# Patient Record
Sex: MALE | Race: WHITE | NOT HISPANIC OR LATINO | Employment: OTHER | URBAN - METROPOLITAN AREA
[De-identification: names, ages, dates, MRNs, and addresses within clinical notes are randomized per-mention and may not be internally consistent; named-entity substitution may affect disease eponyms.]

---

## 2023-07-09 ENCOUNTER — NURSING HOME VISIT (OUTPATIENT)
Dept: POST ACUTE CARE | Facility: EXTERNAL LOCATION | Age: 78
End: 2023-07-09
Payer: MEDICARE

## 2023-07-09 DIAGNOSIS — Z91.81 AT RISK FOR FALLS: ICD-10-CM

## 2023-07-09 DIAGNOSIS — L03.818 CELLULITIS OF OTHER SPECIFIED SITE: Primary | ICD-10-CM

## 2023-07-09 DIAGNOSIS — M62.82 NON-TRAUMATIC RHABDOMYOLYSIS: ICD-10-CM

## 2023-07-09 DIAGNOSIS — R53.1 ASTHENIA: ICD-10-CM

## 2023-07-09 DIAGNOSIS — I25.10 CORONARY ARTERY DISEASE, UNSPECIFIED VESSEL OR LESION TYPE, UNSPECIFIED WHETHER ANGINA PRESENT, UNSPECIFIED WHETHER NATIVE OR TRANSPLANTED HEART: ICD-10-CM

## 2023-07-09 DIAGNOSIS — S70.10XD: ICD-10-CM

## 2023-07-09 DIAGNOSIS — E13.9 DIABETES MELLITUS OF OTHER TYPE WITHOUT COMPLICATION, UNSPECIFIED WHETHER LONG TERM INSULIN USE (MULTI): ICD-10-CM

## 2023-07-09 DIAGNOSIS — I48.91 ATRIAL FIBRILLATION, UNSPECIFIED TYPE (MULTI): ICD-10-CM

## 2023-07-09 DIAGNOSIS — I10 BENIGN ESSENTIAL HYPERTENSION: ICD-10-CM

## 2023-07-09 PROCEDURE — 99306 1ST NF CARE HIGH MDM 50: CPT | Performed by: INTERNAL MEDICINE

## 2023-07-09 NOTE — LETTER
Patient: Darrell Arreola  : 1945    Encounter Date: 2023    NAME OF THE PATIENT: Darrell Arreola    YOB: 1945    PLACE OF SERVICE:   [facility]    This is new/initial history and physical.    HPI:  Mr. Darrell Arreola is a 77-year-old male with history of diabetes, who developed cellulitis to the left lower extremity.  He did have a fall.  He was noted to have contusion to his hip, as well as hematoma to his left thigh.  He was found to be in rhabdomyolysis.  He will continue oral fluid replacement.    PAST MEDICAL HISTORY:  As per nursing home list.  Cellulitis, hematoma, contusion, rhabdomyolysis, atrial fibrillation, coronary artery disease, diabetes, chronic ulcer left lower extremity, CVA, coronary artery disease, PAD, obesity, hypertension and atrial fibrillation.    CURRENT MEDICATIONS:  As per nursing home list.  Reviewed.    ALLERGIES:  As per nursing home list.  No known allergies.    SOCIAL HISTORY:  As per nursing home list.  The patient has a history of cigarette abuse.  He denies use of alcohol.    FAMILY HISTORY:  As per nursing home list.  History of hypertension in this patient's family.    REVIEW OF SYSTEMS:  All 12 systems reviewed and pertaining covered in history and physical, the rest are negative.  The patient denies any fever, chills, or chest pain at this time.    PHYSICAL EXAMINATION  GENERAL: This is a well-developed, well-nourished male, lying in bed, appearing weak and lethargic.  VITAL SIGNS:  As recorded and reviewed from EMR.  Blood pressure 130/84.  Pulse 86.  Respirations 18.  Temperature 98.3.  EYES:  The patient's sclerae white.  The pupils were equal and round.  ENT:  The patient's external ears were normal and the otoscopic examination was negative.  NECK:  Supple.  There were no palpable masses.  Thyroid was not enlarged and there were no carotid bruits.  RESPIRATORY:  The patient had normal inspirations and expirations.  The breath sounds were  equal bilaterally and clear to auscultation.  CARDIOVASCULAR:  The patient had S1 normal, split S2 without obvious rubs, clicks, or murmurs.  GASTROINTESTINAL:  There was no hepatosplenomegaly.  There were no palpable masses and no inguinal nodes.  LYMPHATIC:  The patient had no axillary, groin, or lymphadenopathy.  MUSCULOSKELETAL:  The patient had normal gait.  The joints appeared to be normal without evidence of deformity.  Grossly the muscles had good range of motion and were without effusion.  EXTREMITIES:  There is tenderness over the patient's left hip.  There are cellulitic changes to the patient's left lower extremity.  There is a chronic ulcer to the left lower extremity with dressing employed.  NEUROLOGIC:  The patient had normal cranial nerves.  The reflexes, sensory, and motor examination were grossly within normal limits.  PSYCHIATRIC: The patient had normal judgment and insight.  The patient was oriented to person, place, and time, and had no obvious mood defect including depression, anxiety, and/or agitation.    LAB WORK: Laboratory studies were reviewed.    ASSESSMENT AND PLAN:  Cellulitis, on antibotic.  Diabetes, on insulin.  Hematoma, left thigh, continue to monitor.  Contusion of the hip, on pain control.  Rhabdomyolysis, on oral replacement.  Atrial fibrillation, rate controlled, on Eliquis.  Coronary artery disease, on aspirin.  Weakness, on PT/OT.  Fall risk, on fall precaution.  Hypertension, med controlled.  Medication list reviewed and discussed with the family.  Hospital chart reviewed.      Electronically Signed By: Susan Delgado MD   8/24/23  6:10 PM

## 2023-07-15 ENCOUNTER — NURSING HOME VISIT (OUTPATIENT)
Dept: POST ACUTE CARE | Facility: EXTERNAL LOCATION | Age: 78
End: 2023-07-15
Payer: MEDICARE

## 2023-07-15 DIAGNOSIS — I10 BENIGN ESSENTIAL HYPERTENSION: ICD-10-CM

## 2023-07-15 DIAGNOSIS — I73.9 PVD (PERIPHERAL VASCULAR DISEASE) (CMS-HCC): ICD-10-CM

## 2023-07-15 DIAGNOSIS — M62.82 NON-TRAUMATIC RHABDOMYOLYSIS: ICD-10-CM

## 2023-07-15 DIAGNOSIS — L03.818 CELLULITIS OF OTHER SPECIFIED SITE: Primary | ICD-10-CM

## 2023-07-15 DIAGNOSIS — S70.10XD: ICD-10-CM

## 2023-07-15 DIAGNOSIS — I48.91 ATRIAL FIBRILLATION, UNSPECIFIED TYPE (MULTI): ICD-10-CM

## 2023-07-15 DIAGNOSIS — F32.A DEPRESSION, UNSPECIFIED DEPRESSION TYPE: ICD-10-CM

## 2023-07-15 DIAGNOSIS — R53.1 ASTHENIA: ICD-10-CM

## 2023-07-15 DIAGNOSIS — Z91.81 AT RISK FOR FALLS: ICD-10-CM

## 2023-07-15 DIAGNOSIS — E13.9 DIABETES MELLITUS OF OTHER TYPE WITHOUT COMPLICATION, UNSPECIFIED WHETHER LONG TERM INSULIN USE (MULTI): ICD-10-CM

## 2023-07-15 PROCEDURE — 99308 SBSQ NF CARE LOW MDM 20: CPT | Performed by: INTERNAL MEDICINE

## 2023-07-15 NOTE — LETTER
Patient: Darrell Arreola  : 1945    Encounter Date: 07/15/2023    NAME OF THE PATIENT: Darrell Arreola     YOB: 1945    PLACE OF SERVICE:  [facility]    This is a subsequent visit.    HPI:  Mr. Darrell Arreola is a 77-year-old male with history of diabetes with cellulitis to his lower extremity.  He has had recent rhabdomyolysis.  He suffers from weakness and deconditioning and unable to care for himself.  He requires supportive care.    PAST MEDICAL HISTORY:  As per nursing home list.  Cellulitis, hematoma, contusion, rhabdomyolysis, atrial fibrillation, coronary artery disease, diabetes, chronic ulcer left lower extremity, CVA, coronary artery disease, PAD, obesity, hypertension and atrial fibrillation.    CURRENT MEDICATIONS:  As per nursing home list.  Reviewed.    ALLERGIES:  As per nursing home list.  No known allergies.    SOCIAL HISTORY:  As per nursing home list.  The patient has a history of cigarette abuse.  He denies use of alcohol.    FAMILY HISTORY:  As per nursing home list.  History of hypertension in this patient's family.    REVIEW OF SYSTEMS:  All 12 systems reviewed and pertaining covered in history and physical, the rest are negative.  The patient denies any fever, chills, or chest pain at this time.    PHYSICAL EXAMINATION  GENERAL:  This is a well-developed, well-nourished male, sitting in a chair, in no acute distress.  VITAL SIGNS:  As recorded and reviewed from EMR.  Blood pressure 130/80.  Pulse 74.  Respirations 16.  Temperature 97.5.  EYES:  The patient's sclerae white.  The pupils were equal and round.  ENT:  The patient's external ears were normal and the otoscopic examination was negative.  NECK:  Supple.  There were no palpable masses.  Thyroid was not enlarged and there were no carotid bruits.  RESPIRATORY:  The patient had normal inspirations and expirations.  The breath sounds were equal bilaterally and clear to auscultation.  CARDIOVASCULAR:  The patient  had S1 normal, split S2 without obvious rubs, clicks, or murmurs.  GASTROINTESTINAL:  There was no hepatosplenomegaly.  There were no palpable masses and no inguinal nodes.  LYMPHATIC:  The patient had no axillary, groin, or lymphadenopathy.  MUSCULOSKELETAL:  The patient had normal gait.  The joints appeared to be normal without evidence of deformity.  Grossly the muscles had good range of motion and were without effusion.  EXTREMITIES:  The patient does have cellulitis to his left lower extremity.  There is tenderness over the patient's left thigh.  NEUROLOGIC:  The patient had normal cranial nerves.  The reflexes, sensory, and motor examination were grossly within normal limits.  PSYCHIATRIC: The patient had normal judgment and insight.  The patient was oriented to person, place, and time, and had no obvious mood defect including depression, anxiety, and/or agitation.    LAB WORK:  Laboratory studies reviewed from prior visit.    ASSESSMENT AND PLAN:  Cellulitis, on antibiotic.  Thigh contusion, on pain control.  Rhabdomyolysis, oral hydration.  Atrial fibrillation, rate controlled.  Diabetes, on insulin.  PVD, on medication.  Hypertension, med controlled.  Depression, on medication.  Weakness, on PT and OT.  Fall risk, fall precaution.  Medication list reviewed and discussed with the family.  Hospital chart reviewed.      Electronically Signed By: Susan Delgado MD   8/24/23  6:10 PM

## 2023-08-23 PROBLEM — I10 BENIGN ESSENTIAL HYPERTENSION: Status: ACTIVE | Noted: 2023-08-23

## 2023-08-23 PROBLEM — E11.9 DIABETES MELLITUS (MULTI): Status: ACTIVE | Noted: 2023-08-23

## 2023-08-23 PROBLEM — I73.9 PVD (PERIPHERAL VASCULAR DISEASE) (CMS-HCC): Status: ACTIVE | Noted: 2023-08-23

## 2023-08-23 PROBLEM — L03.90 CELLULITIS: Status: ACTIVE | Noted: 2023-08-23

## 2023-08-23 PROBLEM — Z91.81 AT RISK FOR FALLS: Status: ACTIVE | Noted: 2023-08-23

## 2023-08-23 PROBLEM — R53.1 ASTHENIA: Status: ACTIVE | Noted: 2023-08-23

## 2023-08-23 PROBLEM — I48.91 ATRIAL FIBRILLATION (MULTI): Status: ACTIVE | Noted: 2023-08-23

## 2023-08-23 PROBLEM — M62.82 NON-TRAUMATIC RHABDOMYOLYSIS: Status: ACTIVE | Noted: 2023-08-23

## 2023-08-23 PROBLEM — S70.10XA CONTUSION OF THIGH: Status: ACTIVE | Noted: 2023-08-23

## 2023-08-23 PROBLEM — I25.10 CORONARY ARTERY DISEASE: Status: ACTIVE | Noted: 2023-08-23

## 2023-08-23 PROBLEM — F32.A DEPRESSION: Status: ACTIVE | Noted: 2023-08-23

## 2023-08-24 NOTE — PROGRESS NOTES
NAME OF THE PATIENT: Darrell Arreola     YOB: 1945    PLACE OF SERVICE:  [facility]    This is a subsequent visit.    HPI:  Mr. Darrell Arreola is a 77-year-old male with history of diabetes with cellulitis to his lower extremity.  He has had recent rhabdomyolysis.  He suffers from weakness and deconditioning and unable to care for himself.  He requires supportive care.    PAST MEDICAL HISTORY:  As per nursing home list.  Cellulitis, hematoma, contusion, rhabdomyolysis, atrial fibrillation, coronary artery disease, diabetes, chronic ulcer left lower extremity, CVA, coronary artery disease, PAD, obesity, hypertension and atrial fibrillation.    CURRENT MEDICATIONS:  As per nursing home list.  Reviewed.    ALLERGIES:  As per nursing home list.  No known allergies.    SOCIAL HISTORY:  As per nursing home list.  The patient has a history of cigarette abuse.  He denies use of alcohol.    FAMILY HISTORY:  As per nursing home list.  History of hypertension in this patient's family.    REVIEW OF SYSTEMS:  All 12 systems reviewed and pertaining covered in history and physical, the rest are negative.  The patient denies any fever, chills, or chest pain at this time.    PHYSICAL EXAMINATION  GENERAL:  This is a well-developed, well-nourished male, sitting in a chair, in no acute distress.  VITAL SIGNS:  As recorded and reviewed from EMR.  Blood pressure 130/80.  Pulse 74.  Respirations 16.  Temperature 97.5.  EYES:  The patient's sclerae white.  The pupils were equal and round.  ENT:  The patient's external ears were normal and the otoscopic examination was negative.  NECK:  Supple.  There were no palpable masses.  Thyroid was not enlarged and there were no carotid bruits.  RESPIRATORY:  The patient had normal inspirations and expirations.  The breath sounds were equal bilaterally and clear to auscultation.  CARDIOVASCULAR:  The patient had S1 normal, split S2 without obvious rubs, clicks, or  murmurs.  GASTROINTESTINAL:  There was no hepatosplenomegaly.  There were no palpable masses and no inguinal nodes.  LYMPHATIC:  The patient had no axillary, groin, or lymphadenopathy.  MUSCULOSKELETAL:  The patient had normal gait.  The joints appeared to be normal without evidence of deformity.  Grossly the muscles had good range of motion and were without effusion.  EXTREMITIES:  The patient does have cellulitis to his left lower extremity.  There is tenderness over the patient's left thigh.  NEUROLOGIC:  The patient had normal cranial nerves.  The reflexes, sensory, and motor examination were grossly within normal limits.  PSYCHIATRIC: The patient had normal judgment and insight.  The patient was oriented to person, place, and time, and had no obvious mood defect including depression, anxiety, and/or agitation.    LAB WORK:  Laboratory studies reviewed from prior visit.    ASSESSMENT AND PLAN:  Cellulitis, on antibiotic.  Thigh contusion, on pain control.  Rhabdomyolysis, oral hydration.  Atrial fibrillation, rate controlled.  Diabetes, on insulin.  PVD, on medication.  Hypertension, med controlled.  Depression, on medication.  Weakness, on PT and OT.  Fall risk, fall precaution.  Medication list reviewed and discussed with the family.  Hospital chart reviewed.

## 2023-08-24 NOTE — PROGRESS NOTES
NAME OF THE PATIENT: Darrell Arreola    YOB: 1945    PLACE OF SERVICE:   [facility]    This is new/initial history and physical.    HPI:  Mr. Darrell Arreola is a 77-year-old male with history of diabetes, who developed cellulitis to the left lower extremity.  He did have a fall.  He was noted to have contusion to his hip, as well as hematoma to his left thigh.  He was found to be in rhabdomyolysis.  He will continue oral fluid replacement.    PAST MEDICAL HISTORY:  As per nursing home list.  Cellulitis, hematoma, contusion, rhabdomyolysis, atrial fibrillation, coronary artery disease, diabetes, chronic ulcer left lower extremity, CVA, coronary artery disease, PAD, obesity, hypertension and atrial fibrillation.    CURRENT MEDICATIONS:  As per nursing home list.  Reviewed.    ALLERGIES:  As per nursing home list.  No known allergies.    SOCIAL HISTORY:  As per nursing home list.  The patient has a history of cigarette abuse.  He denies use of alcohol.    FAMILY HISTORY:  As per nursing home list.  History of hypertension in this patient's family.    REVIEW OF SYSTEMS:  All 12 systems reviewed and pertaining covered in history and physical, the rest are negative.  The patient denies any fever, chills, or chest pain at this time.    PHYSICAL EXAMINATION  GENERAL: This is a well-developed, well-nourished male, lying in bed, appearing weak and lethargic.  VITAL SIGNS:  As recorded and reviewed from EMR.  Blood pressure 130/84.  Pulse 86.  Respirations 18.  Temperature 98.3.  EYES:  The patient's sclerae white.  The pupils were equal and round.  ENT:  The patient's external ears were normal and the otoscopic examination was negative.  NECK:  Supple.  There were no palpable masses.  Thyroid was not enlarged and there were no carotid bruits.  RESPIRATORY:  The patient had normal inspirations and expirations.  The breath sounds were equal bilaterally and clear to auscultation.  CARDIOVASCULAR:  The  patient had S1 normal, split S2 without obvious rubs, clicks, or murmurs.  GASTROINTESTINAL:  There was no hepatosplenomegaly.  There were no palpable masses and no inguinal nodes.  LYMPHATIC:  The patient had no axillary, groin, or lymphadenopathy.  MUSCULOSKELETAL:  The patient had normal gait.  The joints appeared to be normal without evidence of deformity.  Grossly the muscles had good range of motion and were without effusion.  EXTREMITIES:  There is tenderness over the patient's left hip.  There are cellulitic changes to the patient's left lower extremity.  There is a chronic ulcer to the left lower extremity with dressing employed.  NEUROLOGIC:  The patient had normal cranial nerves.  The reflexes, sensory, and motor examination were grossly within normal limits.  PSYCHIATRIC: The patient had normal judgment and insight.  The patient was oriented to person, place, and time, and had no obvious mood defect including depression, anxiety, and/or agitation.    LAB WORK: Laboratory studies were reviewed.    ASSESSMENT AND PLAN:  Cellulitis, on antibotic.  Diabetes, on insulin.  Hematoma, left thigh, continue to monitor.  Contusion of the hip, on pain control.  Rhabdomyolysis, on oral replacement.  Atrial fibrillation, rate controlled, on Eliquis.  Coronary artery disease, on aspirin.  Weakness, on PT/OT.  Fall risk, on fall precaution.  Hypertension, med controlled.  Medication list reviewed and discussed with the family.  Hospital chart reviewed.

## 2023-09-02 ENCOUNTER — NURSING HOME VISIT (OUTPATIENT)
Dept: POST ACUTE CARE | Facility: EXTERNAL LOCATION | Age: 78
End: 2023-09-02
Payer: MEDICARE

## 2023-09-02 DIAGNOSIS — E13.9 DIABETES MELLITUS OF OTHER TYPE WITHOUT COMPLICATION, UNSPECIFIED WHETHER LONG TERM INSULIN USE (MULTI): ICD-10-CM

## 2023-09-02 DIAGNOSIS — I10 BENIGN ESSENTIAL HYPERTENSION: ICD-10-CM

## 2023-09-02 DIAGNOSIS — Z91.81 AT RISK FOR FALLS: ICD-10-CM

## 2023-09-02 DIAGNOSIS — F32.89 OTHER DEPRESSION: ICD-10-CM

## 2023-09-02 DIAGNOSIS — I73.9 PVD (PERIPHERAL VASCULAR DISEASE) (CMS-HCC): ICD-10-CM

## 2023-09-02 DIAGNOSIS — L03.818 CELLULITIS OF OTHER SPECIFIED SITE: Primary | ICD-10-CM

## 2023-09-02 DIAGNOSIS — R53.1 ASTHENIA: ICD-10-CM

## 2023-09-02 DIAGNOSIS — E66.9 OBESITY, UNSPECIFIED CLASSIFICATION, UNSPECIFIED OBESITY TYPE, UNSPECIFIED WHETHER SERIOUS COMORBIDITY PRESENT: ICD-10-CM

## 2023-09-02 DIAGNOSIS — I48.91 ATRIAL FIBRILLATION, UNSPECIFIED TYPE (MULTI): ICD-10-CM

## 2023-09-02 PROCEDURE — 99307 SBSQ NF CARE SF MDM 10: CPT | Performed by: INTERNAL MEDICINE

## 2023-09-02 NOTE — LETTER
Patient: Darrell Arreola  : 1945    Encounter Date: 2023    NAME OF THE PATIENT: Darrell Arreola     YOB: 1945    PLACE OF SERVICE:  [facility]    This is a subsequent visit.    HPI:  Mr. Darrell Arreola is a 78-year-old male with history of diabetes and DVT with cellulitis to his lower extremity.  He is currently unable to care for himself and manage his affairs.  He requires supportive care.    PAST MEDICAL HISTORY:  As per nursing home list.  Cellulitis, hematoma, contusion, rhabdomyolysis, atrial fibrillation, coronary artery disease, diabetes, chronic ulcer left lower extremity, CVA, coronary artery disease, PAD, obesity, hypertension and atrial fibrillation.    CURRENT MEDICATIONS:  As per nursing home list.  Reviewed.    ALLERGIES:  As per nursing home list.  No known allergies.    SOCIAL HISTORY:  As per nursing home list.  The patient has a history of cigarette abuse.  He denies use of alcohol.    FAMILY HISTORY:  As per nursing home list.  History of hypertension in this patient's family.    REVIEW OF SYSTEMS:  All 12 systems reviewed and pertaining covered in history and physical, the rest are negative.  The patient denies any fever, chills, or chest pain at this time.    PHYSICAL EXAMINATION  GENERAL:  This is a well-developed, well-nourished male, sitting in a chair, in no acute distress.  VITAL SIGNS:  As recorded and reviewed from EMR.  Blood pressure 128/76.  Pulse 74.  Respirations 18.  Temperature 98.3.  EYES:  The patient's sclerae white.  The pupils were equal and round.  ENT:  The patient's external ears were normal and the otoscopic examination was negative.  NECK:  Supple.  There were no palpable masses.  Thyroid was not enlarged and there were no carotid bruits.  RESPIRATORY:  The patient had normal inspirations and expirations.  The breath sounds were equal bilaterally and clear to auscultation.  CARDIOVASCULAR:  The patient had S1 normal, split S2 without  obvious rubs, clicks, or murmurs.  GASTROINTESTINAL:  There was no hepatosplenomegaly.  There were no palpable masses and no inguinal nodes.  LYMPHATIC:  The patient had no axillary, groin, or lymphadenopathy.  MUSCULOSKELETAL:  The patient had normal gait.  The joints appeared to be normal without evidence of deformity.  Grossly the muscles had good range of motion and were without effusion.  EXTREMITIES:  The patient does show some residual erythema to his left lower extremity.  NEUROLOGIC:  The patient had normal cranial nerves.  The reflexes, sensory, and motor examination were grossly within normal limits.  PSYCHIATRIC: The patient had normal judgment and insight.  The patient was oriented to person, place, and time, and had no obvious mood defect including anxiety, and/or agitation.    LAB WORK:  Laboratory studies were reviewed from prior visit.    ASSESSMENT AND PLAN:  Cellulitis, continue to monitor.  Atrial fibrillation, rate controlled.  Diabetes, on insulin.  PVD, on medication.  Morbid obesity, encouraged to exercise.  Hypertension, med controlled.  Depression, on medication.  Weakness, on PT and OT.  Fall risk, fall precaution.  Medication list reviewed and discussed with the family.  Hospital chart reviewed.      Electronically Signed By: Susan Delgado MD   9/26/23  1:39 PM

## 2023-09-25 PROBLEM — E66.9 OBESITY: Status: ACTIVE | Noted: 2023-09-25

## 2023-09-25 NOTE — PROGRESS NOTES
NAME OF THE PATIENT: Darrell Arreola     YOB: 1945    PLACE OF SERVICE:  [facility]    This is a subsequent visit.    HPI:  Mr. Darrell Arreola is a 78-year-old male with history of diabetes and DVT with cellulitis to his lower extremity.  He is currently unable to care for himself and manage his affairs.  He requires supportive care.    PAST MEDICAL HISTORY:  As per nursing home list.  Cellulitis, hematoma, contusion, rhabdomyolysis, atrial fibrillation, coronary artery disease, diabetes, chronic ulcer left lower extremity, CVA, coronary artery disease, PAD, obesity, hypertension and atrial fibrillation.    CURRENT MEDICATIONS:  As per nursing home list.  Reviewed.    ALLERGIES:  As per nursing home list.  No known allergies.    SOCIAL HISTORY:  As per nursing home list.  The patient has a history of cigarette abuse.  He denies use of alcohol.    FAMILY HISTORY:  As per nursing home list.  History of hypertension in this patient's family.    REVIEW OF SYSTEMS:  All 12 systems reviewed and pertaining covered in history and physical, the rest are negative.  The patient denies any fever, chills, or chest pain at this time.    PHYSICAL EXAMINATION  GENERAL:  This is a well-developed, well-nourished male, sitting in a chair, in no acute distress.  VITAL SIGNS:  As recorded and reviewed from EMR.  Blood pressure 128/76.  Pulse 74.  Respirations 18.  Temperature 98.3.  EYES:  The patient's sclerae white.  The pupils were equal and round.  ENT:  The patient's external ears were normal and the otoscopic examination was negative.  NECK:  Supple.  There were no palpable masses.  Thyroid was not enlarged and there were no carotid bruits.  RESPIRATORY:  The patient had normal inspirations and expirations.  The breath sounds were equal bilaterally and clear to auscultation.  CARDIOVASCULAR:  The patient had S1 normal, split S2 without obvious rubs, clicks, or murmurs.  GASTROINTESTINAL:  There was no  hepatosplenomegaly.  There were no palpable masses and no inguinal nodes.  LYMPHATIC:  The patient had no axillary, groin, or lymphadenopathy.  MUSCULOSKELETAL:  The patient had normal gait.  The joints appeared to be normal without evidence of deformity.  Grossly the muscles had good range of motion and were without effusion.  EXTREMITIES:  The patient does show some residual erythema to his left lower extremity.  NEUROLOGIC:  The patient had normal cranial nerves.  The reflexes, sensory, and motor examination were grossly within normal limits.  PSYCHIATRIC: The patient had normal judgment and insight.  The patient was oriented to person, place, and time, and had no obvious mood defect including anxiety, and/or agitation.    LAB WORK:  Laboratory studies were reviewed from prior visit.    ASSESSMENT AND PLAN:  Cellulitis, continue to monitor.  Atrial fibrillation, rate controlled.  Diabetes, on insulin.  PVD, on medication.  Morbid obesity, encouraged to exercise.  Hypertension, med controlled.  Depression, on medication.  Weakness, on PT and OT.  Fall risk, fall precaution.  Medication list reviewed and discussed with the family.  Hospital chart reviewed.

## 2023-10-08 ENCOUNTER — HOSPITAL ENCOUNTER (INPATIENT)
Facility: HOSPITAL | Age: 78
LOS: 12 days | Discharge: NURSING, REHAB OR LONG-TERM CARE | DRG: 091 | End: 2023-10-20
Attending: EMERGENCY MEDICINE | Admitting: INTERNAL MEDICINE
Payer: MEDICARE

## 2023-10-08 ENCOUNTER — APPOINTMENT (OUTPATIENT)
Dept: RADIOLOGY | Facility: HOSPITAL | Age: 78
DRG: 091 | End: 2023-10-08
Payer: MEDICARE

## 2023-10-08 DIAGNOSIS — N17.9 ACUTE RENAL FAILURE, UNSPECIFIED ACUTE RENAL FAILURE TYPE (CMS-HCC): ICD-10-CM

## 2023-10-08 DIAGNOSIS — I73.9 PVD (PERIPHERAL VASCULAR DISEASE) (CMS-HCC): ICD-10-CM

## 2023-10-08 DIAGNOSIS — R47.01 APHASIA: ICD-10-CM

## 2023-10-08 DIAGNOSIS — E66.9 CLASS 1 OBESITY WITHOUT SERIOUS COMORBIDITY WITH BODY MASS INDEX (BMI) OF 31.0 TO 31.9 IN ADULT, UNSPECIFIED OBESITY TYPE: ICD-10-CM

## 2023-10-08 DIAGNOSIS — I48.91 ATRIAL FIBRILLATION WITH RVR (MULTI): ICD-10-CM

## 2023-10-08 DIAGNOSIS — Z86.73 HISTORY OF CEREBROVASCULAR ACCIDENT: ICD-10-CM

## 2023-10-08 DIAGNOSIS — I25.118 CORONARY ARTERY DISEASE OF NATIVE HEART WITH STABLE ANGINA PECTORIS, UNSPECIFIED VESSEL OR LESION TYPE (CMS-HCC): ICD-10-CM

## 2023-10-08 DIAGNOSIS — I48.0 PAROXYSMAL ATRIAL FIBRILLATION (MULTI): ICD-10-CM

## 2023-10-08 DIAGNOSIS — R29.90 STROKE-LIKE SYMPTOMS: Primary | ICD-10-CM

## 2023-10-08 DIAGNOSIS — I48.20 CHRONIC ATRIAL FIBRILLATION (MULTI): ICD-10-CM

## 2023-10-08 DIAGNOSIS — E11.69 DIABETES MELLITUS TYPE 2 IN OBESE: ICD-10-CM

## 2023-10-08 DIAGNOSIS — I48.91 ATRIAL FIBRILLATION, UNSPECIFIED TYPE (MULTI): ICD-10-CM

## 2023-10-08 DIAGNOSIS — E66.9 DIABETES MELLITUS TYPE 2 IN OBESE: ICD-10-CM

## 2023-10-08 DIAGNOSIS — Z91.81 AT RISK FOR FALLS: ICD-10-CM

## 2023-10-08 DIAGNOSIS — R53.1 ASTHENIA: ICD-10-CM

## 2023-10-08 DIAGNOSIS — I63.9 CEREBRAL INFARCTION, UNSPECIFIED (MULTI): ICD-10-CM

## 2023-10-08 DIAGNOSIS — M62.82 NON-TRAUMATIC RHABDOMYOLYSIS: ICD-10-CM

## 2023-10-08 DIAGNOSIS — I10 BENIGN ESSENTIAL HYPERTENSION: ICD-10-CM

## 2023-10-08 DIAGNOSIS — I42.0 DILATED CARDIOMYOPATHY (MULTI): ICD-10-CM

## 2023-10-08 DIAGNOSIS — E13.9 DIABETES MELLITUS OF OTHER TYPE WITHOUT COMPLICATION, UNSPECIFIED WHETHER LONG TERM INSULIN USE (MULTI): ICD-10-CM

## 2023-10-08 LAB
ALBUMIN SERPL-MCNC: 4 G/DL (ref 3.5–5)
ALP BLD-CCNC: 159 U/L (ref 35–125)
ALT SERPL-CCNC: 13 U/L (ref 5–40)
ANION GAP SERPL CALC-SCNC: 15 MMOL/L
APPEARANCE UR: CLEAR
AST SERPL-CCNC: 24 U/L (ref 5–40)
BACTERIA #/AREA URNS AUTO: ABNORMAL /HPF
BASOPHILS # BLD AUTO: 0.01 X10*3/UL (ref 0–0.1)
BASOPHILS NFR BLD AUTO: 0.2 %
BILIRUB SERPL-MCNC: 2.1 MG/DL (ref 0.1–1.2)
BILIRUB UR STRIP.AUTO-MCNC: ABNORMAL MG/DL
BUN SERPL-MCNC: 23 MG/DL (ref 8–25)
CALCIUM SERPL-MCNC: 9.4 MG/DL (ref 8.5–10.4)
CHLORIDE SERPL-SCNC: 101 MMOL/L (ref 97–107)
CO2 SERPL-SCNC: 23 MMOL/L (ref 24–31)
COLOR UR: YELLOW
CREAT SERPL-MCNC: 1.4 MG/DL (ref 0.4–1.6)
EOSINOPHIL # BLD AUTO: 0.02 X10*3/UL (ref 0–0.4)
EOSINOPHIL NFR BLD AUTO: 0.5 %
ERYTHROCYTE [DISTWIDTH] IN BLOOD BY AUTOMATED COUNT: 15.5 % (ref 11.5–14.5)
GFR SERPL CREATININE-BSD FRML MDRD: 51 ML/MIN/1.73M*2
GLUCOSE BLD MANUAL STRIP-MCNC: 74 MG/DL (ref 74–99)
GLUCOSE BLD MANUAL STRIP-MCNC: 88 MG/DL (ref 74–99)
GLUCOSE BLD MANUAL STRIP-MCNC: 92 MG/DL (ref 74–99)
GLUCOSE SERPL-MCNC: 97 MG/DL (ref 65–99)
GLUCOSE UR STRIP.AUTO-MCNC: NORMAL MG/DL
HCT VFR BLD AUTO: 41.1 % (ref 41–52)
HGB BLD-MCNC: 13.2 G/DL (ref 13.5–17.5)
HYALINE CASTS #/AREA URNS AUTO: ABNORMAL /LPF
IMM GRANULOCYTES # BLD AUTO: 0.01 X10*3/UL (ref 0–0.5)
IMM GRANULOCYTES NFR BLD AUTO: 0.2 % (ref 0–0.9)
INR PPP: 1.3 (ref 0.9–1.2)
KETONES UR STRIP.AUTO-MCNC: ABNORMAL MG/DL
LEUKOCYTE ESTERASE UR QL STRIP.AUTO: NEGATIVE
LYMPHOCYTES # BLD AUTO: 1 X10*3/UL (ref 0.8–3)
LYMPHOCYTES NFR BLD AUTO: 24.1 %
MCH RBC QN AUTO: 33.8 PG (ref 26–34)
MCHC RBC AUTO-ENTMCNC: 32.1 G/DL (ref 32–36)
MCV RBC AUTO: 105 FL (ref 80–100)
MONOCYTES # BLD AUTO: 0.34 X10*3/UL (ref 0.05–0.8)
MONOCYTES NFR BLD AUTO: 8.2 %
NEUTROPHILS # BLD AUTO: 2.77 X10*3/UL (ref 1.6–5.5)
NEUTROPHILS NFR BLD AUTO: 66.8 %
NITRITE UR QL STRIP.AUTO: NEGATIVE
NRBC BLD-RTO: 0 /100 WBCS (ref 0–0)
PH UR STRIP.AUTO: 5 [PH]
PLATELET # BLD AUTO: 147 X10*3/UL (ref 150–450)
PMV BLD AUTO: 9.4 FL (ref 7.5–11.5)
POTASSIUM SERPL-SCNC: 4.5 MMOL/L (ref 3.4–5.1)
PROT SERPL-MCNC: 7.5 G/DL (ref 5.9–7.9)
PROT UR STRIP.AUTO-MCNC: ABNORMAL MG/DL
PROTHROMBIN TIME: 13 SECONDS (ref 9.3–12.7)
RBC # BLD AUTO: 3.91 X10*6/UL (ref 4.5–5.9)
RBC # UR STRIP.AUTO: ABNORMAL /UL
RBC #/AREA URNS AUTO: >20 /HPF
SODIUM SERPL-SCNC: 139 MMOL/L (ref 133–145)
SP GR UR STRIP.AUTO: 1.02
UROBILINOGEN UR STRIP.AUTO-MCNC: ABNORMAL MG/DL
WBC # BLD AUTO: 4.2 X10*3/UL (ref 4.4–11.3)
WBC #/AREA URNS AUTO: ABNORMAL /HPF

## 2023-10-08 PROCEDURE — 93010 ELECTROCARDIOGRAM REPORT: CPT | Performed by: INTERNAL MEDICINE

## 2023-10-08 PROCEDURE — 2060000001 HC INTERMEDIATE ICU ROOM DAILY

## 2023-10-08 PROCEDURE — 2550000001 HC RX 255 CONTRASTS: Performed by: INTERNAL MEDICINE

## 2023-10-08 PROCEDURE — 70498 CT ANGIOGRAPHY NECK: CPT | Mod: MG

## 2023-10-08 PROCEDURE — 85610 PROTHROMBIN TIME: CPT | Performed by: EMERGENCY MEDICINE

## 2023-10-08 PROCEDURE — 85025 COMPLETE CBC W/AUTO DIFF WBC: CPT | Performed by: EMERGENCY MEDICINE

## 2023-10-08 PROCEDURE — 99291 CRITICAL CARE FIRST HOUR: CPT

## 2023-10-08 PROCEDURE — 82947 ASSAY GLUCOSE BLOOD QUANT: CPT

## 2023-10-08 PROCEDURE — 70450 CT HEAD/BRAIN W/O DYE: CPT

## 2023-10-08 PROCEDURE — 96375 TX/PRO/DX INJ NEW DRUG ADDON: CPT

## 2023-10-08 PROCEDURE — 96365 THER/PROPH/DIAG IV INF INIT: CPT

## 2023-10-08 PROCEDURE — 2500000005 HC RX 250 GENERAL PHARMACY W/O HCPCS

## 2023-10-08 PROCEDURE — 80053 COMPREHEN METABOLIC PANEL: CPT | Performed by: EMERGENCY MEDICINE

## 2023-10-08 PROCEDURE — 36415 COLL VENOUS BLD VENIPUNCTURE: CPT | Performed by: EMERGENCY MEDICINE

## 2023-10-08 PROCEDURE — 2500000001 HC RX 250 WO HCPCS SELF ADMINISTERED DRUGS (ALT 637 FOR MEDICARE OP): Performed by: NURSE PRACTITIONER

## 2023-10-08 PROCEDURE — 2500000004 HC RX 250 GENERAL PHARMACY W/ HCPCS (ALT 636 FOR OP/ED)

## 2023-10-08 PROCEDURE — 81001 URINALYSIS AUTO W/SCOPE: CPT | Performed by: EMERGENCY MEDICINE

## 2023-10-08 PROCEDURE — 2500000001 HC RX 250 WO HCPCS SELF ADMINISTERED DRUGS (ALT 637 FOR MEDICARE OP): Performed by: EMERGENCY MEDICINE

## 2023-10-08 RX ORDER — METOPROLOL SUCCINATE 25 MG/1
12.5 TABLET, EXTENDED RELEASE ORAL 2 TIMES DAILY
COMMUNITY
End: 2023-10-20 | Stop reason: HOSPADM

## 2023-10-08 RX ORDER — DILTIAZEM HYDROCHLORIDE 5 MG/ML
10 INJECTION INTRAVENOUS ONCE
Status: COMPLETED | OUTPATIENT
Start: 2023-10-08 | End: 2023-10-08

## 2023-10-08 RX ORDER — VIT C/E/ZN/COPPR/LUTEIN/ZEAXAN 250MG-90MG
50 CAPSULE ORAL DAILY
COMMUNITY
End: 2023-10-08 | Stop reason: ALTCHOICE

## 2023-10-08 RX ORDER — SIMVASTATIN 20 MG/1
20 TABLET, FILM COATED ORAL NIGHTLY
COMMUNITY
End: 2023-10-08 | Stop reason: ALTCHOICE

## 2023-10-08 RX ORDER — CEFTRIAXONE 1 G/50ML
1 INJECTION, SOLUTION INTRAVENOUS ONCE
Status: COMPLETED | OUTPATIENT
Start: 2023-10-08 | End: 2023-10-08

## 2023-10-08 RX ORDER — NAPROXEN SODIUM 220 MG/1
324 TABLET, FILM COATED ORAL ONCE
Status: COMPLETED | OUTPATIENT
Start: 2023-10-08 | End: 2023-10-08

## 2023-10-08 RX ORDER — INSULIN LISPRO 100 [IU]/ML
0-5 INJECTION, SOLUTION INTRAVENOUS; SUBCUTANEOUS EVERY 4 HOURS
Status: DISCONTINUED | OUTPATIENT
Start: 2023-10-08 | End: 2023-10-12

## 2023-10-08 RX ORDER — ENOXAPARIN SODIUM 100 MG/ML
40 INJECTION SUBCUTANEOUS EVERY 24 HOURS
Status: DISCONTINUED | OUTPATIENT
Start: 2023-10-08 | End: 2023-10-08

## 2023-10-08 RX ORDER — ESCITALOPRAM OXALATE 10 MG/1
10 TABLET ORAL DAILY
COMMUNITY
End: 2023-10-08 | Stop reason: ALTCHOICE

## 2023-10-08 RX ORDER — CEFTRIAXONE 1 G/50ML
1 INJECTION, SOLUTION INTRAVENOUS EVERY 24 HOURS
Status: DISCONTINUED | OUTPATIENT
Start: 2023-10-09 | End: 2023-10-14

## 2023-10-08 RX ORDER — HYDRALAZINE HYDROCHLORIDE 20 MG/ML
10 INJECTION INTRAMUSCULAR; INTRAVENOUS
Status: ACTIVE | OUTPATIENT
Start: 2023-10-08 | End: 2023-10-10

## 2023-10-08 RX ORDER — LABETALOL HYDROCHLORIDE 5 MG/ML
10 INJECTION, SOLUTION INTRAVENOUS EVERY 10 MIN PRN
Status: ACTIVE | OUTPATIENT
Start: 2023-10-08 | End: 2023-10-10

## 2023-10-08 RX ORDER — METOPROLOL TARTRATE 1 MG/ML
5 INJECTION, SOLUTION INTRAVENOUS ONCE
Status: COMPLETED | OUTPATIENT
Start: 2023-10-08 | End: 2023-10-08

## 2023-10-08 RX ORDER — LIDOCAINE 50 MG/G
1 PATCH TOPICAL DAILY
COMMUNITY
End: 2023-10-08 | Stop reason: ALTCHOICE

## 2023-10-08 RX ORDER — VENLAFAXINE HYDROCHLORIDE 150 MG/1
150 CAPSULE, EXTENDED RELEASE ORAL DAILY
COMMUNITY
End: 2023-10-20 | Stop reason: HOSPADM

## 2023-10-08 RX ORDER — ASPIRIN 81 MG/1
81 TABLET ORAL DAILY
Status: DISCONTINUED | OUTPATIENT
Start: 2023-10-09 | End: 2023-10-16

## 2023-10-08 RX ORDER — DILTIAZEM HCL/D5W 125 MG/125
5-15 PLASTIC BAG, INJECTION (ML) INTRAVENOUS CONTINUOUS
Status: DISCONTINUED | OUTPATIENT
Start: 2023-10-08 | End: 2023-10-10

## 2023-10-08 RX ORDER — FAMOTIDINE 20 MG/1
20 TABLET, FILM COATED ORAL DAILY
COMMUNITY
End: 2024-04-28 | Stop reason: HOSPADM

## 2023-10-08 RX ORDER — ROPINIROLE 1 MG/1
1 TABLET, FILM COATED ORAL 3 TIMES DAILY
COMMUNITY
End: 2023-10-08 | Stop reason: ALTCHOICE

## 2023-10-08 RX ORDER — HYDRALAZINE HYDROCHLORIDE 25 MG/1
25 TABLET, FILM COATED ORAL EVERY 6 HOURS PRN
Status: DISCONTINUED | OUTPATIENT
Start: 2023-10-10 | End: 2023-10-20 | Stop reason: HOSPADM

## 2023-10-08 RX ORDER — TORSEMIDE 20 MG/1
20 TABLET ORAL DAILY
COMMUNITY
End: 2023-10-20 | Stop reason: HOSPADM

## 2023-10-08 RX ORDER — DEXTROSE 50 % IN WATER (D50W) INTRAVENOUS SYRINGE
25
Status: DISCONTINUED | OUTPATIENT
Start: 2023-10-08 | End: 2023-10-20 | Stop reason: HOSPADM

## 2023-10-08 RX ORDER — FUROSEMIDE 20 MG/1
20 TABLET ORAL DAILY
COMMUNITY
End: 2023-10-08 | Stop reason: ALTCHOICE

## 2023-10-08 RX ORDER — ROSUVASTATIN CALCIUM 10 MG/1
10 TABLET, COATED ORAL DAILY
COMMUNITY

## 2023-10-08 RX ORDER — ASPIRIN 300 MG/1
300 SUPPOSITORY RECTAL DAILY
Status: DISCONTINUED | OUTPATIENT
Start: 2023-10-09 | End: 2023-10-16

## 2023-10-08 RX ORDER — AMOXICILLIN 250 MG
2 CAPSULE ORAL
COMMUNITY
End: 2023-10-08 | Stop reason: ALTCHOICE

## 2023-10-08 RX ORDER — NAPROXEN SODIUM 220 MG/1
81 TABLET, FILM COATED ORAL DAILY
Status: DISCONTINUED | OUTPATIENT
Start: 2023-10-09 | End: 2023-10-16

## 2023-10-08 RX ORDER — DEXTROSE MONOHYDRATE 100 MG/ML
0.3 INJECTION, SOLUTION INTRAVENOUS ONCE AS NEEDED
Status: DISCONTINUED | OUTPATIENT
Start: 2023-10-08 | End: 2023-10-20 | Stop reason: HOSPADM

## 2023-10-08 RX ORDER — TRAZODONE HYDROCHLORIDE 50 MG/1
50 TABLET ORAL NIGHTLY
COMMUNITY
End: 2023-10-20 | Stop reason: HOSPADM

## 2023-10-08 RX ORDER — MIDODRINE HYDROCHLORIDE 10 MG/1
15 TABLET ORAL 3 TIMES DAILY
COMMUNITY
End: 2023-10-20 | Stop reason: HOSPADM

## 2023-10-08 RX ORDER — METOPROLOL TARTRATE 100 MG/1
100 TABLET ORAL 2 TIMES DAILY
COMMUNITY
End: 2023-10-08 | Stop reason: ALTCHOICE

## 2023-10-08 RX ORDER — UBIDECARENONE 75 MG
500 CAPSULE ORAL
COMMUNITY
End: 2023-10-08 | Stop reason: ALTCHOICE

## 2023-10-08 RX ORDER — TAMSULOSIN HYDROCHLORIDE 0.4 MG/1
0.4 CAPSULE ORAL
COMMUNITY
End: 2024-04-28 | Stop reason: HOSPADM

## 2023-10-08 RX ORDER — FOLIC ACID 1 MG/1
TABLET ORAL DAILY
COMMUNITY
End: 2024-04-28 | Stop reason: HOSPADM

## 2023-10-08 RX ORDER — FERROUS SULFATE 325(65) MG
65 TABLET ORAL
COMMUNITY
End: 2023-10-08 | Stop reason: ALTCHOICE

## 2023-10-08 RX ORDER — GABAPENTIN 100 MG/1
100 CAPSULE ORAL 2 TIMES DAILY
COMMUNITY
End: 2023-10-20 | Stop reason: HOSPADM

## 2023-10-08 RX ORDER — COLCHICINE 0.6 MG/1
0.6 TABLET ORAL DAILY
COMMUNITY
End: 2023-10-08 | Stop reason: ALTCHOICE

## 2023-10-08 RX ADMIN — CEFTRIAXONE SODIUM 1 G: 1 INJECTION, SOLUTION INTRAVENOUS at 14:55

## 2023-10-08 RX ADMIN — APIXABAN 5 MG: 5 TABLET, FILM COATED ORAL at 18:15

## 2023-10-08 RX ADMIN — Medication 10 MG/HR: at 15:40

## 2023-10-08 RX ADMIN — ASPIRIN 324 MG: 81 TABLET, CHEWABLE ORAL at 17:13

## 2023-10-08 RX ADMIN — METOPROLOL TARTRATE 5 MG: 5 INJECTION INTRAVENOUS at 14:51

## 2023-10-08 RX ADMIN — IOHEXOL 100 ML: 350 INJECTION, SOLUTION INTRAVENOUS at 16:04

## 2023-10-08 RX ADMIN — DILTIAZEM HYDROCHLORIDE 10 MG: 5 INJECTION INTRAVENOUS at 15:09

## 2023-10-08 ASSESSMENT — COGNITIVE AND FUNCTIONAL STATUS - GENERAL
DRESSING REGULAR UPPER BODY CLOTHING: A LOT
WALKING IN HOSPITAL ROOM: A LOT
MOBILITY SCORE: 14
DRESSING REGULAR LOWER BODY CLOTHING: A LOT
PERSONAL GROOMING: A LOT
MOVING FROM LYING ON BACK TO SITTING ON SIDE OF FLAT BED WITH BEDRAILS: A LITTLE
DAILY ACTIVITIY SCORE: 13
EATING MEALS: A LITTLE
CLIMB 3 TO 5 STEPS WITH RAILING: TOTAL
STANDING UP FROM CHAIR USING ARMS: A LOT
HELP NEEDED FOR BATHING: A LOT
TURNING FROM BACK TO SIDE WHILE IN FLAT BAD: A LITTLE
TOILETING: A LOT
MOVING TO AND FROM BED TO CHAIR: A LITTLE

## 2023-10-08 ASSESSMENT — LIFESTYLE VARIABLES
EVER FELT BAD OR GUILTY ABOUT YOUR DRINKING: NO
EVER HAD A DRINK FIRST THING IN THE MORNING TO STEADY YOUR NERVES TO GET RID OF A HANGOVER: NO
HAVE YOU EVER FELT YOU SHOULD CUT DOWN ON YOUR DRINKING: NO
HAVE PEOPLE ANNOYED YOU BY CRITICIZING YOUR DRINKING: NO

## 2023-10-08 ASSESSMENT — COLUMBIA-SUICIDE SEVERITY RATING SCALE - C-SSRS
1. IN THE PAST MONTH, HAVE YOU WISHED YOU WERE DEAD OR WISHED YOU COULD GO TO SLEEP AND NOT WAKE UP?: NO
6. HAVE YOU EVER DONE ANYTHING, STARTED TO DO ANYTHING, OR PREPARED TO DO ANYTHING TO END YOUR LIFE?: NO
2. HAVE YOU ACTUALLY HAD ANY THOUGHTS OF KILLING YOURSELF?: NO

## 2023-10-08 ASSESSMENT — PAIN SCALES - GENERAL: PAINLEVEL_OUTOF10: 0 - NO PAIN

## 2023-10-08 ASSESSMENT — PAIN - FUNCTIONAL ASSESSMENT
PAIN_FUNCTIONAL_ASSESSMENT: 0-10
PAIN_FUNCTIONAL_ASSESSMENT: 0-10

## 2023-10-08 NOTE — ED PROVIDER NOTES
EMERGENCY DEPARTMENT ENCOUNTER      Pt Name: Darrell Arreola  MRN: [id number]  Birthdate 1945  Date of evaluation: 10/8/2023  ED Provider: Zara Horne DO     CHIEF COMPLAINT       Chief Complaint   Patient presents with    Altered Mental Status     Patient arrives via ems with complaints of confusion, aphasia and possible stroke. Patient alert and awake but not responding to questions asked.       HISTORY OF PRESENT ILLNESS    Darrell Arreola is a 78 y.o. who presents to the emergency department via EMS with concern for possible stroke.  Last known well is similar as prior to arrival.  He lives at home independent and is normally alert and oriented x4.  Today family found him confused and unable to follow commands.  When EMS arrived his sugar was appropriate he was tachycardic in atrial fibrillation.  He is on anticoagulant, unsure Eliquis or Coumadin.  He was recently discharged from a skilled nursing facility approximately 1 month ago when he was admitted for weakness.  The patient himself is unable to verbalize complaint.  He follows some commands but not more complex ones.  History is limited secondary to lack of family and patient's ability to speak for himself.    Nursing Notes were reviewed.    Outside historians: EMS  REVIEW OF SYSTEMS     Focused ROS performed and negative other than as listed in HPI    PAST MEDICAL HISTORY   No past medical history on file.    SURGICAL HISTORY       Past Surgical History:   Procedure Laterality Date    CT GUIDED PERCUTANEOUS BIOPSY BONE DEEP  2/21/2020    CT GUIDED PERCUTANEOUS BIOPSY BONE DEEP 2/21/2020 [facility] [place]    CT GUIDED PERCUTANEOUS BIOPSY BONE DEEP  3/24/2020    CT GUIDED PERCUTANEOUS BIOPSY BONE DEEP 3/24/2020 [facility] [place]    MR HEAD ANGIO WO IV CONTRAST  5/17/2019    MR HEAD ANGIO WO IV CONTRAST [facility] EMERGENCY LEGACY       CURRENT MEDICATIONS       Previous Medications    APIXABAN (ELIQUIS) 5 MG TABLET    Take 1 tablet (5 mg) by mouth 2  times a day.    CHOLECALCIFEROL (VITAMIN D-3) 25 MCG (1000 UT) CAPSULE    Take 2 capsules (50 mcg) by mouth once daily.    COLCHICINE, GOUT, 0.6 MG TABLET    Take 1 tablet (0.6 mg) by mouth once daily.    CYANOCOBALAMIN (VITAMIN B-12) 500 MCG TABLET    Take 1 tablet (500 mcg) by mouth once a day on Tuesday and Thursday. Tues,Thur and saturday    DEXTROMETHORPHAN 15 MG/5 ML SYRUP    Take 15 mL (45 mg) by mouth every 6 hours if needed for cough.    ESCITALOPRAM (LEXAPRO) 10 MG TABLET    Take 1 tablet (10 mg) by mouth once daily.    FERROUS SULFATE 325 (65 FE) MG TABLET    Take 1 tablet (65 mg of iron) by mouth 3 times a day with meals.    FUROSEMIDE (LASIX) 20 MG TABLET    Take 1 tablet (20 mg) by mouth once daily.    LIDOCAINE (LIDODERM) 5 % PATCH    Place 1 patch on the skin once daily. Remove & discard patch within 12 hours or as directed by MD.    METOPROLOL TARTRATE (LOPRESSOR) 100 MG TABLET    Take 1 tablet (100 mg) by mouth 2 times a day. Hold for SBP <100 or HR <60    ROPINIROLE (REQUIP) 1 MG TABLET    Take 1 tablet (1 mg) by mouth 3 times a day.    SENNOSIDES-DOCUSATE SODIUM (BENITO-COLACE) 8.6-50 MG TABLET    Take 2 tablets by mouth once a day on Monday, Wednesday, and Friday.    SIMVASTATIN (ZOCOR) 20 MG TABLET    Take 1 tablet (20 mg) by mouth once daily at bedtime.    TRAZODONE (DESYREL) 50 MG TABLET    Take 1 tablet (50 mg) by mouth once daily at bedtime.       ALLERGIES     Patient has no known allergies.    FAMILY HISTORY     No family history on file.     SOCIAL HISTORY       Social History     Socioeconomic History    Marital status:      Spouse name: Not on file    Number of children: Not on file    Years of education: Not on file    Highest education level: Not on file   Occupational History    Not on file   Tobacco Use    Smoking status: Not on file    Smokeless tobacco: Not on file   Substance and Sexual Activity    Alcohol use: Not on file    Drug use: Not on file    Sexual activity: Not on  file   Other Topics Concern    Not on file   Social History Narrative    Not on file     Social Determinants of Health     Financial Resource Strain: Not on file   Food Insecurity: Not on file   Transportation Needs: Not on file   Physical Activity: Not on file   Stress: Not on file   Social Connections: Not on file   Intimate Partner Violence: Not on file   Housing Stability: Not on file       PHYSICAL EXAM       ED Triage Vitals [10/08/23 1309]   Temp Heart Rate Resp BP   36.9 °C (98.4 °F) (!) 130 18 (!) 130/92      SpO2 Temp src Heart Rate Source Patient Position   95 % -- -- --      BP Location FiO2 (%)     Right arm --        General: Appears as stated age, in no acute distress, alert  Head: Head atraumatic; normocephalic  Eyes: normal inspection; no icterus  ENT: mucosa moist without lesion  Neck: Normal inspection, no meningeal signs  Resp: Normal breath sounds, no wheeze or crackles; No respiratory distress  Chest Wall: no tenderness or deformity  Heart: Heart rate and rhythm regular; No Murmurs  Abdomen: Soft, Non-tender; No distention, guarding, rigidity, or rebound  MSK: Normal appearance; Moves all extremities; No Pedal edema  Neuro: Alert; NIH 8 (deficits in L leg (2), severe aphasia (2), severe dysarthria (2), and incorrect answers to questions (2)  Psych: Mood and Affect normal  Skin: Color appropriate; warm; Dry    DIAGNOSTIC RESULTS   Lab and radiology results are independently interpreted unless noted below.  RADIOLOGY (Per Emergency Physician):     Interpretation per the Radiologist below, if available at the time of this note:  CT brain attack head wo IV contrast   Final Result   1. No acute intracranial findings.   2. Symmetric volume loss of the cerebral hemispheres.   3. Periventricular Microangiopathy.             This report has been produced using speech recognition.   This exam is available in DICOM format to non-affiliated healthcare   facilities on a secure media free searchable basis  with prior patient   authorization. The patient exposure is reported to a radiation dose   index registry. All CT examinations are performed with one or more of   the following dose reduction techniques: Automated Exposure Control,   Adjustment of mA and/or KV according to patient size, or use of   iterative reconstruction techniques.        MACRO:   None        Signed by: Earl Sanchez 10/8/2023 1:12 PM   Dictation workstation:   [id number]      CT angio head neck w and wo IV contrast    (Results Pending)       LABS:  Abnormal Labs Reviewed   COMPREHENSIVE METABOLIC PANEL - Abnormal; Notable for the following components:       Result Value    Bicarbonate 23 (*)     eGFR 51 (*)     Alkaline Phosphatase 159 (*)     Bilirubin, Total 2.1 (*)     All other components within normal limits   CBC WITH AUTO DIFFERENTIAL - Abnormal; Notable for the following components:    WBC 4.2 (*)     RBC 3.91 (*)     Hemoglobin 13.2 (*)      (*)     RDW 15.5 (*)     Platelets 147 (*)     All other components within normal limits   PROTIME-INR - Abnormal; Notable for the following components:    Protime 13.0 (*)     INR 1.3 (*)     All other components within normal limits    Narrative:     INR Therapeutic Range: 2.0-3.5   URINALYSIS WITH REFLEX MICROSCOPIC - Abnormal; Notable for the following components:    Protein, Urine 30 (1+) (*)     Blood, Urine OVER (3+) (*)     Ketones, Urine 20 (1+) (*)     Bilirubin, Urine 0.5 (1+) (*)     Urobilinogen, Urine 8 (3+) (*)     All other components within normal limits   URINALYSIS MICROSCOPIC ONLY - Abnormal; Notable for the following components:    WBC, Urine 21-50 (*)     RBC, Urine >20 (*)     Bacteria, Urine 1+ (*)     Hyaline Casts, Urine 1+ (*)     All other components within normal limits       All other labs were within normal range or not returned as of this dictation.    EKG:  Personally interpreted by Zara Horne DO      EMERGENCY DEPARTMENT COURSE/MDM:   Patient presents  with concern for CVA.  Brain attack was called prior to patient's arrival.  Patient is escorted personally to CT scanner and NIH was performed on the way.  There is no family to collaborate information.  Stroke work-up is initiated.      ED Course as of 10/08/23 1530   Sun Oct 08, 2023   1329 CT scan personally reviewed no evidence of intracranial hemorrhage.  This is confirmed on radiology read. [EF]   1335 Spoke with neurology, Dr. Singh,  main [facility].  She recommended CTA of the head neck be obtained to ensure that there is no large vessel occlusion.  This order was placed [EF]   1509 CTA head and neck ordered and partially completed, due to issues with CT scan scanner, unable to complete full study.  Spoke with stroke neurologist once again Dr. Singh was able to review partial images and could determine that patient had no obvious large vessel occlusion and she recommended admitting to the hospitalist for further MRI imaging.  No indication for acute transfer at this time. [AF]      ED Course User Index  [AF] Daily Benitez PA-C  [EF] Zara Horne, DO         Diagnoses as of 10/08/23 1530   Stroke-like symptoms   Aphasia   Atrial fibrillation with RVR (CMS/HCC)     Meds Administered:  Medications   dilTIAZem (Cardizem) 125 mg in dextrose 5% 125 mL (1 mg/mL) infusion (premix) (has no administration in time range)   metoprolol tartrate (Lopressor) injection 5 mg (5 mg intravenous Given 10/8/23 1451)   cefTRIAXone (Rocephin) IVPB 1 g (0 g intravenous Stopped 10/8/23 1525)   dilTIAZem (Cardizem) injection 10 mg (10 mg intravenous New Bag 10/8/23 1509)     PROCEDURES:  Unless otherwise noted below, none  Procedures      FINAL IMPRESSION      1. Stroke-like symptoms    2. Aphasia    3. Atrial fibrillation with RVR (CMS/HCC)          DISPOSITION    Admit 10/08/2023 03:09:00 PM    Critical Care Time   TOTAL CRITICAL CARE TIME (EXCLUDING SEPARATE BILLABLE PROCEDURES): 32 min  CC time assessed for complex  medical decision making, coordination of care between providers and specialists, discussion with family, documentation of findings, and interpretation of labwork and imaging.      (Comment: Please note this report has been produced using speech recognition software and may contain errors related to that system including errors in grammar, punctuation, and spelling, as well as words and phrases that may be inappropriate.  If there are any questions or concerns please feel free to contact the dictating provider for clarification.)    Zara Horne DO (electronically signed)  Emergency Medicine Physician    Medical Decision Making             Zara Horne DO  10/08/23 1531

## 2023-10-08 NOTE — ED PROVIDER NOTES
HPI   Chief Complaint   Patient presents with    Altered Mental Status     Patient arrives via ems with complaints of confusion, aphasia and possible stroke. Patient alert and awake but not responding to questions asked.       Patient is a 78-year-old male presents emergency department after code brain attack called from the field last known well of 2 hours prior to presentation so approximately 11:00 am.  Reportedly patient was with family earlier this morning when they left and came back. when they came back patient was aphasic and unable to respond to them or talk.  He was alert, but seems significantly confused.  He typically is alert and oriented x3 at baseline.  They note that he was just recently discharged from rehab facility 4 days ago and has been doing well.  He is currently on Eliquis atrial fibrillation and has history of strokes in the past.      History provided by:  EMS personnel and relative   used: No                        Joseph Coma Scale Score: 12         NIH Stroke Scale: 8          Patient History   No past medical history on file.  Past Surgical History:   Procedure Laterality Date    CT GUIDED PERCUTANEOUS BIOPSY BONE DEEP  2/21/2020    CT GUIDED PERCUTANEOUS BIOPSY BONE DEEP 2/21/2020 [facility] [place] [facility]    CT GUIDED PERCUTANEOUS BIOPSY BONE DEEP  3/24/2020    CT GUIDED PERCUTANEOUS BIOPSY BONE DEEP 3/24/2020 [facility] [place] [facility]    MR HEAD ANGIO WO IV CONTRAST  5/17/2019    MR HEAD ANGIO WO IV CONTRAST [facility]GENCY LEGAC[facility]     No family history on file.  Social History     Tobacco Use    Smoking status: Not on file    Smokeless tobacco: Not on file   Substance Use Topics    Alcohol use: Not on file    Drug use: Not on file       Physical Exam   ED Triage Vitals [10/08/23 1309]   Temp Heart Rate Resp BP   36.9 °C (98.4 °F) (!) 130 18 (!) 130/92      SpO2 Temp src Heart Rate Source Patient Position   95 % -- -- --      BP Location FiO2 (%)     Right arm --        Physical Exam  Constitutional:       Appearance: He is well-developed.   HENT:      Head: Normocephalic and atraumatic.   Eyes:      Extraocular Movements: Extraocular movements intact.      Pupils: Pupils are equal, round, and reactive to light.   Cardiovascular:      Rate and Rhythm: Tachycardia present. Rhythm irregular.   Pulmonary:      Effort: Pulmonary effort is normal.      Breath sounds: Normal breath sounds.   Abdominal:      General: Bowel sounds are normal.      Palpations: Abdomen is soft.   Musculoskeletal:         General: Normal range of motion.      Cervical back: Normal range of motion and neck supple.   Skin:     General: Skin is warm and dry.   Neurological:      Mental Status: He is alert. He is disoriented and confused.      GCS: GCS eye subscore is 4. GCS verbal subscore is 2. GCS motor subscore is 6.      Comments: NIHSS 8 for severe aphasia, severe dysarthria, left lower extremity drift, inability to answer level of consciousness questions.  Detailed NIH score by me in scoring tools section of note.         ED Course & [facility]   ED Course as of 10/08/23 1511   Sun Oct 08, 2023   1329 CT scan personally reviewed no evidence of intracranial hemorrhage.  This is confirmed on radiology read. [EF]   1335 Spoke with neurology, Dr. Singh,  main [facility].  She recommended CTA of the head neck be obtained to ensure that there is no large vessel occlusion.  This order was placed [EF]   1509 CTA head and neck ordered and partially completed, due to issues with CT scan scanner, unable to complete full study.  Spoke with stroke neurologist once again Dr. Singh was able to review partial images and could determine that patient had no obvious large vessel occlusion and she recommended admitting to the hospitalist for further MRI imaging.  No indication for acute transfer at this time. [AF]      ED Course User Index  [AF] Daily Benitez PA-C  [EF] Zara Horne DO         Diagnoses as of  10/08/23 1511   Stroke-like symptoms   Aphasia   Atrial fibrillation with RVR (CMS/[facility])       Medical Decision Making  Is a 78-year-old male presents emergency department under code brain attack for aphasia and altered mental status.    EKG was interpreted by attending physician.    Lab work done today included CMP, CBC, PT/INR, urinalysis.  Lab work with elevations of alkaline phosphatase, bilirubinemia, urine with blood, white blood cells, red blood cells, bacteria, INR 1.3, leukopenia, mild anemia.    Scans done today were interpreted/confirmed by radiologist and also interpreted by me which included CT brain without contrast and CT angio head and neck.  CT brain showed no acute intracranial findings with symmetric volume loss of cerebral hemispheres and periventricular microangiopathy.  CTA head and neck partially done and able to be reviewed by telestroke neurologist Dr. Ch who felt there was no obvious large vessel occlusion.    Medications given at today's visit include metoprolol, IV Cardizem, IV ceftriaxone    I saw this patient in conjunction with Dr. Horne.  Given patient's abrupt onset of aphasia brain attack was called from the field.  Patient's last known well was just before 11 AM per family.  Patient is on Eliquis he is not candidate for TNK.  We initially spoke with telestroke neurology Dr. Singh commended in addition to CT scan doing a CTA head and neck and that I NIH score was 8.  His initial difficulty with CTA scanner and only partial study was able to be completed, but telestroke neurologist Dr. Singh was able to review scans and determined that there was no obvious large vessel occlusion and patient was not thrombectomy candidate given this and she recommended admission for further work-up including MRI and MRA.  Also during stay patient incidentally was found to be in A-fib RVR.  Initial dose of IV metoprolol was given which did not lower rate.  Given this bolus dose of  diltiazem including Cardizem drip was started to help control rate better.  Patient did have improvement of rate with Cardizem drip going from 130s down to the low 100s.  Urine did show some white blood cells and leukocyte esterase and given this was also given dose of IV ceftriaxone for urinary tract infection coverage.  I spoke with hospitalist on-call who is agreeable to mission patient for further management for further evaluation and stroke work-up and atrial fibrillation RVR control.    The patient/family was counseled on clinical impression, expectations, and plan along with recommendations to admission.  All questions were answered and involved parties were understanding and agreeable to course of treatment.  Case was discussed with admitting physician and any consultants. Bed type, ED treatment and further ED workup decided by joint decision making with admitting team and any consultants. Patient stable for admission per my assessment and further management of patient will be deferred to the inpatient setting.    Critical care time was billed at 40 minutes by me and is non concurrent with other provider involved.  Time excludes other separately billable procedures.    ** Disclaimer:  Parts of this document were written utilizing a voice to text dictation software.  Note may contain minor transcription or typographical errors that were inadvertently transcribed by the computer software.        Procedure  Critical Care    Performed by: Daily Benitez PA-C  Authorized by: Zara Horne DO    Critical care provider statement:     Critical care time (minutes):  40    Critical care start time:  10/8/2023 3:45 PM    Critical care end time:  10/8/2023 4:31 PM    Critical care time was exclusive of:  Separately billable procedures and treating other patients    Critical care was necessary to treat or prevent imminent or life-threatening deterioration of the following conditions:  CNS failure or compromise,  circulatory failure and cardiac failure    Critical care was time spent personally by me on the following activities:  Development of treatment plan with patient or surrogate, discussions with consultants, evaluation of patient's response to treatment, obtaining history from patient or surrogate, ordering and performing treatments and interventions, ordering and review of laboratory studies, ordering and review of radiographic studies, re-evaluation of patient's condition and review of old charts    Care discussed with: admitting provider         Daily Benitez PA-C  10/08/23 3496

## 2023-10-08 NOTE — TREATMENT PLAN
Patient was  seen and examined by me personally.  Please see detailed history and physical in epic by Mirian Paul CNP.  I talked with CNP regarding assessment and plan.  I agree with assessment and plan.  Patient presented to [facility] with aphasia.  Patient has history of atrial fibrillation on Eliquis, history of stroke in the past.   CT  brain done in ER did not reveal acute intracranial finding.    Neurology stroke team was called ER physician.  He was not found to be candidate for thrombolytics or endovascular procedure.  Patient was not able to to  to provide some information, he answer with yes or no, nodding his head.    General: Negative for fever,  chills or fatigue.    HEENT: Negative for headache, blurring of vision or double vision.    Cardiovascular: Negative for chest pain, positive for  palpitations , negative for orthopnea.    Respiratory: Negative for cough, shortness of breath or wheezing.    Gastrointestinal: Negative for nausea, vomiting, hematemesis, abdominal pain or diarrhea.   Genitourinary: Negative for dysuria, hematuria, frequency or nocturia.   Musculoskeletal: Negative for joint pain, joint swelling or deformity.   Skin: Negative for rash, itching, or jaundice.   Hematologic: Negative for bleeding or bruising.   Neurologic: Negative for headache, loss of consciousness. syncope or seizures   Psychological: Negative for anxiety, hallucinations or depression.     General: In non acute distress, cooperating during physical exam, answer to some of my questions  HEENT: Pupils are equal and reactive to light and commendation , oral mucosa moist, no JVD   Cardiovascular: Normal sinus rhythm, no MRG.  Lungs: Clear to auscultation bilaterally, no wheezing, no crackles, no dullness to percussion.  Abdomen: No hepatosplenomegaly appreciated, soft , not tender, positive bowel sounds, positive bowel movement.  Neuro: Alert and oriented to himself, aphasia, dysarthria, sensation intact.  Psych:  Patient had great insight was going on  Musculoskeletal: No swelling in lower extremities, no limitation in range of motion.  Vascular: Pulses are intact in upper and lower extremities  Skin: No petechiae, ecchymosis or other stigmata for dermatology disease.     Aphasia, CVA  Patient has history of stroke  History of atrial fibrillation, being on Eliquis  CT scan brain did not reveal acute intracranial findings.  Stroke neurologist was contacted at  main  Patient was not candidate for thrombolytics  No large vessel occlusion  Will have full work-up including MRI, MRA, ultrasound carotid 2D echo  Consult Dr. Trujillo from neurology services.  Monitor close    Atrial fibrillation  Rate not controlled  Patient is on Cardizem drip  Consult Dr. Lees from cardiology services  2D echo  PT/ OT to see him    Diabetes mellitus type 2  Cover with insulin sliding scale    Coronary artery disease  Peripheral artery disease  Resume his home medication.    Diabetic neuropathy.    Deconditioning  PT /OT to see him  Ambulate with assistant  Fall precaution  Hypertension    Urinary tract infection  Start ceftriaxone    Patient to get discharge in 24 to 48 hours.      Patient will be monitored in stepdown unit.  Time spent with the patient 62 minutes.    Discussed with neurology on the case  Okay to resume [facility]  Neurology to evaluate the patient soon.

## 2023-10-08 NOTE — H&P
History Of Present Illness  Darrell Arreola is a 78 y.o. male presenting with past medical history of CVA, atrial fibrillation, on Eliquis, heart failure, diabetes mellitus, hypertension, CAD, PAD, and hyperlipidemia who presented to the emergency department with complaints of aphasia.  Patient is a limited historian and unable to provide a history.  Attempted to call his nephew however the line was busy.  History obtained from the chart.  Patient lives at home independently.  He was recently discharged in the hospital to a skilled nursing facility about a month ago.  He was discharged home from the nursing home and had been doing well.  Last known well was about 2 hours prior to presentation, around 11:00 AM.  Per ED note, family had been with the patient earlier and left.  When they came back they found the patient was aphasic and unable to respond to them or talk.  He was alert but confused.  His baseline is alert and oriented x3.  Neurology stroke team was called in the ED.  Patient was not a candidate for thrombolytics or endovascular procedure.    In the ED: Glucose 97.  Sodium 139, potassium 4.5.  BUN/creatinine 23/1.40, ALT/AST normal.  INR 1.3.  WBC was low at 4.2.  H&H 13.2/41.1, platelets 147.  UA was positive for WBCs and bacteria, culture pending.  CT of the head without contrast showed no acute intracranial findings.  CT angio of the head and neck were ordered however are currently pending.  Patient was admitted to [facility] for further evaluation and treatment.     Past Medical History  Past Medical History:   Diagnosis Date    CHF (congestive heart failure) (CMS/HCC)     Diabetes mellitus (CMS/HCC)     Heart disease     Hypertension     Stroke (CMS/HCC)    A fib, paroxysmal (2 unsuccessful DC cardioversion)  HTN, essential  IDDM  Remote stroke  Coronary artery disease  Peripheral artery disease  Morbid obesity  CHF, per [facility] chart review, echo with a EF of 27±5%, severe MR, moderately  severe TR, AS; [facility] 7/27 concern for anaphylaxis?    Surgical History  Past Surgical History:   Procedure Laterality Date    CT GUIDED PERCUTANEOUS BIOPSY BONE DEEP  02/21/2020    CT GUIDED PERCUTANEOUS BIOPSY BONE DEEP [place] [facility] [place] [facility]    CT GUIDED PERCUTANEOUS BIOPSY BONE DEEP  03/24/2020    CT GUIDED PERCUTANEOUS BIOPSY BONE DEEP [place] [facility] [place] [facility]    MR HEAD ANGIO WO IV CONTRAST  05/17/2019    MR HEAD ANGIO WO IV CONTRAST [facility]    TOE AMPUTATION      TONSILLECTOMY          Social History  He reports that he has quit smoking. His smoking use included cigars and cigarettes. He has never used smokeless tobacco. He reports that he does not drink alcohol and does not use drugs.    Family History  Family History   Family history unknown: Yes        Allergies  Patient has no known allergies.    Review of Systems   Reason unable to perform ROS: due to patient altered mental status.        Physical Exam  Vitals reviewed.   Constitutional:       Appearance: He is ill-appearing.   HENT:      Head: Normocephalic and atraumatic.      Nose: Nose normal.      Mouth/Throat:      Mouth: Mucous membranes are moist.   Eyes:      Extraocular Movements: Extraocular movements intact.      Conjunctiva/sclera: Conjunctivae normal.   Cardiovascular:      Rate and Rhythm: Tachycardia present. Rhythm irregular.   Pulmonary:      Effort: Pulmonary effort is normal.      Breath sounds: Normal breath sounds. No wheezing, rhonchi or rales.   Abdominal:      General: Bowel sounds are normal.      Palpations: Abdomen is soft.      Tenderness: There is no abdominal tenderness.   Musculoskeletal:         General: Normal range of motion.      Cervical back: Normal range of motion and neck supple.   Skin:     General: Skin is warm and dry.      Capillary Refill: Capillary refill takes less than 2 seconds.   Neurological:      Mental Status: He is alert. He is disoriented.      Motor: Weakness present.      " Comments: aphasia   Psychiatric:         Mood and Affect: Mood normal.         Behavior: Behavior normal.          Last Recorded Vitals  Blood pressure (!) 154/97, pulse (!) 120, temperature 36.2 °C (97.2 °F), resp. rate 18, height 1.854 m (6' 1\"), weight 104 kg (229 lb 4.5 oz), SpO2 98 %.    Relevant Results    Scheduled medications  aspirin, 324 mg, oral, Once  [START ON 10/9/2023] aspirin, 81 mg, oral, Daily   Or  [START ON 10/9/2023] aspirin, 81 mg, oral, Daily   Or  [START ON 10/9/2023] aspirin, 81 mg, nasogastric tube, Daily   Or  [START ON 10/9/2023] aspirin, 300 mg, rectal, Daily  enoxaparin, 40 mg, subcutaneous, q24h      Continuous medications  dilTIAZem, 5-15 mg/hr, Last Rate: 10 mg/hr (10/08/23 1540)      PRN medications  PRN medications: hydrALAZINE **FOLLOWED BY** [START ON 10/10/2023] hydrALAZINE, labetaloL, oxygen    Lab Results   Component Value Date    WBC 4.2 (L) 10/08/2023    HGB 13.2 (L) 10/08/2023    HCT 41.1 10/08/2023     (H) 10/08/2023     (L) 10/08/2023     Lab Results   Component Value Date    GLUCOSE 97 10/08/2023    CALCIUM 9.4 10/08/2023     10/08/2023    K 4.5 10/08/2023    CO2 23 (L) 10/08/2023     10/08/2023    BUN 23 10/08/2023    CREATININE 1.40 10/08/2023     CT brain attack head wo IV contrast    Result Date: 10/8/2023  Interpreted By:  Earl Sanchez, STUDY: CT BRAIN ATTACK HEAD WO IV CONTRAST; 10/8/2023 1:04 pm   INDICATION: Signs/Symptoms:aphasia.   COMPARISON: 1 July 2023.   ACCESSION NUMBER(S): [id number]   ORDERING CLINICIAN: NAMAN SCHAEFER   TECHNIQUE: CT was performed with one or more of the following dose reduction techniques: automated exposure control, adjustment of the mA and/or kV according to patient size, or use of iterative reconstruction technique.       PROCEDURE: 3.0 mm axial images were obtained through the brain, to include the posterior fossa without intravenous contrast enhancement.   FINDINGS: There is symmetric volume loss of the " cerebral hemispheres. Mild-to-moderate decreased attenuation in the bilateral periventricular white matter, consistent with microangiopathy is noted.  There is no encephalomalacia change. Brainstem is unremarkable. Cerebellar hemispheres are symmetric. No intra- or extra-axial mass or abnormal blood products are demonstrated.   Mild nodular ethmoid mucosal thickening left maxillary mucosal thickening seen. Globes and orbits as well as calvarium and scalp are unremarkable.       1. No acute intracranial findings. 2. Symmetric volume loss of the cerebral hemispheres. 3. Periventricular Microangiopathy.     This report has been produced using speech recognition. This exam is available in DICOM format to non-affiliated healthcare facilities on a secure media free searchable basis with prior patient authorization. The patient exposure is reported to a radiation dose index registry. All CT examinations are performed with one or more of the following dose reduction techniques: Automated Exposure Control, Adjustment of mA and/or KV according to patient size, or use of iterative reconstruction techniques.   MACRO: None   Signed by: Earl Sanchez 10/8/2023 1:12 PM Dictation workstation:   [id number]        Assessment/Plan   Principal Problem:    Stroke-like symptoms    CVA  Suspected, aphasia  admit to SDU  monitor on tele  ASA/statin when able to take p.o.  NPO pending swallow eval  echocardiogram recently at [place], echo with a EF of 27±5%, severe MR, moderately severe TR, AS; [facility] 7/27   MRI/MRA  US carotids  lipid panel recently  neurostroke assessment  consult neurology, appreciate recommendations  PT/OT eval    A-fib RVR  A fib RVR on the monitor  Cardizem drip running  We will need to monitor blood pressure closely    Urinary tract infection  UA positive for WBCs and bacteria, culture pending  IV ceftriaxone  Follow cultures    Diabetes mellitus  Sliding scale insulin  Monitor blood glucose  Hypoglycemia  protocol  Hemoglobin A1c    Hypertension  Hold antihypertensives for now  Permissive hypertension  As needed labetalol and Apresoline for systolic greater than 220    CAD  Aspirin/statin    PAD  As above    Plan  Admit to stepdown unit for observation  Monitor on telemetry  Stroke work-up  Cardizem drip  Monitor blood pressure  Consult neurology and cardiology, appreciate recommendations  DVT prophylaxis-Lovenox (resume Eliquis pending MRI and ability to take p.o.)  CBC and BMP in the morning    Addendum: Dr. Kingsley spoke with neurology, hema Torres to resume eliquis as patient is in A fib RVR. Stop Lovenox       I spent 55 minutes in the professional and overall care of this patient.      Bianka Olivia, ELENITA-CNP

## 2023-10-09 ENCOUNTER — APPOINTMENT (OUTPATIENT)
Dept: RADIOLOGY | Facility: HOSPITAL | Age: 78
DRG: 091 | End: 2023-10-09
Payer: MEDICARE

## 2023-10-09 LAB
ANION GAP SERPL CALC-SCNC: 11 MMOL/L
BASOPHILS # BLD AUTO: 0.01 X10*3/UL (ref 0–0.1)
BASOPHILS NFR BLD AUTO: 0.2 %
BUN SERPL-MCNC: 22 MG/DL (ref 8–25)
CALCIUM SERPL-MCNC: 8.7 MG/DL (ref 8.5–10.4)
CHLORIDE SERPL-SCNC: 107 MMOL/L (ref 97–107)
CO2 SERPL-SCNC: 23 MMOL/L (ref 24–31)
CREAT SERPL-MCNC: 1.3 MG/DL (ref 0.4–1.6)
EOSINOPHIL # BLD AUTO: 0.05 X10*3/UL (ref 0–0.4)
EOSINOPHIL NFR BLD AUTO: 1 %
ERYTHROCYTE [DISTWIDTH] IN BLOOD BY AUTOMATED COUNT: 15.5 % (ref 11.5–14.5)
ERYTHROCYTE [DISTWIDTH] IN BLOOD BY AUTOMATED COUNT: 15.9 % (ref 11.5–14.5)
GFR SERPL CREATININE-BSD FRML MDRD: 56 ML/MIN/1.73M*2
GLUCOSE BLD MANUAL STRIP-MCNC: 104 MG/DL (ref 74–99)
GLUCOSE BLD MANUAL STRIP-MCNC: 129 MG/DL (ref 74–99)
GLUCOSE BLD MANUAL STRIP-MCNC: 174 MG/DL (ref 74–99)
GLUCOSE BLD MANUAL STRIP-MCNC: 179 MG/DL (ref 74–99)
GLUCOSE BLD MANUAL STRIP-MCNC: 204 MG/DL (ref 74–99)
GLUCOSE BLD MANUAL STRIP-MCNC: 216 MG/DL (ref 74–99)
GLUCOSE SERPL-MCNC: 121 MG/DL (ref 65–99)
HCT VFR BLD AUTO: 34.9 % (ref 41–52)
HCT VFR BLD AUTO: 37.7 % (ref 41–52)
HGB BLD-MCNC: 11.3 G/DL (ref 13.5–17.5)
HGB BLD-MCNC: 12 G/DL (ref 13.5–17.5)
IMM GRANULOCYTES # BLD AUTO: 0.02 X10*3/UL (ref 0–0.5)
IMM GRANULOCYTES NFR BLD AUTO: 0.4 % (ref 0–0.9)
LYMPHOCYTES # BLD AUTO: 1.41 X10*3/UL (ref 0.8–3)
LYMPHOCYTES NFR BLD AUTO: 28.3 %
MCH RBC QN AUTO: 34 PG (ref 26–34)
MCH RBC QN AUTO: 34.2 PG (ref 26–34)
MCHC RBC AUTO-ENTMCNC: 31.8 G/DL (ref 32–36)
MCHC RBC AUTO-ENTMCNC: 32.4 G/DL (ref 32–36)
MCV RBC AUTO: 105 FL (ref 80–100)
MCV RBC AUTO: 107 FL (ref 80–100)
MONOCYTES # BLD AUTO: 0.57 X10*3/UL (ref 0.05–0.8)
MONOCYTES NFR BLD AUTO: 11.4 %
NEUTROPHILS # BLD AUTO: 2.93 X10*3/UL (ref 1.6–5.5)
NEUTROPHILS NFR BLD AUTO: 58.7 %
NRBC BLD-RTO: 0 /100 WBCS (ref 0–0)
NRBC BLD-RTO: 0 /100 WBCS (ref 0–0)
PLATELET # BLD AUTO: 131 X10*3/UL (ref 150–450)
PLATELET # BLD AUTO: 142 X10*3/UL (ref 150–450)
PMV BLD AUTO: 9.3 FL (ref 7.5–11.5)
PMV BLD AUTO: 9.7 FL (ref 7.5–11.5)
POTASSIUM SERPL-SCNC: 3.8 MMOL/L (ref 3.4–5.1)
RBC # BLD AUTO: 3.32 X10*6/UL (ref 4.5–5.9)
RBC # BLD AUTO: 3.51 X10*6/UL (ref 4.5–5.9)
SODIUM SERPL-SCNC: 141 MMOL/L (ref 133–145)
WBC # BLD AUTO: 5 X10*3/UL (ref 4.4–11.3)
WBC # BLD AUTO: 5.6 X10*3/UL (ref 4.4–11.3)

## 2023-10-09 PROCEDURE — 82374 ASSAY BLOOD CARBON DIOXIDE: CPT | Performed by: NURSE PRACTITIONER

## 2023-10-09 PROCEDURE — 92610 EVALUATE SWALLOWING FUNCTION: CPT | Mod: GN | Performed by: SPEECH-LANGUAGE PATHOLOGIST

## 2023-10-09 PROCEDURE — 97530 THERAPEUTIC ACTIVITIES: CPT | Mod: GO

## 2023-10-09 PROCEDURE — 76857 US EXAM PELVIC LIMITED: CPT

## 2023-10-09 PROCEDURE — 97116 GAIT TRAINING THERAPY: CPT | Mod: GP

## 2023-10-09 PROCEDURE — 70544 MR ANGIOGRAPHY HEAD W/O DYE: CPT | Mod: MG

## 2023-10-09 PROCEDURE — 76857 US EXAM PELVIC LIMITED: CPT | Mod: XU | Performed by: RADIOLOGY

## 2023-10-09 PROCEDURE — 2500000001 HC RX 250 WO HCPCS SELF ADMINISTERED DRUGS (ALT 637 FOR MEDICARE OP): Performed by: NURSE PRACTITIONER

## 2023-10-09 PROCEDURE — 2500000004 HC RX 250 GENERAL PHARMACY W/ HCPCS (ALT 636 FOR OP/ED): Performed by: NURSE PRACTITIONER

## 2023-10-09 PROCEDURE — 82947 ASSAY GLUCOSE BLOOD QUANT: CPT

## 2023-10-09 PROCEDURE — 70551 MRI BRAIN STEM W/O DYE: CPT | Mod: MG

## 2023-10-09 PROCEDURE — 85025 COMPLETE CBC W/AUTO DIFF WBC: CPT | Performed by: NURSE PRACTITIONER

## 2023-10-09 PROCEDURE — 36415 COLL VENOUS BLD VENIPUNCTURE: CPT

## 2023-10-09 PROCEDURE — 2500000002 HC RX 250 W HCPCS SELF ADMINISTERED DRUGS (ALT 637 FOR MEDICARE OP, ALT 636 FOR OP/ED): Performed by: NURSE PRACTITIONER

## 2023-10-09 PROCEDURE — 92523 SPEECH SOUND LANG COMPREHEN: CPT | Mod: GN | Performed by: SPEECH-LANGUAGE PATHOLOGIST

## 2023-10-09 PROCEDURE — 2060000001 HC INTERMEDIATE ICU ROOM DAILY

## 2023-10-09 PROCEDURE — 97162 PT EVAL MOD COMPLEX 30 MIN: CPT | Mod: GP

## 2023-10-09 PROCEDURE — 97166 OT EVAL MOD COMPLEX 45 MIN: CPT | Mod: GO

## 2023-10-09 PROCEDURE — 36415 COLL VENOUS BLD VENIPUNCTURE: CPT | Performed by: NURSE PRACTITIONER

## 2023-10-09 PROCEDURE — 85027 COMPLETE CBC AUTOMATED: CPT

## 2023-10-09 PROCEDURE — 76770 US EXAM ABDO BACK WALL COMP: CPT

## 2023-10-09 PROCEDURE — 96372 THER/PROPH/DIAG INJ SC/IM: CPT | Performed by: NURSE PRACTITIONER

## 2023-10-09 RX ORDER — TAMSULOSIN HYDROCHLORIDE 0.4 MG/1
0.4 CAPSULE ORAL
Status: DISCONTINUED | OUTPATIENT
Start: 2023-10-09 | End: 2023-10-20 | Stop reason: HOSPADM

## 2023-10-09 RX ORDER — FAMOTIDINE 20 MG/1
20 TABLET, FILM COATED ORAL DAILY
Status: DISCONTINUED | OUTPATIENT
Start: 2023-10-09 | End: 2023-10-20 | Stop reason: HOSPADM

## 2023-10-09 RX ORDER — METOPROLOL SUCCINATE 25 MG/1
12.5 TABLET, EXTENDED RELEASE ORAL 2 TIMES DAILY
Status: DISCONTINUED | OUTPATIENT
Start: 2023-10-09 | End: 2023-10-10

## 2023-10-09 RX ORDER — ROSUVASTATIN CALCIUM 10 MG/1
10 TABLET, COATED ORAL DAILY
Status: DISCONTINUED | OUTPATIENT
Start: 2023-10-09 | End: 2023-10-20 | Stop reason: HOSPADM

## 2023-10-09 RX ORDER — FOLIC ACID 1 MG/1
1 TABLET ORAL DAILY
Status: DISCONTINUED | OUTPATIENT
Start: 2023-10-09 | End: 2023-10-20 | Stop reason: HOSPADM

## 2023-10-09 RX ADMIN — METOPROLOL SUCCINATE 12.5 MG: 25 TABLET, FILM COATED, EXTENDED RELEASE ORAL at 13:02

## 2023-10-09 RX ADMIN — ASPIRIN 81 MG CHEWABLE TABLET 81 MG: 81 TABLET CHEWABLE at 10:45

## 2023-10-09 RX ADMIN — INSULIN LISPRO 1 UNITS: 100 INJECTION, SOLUTION INTRAVENOUS; SUBCUTANEOUS at 18:12

## 2023-10-09 RX ADMIN — TAMSULOSIN HYDROCHLORIDE 0.4 MG: 0.4 CAPSULE ORAL at 13:04

## 2023-10-09 RX ADMIN — ROSUVASTATIN CALCIUM 10 MG: 10 TABLET, COATED ORAL at 13:03

## 2023-10-09 RX ADMIN — APIXABAN 5 MG: 5 TABLET, FILM COATED ORAL at 05:18

## 2023-10-09 RX ADMIN — Medication 5 MG/HR: at 04:16

## 2023-10-09 RX ADMIN — CEFTRIAXONE SODIUM 1 G: 1 INJECTION, SOLUTION INTRAVENOUS at 18:13

## 2023-10-09 RX ADMIN — METOPROLOL SUCCINATE 12.5 MG: 25 TABLET, FILM COATED, EXTENDED RELEASE ORAL at 21:15

## 2023-10-09 SDOH — SOCIAL STABILITY: SOCIAL INSECURITY: DO YOU FEEL UNSAFE GOING BACK TO THE PLACE WHERE YOU ARE LIVING?: UNABLE TO ASSESS

## 2023-10-09 SDOH — SOCIAL STABILITY: SOCIAL INSECURITY: ARE THERE ANY APPARENT SIGNS OF INJURIES/BEHAVIORS THAT COULD BE RELATED TO ABUSE/NEGLECT?: NO

## 2023-10-09 SDOH — SOCIAL STABILITY: SOCIAL INSECURITY: ARE YOU OR HAVE YOU BEEN THREATENED OR ABUSED PHYSICALLY, EMOTIONALLY, OR SEXUALLY BY ANYONE?: UNABLE TO ASSESS

## 2023-10-09 SDOH — SOCIAL STABILITY: SOCIAL INSECURITY: ABUSE: ADULT

## 2023-10-09 SDOH — SOCIAL STABILITY: SOCIAL INSECURITY: HAS ANYONE EVER THREATENED TO HURT YOUR FAMILY OR YOUR PETS?: UNABLE TO ASSESS

## 2023-10-09 SDOH — SOCIAL STABILITY: SOCIAL INSECURITY: DOES ANYONE TRY TO KEEP YOU FROM HAVING/CONTACTING OTHER FRIENDS OR DOING THINGS OUTSIDE YOUR HOME?: UNABLE TO ASSESS

## 2023-10-09 SDOH — SOCIAL STABILITY: SOCIAL INSECURITY: DO YOU FEEL ANYONE HAS EXPLOITED OR TAKEN ADVANTAGE OF YOU FINANCIALLY OR OF YOUR PERSONAL PROPERTY?: UNABLE TO ASSESS

## 2023-10-09 SDOH — SOCIAL STABILITY: SOCIAL INSECURITY: WERE YOU ABLE TO COMPLETE ALL THE BEHAVIORAL HEALTH SCREENINGS?: NO

## 2023-10-09 ASSESSMENT — COGNITIVE AND FUNCTIONAL STATUS - GENERAL
MOVING FROM LYING ON BACK TO SITTING ON SIDE OF FLAT BED WITH BEDRAILS: A LOT
WALKING IN HOSPITAL ROOM: A LITTLE
TURNING FROM BACK TO SIDE WHILE IN FLAT BAD: A LOT
MOVING FROM LYING ON BACK TO SITTING ON SIDE OF FLAT BED WITH BEDRAILS: A LITTLE
HELP NEEDED FOR BATHING: A LITTLE
DRESSING REGULAR LOWER BODY CLOTHING: A LOT
TOILETING: A LOT
TURNING FROM BACK TO SIDE WHILE IN FLAT BAD: A LITTLE
MOVING TO AND FROM BED TO CHAIR: A LITTLE
PERSONAL GROOMING: A LITTLE
STANDING UP FROM CHAIR USING ARMS: A LITTLE
DRESSING REGULAR UPPER BODY CLOTHING: A LITTLE
WALKING IN HOSPITAL ROOM: A LITTLE
CLIMB 3 TO 5 STEPS WITH RAILING: A LITTLE
PATIENT BASELINE BEDBOUND: UNABLE TO ASSESS AT THIS TIME
MOBILITY SCORE: 16
DRESSING REGULAR LOWER BODY CLOTHING: A LITTLE
PERSONAL GROOMING: A LITTLE
WALKING IN HOSPITAL ROOM: A LOT
CLIMB 3 TO 5 STEPS WITH RAILING: A LITTLE
DAILY ACTIVITIY SCORE: 14
HELP NEEDED FOR BATHING: A LITTLE
PERSONAL GROOMING: A LITTLE
TOILETING: A LITTLE
MOVING FROM LYING ON BACK TO SITTING ON SIDE OF FLAT BED WITH BEDRAILS: A LITTLE
DAILY ACTIVITIY SCORE: 19
WALKING IN HOSPITAL ROOM: A LOT
MOBILITY SCORE: 18
MOBILITY SCORE: 11
CLIMB 3 TO 5 STEPS WITH RAILING: TOTAL
STANDING UP FROM CHAIR USING ARMS: A LITTLE
DAILY ACTIVITIY SCORE: 15
DRESSING REGULAR UPPER BODY CLOTHING: A LOT
HELP NEEDED FOR BATHING: A LOT
TOILETING: A LITTLE
EATING MEALS: A LITTLE
MOVING TO AND FROM BED TO CHAIR: A LITTLE
MOVING TO AND FROM BED TO CHAIR: A LITTLE
MOVING TO AND FROM BED TO CHAIR: A LOT
STANDING UP FROM CHAIR USING ARMS: A LOT
DRESSING REGULAR LOWER BODY CLOTHING: A LOT
DAILY ACTIVITIY SCORE: 19
TOILETING: A LOT
CLIMB 3 TO 5 STEPS WITH RAILING: A LOT
DRESSING REGULAR UPPER BODY CLOTHING: A LITTLE
HELP NEEDED FOR BATHING: A LOT
PERSONAL GROOMING: A LITTLE
DRESSING REGULAR LOWER BODY CLOTHING: A LITTLE
STANDING UP FROM CHAIR USING ARMS: A LITTLE
MOBILITY SCORE: 18
MOVING FROM LYING ON BACK TO SITTING ON SIDE OF FLAT BED WITH BEDRAILS: A LITTLE
TURNING FROM BACK TO SIDE WHILE IN FLAT BAD: A LITTLE
DRESSING REGULAR UPPER BODY CLOTHING: A LITTLE
TURNING FROM BACK TO SIDE WHILE IN FLAT BAD: A LITTLE
EATING MEALS: A LITTLE

## 2023-10-09 ASSESSMENT — PAIN SCALES - GENERAL
PAINLEVEL_OUTOF10: 0 - NO PAIN

## 2023-10-09 ASSESSMENT — ACTIVITIES OF DAILY LIVING (ADL)
DRESSING YOURSELF: NEEDS ASSISTANCE
FEEDING YOURSELF: NEEDS ASSISTANCE
JUDGMENT_ADEQUATE_SAFELY_COMPLETE_DAILY_ACTIVITIES: UNABLE TO ASSESS
HEARING - RIGHT EAR: UNABLE TO ASSESS
GROOMING: NEEDS ASSISTANCE
TOILETING: NEEDS ASSISTANCE
BATHING: NEEDS ASSISTANCE
HEARING - LEFT EAR: UNABLE TO ASSESS
BATHING_ASSISTANCE: MODERATE
ADEQUATE_TO_COMPLETE_ADL: YES
WALKS IN HOME: NEEDS ASSISTANCE
PATIENT'S MEMORY ADEQUATE TO SAFELY COMPLETE DAILY ACTIVITIES?: UNABLE TO ASSESS
ASSISTIVE_DEVICE: OTHER (COMMENT)
ADL_ASSISTANCE: NEEDS ASSISTANCE

## 2023-10-09 ASSESSMENT — PAIN - FUNCTIONAL ASSESSMENT
PAIN_FUNCTIONAL_ASSESSMENT: UNABLE TO SELF-REPORT
PAIN_FUNCTIONAL_ASSESSMENT: 0-10
PAIN_FUNCTIONAL_ASSESSMENT: 0-10
PAIN_FUNCTIONAL_ASSESSMENT: UNABLE TO SELF-REPORT
PAIN_FUNCTIONAL_ASSESSMENT: 0-10

## 2023-10-09 ASSESSMENT — LIFESTYLE VARIABLES
HOW OFTEN DO YOU HAVE 6 OR MORE DRINKS ON ONE OCCASION: NEVER
HOW OFTEN DO YOU HAVE A DRINK CONTAINING ALCOHOL: NEVER
SUBSTANCE_ABUSE_PAST_12_MONTHS: NO
PRESCIPTION_ABUSE_PAST_12_MONTHS: NO

## 2023-10-09 NOTE — CONSULTS
History Of Present Illness  Darrell Arreola is a 78 y.o. male presented with aphasia, but pt c/o dizziness.  He does not exhibit aphasia nor other focal neurologic deficits at this time.  He c/o fever and dizziness and states he wants to go home.  He does report numbness and tingling in B feet though cannot state how high on his BLE is ascends, nor for how long it has been there.  He also c/o numbness in his chin but not elsewhere on his face.       Past Medical History  Past Medical History:   Diagnosis Date    CHF (congestive heart failure) (CMS/[facility])     Diabetes mellitus (CMS/HCC)     Heart disease     Hypertension     Stroke (CMS/[facility])      Surgical History  Past Surgical History:   Procedure Laterality Date    CT GUIDED PERCUTANEOUS BIOPSY BONE DEEP  02/21/2020    CT GUIDED PERCUTANEOUS BIOPSY BONE DEEP 2/21/2020 [facility] INPATIENT LEGACY    CT GUIDED PERCUTANEOUS BIOPSY BONE DEEP  03/24/2020    CT GUIDED PERCUTANEOUS BIOPSY BONE DEEP 3/24/2020 [facility] INPATIENT LEGACY    MR HEAD ANGIO WO IV CONTRAST  05/17/2019    MR HEAD ANGIO WO IV CONTRAST [facility] EMERGENCY LEGACY    MR HEAD ANGIO WO IV CONTRAST  10/9/2023    MR HEAD ANGIO WO IV CONTRAST 10/9/2023 [facility]    TOE AMPUTATION      TONSILLECTOMY       Social History  Social History     Tobacco Use    Smoking status: Former     Types: Cigars, Cigarettes    Smokeless tobacco: Never   Substance Use Topics    Alcohol use: He denies but I was informed he drinks daily    Drug use: Never     Allergies  Patient has no known allergies.  Medications Prior to Admission   Medication Sig Dispense Refill Last Dose    apixaban (Eliquis) 5 mg tablet Take 1 tablet (5 mg) by mouth 2 times a day.       famotidine (Pepcid) 20 mg tablet Take 1 tablet (20 mg) by mouth once daily.       folic acid (Folvite) 1 mg tablet Take by mouth once daily.       gabapentin (Neurontin) 100 mg capsule Take 1 capsule (100 mg) by mouth 2 times a day.       metoprolol succinate XL (Toprol-XL) 25 mg 24 hr tablet  "Take 0.5 tablets (12.5 mg) by mouth 2 times a day. Do not crush or chew.       midodrine (Proamatine) 10 mg tablet Take 1.5 tablets (15 mg) by mouth 3 times a day.       rosuvastatin (Crestor) 10 mg tablet Take 1 tablet (10 mg) by mouth once daily.       SITagliptin phosphate (Januvia) 50 mg tablet Take 1 tablet (50 mg) by mouth once daily.       tamsulosin (Flomax) 0.4 mg 24 hr capsule Take 1 capsule (0.4 mg) by mouth once daily in the morning. Take before meals.       torsemide (Demadex) 20 mg tablet Take 1 tablet (20 mg) by mouth once daily.       traZODone (Desyrel) 50 mg tablet Take 1 tablet (50 mg) by mouth once daily at bedtime.       venlafaxine XR (Effexor-XR) 150 mg 24 hr capsule Take 1 capsule (150 mg) by mouth once daily. Do not crush or chew.          Review of Systems  Neurological Exam  Physical Exam  Last Recorded Vitals  Blood pressure 124/68, pulse (!) 155, temperature 36.2 °C (97.2 °F), temperature source Temporal, resp. rate 18, height 1.854 m (6' 1\"), weight 104 kg (229 lb 8 oz), SpO2 92 %.    Relevant Results  Scheduled medications  apixaban, 5 mg, oral, q12h  aspirin, 81 mg, oral, Daily  cefTRIAXone, 1 g, intravenous, q24h  famotidine, 20 mg, oral, Daily  folic acid, 1 mg, oral, Daily  insulin lispro, 0-5 Units, subcutaneous, q4h  metoprolol succinate XL, 12.5 mg, oral, BID  rosuvastatin, 10 mg, oral, Daily  tamsulosin, 0.4 mg, oral, Daily before breakfast      Continuous medications  dilTIAZem, 5-15 mg/hr, Last Rate: 5 mg/hr (10/09/23 0416)      PRN medications  PRN medications: dextrose 10 % in water (D10W), dextrose, glucagon, hydrALAZINE **FOLLOWED BY** [START ON 10/10/2023] hydrALAZINE, labetaloL, oxygen    Results for orders placed or performed during the hospital encounter of 10/08/23 (from the past 96 hour(s))   Comprehensive metabolic panel   Result Value Ref Range    Glucose 97 65 - 99 mg/dL    Sodium 139 133 - 145 mmol/L    Potassium 4.5 3.4 - 5.1 mmol/L    Chloride 101 97 - 107 " mmol/L    Bicarbonate 23 (L) 24 - 31 mmol/L    Urea Nitrogen 23 8 - 25 mg/dL    Creatinine 1.40 0.40 - 1.60 mg/dL    eGFR 51 (L) >60 mL/min/1.73m*2    Calcium 9.4 8.5 - 10.4 mg/dL    Albumin 4.0 3.5 - 5.0 g/dL    Alkaline Phosphatase 159 (H) 35 - 125 U/L    Total Protein 7.5 5.9 - 7.9 g/dL    AST 24 5 - 40 U/L    Bilirubin, Total 2.1 (H) 0.1 - 1.2 mg/dL    ALT 13 5 - 40 U/L    Anion Gap 15 <=19 mmol/L   CBC and Auto Differential   Result Value Ref Range    WBC 4.2 (L) 4.4 - 11.3 x10*3/uL    nRBC 0.0 0.0 - 0.0 /100 WBCs    RBC 3.91 (L) 4.50 - 5.90 x10*6/uL    Hemoglobin 13.2 (L) 13.5 - 17.5 g/dL    Hematocrit 41.1 41.0 - 52.0 %     (H) 80 - 100 fL    MCH 33.8 26.0 - 34.0 pg    MCHC 32.1 32.0 - 36.0 g/dL    RDW 15.5 (H) 11.5 - 14.5 %    Platelets 147 (L) 150 - 450 x10*3/uL    MPV 9.4 7.5 - 11.5 fL    Neutrophils % 66.8 40.0 - 80.0 %    Immature Granulocytes %, Automated 0.2 0.0 - 0.9 %    Lymphocytes % 24.1 13.0 - 44.0 %    Monocytes % 8.2 2.0 - 10.0 %    Eosinophils % 0.5 0.0 - 6.0 %    Basophils % 0.2 0.0 - 2.0 %    Neutrophils Absolute 2.77 1.60 - 5.50 x10*3/uL    Immature Granulocytes Absolute, Automated 0.01 0.00 - 0.50 x10*3/uL    Lymphocytes Absolute 1.00 0.80 - 3.00 x10*3/uL    Monocytes Absolute 0.34 0.05 - 0.80 x10*3/uL    Eosinophils Absolute 0.02 0.00 - 0.40 x10*3/uL    Basophils Absolute 0.01 0.00 - 0.10 x10*3/uL   Protime-INR   Result Value Ref Range    Protime 13.0 (H) 9.3 - 12.7 seconds    INR 1.3 (H) 0.9 - 1.2   Urinalysis with Reflex Microscopic   Result Value Ref Range    Color, Urine Yellow Light-Yellow, Yellow, Dark-Yellow    Appearance, Urine Clear Clear    Specific Gravity, Urine 1.022 1.005 - 1.035    pH, Urine 5.0 5.0, 5.5, 6.0, 6.5, 7.0, 7.5, 8.0    Protein, Urine 30 (1+) (A) NEGATIVE, 10 (TRACE), 20 (TRACE) mg/dL    Glucose, Urine Normal Normal mg/dL    Blood, Urine OVER (3+) (A) NEGATIVE    Ketones, Urine 20 (1+) (A) NEGATIVE mg/dL    Bilirubin, Urine 0.5 (1+) (A) NEGATIVE     Urobilinogen, Urine 8 (3+) (A) Normal mg/dL    Nitrite, Urine NEGATIVE NEGATIVE    Leukocyte Esterase, Urine NEGATIVE NEGATIVE   Urinalysis Microscopic Only   Result Value Ref Range    WBC, Urine 21-50 (A) 1-5, NONE /HPF    RBC, Urine >20 (A) NONE, 1-2, 3-5 /HPF    Bacteria, Urine 1+ (A) NONE SEEN /HPF    Hyaline Casts, Urine 1+ (A) NONE /LPF   POCT GLUCOSE   Result Value Ref Range    POCT Glucose 88 74 - 99 mg/dL   POCT GLUCOSE   Result Value Ref Range    POCT Glucose 92 74 - 99 mg/dL   POCT GLUCOSE   Result Value Ref Range    POCT Glucose 74 74 - 99 mg/dL   POCT GLUCOSE   Result Value Ref Range    POCT Glucose 204 (H) 74 - 99 mg/dL   Basic Metabolic Panel   Result Value Ref Range    Glucose 121 (H) 65 - 99 mg/dL    Sodium 141 133 - 145 mmol/L    Potassium 3.8 3.4 - 5.1 mmol/L    Chloride 107 97 - 107 mmol/L    Bicarbonate 23 (L) 24 - 31 mmol/L    Urea Nitrogen 22 8 - 25 mg/dL    Creatinine 1.30 0.40 - 1.60 mg/dL    eGFR 56 (L) >60 mL/min/1.73m*2    Calcium 8.7 8.5 - 10.4 mg/dL    Anion Gap 11 <=19 mmol/L   CBC and Auto Differential   Result Value Ref Range    WBC 5.0 4.4 - 11.3 x10*3/uL    nRBC 0.0 0.0 - 0.0 /100 WBCs    RBC 3.32 (L) 4.50 - 5.90 x10*6/uL    Hemoglobin 11.3 (L) 13.5 - 17.5 g/dL    Hematocrit 34.9 (L) 41.0 - 52.0 %     (H) 80 - 100 fL    MCH 34.0 26.0 - 34.0 pg    MCHC 32.4 32.0 - 36.0 g/dL    RDW 15.5 (H) 11.5 - 14.5 %    Platelets 131 (L) 150 - 450 x10*3/uL    MPV 9.7 7.5 - 11.5 fL    Neutrophils % 58.7 40.0 - 80.0 %    Immature Granulocytes %, Automated 0.4 0.0 - 0.9 %    Lymphocytes % 28.3 13.0 - 44.0 %    Monocytes % 11.4 2.0 - 10.0 %    Eosinophils % 1.0 0.0 - 6.0 %    Basophils % 0.2 0.0 - 2.0 %    Neutrophils Absolute 2.93 1.60 - 5.50 x10*3/uL    Immature Granulocytes Absolute, Automated 0.02 0.00 - 0.50 x10*3/uL    Lymphocytes Absolute 1.41 0.80 - 3.00 x10*3/uL    Monocytes Absolute 0.57 0.05 - 0.80 x10*3/uL    Eosinophils Absolute 0.05 0.00 - 0.40 x10*3/uL    Basophils Absolute 0.01  0.00 - 0.10 x10*3/uL   POCT GLUCOSE   Result Value Ref Range    POCT Glucose 129 (H) 74 - 99 mg/dL   POCT GLUCOSE   Result Value Ref Range    POCT Glucose 104 (H) 74 - 99 mg/dL   POCT GLUCOSE   Result Value Ref Range    POCT Glucose 174 (H) 74 - 99 mg/dL      MR angio head wo IV contrast    Result Date: 10/9/2023  Interpreted By:  Earl Barnhart, STUDY: MR ANGIO HEAD WO IV CONTRAST; 10/9/2023 12:11 pm   INDICATION: Signs/Symptoms:CVA. Aphasia. Unable to respond. Confusion.   COMPARISON: None   ACCESSION NUMBER(S): [id number]   ORDERING CLINICIAN: COREY MENA   TECHNIQUE: 3-D time of flight images of the [place] were obtained.   FINDINGS: The carotid siphons and basilar artery are patent. The ARIEL, MCA, and posterior cerebral arteries are patent bilaterally. Superior cerebellar arteries as well as the AICA vessels are also patent bilaterally. There is no evidence for intracranial aneurysm. PICA vessels are also identified bilaterally.       No evidence for hemodynamically significant stenosis at the level of the [place].   Signed by: Earl Barnhart 10/9/2023 12:42 PM Dictation workstation:   [id number]    MR brain wo IV contrast    Result Date: 10/9/2023  Interpreted By:  Earl Barnhart, STUDY: MR BRAIN WO IV CONTRAST; 10/9/2023 12:09 pm   INDICATION: Signs/Symptoms:CVA. Aphasia. Unable to respond. Confusion.   COMPARISON: None. CT head 10/08/2023   ACCESSION NUMBER(S): [id number]   ORDERING CLINICIAN: COREY MENA   TECHNIQUE: Multiplanar T1, T2, FLAIR and diffusion-weighted images were obtained.  Gradient axial images were also obtained.   FINDINGS: There is no evidence for acute infarction.  There is no evidence for intracranial hemorrhage or mass effect.  There are patchy white matter hyperintensities bilaterally probably secondary to age related changes and/or white matter ischemic disease.  Age-related atrophy is present. Paranasal sinuses are clear. Normal flow-voids indicating  vessel patency are identified within the carotid siphons and basilar artery.       No acute intracranial pathologic findings are identified. Age-related findings are present.   MACRO: none   Signed by: Earl Barnhart 10/9/2023 12:41 PM Dictation workstation:   [id number]    CT angio head neck w and wo IV contrast    Result Date: 10/8/2023  Interpreted By:  Isaac Tejeda, STUDY: CT ANGIO HEAD NECK W AND WO IV CONTRAST; 10/8/2023 6:17 pm   INDICATION: Signs/Symptoms:aphasia, dysarthria; /patient on Eliquis/history of CVA/heart failure/diabetes   COMPARISON: CT head same day /   ACCESSION NUMBER(S): [id number]   ORDERING CLINICIAN: NAMAN SCHAEFER   TECHNIQUE: CT arteriogram of the head and neck was attempted. I IV infiltration 3 times. No significant intravenous contrast can be seen within the arterial system.   FINDINGS:   AORTIC ARCH AND BRANCHES: Calcific atherosclerosis of the carotid bulbs and aortic arch can be seen bilaterally.   Degenerative disc disease spondylosis of the cervical spine is seen.   This study is nondiagnostic for the evaluation of large vessel occlusion of the head and neck.       Nondiagnostic study.   Signed by: Isaac Tejeda 10/8/2023 6:35 PM Dictation workstation:   [id number]    CT brain attack head wo IV contrast    Result Date: 10/8/2023  Interpreted By:  Earl Sanchez, STUDY: CT BRAIN ATTACK HEAD WO IV CONTRAST; 10/8/2023 1:04 pm   INDICATION: Signs/Symptoms:aphasia.   COMPARISON: 1 July 2023.   ACCESSION NUMBER(S): [id number]   ORDERING CLINICIAN: NAMAN SCHAEFER   TECHNIQUE: CT was performed with one or more of the following dose reduction techniques: automated exposure control, adjustment of the mA and/or kV according to patient size, or use of iterative reconstruction technique.       PROCEDURE: 3.0 mm axial images were obtained through the brain, to include the posterior fossa without intravenous contrast enhancement.   FINDINGS: There is symmetric volume loss of the cerebral  hemispheres. Mild-to-moderate decreased attenuation in the bilateral periventricular white matter, consistent with microangiopathy is noted.  There is no encephalomalacia change. Brainstem is unremarkable. Cerebellar hemispheres are symmetric. No intra- or extra-axial mass or abnormal blood products are demonstrated.   Mild nodular ethmoid mucosal thickening left maxillary mucosal thickening seen. Globes and orbits as well as calvarium and scalp are unremarkable.       1. No acute intracranial findings. 2. Symmetric volume loss of the cerebral hemispheres. 3. Periventricular Microangiopathy.     This report has been produced using speech recognition. This exam is available in DICOM format to non-affiliated healthcare facilities on a secure media free searchable basis with prior patient authorization. The patient exposure is reported to a radiation dose index registry. All CT examinations are performed with one or more of the following dose reduction techniques: Automated Exposure Control, Adjustment of mA and/or KV according to patient size, or use of iterative reconstruction techniques.   MACRO: None   Signed by: Earl Sanchez 10/8/2023 1:12 PM Dictation workstation:   [id number]        NIH Stroke Scale  1A. Level of Consciousness: Alert, Keenly Responsive  1B. Ask Month and Age: No Questions Right  1C. Blink Eyes & Squeeze Hands: Performs Both Tasks  2. Best Gaze: Normal  3. Visual: No Visual Loss  4. Facial Palsy: Normal Symmetrical Movements  5A. Motor - Left Arm: No Drift  5B. Motor - Right Arm: No Drift  6A. Motor - Left Leg: Some Effort Against Gravity  6B. Motor - Right Leg: No Drift  7. Limb Ataxia: Absent  8. Sensory Loss: Normal  9. Best Language: none  10. Dysarthria: none  11. Extinction and Inattention: No Abnormality  NIH Stroke Scale: 0           I have personally reviewed the following imaging results MR angio head wo IV contrast    Result Date: 10/9/2023  Interpreted By:  Earl Barnhart, STUDY: MR  ANGIO HEAD WO IV CONTRAST; 10/9/2023 12:11 pm   INDICATION: Signs/Symptoms:CVA. Aphasia. Unable to respond. Confusion.   COMPARISON: None   ACCESSION NUMBER(S): [id number]   ORDERING CLINICIAN: COREY MENA   TECHNIQUE: 3-D time of flight images of the [place] were obtained.   FINDINGS: The carotid siphons and basilar artery are patent. The ARIEL, MCA, and posterior cerebral arteries are patent bilaterally. Superior cerebellar arteries as well as the AICA vessels are also patent bilaterally. There is no evidence for intracranial aneurysm. PICA vessels are also identified bilaterally.       No evidence for hemodynamically significant stenosis at the level of the [place].   Signed by: Earl Banrhart 10/9/2023 12:42 PM Dictation workstation:   [id number]    MR brain wo IV contrast    Result Date: 10/9/2023  Interpreted By:  Earl Barnhart, STUDY: MR BRAIN WO IV CONTRAST; 10/9/2023 12:09 pm   INDICATION: Signs/Symptoms:CVA. Aphasia. Unable to respond. Confusion.   COMPARISON: None. CT head 10/08/2023   ACCESSION NUMBER(S): [id number]   ORDERING CLINICIAN: COREY MENA   TECHNIQUE: Multiplanar T1, T2, FLAIR and diffusion-weighted images were obtained.  Gradient axial images were also obtained.   FINDINGS: There is no evidence for acute infarction.  There is no evidence for intracranial hemorrhage or mass effect.  There are patchy white matter hyperintensities bilaterally probably secondary to age related changes and/or white matter ischemic disease.  Age-related atrophy is present. Paranasal sinuses are clear. Normal flow-voids indicating vessel patency are identified within the carotid siphons and basilar artery.       No acute intracranial pathologic findings are identified. Age-related findings are present.   MACRO: none   Signed by: Earl Barnhart 10/9/2023 12:41 PM Dictation workstation:   [id number]    CT angio head neck w and wo IV contrast    Result Date: 10/8/2023  Interpreted By:   Isaac Tejeda, STUDY: CT ANGIO HEAD NECK W AND WO IV CONTRAST; 10/8/2023 6:17 pm   INDICATION: Signs/Symptoms:aphasia, dysarthria; /patient on Eliquis/history of CVA/heart failure/diabetes   COMPARISON: CT head same day /   ACCESSION NUMBER(S): [id number]   ORDERING CLINICIAN: NAMAN SCHAEFER   TECHNIQUE: CT arteriogram of the head and neck was attempted. I IV infiltration 3 times. No significant intravenous contrast can be seen within the arterial system.   FINDINGS:   AORTIC ARCH AND BRANCHES: Calcific atherosclerosis of the carotid bulbs and aortic arch can be seen bilaterally.   Degenerative disc disease spondylosis of the cervical spine is seen.   This study is nondiagnostic for the evaluation of large vessel occlusion of the head and neck.       Nondiagnostic study.   Signed by: Isaac Tejeda 10/8/2023 6:35 PM Dictation workstation:   [id number]    CT brain attack head wo IV contrast    Result Date: 10/8/2023  Interpreted By:  Earl Sanchez, STUDY: CT BRAIN ATTACK HEAD WO IV CONTRAST; 10/8/2023 1:04 pm   INDICATION: Signs/Symptoms:aphasia.   COMPARISON: 1 July 2023.   ACCESSION NUMBER(S): [id number]   ORDERING CLINICIAN: NAMAN SCHAEFER   TECHNIQUE: CT was performed with one or more of the following dose reduction techniques: automated exposure control, adjustment of the mA and/or kV according to patient size, or use of iterative reconstruction technique.       PROCEDURE: 3.0 mm axial images were obtained through the brain, to include the posterior fossa without intravenous contrast enhancement.   FINDINGS: There is symmetric volume loss of the cerebral hemispheres. Mild-to-moderate decreased attenuation in the bilateral periventricular white matter, consistent with microangiopathy is noted.  There is no encephalomalacia change. Brainstem is unremarkable. Cerebellar hemispheres are symmetric. No intra- or extra-axial mass or abnormal blood products are demonstrated.   Mild nodular ethmoid mucosal thickening  left maxillary mucosal thickening seen. Globes and orbits as well as calvarium and scalp are unremarkable.       1. No acute intracranial findings. 2. Symmetric volume loss of the cerebral hemispheres. 3. Periventricular Microangiopathy.     This report has been produced using speech recognition. This exam is available in DICOM format to non-affiliated healthcare facilities on a secure media free searchable basis with prior patient authorization. The patient exposure is reported to a radiation dose index registry. All CT examinations are performed with one or more of the following dose reduction techniques: Automated Exposure Control, Adjustment of mA and/or KV according to patient size, or use of iterative reconstruction techniques.   MACRO: None   Signed by: Earl Sanchez 10/8/2023 1:12 PM Dictation workstation:   [id number]  .      Assessment/Plan   Principal Problem:    Stroke-like symptoms resolved    77 yo M with Afib presented with c/o aphasia in the setting of acute medical issues.  I suspect this event was a toxic metabolic encephalopathy in the setting of UTI.  His cognitive issues have since resolved.  Re: Afib, I agree with current use of Eliquis, though if the ASA is only there for stroke prevention, then recommend its discontinuation.     He does also c/o dizziness, which I believe is a manifestation of his severe alcoholic polyneuropathy, the clinical impact of which was magnified by the TME.  He is currently at his baseline and will likely benefit from rehab, but no further neurologic evaluation is recommended at this time. I will sign off.  Please erma with questions.         I spent 45 minutes in the professional and overall care of this patient.      Ira Rios MD

## 2023-10-09 NOTE — NURSING NOTE
Made hospitalist aware that patient can take PO meds and all PO meds are on hold at this time.    Patient sitting upright in bed, eating cereal.  Taking PO fluids with no difficulties.  Ordered tray.    MRI to be done shortly.    Afib on tele with rates in 110's.

## 2023-10-09 NOTE — NURSING NOTE
End of shift orders reviewed, pt resting in bed watching FOOTBALL  Ed loves foot ball, he is watching the college station per request,   VSS and pt denies any needs, the pt seems more alert and oriented than the beginning of the shift, pt will respond but it takes him a minute to think of what he wants to say.  He knows his name and  he is not sure of the month or year, he does know he is at [facility].   He is excited to eat breakfast, but he is sad because he does not have his teeth. I suggested that we can possibly order him a soft diet.

## 2023-10-09 NOTE — NURSING NOTE
Patient ambulated to bathroom, washed up.  When transferring patient off toilet noted blood in toilet.  Assessed rectum and penis, blood is coming from penis.  Patient denies pain/discomfort.  Hospitalist notified of findings.

## 2023-10-09 NOTE — NURSING NOTE
Amy ROBLES hospitalist rounding on patient.  Therapy worked with patient and ambulated in room.  HR up to 150's.  Recovered quickly.  Placed back in bed.  MRI scheduled for 1045.  Incontinence care provided.

## 2023-10-09 NOTE — PROGRESS NOTES
[facility] attempted to discuss dc planning with the pt x1, he appeared to be eating and this worker asked him if she would like him to return when he was done with his food, he stated yes but then put his food on the tray as if he was done, this worker repeated what she states and he again stated yes. [facility] called pts contact Abdoulaye Arreola [phone] and left message to further discuss dc planning.     Katrina Harris [facility]

## 2023-10-09 NOTE — PROGRESS NOTES
Speech-Language Pathology    SLP ADULT Inpatient Speech-Language Cognition    Patient Name: Darrell Arreola  MRN: [id number]  Today's Date: 10/9/2023   Time Calculation  Start Time: 0909  Stop Time: 0925  Time Calculation (min): 16 min         Current Problem:   Patient Active Problem List   Diagnosis    Cellulitis    Contusion of thigh    Non-traumatic rhabdomyolysis    Atrial fibrillation (CMS/HCC)    Diabetes mellitus (CMS/HCC)    PVD (peripheral vascular disease) (CMS/HCC)    Benign essential hypertension    Depression    Asthenia    At risk for falls    Coronary artery disease    Obesity    Stroke-like symptoms    Diabetes mellitus type 2 in obese (CMS/HCC)    History of cerebrovascular accident     SLP Assessment:  SLP Assessment  SLP Assessment Results: Cognitive Deficits, Expression deficits  Prognosis: Good  Treatment Provided: No  Medical Staff Made Aware: Yes (jocelyn soler RN)  Barriers: Cognition  Education Provided: Yes      SLP Plan:  Plan  Inpatient/Swing Bed or Outpatient: Inpatient  Treatment/Interventions: Cognitive communication functioning, Expressive Language (further assess cognition)  SLP TX Plan: Continue Plan of Care  SLP Plan: Skilled SLP  SLP Frequency: 2x per week  Duration: Current admission  SLP Discharge Recommendations: Continue skilled SLP services at the next level of care  Next Treatment Priority: aphasia tx, cognitve assessment  Discussed POC: Patient, Nursing (jocelyn soler RN)  Discussed Risks/Benefits: Yes, Patient, Nursing (jocelyn soler RN)  Patient/Caregiver Agreeable: Yes (needs reinforcemnt)    Goals: Pt will:  Complete confrontational naming tasks with 90% acc given cues  Complete responsive naming tasks with 90% acc given cues  Complete orientation tasls with 90% acc  Participate in futher cognitive testing      Subjective   Current Problem:  Darrell Arreola is a 78 y.o. male presenting with past medical history of CVA, atrial fibrillation, on Eliquis, heart  failure, diabetes mellitus, hypertension, CAD, PAD, and hyperlipidemia who presented to the emergency department with complaints of aphasia.       General Visit Information:  General Information  Reason for Referral: aphasia  Past Medical History Relevant to Rehab: h/o CVA  Prior to Session Communication: Bedside nurse (jocelyn soler RN)    Objective   Moderate to severe expressive aphasia, cognitive impairment    Pain:  Pain Assessment  Pain Assessment: Unable to self-report  Pain Score:  (pt is aphasic, says foot, unable to rate)      Cognition:  Cognition  Overall Cognitive Status: Impaired  Orientation Level: Other (Comment) (oriented to self and place only)  Problem Solving: Unable to assess  Numeric Reasoning: Unable to assess  Abstract Reasoning: Unable to assess  Processing Speed: Delayed               Auditory Comprehension:   Auditory Comprehension  Yes/No Questions: Within Functional Limits  Commands: Within Functional Limits (pt followed 1 and 2 level commands)      Verbal:  Verbal Expression  Primary Mode of Expression: Verbal  Primary Language: English  Confrontation Naming: Impaired (Automatic speech: impaired: Counted 1-10, unable to recite days of week, cues not effective)  Confrontation Objects: Impaired (named 4/7 objects)  Confrontation Naming Comments: cues not effective  Responsive Naming: Impaired  Responsive Naming Comments: responded to 2/5 basic questions re self  Open Ended Questions: Impaired  Open Ended Questions Comments: responded to 2/5 open ended questions  Conversation: Impaired  Impaired Conversation: pt demon some automatic speech, word finding difficulty, paraphasias, speech is telegraphic, aware of deficits  Eye Contact: Within Functional Limits      Inpatient:  Education Documentation  Pt educated on results, rec and POC, verbalizes understanding, and agreement, needs reinforcement from staff and caregivers

## 2023-10-09 NOTE — PROGRESS NOTES
Occupational Therapy    Evaluation/Treatment    Patient Name: Darrell Arreola  MRN: [id number]  : 1945  Today's Date: 10/09/23  Time Calculation  Start Time: 930  Stop Time: 954  Time Calculation (min): 24 min       Assessment:  OT Assessment: Pt would benefit from acute OT services  Prognosis: Good  Evaluation/Treatment Tolerance: Patient limited by fatigue  Medical Staff Made Aware: Yes  OT Assessment Results: Decreased ADL status, Decreased upper extremity strength, Decreased cognition, Decreased fine motor control, Decreased functional mobility  Prognosis: Good  Evaluation/Treatment Tolerance: Patient limited by fatigue  Medical Staff Made Aware: Yes  Plan:  Treatment Interventions: ADL retraining, Functional transfer training, UE strengthening/ROM, Patient/family training  OT Frequency: 4 times per week  OT Discharge Recommendations: Moderate intensity level of continued care  Treatment Interventions: ADL retraining, Functional transfer training, UE strengthening/ROM, Patient/family training    Subjective   Current Problem:  1. Stroke-like symptoms        2. Aphasia        3. Atrial fibrillation with RVR (CMS/HCC)        4. Atrial fibrillation, unspecified type (CMS/HCC)          General:   OT Received On: 10/09/23  General  Reason for Referral: Stroke like symptoms  Past Medical History Relevant to Rehab: CVA, AFIB, heart failure, DM, HTN, CAD, PAD, toe amputation  Family/Caregiver Present: No  Prior to Session Communication: Bedside nurse  Patient Position Received: Bed, 2 rail up  General Comment: Cleared by nursing. Pt supine in bed upon arrival. Pt pleasant and agreeable to therapy  Precautions:  Medical Precautions: Fall precautions  Vital Signs:  Heart Rate:  (105-155)  Heart Rate Source: Monitor  Pain:  Pain Assessment  Pain Assessment: 0-10  Pain Score: 0 - No pain    Objective   Cognition:  Overall Cognitive Status: Impaired  Orientation Level: Disoriented to time (aphasia)  Processing Speed:  Delayed           Home Living:  Type of Home: House  Lives With: Significant other  Home Adaptive Equipment: Cane, Walker rolling or standard  Home Layout: One level  Home Access: Ramped entrance  Bathroom Shower/Tub: Tub/shower unit  Bathroom Equipment: Tub transfer bench  Home Living Comments: Unable to accurately assess intake infromation due to patient being poor historian;  intake information abstracted from previous medical records  Prior Function:  Level of [place]: Needs assistance with ADLs, Needs assistance with homemaking, Independent with homemaking with ambulation (use of RW)  Receives Help From: Home health  ADL Assistance: Needs assistance (?)  Homemaking Assistance: Needs assistance (?)  Ambulatory Assistance: Independent (use of RW)  Prior Function Comments: Unable to accurately assess PLOF due to patient being poor historian.       ADL:  Eating Assistance: Minimal  Grooming Assistance: Minimal  Bathing Assistance: Moderate  UE Dressing Assistance: Minimal  UE Dressing Deficit:  (to don/doff hospitla gown)  LE Dressing Assistance: Moderate  LE Dressing Deficit: Don/doff R sock, Don/doff L sock  Toileting Assistance with Device: Moderate    Activity Tolerance:  Endurance: Tolerates less than 10 min exercise with changes in vital signs  Functional Standing Tolerance:     Bed Mobility/Transfers: Bed Mobility  Bed Mobility: Yes  Bed Mobility 1  Bed Mobility 1: Supine to sitting  Level of Assistance 1: Moderate assistance  Bed Mobility Comments 1: trunk upright and assist with BLEs supine>seated EOB  Bed Mobility 2  Bed Mobility  2: Sitting to supine  Level of Assistance 2: Moderate assistance  Bed Mobility Comments 2: controlled descent of trunk and assist with BLEs seated EOB>supine    Transfers  Transfer: Yes  Transfer 1  Transfer From 1: Bed to, Stand to  Transfer to 1: Sit, Stand  Technique 1: Sit to stand, Stand to sit  Transfer Device 1: Walker  Transfer Level of Assistance 1: Minimum  assistance  Trials/Comments 1: for balance and controlled descent sit<>stand, VCs for safe hand and leg placement  Pt mod  A for functional mobility short household distance with use of RW, slightly unsteady this date. HR elevated to 150s during mobility, pt returned to supine. Pt supine in bed at end of session, bed alarm on, all needs in reach, RN aware.            Vision:Vision - Basic Assessment  Current Vision: Wears glasses only for reading  Sensation:  Sensation Comment: pt denied numbness/paresthesia in BUEs  Strength:  Strength Comments: BUEs grossly 3+/5    Coordination:  Movements are Fluid and Coordinated: No  Coordination Comment: digit opposition impaired right hand   Hand Function:  Hand Function  Gross Grasp: Functional  Coordination: Functional  Extremities: RUE   RUE : Within Functional Limits and LUE   LUE: Within Functional Limits    Outcome Measures: [facility] Daily Activity  Putting on and taking off regular lower body clothing: A lot  Bathing (including washing, rinsing, drying): A lot  Putting on and taking off regular upper body clothing: A little  Toileting, which includes using toilet, bedpan or urinal: A lot  Taking care of personal grooming such as brushing teeth: A little  Eating Meals: A little  Daily Activity - Total Score: 15    Education Documentation  ADL Training, taught by Danielle Choe OT at 10/9/2023 11:41 AM.  Learner: Patient  Readiness: Acceptance  Method: Explanation, Demonstration  Response: Needs Reinforcement    Education Comments  No comments found.      Goals:    Problem: Bathing  Goal: STG - Patient will perform UB bathing at mod I; LB bathing at distant supervision with use of AE prn  Outcome: Progressing     Problem: Dressings Lower Extremities  Goal: STG - Patient to complete lower body dressing at distant supervision with use of AE prn  Outcome: Progressing     Problem: Dressing Upper Extremities  Goal: STG - Patient will dress upper body at mod  I  Outcome: Progressing     Problem: Grooming  Goal: STG - Patient completes grooming at mod I  Outcome: Progressing     Problem: Mobility  Goal: Pt will perform functional mobility household distances at distant supervision with use of RW  Outcome: Progressing     Problem: Toileting  Goal: STG - Patient will complete toileting tasks at distant supervision  Outcome: Progressing     Problem: Transfers  Goal: pt will perform functional transfers at distant supervision with use of DME prn  Outcome: Progressing  Goal: Pt will perform BUE exercises at mod I to improve strength and independence with functional tranfers and ADL tasks   Outcome: Progressing

## 2023-10-09 NOTE — NURSING NOTE
Assumed care of patient.  Awake and alert to name and place. Difficulties with speech observed.  Night nurse reports patient did pass swallow screen although patient does not have dentures.  Will make hospitalist aware.   Afib on monitoring with HR in 100-110's. Cardizem  gtt infusing at 5.  Call  light and commonly used items in reach.  Bed locked and in low position.

## 2023-10-09 NOTE — PROGRESS NOTES
Darrell Arreola is a 78 y.o. male on day 1 of admission presenting with Stroke-like symptoms.      Subjective   Patient seen and examined.  Is alert and oriented x1-2.  With expressive aphasia.  Pending MRI.  Pending consult with neurology.       Objective     Last Recorded Vitals  /68 (BP Location: Left arm, Patient Position: Lying)   Pulse (!) 155 Comment: heart rate range 1-5-155 bpm;  RN notified  Temp 36.2 °C (97.2 °F) (Temporal)   Resp 18   Wt 104 kg (229 lb 8 oz)   SpO2 92%   Intake/Output last 3 Shifts:    Intake/Output Summary (Last 24 hours) at 10/9/2023 1105  Last data filed at 10/9/2023 1000  Gross per 24 hour   Intake 664.5 ml   Output --   Net 664.5 ml       Admission Weight  Weight: 104 kg (229 lb 4.5 oz) (10/08/23 1309)    Daily Weight  10/09/23 : 104 kg (229 lb 8 oz)    Image Results  CT angio head neck w and wo IV contrast  Narrative: Interpreted By:  Isaac Tejeda,   STUDY:  CT ANGIO HEAD NECK W AND WO IV CONTRAST; 10/8/2023 6:17 pm      INDICATION:  Signs/Symptoms:aphasia, dysarthria; /patient on Eliquis/history of  CVA/heart failure/diabetes      COMPARISON:  CT head same day /      ACCESSION NUMBER(S):  [id number]      ORDERING CLINICIAN:  NAMAN SCHAEFER      TECHNIQUE:  CT arteriogram of the head and neck was attempted. I IV infiltration  3 times. No significant intravenous contrast can be seen within the  arterial system.      FINDINGS:      AORTIC ARCH AND BRANCHES: Calcific atherosclerosis of the carotid  bulbs and aortic arch can be seen bilaterally.      Degenerative disc disease spondylosis of the cervical spine is seen.      This study is nondiagnostic for the evaluation of large vessel  occlusion of the head and neck.      Impression: Nondiagnostic study.      Signed by: Isaac Tejeda 10/8/2023 6:35 PM  Dictation workstation:   [id number]  CT brain attack head wo IV contrast  Narrative: Interpreted By:  Earl Sanchez,   STUDY:  CT BRAIN ATTACK HEAD WO IV CONTRAST;  10/8/2023 1:04 pm      INDICATION:  Signs/Symptoms:aphasia.      COMPARISON:  1 July 2023.      ACCESSION NUMBER(S):  [id number]      ORDERING CLINICIAN:  NAMAN SCHAEFER      TECHNIQUE:  CT was performed with one or more of the following dose reduction  techniques: automated exposure control, adjustment of the mA and/or  kV according to patient size, or use of iterative reconstruction  technique.              PROCEDURE: 3.0 mm axial images were obtained through the brain, to  include the posterior fossa without intravenous contrast enhancement.      FINDINGS:  There is symmetric volume loss of the cerebral hemispheres.  Mild-to-moderate decreased attenuation in the bilateral  periventricular white matter, consistent with microangiopathy is  noted.  There is no encephalomalacia change. Brainstem is  unremarkable. Cerebellar hemispheres are symmetric. No intra- or  extra-axial mass or abnormal blood products are demonstrated.      Mild nodular ethmoid mucosal thickening left maxillary mucosal  thickening seen. Globes and orbits as well as calvarium and scalp are  unremarkable.      Impression: 1. No acute intracranial findings.  2. Symmetric volume loss of the cerebral hemispheres.  3. Periventricular Microangiopathy.          This report has been produced using speech recognition.  This exam is available in DICOM format to non-affiliated healthcare  facilities on a secure media free searchable basis with prior patient  authorization. The patient exposure is reported to a radiation dose  index registry. All CT examinations are performed with one or more of  the following dose reduction techniques: Automated Exposure Control,  Adjustment of mA and/or KV according to patient size, or use of  iterative reconstruction techniques.      MACRO:  None      Signed by: Earl Sanchez 10/8/2023 1:12 PM  Dictation workstation:   [id number]      Physical Exam  Vitals reviewed.   Constitutional:       Appearance: He is obese.   HENT:       Head: Normocephalic and atraumatic.   Eyes:      Extraocular Movements: Extraocular movements intact.   Cardiovascular:      Rate and Rhythm: Tachycardia present. Rhythm irregular.      Pulses: Normal pulses.   Pulmonary:      Effort: Pulmonary effort is normal.   Abdominal:      General: Abdomen is flat.      Palpations: Abdomen is soft.   Musculoskeletal:      Cervical back: Normal range of motion.      Comments: Generalized weakness   Skin:     General: Skin is warm and dry.      Capillary Refill: Capillary refill takes less than 2 seconds.   Neurological:      Mental Status: He is alert.      Motor: Weakness present.      Comments: Oriented x1-2         Relevant Results       Results for orders placed or performed during the hospital encounter of 10/08/23 (from the past 24 hour(s))   Comprehensive metabolic panel   Result Value Ref Range    Glucose 97 65 - 99 mg/dL    Sodium 139 133 - 145 mmol/L    Potassium 4.5 3.4 - 5.1 mmol/L    Chloride 101 97 - 107 mmol/L    Bicarbonate 23 (L) 24 - 31 mmol/L    Urea Nitrogen 23 8 - 25 mg/dL    Creatinine 1.40 0.40 - 1.60 mg/dL    eGFR 51 (L) >60 mL/min/1.73m*2    Calcium 9.4 8.5 - 10.4 mg/dL    Albumin 4.0 3.5 - 5.0 g/dL    Alkaline Phosphatase 159 (H) 35 - 125 U/L    Total Protein 7.5 5.9 - 7.9 g/dL    AST 24 5 - 40 U/L    Bilirubin, Total 2.1 (H) 0.1 - 1.2 mg/dL    ALT 13 5 - 40 U/L    Anion Gap 15 <=19 mmol/L   CBC and Auto Differential   Result Value Ref Range    WBC 4.2 (L) 4.4 - 11.3 x10*3/uL    nRBC 0.0 0.0 - 0.0 /100 WBCs    RBC 3.91 (L) 4.50 - 5.90 x10*6/uL    Hemoglobin 13.2 (L) 13.5 - 17.5 g/dL    Hematocrit 41.1 41.0 - 52.0 %     (H) 80 - 100 fL    MCH 33.8 26.0 - 34.0 pg    MCHC 32.1 32.0 - 36.0 g/dL    RDW 15.5 (H) 11.5 - 14.5 %    Platelets 147 (L) 150 - 450 x10*3/uL    MPV 9.4 7.5 - 11.5 fL    Neutrophils % 66.8 40.0 - 80.0 %    Immature Granulocytes %, Automated 0.2 0.0 - 0.9 %    Lymphocytes % 24.1 13.0 - 44.0 %    Monocytes % 8.2 2.0 - 10.0 %     Eosinophils % 0.5 0.0 - 6.0 %    Basophils % 0.2 0.0 - 2.0 %    Neutrophils Absolute 2.77 1.60 - 5.50 x10*3/uL    Immature Granulocytes Absolute, Automated 0.01 0.00 - 0.50 x10*3/uL    Lymphocytes Absolute 1.00 0.80 - 3.00 x10*3/uL    Monocytes Absolute 0.34 0.05 - 0.80 x10*3/uL    Eosinophils Absolute 0.02 0.00 - 0.40 x10*3/uL    Basophils Absolute 0.01 0.00 - 0.10 x10*3/uL   Protime-INR   Result Value Ref Range    Protime 13.0 (H) 9.3 - 12.7 seconds    INR 1.3 (H) 0.9 - 1.2   Urinalysis with Reflex Microscopic   Result Value Ref Range    Color, Urine Yellow Light-Yellow, Yellow, Dark-Yellow    Appearance, Urine Clear Clear    Specific Gravity, Urine 1.022 1.005 - 1.035    pH, Urine 5.0 5.0, 5.5, 6.0, 6.5, 7.0, 7.5, 8.0    Protein, Urine 30 (1+) (A) NEGATIVE, 10 (TRACE), 20 (TRACE) mg/dL    Glucose, Urine Normal Normal mg/dL    Blood, Urine OVER (3+) (A) NEGATIVE    Ketones, Urine 20 (1+) (A) NEGATIVE mg/dL    Bilirubin, Urine 0.5 (1+) (A) NEGATIVE    Urobilinogen, Urine 8 (3+) (A) Normal mg/dL    Nitrite, Urine NEGATIVE NEGATIVE    Leukocyte Esterase, Urine NEGATIVE NEGATIVE   Urinalysis Microscopic Only   Result Value Ref Range    WBC, Urine 21-50 (A) 1-5, NONE /HPF    RBC, Urine >20 (A) NONE, 1-2, 3-5 /HPF    Bacteria, Urine 1+ (A) NONE SEEN /HPF    Hyaline Casts, Urine 1+ (A) NONE /LPF   POCT GLUCOSE   Result Value Ref Range    POCT Glucose 88 74 - 99 mg/dL   POCT GLUCOSE   Result Value Ref Range    POCT Glucose 92 74 - 99 mg/dL   POCT GLUCOSE   Result Value Ref Range    POCT Glucose 74 74 - 99 mg/dL   POCT GLUCOSE   Result Value Ref Range    POCT Glucose 204 (H) 74 - 99 mg/dL   Basic Metabolic Panel   Result Value Ref Range    Glucose 121 (H) 65 - 99 mg/dL    Sodium 141 133 - 145 mmol/L    Potassium 3.8 3.4 - 5.1 mmol/L    Chloride 107 97 - 107 mmol/L    Bicarbonate 23 (L) 24 - 31 mmol/L    Urea Nitrogen 22 8 - 25 mg/dL    Creatinine 1.30 0.40 - 1.60 mg/dL    eGFR 56 (L) >60 mL/min/1.73m*2    Calcium 8.7  8.5 - 10.4 mg/dL    Anion Gap 11 <=19 mmol/L   CBC and Auto Differential   Result Value Ref Range    WBC 5.0 4.4 - 11.3 x10*3/uL    nRBC 0.0 0.0 - 0.0 /100 WBCs    RBC 3.32 (L) 4.50 - 5.90 x10*6/uL    Hemoglobin 11.3 (L) 13.5 - 17.5 g/dL    Hematocrit 34.9 (L) 41.0 - 52.0 %     (H) 80 - 100 fL    MCH 34.0 26.0 - 34.0 pg    MCHC 32.4 32.0 - 36.0 g/dL    RDW 15.5 (H) 11.5 - 14.5 %    Platelets 131 (L) 150 - 450 x10*3/uL    MPV 9.7 7.5 - 11.5 fL    Neutrophils % 58.7 40.0 - 80.0 %    Immature Granulocytes %, Automated 0.4 0.0 - 0.9 %    Lymphocytes % 28.3 13.0 - 44.0 %    Monocytes % 11.4 2.0 - 10.0 %    Eosinophils % 1.0 0.0 - 6.0 %    Basophils % 0.2 0.0 - 2.0 %    Neutrophils Absolute 2.93 1.60 - 5.50 x10*3/uL    Immature Granulocytes Absolute, Automated 0.02 0.00 - 0.50 x10*3/uL    Lymphocytes Absolute 1.41 0.80 - 3.00 x10*3/uL    Monocytes Absolute 0.57 0.05 - 0.80 x10*3/uL    Eosinophils Absolute 0.05 0.00 - 0.40 x10*3/uL    Basophils Absolute 0.01 0.00 - 0.10 x10*3/uL   POCT GLUCOSE   Result Value Ref Range    POCT Glucose 129 (H) 74 - 99 mg/dL               Assessment/Plan   This patient currently has cardiac telemetry ordered; if you would like to modify or discontinue the telemetry order, click here to go to the orders activity to modify/discontinue the order.    CVA  Suspected, aphasia  admit to SDU  monitor on tele  ASA/statin when able to take p.o.  Swallow eval  echocardiogram recently at [place], echo with a EF of 27±5%, severe MR, moderately severe TR, AS; [facility] 7/27   MRI/MRA  US carotids  lipid panel recently  neurostroke assessment  consult neurology, appreciate recommendations  PT/OT eval     A-fib RVR  A fib RVR on the monitor  Cardizem drip running  We will need to monitor blood pressure closely     Urinary tract infection  UA positive for WBCs and bacteria, culture pending  IV ceftriaxone  Follow cultures     Diabetes mellitus  Sliding scale insulin  Monitor blood glucose  Hypoglycemia  protocol  Hemoglobin A1c     Hypertension  Hold antihypertensives for now  Permissive hypertension  As needed labetalol and Apresoline for systolic greater than 220     CAD  Aspirin/statin     PAD  As above     Plan  Admit to stepdown unit for observation  Monitor on telemetry  Stroke work-up  Cardizem drip  Monitor blood pressure  Consult neurology and cardiology, appreciate recommendations  Okay to resume Eliquis per neurology  CBC and BMP in the morning            Principal Problem:    Stroke-like symptoms        Amy Moss, APRN-CNP

## 2023-10-09 NOTE — PROGRESS NOTES
Speech-Language Pathology    Inpatient Speech-Language Pathology Clinical Swallow Evaluation    Patient Name: Darrell Arreola  MRN: [id number]  Today's Date: 10/9/2023   Time Calculation  Start Time: 0909  Stop Time: 0925  Time Calculation (min): 16 min         Current Problem:   Patient Active Problem List   Diagnosis    Cellulitis    Contusion of thigh    Non-traumatic rhabdomyolysis    Atrial fibrillation (CMS/HCC)    Diabetes mellitus (CMS/HCC)    PVD (peripheral vascular disease) (CMS/HCC)    Benign essential hypertension    Depression    Asthenia    At risk for falls    Coronary artery disease    Obesity    Stroke-like symptoms    Diabetes mellitus type 2 in obese (CMS/HCC)    History of cerebrovascular accident         Recommendations:  Risk for Aspiration: No  Additional Recommendations: Dysphagia treatment  Solid Diet Recommendations : Soft & bite sized/chopped (IDDSI Level 6) (rec soft and bite sized/chopped d/t edentulous status)  Liquid Diet Recommendations: Thin (IDDSI Level 0)  Compensatory Swallowing Strategies: Upright 90 degrees as possible for all oral intake, Single sips  Medication Administration Recommendations: With Liquid  Follow up treatments: Diet tolerance monitoring  Dysphagia Goals: Patient will tolerate recommended diet without observed clinical signs of aspiration, Patient will demonstrate appropriate strategies for swallowing safety      Assessment:  Assessment Results: orophayrngeal swallow WFL, no s/s aspiration, pt edentulous, no dentures available, rec IDDS level 6 solids @ this time, ok to upgrade to regular solids when pt has dentures   Prognosis: Good  Treatment Provided: No  Medical Staff Made Aware: Yes (jocelyn osler RN)  Barriers: Cognition      Plan:  Inpatient/Swing Bed or Outpatient: Inpatient  SLP Plan: Skilled SLP  SLP Frequency: 1x per week  Duration: Current admission  Diet Recommendations: Solid  Solid Consistency: Soft & bite sized/chopped (IDDSI Level 6)  Liquid  Consistency: Thin (IDDSI Level 0)  Discussed POC: Patient, Nursing (jocelyn soler RN)  Discussed Risks/Benefits: Yes, Patient, Nursing (jocelyn soler RN)  Patient/Caregiver Agreeable: Yes (needs reinforcemnt)      Subjective   Current Problem:  Darrell Arreola is a 78 y.o. male presenting with past medical history of CVA, atrial fibrillation, on Eliquis, heart failure, diabetes mellitus, hypertension, CAD, PAD, and hyperlipidemia who presented to the emergency department with complaints of aphasia.    Pt reports no difficulty with swallowing, but difficulty with speech.      General Visit Information:  Patient Class: Inpatient  Reason for Referral: assess swallow per CVA protocol, pt passed NSS per RN, edentulous, unsure of diet order, so pt is currently NPO  Past Medical History Relevant to Rehab: h/o CVA, aphasia, dysphagia   Date of Onset: 10/08/23  Date of Order: 10/08/23  [employer] Diet: regular and thin liquids  Current Diet : NPO  Dysphagia Diagnosis: Within functional limits     Objective   Pt tolerated 4 oz thin liquids via cup and straw (single and consecutive sips), 2 oz applesauce, and 1 shelia cracker without s/s aspiration, mastication slow d/t edentulous status, swallow onset appeared mildly delayed, laryngeal elevation adequate upon palpation.   Baseline Assessment:  Respiratory Status: Room air  Behavior/Cognition: Alert, Cooperative, Confused (aphasia)  Patient Positioning: Upright in Bed  Baseline Vocal Quality: Normal  Volitional Cough: Strong      Pain:  Pain Assessment: Unable to self-report  Pain Score:  (pt is aphasic, says foot, unable to rate)       Oral/Motor Assessment:  Oral Hygiene: oral mucosa moist  Dentition: Edentulous (does not have dentures)  Oral Motor: Within Functional Limits  Vocal Quality: Within Functional Limits      Consistencies Trialed:  Consistencies Trialed: Yes  Consistencies Trialed: Thin (IDDSI Level 0) - Straw, Thin (IDDSI Level 0) - Cup, Pureed/extremely  thick (IDDSI Level 4), Regular (IDDSI Level 7)      Clinical Observations:  Patient Positioning: Upright in Bed  Impaired Mastication: Regular (IDDSI Level 7) (slow mastication d/t edentulous status)     Inpatient:  Education Documentation  Pt educated on results and diet recommendation, POC, pt verbalized understanding and agreement, needs reinforcement from staff and caregivers

## 2023-10-09 NOTE — CARE PLAN
Problem: PT Problem  Goal: Patient will ambulate 50 distance using walker with supervision  Outcome: Progressing  Note: Patient will ambulate 50 distance using walker with supervision  Goal: Patient will perform chair to and from bed transfer with supervision  Outcome: Progressing  Note: Patient will perform chair to and from bed transfer with supervision

## 2023-10-09 NOTE — PROGRESS NOTES
Physical Therapy    Physical Therapy Evaluation    Patient Name: Darrell Arreola  MRN: [id number]  Today's Date: 10/9/2023   Time Calculation  Start Time: 0925  Stop Time: 0955  Time Calculation (min): 30 min    Assessment/Plan   PT Assessment  PT Assessment Results: Decreased strength, Decreased endurance, Impaired balance, Decreased mobility, Decreased coordination, Decreased safety awareness  Rehab Prognosis: Good  Evaluation/Treatment Tolerance: Other (Comment) (Elevated heart rate during mobility (155 bpm);  RN nontified)  Medical Staff Made Aware: Yes  End of Session Communication: Bedside nurse  End of Session Patient Position: Bed, 1 rail up, Alarm on  IP OR SWING BED PT PLAN  Inpatient or Swing Bed: Swing Bed  PT Plan  Treatment/Interventions: Bed mobility, Transfer training, Gait training, Balance training, Strengthening, Endurance training, Therapeutic exercise, Therapeutic activity  PT Plan: Skilled PT  PT Frequency: 4 times per week  PT Discharge Recommendations: Moderate intensity level of continued care  PT Recommended Transfer Status: Assist x1      Subjective   General Visit Information:  General  Reason for Referral: Stroke like symptoms;  impaired mobility  Referred By: Dr. Ramirez  Past Medical History Relevant to Rehab: CVA, AFIB, heart failure, DM, HTN, CAD, PAD, toe amputation  Prior to Session Communication: Bedside nurse  Patient Position Received: Bed, 1 rail up  Home Living:  Home Living  Type of Home: House  Lives With: Significant other  Home Adaptive Equipment: Cane, Walker rolling or standard  Home Layout: One level  Home Access: Ramped entrance  Bathroom Shower/Tub: Tub/shower unit  Bathroom Equipment: Tub transfer bench  Home Living Comments: Unable to accurately assess intake infromation due to patient being poor historian;  intake information abstracted from previous medical records.  Prior Level of Function:  Prior Function Per Pt/Caregiver Report  Level of [place]: Other  "(Comment) (Mod independent with rolling walker)  Receives Help From: Home health  Ambulatory Assistance: Independent (Mod independent with rolling walker)  Prior Function Comments: Unable to accurately assess PLOF due to patient being poor historian.  Precautions:  Precautions  Hearing/Visual Limitations: glasses  Medical Precautions: Fall precautions  Vital Signs:  Vital Signs  Heart Rate: (!) 155 (heart rate range 1-5-155 bpm;  RN notified)  Heart Rate Source: Monitor    Objective   Pain:  Pain Assessment  Pain Assessment: Unable to self-report  Unable to Self-Report Pain Reason: Other (Comment)  Activities/Procedures Causing Pain:  (aphasia)  Cognition:  Cognition  Overall Cognitive Status: Impaired (appropriately verbalizes \"yes/no\" responses;  verbalizes 2word phrases intermittently)  Orientation Level: Disoriented to place, Disoriented to time (expressive apahasia;  mild receptive aphasia?)    General Assessments:  General Observation  General Observation: Areas of skin discoloration smita LE tibial region;  closed areas of previous skin ulcers             Activity Tolerance  Endurance: Decreased tolerance for upright activites  Activity Tolerance Comments: heart rate 155 bpm during mobility:  RN notified    Sensation  Light Touch: Partial deficits in the RLE, Partial deficits in the LLE  Sensation Comment: reports numbness in toes smita LE feet                Coordination  Movements are Fluid and Coordinated: No  Lower Body Coordination: slower rate of movement smita LE         Static Sitting Balance  Static Sitting-Balance Support: No upper extremity supported  Static Sitting-Level of Assistance: Close supervision    Static Standing Balance  Static Standing-Balance Support: Bilateral upper extremity supported  Static Standing-Level of Assistance: Minimum assistance  Functional Assessments:  Bed Mobility  Bed Mobility: Yes  Bed Mobility 1  Bed Mobility 1: Supine to sitting, Sitting to supine  Level of Assistance 1: " Moderate assistance  Bed Mobility Comments 1: Assist with trunk-up during supine to sit;  assist with smita LE during sit to supine    Transfers  Transfer: Yes  Transfer 1  Transfer From 1: Bed to  Transfer to 1: Stand  Technique 1: Sit to stand, Stand to sit  Transfer Device 1: Walker  Transfer Level of Assistance 1: Minimum assistance  Trials/Comments 1: Tactile cues for hand placement    Ambulation/Gait Training  Ambulation/Gait Training Performed: Yes  Ambulation/Gait Training 1  Surface 1: Level tile  Device 1: Rolling walker  Assistance 1: Moderate assistance  Comments/Distance (ft) 1: 5 feet x 1.  Treatment including gait training (9 minutes) including ambulation 10 feet x 1, 15 feet x 1 with rolling walker and assist for balance and trunk support;  patient ambulates with slow mari, non-reciprocating gait pattern, decreased step length smita LE, and flexion at trunk-hips-knees.  Extremity/Trunk Assessments:  RLE   RLE : Exceptions to WFL  Strength RLE  R Hip Flexion: 2/5  R Knee Extension: 3/5  R Ankle Dorsiflexion: 3/5  LLE   LLE : Exceptions to WFL  Strength LLE  L Hip Flexion: 2/5  L Knee Extension: 3/5  L Ankle Dorsiflexion: 3/5  Outcome Measures:  [facility] Basic Mobility  Turning from your back to your side while in a flat bed without using bedrails: A lot  Moving from lying on your back to sitting on the side of a flat bed without using bedrails: A lot  Moving to and from bed to chair (including a wheelchair): A lot  Standing up from a chair using your arms (e.g. wheelchair or bedside chair): A lot  To walk in hospital room: A lot  Climbing 3-5 steps with railing: Total  Basic Mobility - Total Score: 11    Encounter Problems       Encounter Problems (Active)       PT Problem       Patient will ambulate 50 distance using walker with supervision (Progressing)       Start:  10/09/23    Expected End:  10/23/23         Goal Note       Patient will ambulate 50 distance using walker with supervision               Patient will perform chair to and from bed transfer with supervision (Progressing)       Start:  10/09/23    Expected End:  10/23/23         Goal Note       Patient will perform chair to and from bed transfer with supervision              Patient will perform supine to sit on bed with supervision demonstrating control       Start:  10/09/23    Expected End:  10/23/23         Goal note on 10/09/23 1028 by Earl Barba, PT       Patient will perform supine to sit on bed with supervision demonstrating control              Patient will perform sit to supine on bed with supervision demonstrating control       Start:  10/09/23    Expected End:  10/23/23         Goal note on 10/09/23 1028 by Earl Barba, PT       Patient will perform sit to supine on bed with supervision demonstrating control                     Education Documentation  Mobility Training, taught by Earl Barba PT at 10/9/2023 10:30 AM.  Learner: Patient  Readiness: Acceptance  Method: Explanation, Demonstration  Response: Needs Reinforcement    Education Comments  No comments found.

## 2023-10-09 NOTE — CARE PLAN
Problem: Bathing  Goal: STG - Patient will perform UB bathing at mod I; LB bathing at distant supervision with use of AE prn  Outcome: Progressing     Problem: Dressings Lower Extremities  Goal: STG - Patient to complete lower body dressing at distant supervision with use of AE prn  Outcome: Progressing     Problem: Dressing Upper Extremities  Goal: STG - Patient will dress upper body at mod I  Outcome: Progressing     Problem: Grooming  Goal: STG - Patient completes grooming at mod I  Outcome: Progressing     Problem: Mobility  Goal: Pt will perform functional mobility household distances at distant supervision with use of RW  Outcome: Progressing     Problem: Toileting  Goal: STG - Patient will complete toileting tasks at distant supervision  Outcome: Progressing     Problem: Transfers  Goal: pt will perform functional transfers at distant supervision with use of DME prn  Outcome: Progressing  Goal: Pt will perform BUE exercises at mod I to improve strength and independence with functional tranfers and ADL tasks   Outcome: Progressing

## 2023-10-09 NOTE — CARE PLAN
The patient's goals for the shift include      The clinical goals for the shift include stable heart rate and BP    Over the shift, the patient did not make progress toward the following goals. Barriers to progression include N/A pt is progressing. Recommendations to address these barriers include rest.

## 2023-10-10 ENCOUNTER — APPOINTMENT (OUTPATIENT)
Dept: CARDIOLOGY | Facility: HOSPITAL | Age: 78
DRG: 091 | End: 2023-10-10
Payer: MEDICARE

## 2023-10-10 LAB
25(OH)D3 SERPL-MCNC: 44 NG/ML (ref 31–100)
ANION GAP SERPL CALC-SCNC: 6 MMOL/L
BUN SERPL-MCNC: 17 MG/DL (ref 8–25)
CALCIUM SERPL-MCNC: 8.8 MG/DL (ref 8.5–10.4)
CHLORIDE SERPL-SCNC: 105 MMOL/L (ref 97–107)
CO2 SERPL-SCNC: 27 MMOL/L (ref 24–31)
CREAT SERPL-MCNC: 1.2 MG/DL (ref 0.4–1.6)
EJECTION FRACTION APICAL 4 CHAMBER: 25
ERYTHROCYTE [DISTWIDTH] IN BLOOD BY AUTOMATED COUNT: 15.7 % (ref 11.5–14.5)
EST. AVERAGE GLUCOSE BLD GHB EST-MCNC: 105 MG/DL
GFR SERPL CREATININE-BSD FRML MDRD: 62 ML/MIN/1.73M*2
GLUCOSE BLD MANUAL STRIP-MCNC: 136 MG/DL (ref 74–99)
GLUCOSE BLD MANUAL STRIP-MCNC: 152 MG/DL (ref 74–99)
GLUCOSE BLD MANUAL STRIP-MCNC: 174 MG/DL (ref 74–99)
GLUCOSE BLD MANUAL STRIP-MCNC: 251 MG/DL (ref 74–99)
GLUCOSE BLD MANUAL STRIP-MCNC: 97 MG/DL (ref 74–99)
GLUCOSE SERPL-MCNC: 122 MG/DL (ref 65–99)
HBA1C MFR BLD: 5.3 %
HCT VFR BLD AUTO: 35 % (ref 41–52)
HGB BLD-MCNC: 11.1 G/DL (ref 13.5–17.5)
MCH RBC QN AUTO: 33.6 PG (ref 26–34)
MCHC RBC AUTO-ENTMCNC: 31.7 G/DL (ref 32–36)
MCV RBC AUTO: 106 FL (ref 80–100)
NRBC BLD-RTO: 0 /100 WBCS (ref 0–0)
PLATELET # BLD AUTO: 127 X10*3/UL (ref 150–450)
PMV BLD AUTO: 9.6 FL (ref 7.5–11.5)
POTASSIUM SERPL-SCNC: 3.8 MMOL/L (ref 3.4–5.1)
Q ONSET: 212 MS
QRS COUNT: 21 BEATS
QRS DURATION: 110 MS
QT INTERVAL: 314 MS
QTC CALCULATION(BAZETT): 460 MS
QTC FREDERICIA: 405 MS
R AXIS: 45 DEGREES
RBC # BLD AUTO: 3.3 X10*6/UL (ref 4.5–5.9)
SODIUM SERPL-SCNC: 138 MMOL/L (ref 133–145)
T AXIS: 213 DEGREES
T OFFSET: 369 MS
TSH SERPL DL<=0.05 MIU/L-ACNC: 3.37 MIU/L (ref 0.27–4.2)
VENTRICULAR RATE: 129 BPM
VIT B12 SERPL-MCNC: 1864 PG/ML (ref 211–946)
WBC # BLD AUTO: 4.5 X10*3/UL (ref 4.4–11.3)

## 2023-10-10 PROCEDURE — 82306 VITAMIN D 25 HYDROXY: CPT | Performed by: PSYCHIATRY & NEUROLOGY

## 2023-10-10 PROCEDURE — 80051 ELECTROLYTE PANEL: CPT

## 2023-10-10 PROCEDURE — 97116 GAIT TRAINING THERAPY: CPT | Mod: GP

## 2023-10-10 PROCEDURE — 82947 ASSAY GLUCOSE BLOOD QUANT: CPT

## 2023-10-10 PROCEDURE — 36415 COLL VENOUS BLD VENIPUNCTURE: CPT

## 2023-10-10 PROCEDURE — 87086 URINE CULTURE/COLONY COUNT: CPT | Mod: CMCLAB,WESLAB

## 2023-10-10 PROCEDURE — 2060000001 HC INTERMEDIATE ICU ROOM DAILY

## 2023-10-10 PROCEDURE — 83721 ASSAY OF BLOOD LIPOPROTEIN: CPT | Mod: CMCLAB,WESLAB

## 2023-10-10 PROCEDURE — 93306 TTE W/DOPPLER COMPLETE: CPT

## 2023-10-10 PROCEDURE — 83036 HEMOGLOBIN GLYCOSYLATED A1C: CPT

## 2023-10-10 PROCEDURE — 2500000001 HC RX 250 WO HCPCS SELF ADMINISTERED DRUGS (ALT 637 FOR MEDICARE OP): Performed by: NURSE PRACTITIONER

## 2023-10-10 PROCEDURE — 2500000004 HC RX 250 GENERAL PHARMACY W/ HCPCS (ALT 636 FOR OP/ED): Performed by: NURSE PRACTITIONER

## 2023-10-10 PROCEDURE — 97535 SELF CARE MNGMENT TRAINING: CPT | Mod: GO,CO

## 2023-10-10 PROCEDURE — 82607 VITAMIN B-12: CPT | Performed by: PSYCHIATRY & NEUROLOGY

## 2023-10-10 PROCEDURE — 85027 COMPLETE CBC AUTOMATED: CPT

## 2023-10-10 PROCEDURE — 97110 THERAPEUTIC EXERCISES: CPT | Mod: GP

## 2023-10-10 PROCEDURE — 93005 ELECTROCARDIOGRAM TRACING: CPT

## 2023-10-10 PROCEDURE — 84443 ASSAY THYROID STIM HORMONE: CPT | Performed by: PSYCHIATRY & NEUROLOGY

## 2023-10-10 PROCEDURE — 2500000001 HC RX 250 WO HCPCS SELF ADMINISTERED DRUGS (ALT 637 FOR MEDICARE OP): Performed by: INTERNAL MEDICINE

## 2023-10-10 PROCEDURE — 2500000002 HC RX 250 W HCPCS SELF ADMINISTERED DRUGS (ALT 637 FOR MEDICARE OP, ALT 636 FOR OP/ED): Performed by: NURSE PRACTITIONER

## 2023-10-10 PROCEDURE — 96372 THER/PROPH/DIAG INJ SC/IM: CPT | Performed by: NURSE PRACTITIONER

## 2023-10-10 RX ORDER — METOPROLOL SUCCINATE 25 MG/1
25 TABLET, EXTENDED RELEASE ORAL 2 TIMES DAILY
Status: DISCONTINUED | OUTPATIENT
Start: 2023-10-10 | End: 2023-10-11

## 2023-10-10 RX ADMIN — FOLIC ACID 1 MG: 1 TABLET ORAL at 10:05

## 2023-10-10 RX ADMIN — FAMOTIDINE 20 MG: 20 TABLET ORAL at 10:05

## 2023-10-10 RX ADMIN — ROSUVASTATIN CALCIUM 10 MG: 10 TABLET, COATED ORAL at 10:05

## 2023-10-10 RX ADMIN — APIXABAN 5 MG: 5 TABLET, FILM COATED ORAL at 17:18

## 2023-10-10 RX ADMIN — METOPROLOL SUCCINATE 25 MG: 25 TABLET, FILM COATED, EXTENDED RELEASE ORAL at 17:18

## 2023-10-10 RX ADMIN — CEFTRIAXONE SODIUM 1 G: 1 INJECTION, SOLUTION INTRAVENOUS at 17:17

## 2023-10-10 RX ADMIN — TAMSULOSIN HYDROCHLORIDE 0.4 MG: 0.4 CAPSULE ORAL at 06:06

## 2023-10-10 RX ADMIN — INSULIN LISPRO 3 UNITS: 100 INJECTION, SOLUTION INTRAVENOUS; SUBCUTANEOUS at 22:08

## 2023-10-10 RX ADMIN — METOPROLOL SUCCINATE 12.5 MG: 25 TABLET, FILM COATED, EXTENDED RELEASE ORAL at 10:05

## 2023-10-10 RX ADMIN — ASPIRIN 81 MG: 81 TABLET, COATED ORAL at 10:04

## 2023-10-10 SDOH — ECONOMIC STABILITY: FOOD INSECURITY: WITHIN THE PAST 12 MONTHS, THE FOOD YOU BOUGHT JUST DIDN'T LAST AND YOU DIDN'T HAVE MONEY TO GET MORE.: NEVER TRUE

## 2023-10-10 SDOH — ECONOMIC STABILITY: INCOME INSECURITY: HOW HARD IS IT FOR YOU TO PAY FOR THE VERY BASICS LIKE FOOD, HOUSING, MEDICAL CARE, AND HEATING?: NOT HARD AT ALL

## 2023-10-10 SDOH — SOCIAL STABILITY: SOCIAL NETWORK: ARE YOU MARRIED, WIDOWED, DIVORCED, SEPARATED, NEVER MARRIED, OR LIVING WITH A PARTNER?: WIDOWED

## 2023-10-10 SDOH — ECONOMIC STABILITY: INCOME INSECURITY: IN THE LAST 12 MONTHS, WAS THERE A TIME WHEN YOU WERE NOT ABLE TO PAY THE MORTGAGE OR RENT ON TIME?: NO

## 2023-10-10 SDOH — ECONOMIC STABILITY: TRANSPORTATION INSECURITY
IN THE PAST 12 MONTHS, HAS LACK OF TRANSPORTATION KEPT YOU FROM MEETINGS, WORK, OR FROM GETTING THINGS NEEDED FOR DAILY LIVING?: NO

## 2023-10-10 SDOH — SOCIAL STABILITY: SOCIAL NETWORK: HOW OFTEN DO YOU GET TOGETHER WITH FRIENDS OR RELATIVES?: MORE THAN THREE TIMES A WEEK

## 2023-10-10 SDOH — SOCIAL STABILITY: SOCIAL NETWORK
IN A TYPICAL WEEK, HOW MANY TIMES DO YOU TALK ON THE PHONE WITH FAMILY, FRIENDS, OR NEIGHBORS?: MORE THAN THREE TIMES A WEEK

## 2023-10-10 SDOH — ECONOMIC STABILITY: INCOME INSECURITY: IN THE PAST 12 MONTHS, HAS THE ELECTRIC, GAS, OIL, OR WATER COMPANY THREATENED TO SHUT OFF SERVICE IN YOUR HOME?: NO

## 2023-10-10 SDOH — ECONOMIC STABILITY: FOOD INSECURITY: WITHIN THE PAST 12 MONTHS, YOU WORRIED THAT YOUR FOOD WOULD RUN OUT BEFORE YOU GOT MONEY TO BUY MORE.: NEVER TRUE

## 2023-10-10 SDOH — ECONOMIC STABILITY: HOUSING INSECURITY
IN THE LAST 12 MONTHS, WAS THERE A TIME WHEN YOU DID NOT HAVE A STEADY PLACE TO SLEEP OR SLEPT IN A SHELTER (INCLUDING NOW)?: NO

## 2023-10-10 ASSESSMENT — COGNITIVE AND FUNCTIONAL STATUS - GENERAL
WALKING IN HOSPITAL ROOM: A LITTLE
MOVING FROM LYING ON BACK TO SITTING ON SIDE OF FLAT BED WITH BEDRAILS: A LITTLE
MOBILITY SCORE: 16
DRESSING REGULAR UPPER BODY CLOTHING: A LITTLE
MOVING TO AND FROM BED TO CHAIR: A LITTLE
DAILY ACTIVITIY SCORE: 19
PERSONAL GROOMING: A LITTLE
TOILETING: A LITTLE
TURNING FROM BACK TO SIDE WHILE IN FLAT BAD: A LITTLE
STANDING UP FROM CHAIR USING ARMS: A LITTLE
DRESSING REGULAR LOWER BODY CLOTHING: A LITTLE
HELP NEEDED FOR BATHING: A LITTLE
CLIMB 3 TO 5 STEPS WITH RAILING: TOTAL

## 2023-10-10 ASSESSMENT — PAIN SCALES - GENERAL
PAINLEVEL_OUTOF10: 0 - NO PAIN

## 2023-10-10 ASSESSMENT — PAIN - FUNCTIONAL ASSESSMENT
PAIN_FUNCTIONAL_ASSESSMENT: 0-10

## 2023-10-10 NOTE — NURSING NOTE
Patient up to chair.  Nephew brought in glasses for patient.  No complaints of pain/discomfort.  HR remains in low 100's.  Afib rhythm.  Denies SOB/chest pain.  Up with 1 person assist with toileting.  Call light in reach.

## 2023-10-10 NOTE — NURSING NOTE
Patient worked with therapy, up to chair.  Complaints of missing glasses, called nephew who states DID not bring glasses to hospital.  Nephew Wilfredo will bring in glasses today.    No bleeding episodes noted with urination.,  Amy ROBLES at bedside.  Has been made aware.  Eliquis to restart and NP to place order.

## 2023-10-10 NOTE — NURSING NOTE
Pt resting in bed watching football, he denies any needs, end of shift orders reviewed,  only positive changes noted since beginning of shift, improvement of pt mental status and communication  is much better  Pt is still on Cardizem drip at 5mg and tolerating

## 2023-10-10 NOTE — PROGRESS NOTES
"Subjective   Patient is awake sitting in the chair mildly irritable but appropriately interactive.  No events overnight.  The nurse reports sundowning behaviors with agitation, but redirectable.  No hallucinations.     Objective   Neurological Exam  Physical Exam    Last Recorded Vitals  Blood pressure 100/72, pulse 103, temperature 36.6 °C (97.9 °F), temperature source Temporal, resp. rate 14, height 1.854 m (6' 1\"), weight 102 kg (225 lb 12 oz), SpO2 98 %.      Neurologically, pt is awake and oriented x2.  Cognition is notable for 4 poor processing but adequate attention at this time.  no aphasia  Cranial nerves: VFF, EOMI, No nystagmus, Face is symmetric to sensory and motor, no dysarthria, Tongue protrudes midline, Shrug symmetric  Motor: 4/5 strength B throughout, no tremor or asterixis  Sensation is intact bilaterally throughout  Coordination: no ataxia, no dysmetria/dysdiadochokinesia         Scheduled medications  apixaban, 5 mg, oral, q12h  aspirin, 81 mg, oral, Daily   Or  aspirin, 81 mg, oral, Daily   Or  aspirin, 81 mg, nasogastric tube, Daily   Or  aspirin, 300 mg, rectal, Daily  cefTRIAXone, 1 g, intravenous, q24h  famotidine, 20 mg, oral, Daily  folic acid, 1 mg, oral, Daily  insulin lispro, 0-5 Units, subcutaneous, q4h  metoprolol succinate XL, 12.5 mg, oral, BID  rosuvastatin, 10 mg, oral, Daily  tamsulosin, 0.4 mg, oral, Daily before breakfast      Continuous medications  dilTIAZem, 5-15 mg/hr, Last Rate: 5 mg/hr (10/10/23 0023)      PRN medications  PRN medications: dextrose 10 % in water (D10W), dextrose, glucagon, hydrALAZINE **FOLLOWED BY** hydrALAZINE, labetaloL, oxygen     Results for orders placed or performed during the hospital encounter of 10/08/23 (from the past 96 hour(s))   Comprehensive metabolic panel   Result Value Ref Range    Glucose 97 65 - 99 mg/dL    Sodium 139 133 - 145 mmol/L    Potassium 4.5 3.4 - 5.1 mmol/L    Chloride 101 97 - 107 mmol/L    Bicarbonate 23 (L) 24 - 31 " mmol/L    Urea Nitrogen 23 8 - 25 mg/dL    Creatinine 1.40 0.40 - 1.60 mg/dL    eGFR 51 (L) >60 mL/min/1.73m*2    Calcium 9.4 8.5 - 10.4 mg/dL    Albumin 4.0 3.5 - 5.0 g/dL    Alkaline Phosphatase 159 (H) 35 - 125 U/L    Total Protein 7.5 5.9 - 7.9 g/dL    AST 24 5 - 40 U/L    Bilirubin, Total 2.1 (H) 0.1 - 1.2 mg/dL    ALT 13 5 - 40 U/L    Anion Gap 15 <=19 mmol/L   CBC and Auto Differential   Result Value Ref Range    WBC 4.2 (L) 4.4 - 11.3 x10*3/uL    nRBC 0.0 0.0 - 0.0 /100 WBCs    RBC 3.91 (L) 4.50 - 5.90 x10*6/uL    Hemoglobin 13.2 (L) 13.5 - 17.5 g/dL    Hematocrit 41.1 41.0 - 52.0 %     (H) 80 - 100 fL    MCH 33.8 26.0 - 34.0 pg    MCHC 32.1 32.0 - 36.0 g/dL    RDW 15.5 (H) 11.5 - 14.5 %    Platelets 147 (L) 150 - 450 x10*3/uL    MPV 9.4 7.5 - 11.5 fL    Neutrophils % 66.8 40.0 - 80.0 %    Immature Granulocytes %, Automated 0.2 0.0 - 0.9 %    Lymphocytes % 24.1 13.0 - 44.0 %    Monocytes % 8.2 2.0 - 10.0 %    Eosinophils % 0.5 0.0 - 6.0 %    Basophils % 0.2 0.0 - 2.0 %    Neutrophils Absolute 2.77 1.60 - 5.50 x10*3/uL    Immature Granulocytes Absolute, Automated 0.01 0.00 - 0.50 x10*3/uL    Lymphocytes Absolute 1.00 0.80 - 3.00 x10*3/uL    Monocytes Absolute 0.34 0.05 - 0.80 x10*3/uL    Eosinophils Absolute 0.02 0.00 - 0.40 x10*3/uL    Basophils Absolute 0.01 0.00 - 0.10 x10*3/uL   Protime-INR   Result Value Ref Range    Protime 13.0 (H) 9.3 - 12.7 seconds    INR 1.3 (H) 0.9 - 1.2   Urinalysis with Reflex Microscopic   Result Value Ref Range    Color, Urine Yellow Light-Yellow, Yellow, Dark-Yellow    Appearance, Urine Clear Clear    Specific Gravity, Urine 1.022 1.005 - 1.035    pH, Urine 5.0 5.0, 5.5, 6.0, 6.5, 7.0, 7.5, 8.0    Protein, Urine 30 (1+) (A) NEGATIVE, 10 (TRACE), 20 (TRACE) mg/dL    Glucose, Urine Normal Normal mg/dL    Blood, Urine OVER (3+) (A) NEGATIVE    Ketones, Urine 20 (1+) (A) NEGATIVE mg/dL    Bilirubin, Urine 0.5 (1+) (A) NEGATIVE    Urobilinogen, Urine 8 (3+) (A) Normal mg/dL     Nitrite, Urine NEGATIVE NEGATIVE    Leukocyte Esterase, Urine NEGATIVE NEGATIVE   Urinalysis Microscopic Only   Result Value Ref Range    WBC, Urine 21-50 (A) 1-5, NONE /HPF    RBC, Urine >20 (A) NONE, 1-2, 3-5 /HPF    Bacteria, Urine 1+ (A) NONE SEEN /HPF    Hyaline Casts, Urine 1+ (A) NONE /LPF   POCT GLUCOSE   Result Value Ref Range    POCT Glucose 88 74 - 99 mg/dL   POCT GLUCOSE   Result Value Ref Range    POCT Glucose 92 74 - 99 mg/dL   POCT GLUCOSE   Result Value Ref Range    POCT Glucose 74 74 - 99 mg/dL   POCT GLUCOSE   Result Value Ref Range    POCT Glucose 204 (H) 74 - 99 mg/dL   Basic Metabolic Panel   Result Value Ref Range    Glucose 121 (H) 65 - 99 mg/dL    Sodium 141 133 - 145 mmol/L    Potassium 3.8 3.4 - 5.1 mmol/L    Chloride 107 97 - 107 mmol/L    Bicarbonate 23 (L) 24 - 31 mmol/L    Urea Nitrogen 22 8 - 25 mg/dL    Creatinine 1.30 0.40 - 1.60 mg/dL    eGFR 56 (L) >60 mL/min/1.73m*2    Calcium 8.7 8.5 - 10.4 mg/dL    Anion Gap 11 <=19 mmol/L   CBC and Auto Differential   Result Value Ref Range    WBC 5.0 4.4 - 11.3 x10*3/uL    nRBC 0.0 0.0 - 0.0 /100 WBCs    RBC 3.32 (L) 4.50 - 5.90 x10*6/uL    Hemoglobin 11.3 (L) 13.5 - 17.5 g/dL    Hematocrit 34.9 (L) 41.0 - 52.0 %     (H) 80 - 100 fL    MCH 34.0 26.0 - 34.0 pg    MCHC 32.4 32.0 - 36.0 g/dL    RDW 15.5 (H) 11.5 - 14.5 %    Platelets 131 (L) 150 - 450 x10*3/uL    MPV 9.7 7.5 - 11.5 fL    Neutrophils % 58.7 40.0 - 80.0 %    Immature Granulocytes %, Automated 0.4 0.0 - 0.9 %    Lymphocytes % 28.3 13.0 - 44.0 %    Monocytes % 11.4 2.0 - 10.0 %    Eosinophils % 1.0 0.0 - 6.0 %    Basophils % 0.2 0.0 - 2.0 %    Neutrophils Absolute 2.93 1.60 - 5.50 x10*3/uL    Immature Granulocytes Absolute, Automated 0.02 0.00 - 0.50 x10*3/uL    Lymphocytes Absolute 1.41 0.80 - 3.00 x10*3/uL    Monocytes Absolute 0.57 0.05 - 0.80 x10*3/uL    Eosinophils Absolute 0.05 0.00 - 0.40 x10*3/uL    Basophils Absolute 0.01 0.00 - 0.10 x10*3/uL   POCT GLUCOSE    Result Value Ref Range    POCT Glucose 129 (H) 74 - 99 mg/dL   POCT GLUCOSE   Result Value Ref Range    POCT Glucose 104 (H) 74 - 99 mg/dL   POCT GLUCOSE   Result Value Ref Range    POCT Glucose 174 (H) 74 - 99 mg/dL   CBC   Result Value Ref Range    WBC 5.6 4.4 - 11.3 x10*3/uL    nRBC 0.0 0.0 - 0.0 /100 WBCs    RBC 3.51 (L) 4.50 - 5.90 x10*6/uL    Hemoglobin 12.0 (L) 13.5 - 17.5 g/dL    Hematocrit 37.7 (L) 41.0 - 52.0 %     (H) 80 - 100 fL    MCH 34.2 (H) 26.0 - 34.0 pg    MCHC 31.8 (L) 32.0 - 36.0 g/dL    RDW 15.9 (H) 11.5 - 14.5 %    Platelets 142 (L) 150 - 450 x10*3/uL    MPV 9.3 7.5 - 11.5 fL   POCT GLUCOSE   Result Value Ref Range    POCT Glucose 179 (H) 74 - 99 mg/dL   POCT GLUCOSE   Result Value Ref Range    POCT Glucose 216 (H) 74 - 99 mg/dL   POCT GLUCOSE   Result Value Ref Range    POCT Glucose 152 (H) 74 - 99 mg/dL   CBC   Result Value Ref Range    WBC 4.5 4.4 - 11.3 x10*3/uL    nRBC 0.0 0.0 - 0.0 /100 WBCs    RBC 3.30 (L) 4.50 - 5.90 x10*6/uL    Hemoglobin 11.1 (L) 13.5 - 17.5 g/dL    Hematocrit 35.0 (L) 41.0 - 52.0 %     (H) 80 - 100 fL    MCH 33.6 26.0 - 34.0 pg    MCHC 31.7 (L) 32.0 - 36.0 g/dL    RDW 15.7 (H) 11.5 - 14.5 %    Platelets 127 (L) 150 - 450 x10*3/uL    MPV 9.6 7.5 - 11.5 fL   POCT GLUCOSE   Result Value Ref Range    POCT Glucose 97 74 - 99 mg/dL   Basic metabolic panel   Result Value Ref Range    Glucose 122 (H) 65 - 99 mg/dL    Sodium 138 133 - 145 mmol/L    Potassium 3.8 3.4 - 5.1 mmol/L    Chloride 105 97 - 107 mmol/L    Bicarbonate 27 24 - 31 mmol/L    Urea Nitrogen 17 8 - 25 mg/dL    Creatinine 1.20 0.40 - 1.60 mg/dL    eGFR 62 >60 mL/min/1.73m*2    Calcium 8.8 8.5 - 10.4 mg/dL    Anion Gap 6 <=19 mmol/L   POCT GLUCOSE   Result Value Ref Range    POCT Glucose 136 (H) 74 - 99 mg/dL   Hemoglobin A1c   Result Value Ref Range    Hemoglobin A1C 5.3 See below %    Estimated Average Glucose 105 Not Established mg/dL          US bladder    Result Date:  10/9/2023  Interpreted By:  Jamarcus Stroud, STUDY: US BLADDER; 10/9/2023 7:27 pm   INDICATION: Signs/Symptoms:hematuria.   COMPARISON: None.   ACCESSION NUMBER(S): [id number]   ORDERING CLINICIAN: GONZALEZ GARCIA   TECHNIQUE: The urinary bladder is morphologically normal. No free fluid is seen in the pelvis. Before voiding, the bladder corresponds to an estimated volume of 452 ml. After voiding, the bladder corresponds to an estimated volume of 5 ml.   FINDINGS: No echogenic masses or calculi are identified within the bladder.       1. Post void residual volume 5 ml.   MACRO: None.   Signed by: Jamarcus Stroud 10/9/2023 8:38 PM Dictation workstation:   [id number]    US renal complete    Result Date: 10/9/2023  Interpreted By:  Jamarcus Stroud, STUDY: US RENAL COMPLETE; 10/9/2023 7:15 pm   INDICATION: Signs/Symptoms:hematuria.   COMPARISON: None.   ACCESSION NUMBER(S): [id number]   ORDERING CLINICIAN: GONZALEZ GARCIA   TECHNIQUE: Grayscale and color Doppler imaging of the kidneys.   FINDINGS: The examination is markedly limited due to poor penetration due to the patient's body habitus.   The right kidney measures 10.2 x 4.1 x 5.1 cm. The left kidney measures 10.2 x 5.7 x 5.9 cm. There is no definite shadowing calculus, hydronephrosis, or solid mass identified..       Limited examination due to poor penetration due to the patient's body habitus. Within the limits of the examination, unremarkable exam.   MACRO: None.   Signed by: Jamarcus Stroud 10/9/2023 8:37 PM Dictation workstation:   [id number]    MR angio head wo IV contrast    Result Date: 10/9/2023  Interpreted By:  Earl Barnhart, STUDY: MR ANGIO HEAD WO IV CONTRAST; 10/9/2023 12:11 pm   INDICATION: Signs/Symptoms:CVA. Aphasia. Unable to respond. Confusion.   COMPARISON: None   ACCESSION NUMBER(S): [id number]   ORDERING CLINICIAN: COREY MENA   TECHNIQUE: 3-D time of flight images of the [place] were obtained.   FINDINGS: The carotid  siphons and basilar artery are patent. The ARIEL, MCA, and posterior cerebral arteries are patent bilaterally. Superior cerebellar arteries as well as the AICA vessels are also patent bilaterally. There is no evidence for intracranial aneurysm. PICA vessels are also identified bilaterally.       No evidence for hemodynamically significant stenosis at the level of the [place].   Signed by: Earl Barnhart 10/9/2023 12:42 PM Dictation workstation:   [id number]    MR brain wo IV contrast    Result Date: 10/9/2023  Interpreted By:  Earl Barnhart, STUDY: MR BRAIN WO IV CONTRAST; 10/9/2023 12:09 pm   INDICATION: Signs/Symptoms:CVA. Aphasia. Unable to respond. Confusion.   COMPARISON: None. CT head 10/08/2023   ACCESSION NUMBER(S): [id number]   ORDERING CLINICIAN: COREY MENA   TECHNIQUE: Multiplanar T1, T2, FLAIR and diffusion-weighted images were obtained.  Gradient axial images were also obtained.   FINDINGS: There is no evidence for acute infarction.  There is no evidence for intracranial hemorrhage or mass effect.  There are patchy white matter hyperintensities bilaterally probably secondary to age related changes and/or white matter ischemic disease.  Age-related atrophy is present. Paranasal sinuses are clear. Normal flow-voids indicating vessel patency are identified within the carotid siphons and basilar artery.       No acute intracranial pathologic findings are identified. Age-related findings are present.   MACRO: none   Signed by: Earl Barnhart 10/9/2023 12:41 PM Dictation workstation:   [id number]    CT angio head neck w and wo IV contrast    Result Date: 10/8/2023  Interpreted By:  Isaac Tejeda, STUDY: CT ANGIO HEAD NECK W AND WO IV CONTRAST; 10/8/2023 6:17 pm   INDICATION: Signs/Symptoms:aphasia, dysarthria; /patient on Eliquis/history of CVA/heart failure/diabetes   COMPARISON: CT head same day /   ACCESSION NUMBER(S): [id number]   ORDERING CLINICIAN: NAMAN SCHAEFER   TECHNIQUE: CT  arteriogram of the head and neck was attempted. I IV infiltration 3 times. No significant intravenous contrast can be seen within the arterial system.   FINDINGS:   AORTIC ARCH AND BRANCHES: Calcific atherosclerosis of the carotid bulbs and aortic arch can be seen bilaterally.   Degenerative disc disease spondylosis of the cervical spine is seen.   This study is nondiagnostic for the evaluation of large vessel occlusion of the head and neck.       Nondiagnostic study.   Signed by: Isaac Tejeda 10/8/2023 6:35 PM Dictation workstation:   [id number]    CT brain attack head wo IV contrast    Result Date: 10/8/2023  Interpreted By:  Earl Sanchez, STUDY: CT BRAIN ATTACK HEAD WO IV CONTRAST; 10/8/2023 1:04 pm   INDICATION: Signs/Symptoms:aphasia.   COMPARISON: 1 July 2023.   ACCESSION NUMBER(S): [id number]   ORDERING CLINICIAN: NAMAN SCHAEFER   TECHNIQUE: CT was performed with one or more of the following dose reduction techniques: automated exposure control, adjustment of the mA and/or kV according to patient size, or use of iterative reconstruction technique.       PROCEDURE: 3.0 mm axial images were obtained through the brain, to include the posterior fossa without intravenous contrast enhancement.   FINDINGS: There is symmetric volume loss of the cerebral hemispheres. Mild-to-moderate decreased attenuation in the bilateral periventricular white matter, consistent with microangiopathy is noted.  There is no encephalomalacia change. Brainstem is unremarkable. Cerebellar hemispheres are symmetric. No intra- or extra-axial mass or abnormal blood products are demonstrated.   Mild nodular ethmoid mucosal thickening left maxillary mucosal thickening seen. Globes and orbits as well as calvarium and scalp are unremarkable.       1. No acute intracranial findings. 2. Symmetric volume loss of the cerebral hemispheres. 3. Periventricular Microangiopathy.     This report has been produced using speech recognition. This exam is  available in DICOM format to non-affiliated healthcare facilities on a secure media free searchable basis with prior patient authorization. The patient exposure is reported to a radiation dose index registry. All CT examinations are performed with one or more of the following dose reduction techniques: Automated Exposure Control, Adjustment of mA and/or KV according to patient size, or use of iterative reconstruction techniques.   MACRO: None   Signed by: Earl Sanchez 10/8/2023 1:12 PM Dictation workstation:   [id number]       Assessment/Plan   Principal Problem:    Stroke-like symptoms    77 yo M with Afib presented with c/o aphasia in the setting of acute medical issues.  I suspect this event was a toxic metabolic encephalopathy in the setting of UTI.  He continues to have a fluctuating mental status suggestive of sundowning, indicative of an underlying dementia.  An outpatient evaluation for his cognition would be appropriate.  I will order nutritional labs.  He has had an MRI.  Outpatient cognitive eval is appropriate.  Re: Afib, I agree with current use of Eliquis, though if the ASA is only there for stroke prevention, then recommend its discontinuation.      He does also c/o dizziness, which I believe is a manifestation of his severe alcoholic polyneuropathy, the clinical impact of which was magnified by the TME.       I personally spent 25 minutes today, exclusive of procedures, providing care for this patient, including preparation, face to face time, documentation and other services such as review of medical records, diagnostic result, patient education, counseling, coordination of care as specified in the encounter.

## 2023-10-10 NOTE — PROGRESS NOTES
Pts harmanew/YUE Stanford supportive of pt going to SNF at [place], referral sent to [facility] will be needed.

## 2023-10-10 NOTE — PROGRESS NOTES
Darrell Arreola is a 78 y.o. male on day 2 of admission presenting with Stroke-like symptoms.      Subjective   Patient seen and examined.  Is alert and oriented x3.  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  Negative stroke work-up.  Has been seen by neurology.       Objective     Last Recorded Vitals  /72 (BP Location: Right arm, Patient Position: Sitting)   Pulse 103   Temp 36.6 °C (97.9 °F) (Temporal)   Resp 14   Wt 102 kg (225 lb 12 oz)   SpO2 98%   Intake/Output last 3 Shifts:    Intake/Output Summary (Last 24 hours) at 10/10/2023 1303  Last data filed at 10/10/2023 0600  Gross per 24 hour   Intake 1302.08 ml   Output 500 ml   Net 802.08 ml         Admission Weight  Weight: 104 kg (229 lb 4.5 oz) (10/08/23 1309)    Daily Weight  10/10/23 : 102 kg (225 lb 12 oz)    Image Results  US bladder  Narrative: Interpreted By:  Jamarcus Stroud,   STUDY:  US BLADDER; 10/9/2023 7:27 pm      INDICATION:  Signs/Symptoms:hematuria.      COMPARISON:  None.      ACCESSION NUMBER(S):  [id number]      ORDERING CLINICIAN:  GONZALEZ GARCIA      TECHNIQUE:  The urinary bladder is morphologically normal. No free fluid is seen  in the pelvis. Before voiding, the bladder corresponds to an  estimated volume of 452 ml. After voiding, the bladder corresponds to  an estimated volume of 5 ml.      FINDINGS:  No echogenic masses or calculi are identified within the bladder.      Impression: 1. Post void residual volume 5 ml.      MACRO:  None.      Signed by: Jamarcus Stroud 10/9/2023 8:38 PM  Dictation workstation:   [id number]  US renal complete  Narrative: Interpreted By:  Jamarcus Stroud,   STUDY:  US RENAL COMPLETE; 10/9/2023 7:15 pm      INDICATION:  Signs/Symptoms:hematuria.      COMPARISON:  None.      ACCESSION NUMBER(S):  [id number]      ORDERING CLINICIAN:  GONZALEZ GARCIA      TECHNIQUE:  Grayscale and color Doppler imaging of the kidneys.      FINDINGS:  The examination is markedly limited due to poor  penetration due to  the patient's body habitus.      The right kidney measures 10.2 x 4.1 x 5.1 cm.  The left kidney measures 10.2 x 5.7 x 5.9 cm.  There is no definite shadowing calculus, hydronephrosis, or solid  mass identified..      Impression: Limited examination due to poor penetration due to the patient's body  habitus. Within the limits of the examination, unremarkable exam.      MACRO:  None.      Signed by: Jamarcus Stroud 10/9/2023 8:37 PM  Dictation workstation:   [id number]  MR angio head wo IV contrast  Narrative: Interpreted By:  Earl Barnhart,   STUDY:  MR ANGIO HEAD WO IV CONTRAST; 10/9/2023 12:11 pm      INDICATION:  Signs/Symptoms:CVA. Aphasia. Unable to respond. Confusion.      COMPARISON:  None      ACCESSION NUMBER(S):  [id number]      ORDERING CLINICIAN:  COREY MENA      TECHNIQUE:  3-D time of flight images of the [place] were obtained.      FINDINGS:  The carotid siphons and basilar artery are patent. The ARIEL, MCA, and  posterior cerebral arteries are patent bilaterally. Superior  cerebellar arteries as well as the AICA vessels are also patent  bilaterally. There is no evidence for intracranial aneurysm. PICA  vessels are also identified bilaterally.      Impression: No evidence for hemodynamically significant stenosis at the level of  the [place].      Signed by: Earl Barnhart 10/9/2023 12:42 PM  Dictation workstation:   [id number]  MR brain wo IV contrast  Narrative: Interpreted By:  Earl Barnhart,   STUDY:  MR BRAIN WO IV CONTRAST; 10/9/2023 12:09 pm      INDICATION:  Signs/Symptoms:CVA. Aphasia. Unable to respond. Confusion.      COMPARISON:  None.  CT head 10/08/2023      ACCESSION NUMBER(S):  [id number]      ORDERING CLINICIAN:  COREY MENA      TECHNIQUE:  Multiplanar T1, T2, FLAIR and diffusion-weighted images were  obtained.  Gradient axial images were also obtained.      FINDINGS:  There is no evidence for acute infarction.  There is no evidence  for  intracranial hemorrhage or mass effect.  There are patchy white  matter hyperintensities bilaterally probably secondary to age related  changes and/or white matter ischemic disease.  Age-related atrophy is  present. Paranasal sinuses are clear. Normal flow-voids indicating  vessel patency are identified within the carotid siphons and basilar  artery.      Impression: No acute intracranial pathologic findings are identified.  Age-related findings are present.      MACRO:  none      Signed by: Earl Barnhart 10/9/2023 12:41 PM  Dictation workstation:   [id number]      Physical Exam  Vitals reviewed.   Constitutional:       Appearance: He is obese.   HENT:      Head: Normocephalic and atraumatic.   Eyes:      Extraocular Movements: Extraocular movements intact.   Cardiovascular:      Rate and Rhythm: Tachycardia present. Rhythm irregular.      Pulses: Normal pulses.   Pulmonary:      Effort: Pulmonary effort is normal.   Abdominal:      General: Abdomen is flat.      Palpations: Abdomen is soft.   Musculoskeletal:      Cervical back: Normal range of motion.      Comments: Generalized weakness   Skin:     General: Skin is warm and dry.      Capillary Refill: Capillary refill takes less than 2 seconds.   Neurological:      Mental Status: He is alert.      Motor: Weakness present.      Comments: Oriented x1-2         Relevant Results       Results for orders placed or performed during the hospital encounter of 10/08/23 (from the past 24 hour(s))   CBC   Result Value Ref Range    WBC 5.6 4.4 - 11.3 x10*3/uL    nRBC 0.0 0.0 - 0.0 /100 WBCs    RBC 3.51 (L) 4.50 - 5.90 x10*6/uL    Hemoglobin 12.0 (L) 13.5 - 17.5 g/dL    Hematocrit 37.7 (L) 41.0 - 52.0 %     (H) 80 - 100 fL    MCH 34.2 (H) 26.0 - 34.0 pg    MCHC 31.8 (L) 32.0 - 36.0 g/dL    RDW 15.9 (H) 11.5 - 14.5 %    Platelets 142 (L) 150 - 450 x10*3/uL    MPV 9.3 7.5 - 11.5 fL   POCT GLUCOSE   Result Value Ref Range    POCT Glucose 179 (H) 74 - 99 mg/dL   POCT  GLUCOSE   Result Value Ref Range    POCT Glucose 216 (H) 74 - 99 mg/dL   POCT GLUCOSE   Result Value Ref Range    POCT Glucose 152 (H) 74 - 99 mg/dL   CBC   Result Value Ref Range    WBC 4.5 4.4 - 11.3 x10*3/uL    nRBC 0.0 0.0 - 0.0 /100 WBCs    RBC 3.30 (L) 4.50 - 5.90 x10*6/uL    Hemoglobin 11.1 (L) 13.5 - 17.5 g/dL    Hematocrit 35.0 (L) 41.0 - 52.0 %     (H) 80 - 100 fL    MCH 33.6 26.0 - 34.0 pg    MCHC 31.7 (L) 32.0 - 36.0 g/dL    RDW 15.7 (H) 11.5 - 14.5 %    Platelets 127 (L) 150 - 450 x10*3/uL    MPV 9.6 7.5 - 11.5 fL   POCT GLUCOSE   Result Value Ref Range    POCT Glucose 97 74 - 99 mg/dL   Basic metabolic panel   Result Value Ref Range    Glucose 122 (H) 65 - 99 mg/dL    Sodium 138 133 - 145 mmol/L    Potassium 3.8 3.4 - 5.1 mmol/L    Chloride 105 97 - 107 mmol/L    Bicarbonate 27 24 - 31 mmol/L    Urea Nitrogen 17 8 - 25 mg/dL    Creatinine 1.20 0.40 - 1.60 mg/dL    eGFR 62 >60 mL/min/1.73m*2    Calcium 8.8 8.5 - 10.4 mg/dL    Anion Gap 6 <=19 mmol/L   POCT GLUCOSE   Result Value Ref Range    POCT Glucose 136 (H) 74 - 99 mg/dL   Hemoglobin A1c   Result Value Ref Range    Hemoglobin A1C 5.3 See below %    Estimated Average Glucose 105 Not Established mg/dL               Assessment/Plan   This patient currently has cardiac telemetry ordered; if you would like to modify or discontinue the telemetry order, click here to go to the orders activity to modify/discontinue the order.    CVA  Suspected, aphasia  admit to SDU  monitor on tele  ASA/statin when able to take p.o.  Swallow eval  echocardiogram recently at [place], echo with a EF of 27±5%, severe MR, moderately severe TR, AS; [facility] 7/27   MRI/MRA  US carotids  lipid panel ordered  neurostroke assessment  consult neurology, appreciate recommendations  PT/OT eval     A-fib RVR  A fib rate controlled  Cardizem drip running  We will need to monitor blood pressure closely  Awaiting consult with cardiology     Urinary tract infection  UA positive  for WBCs and bacteria, ordered culture  IV ceftriaxone  Follow cultures     Diabetes mellitus  Sliding scale insulin  Monitor blood glucose  Hypoglycemia protocol  Hemoglobin A1c 5.3     Hypertension  Hold antihypertensives for now  Permissive hypertension  As needed labetalol and Apresoline for systolic greater than 220     CAD  Aspirin/statin     PAD  As above     Plan  Admit to stepdown unit for observation  Monitor on telemetry  Stroke work-up  Cardizem drip  Monitor blood pressure  Consult neurology and cardiology, appreciate recommendations  Okay to resume Eliquis per neurology  CBC and BMP in the morning    Awaiting discharge planning with care coordination and family.  I spent over 35 minutes in the care and management of this patient.          Principal Problem:    Stroke-like symptoms        Amy Moss, APRN-CNP

## 2023-10-10 NOTE — PROGRESS NOTES
"Occupational Therapy    OT Treatment    Patient Name: Darrell Arreola  MRN: [id number]  Today's Date: 10/10/2023  Time Calculation  Start Time: 1130 (previously entered incorrect time)  Stop Time: 1148 (previously entered incorrect time)  Time Calculation (min): 18 min         Assessment:        Plan:          Subjective   General:  OT Received On: 10/10/23  Prior to Session Communication: Bedside nurse  Patient Position Received: Up in chair  General Comment: . Pt cleared by nursing to be seen for therapy. Pt sitting up in chair upon arrival and initially declined therapy after stating \"I already did my therapy today.\" Educated pt on the difference of PT/OT and the goals/benefits of OT. Pt agreeable to treatment.  Vital Signs:     Pain:  Pain Assessment  Pain Assessment: 0-10  Pain Score: 0 - No pain    Objective    Activities of Daily Living: Grooming  Grooming Level of Assistance: Setup  Grooming Where Assessed: Chair  Grooming Comments: to wash face    LE Dressing  LE Dressing: Yes  Sock Level of Assistance: Minimum assistance  LE Dressing Where Assessed: Chair  LE Dressing Comments: to doff/don socks with Min A for alignment after donning sock  Functional Standing Tolerance:     Bed Mobility/Transfers: Transfers  Transfer: No (Pt declined to complete functional transfers)      Outcome Measures:[facility] Daily Activity  Putting on and taking off regular lower body clothing: A little  Bathing (including washing, rinsing, drying): A little  Putting on and taking off regular upper body clothing: A little  Toileting, which includes using toilet, bedpan or urinal: A little  Taking care of personal grooming such as brushing teeth: A little  Eating Meals: None  Daily Activity - Total Score: 19        Education Documentation  No documentation found.  Education Comments  No comments found.        OP EDUCATION:       Goals:  Encounter Problems       Encounter Problems (Active)       Bathing       STG - Patient will perform UB " bathing at mod I; LB bathing at distant supervision with use of AE prn (Progressing)       Start:  10/09/23    Expected End:  10/23/23               Dressing Upper Extremities       STG - Patient will dress upper body at mod I (Progressing)       Start:  10/09/23    Expected End:  10/23/23               Dressings Lower Extremities       STG - Patient to complete lower body dressing at distant supervision with use of AE prn (Progressing)       Start:  10/09/23    Expected End:  10/23/23               Grooming       STG - Patient completes grooming at mod I (Progressing)       Start:  10/09/23    Expected End:  10/23/23               Mobility       Pt will perform functional mobility household distances at distant supervision with use of RW (Progressing)       Start:  10/09/23    Expected End:  10/23/23               Toileting       STG - Patient will complete toileting tasks at distant supervision (Progressing)       Start:  10/09/23    Expected End:  10/23/23               Transfers       pt will perform functional transfers at distant supervision with use of DME prn (Progressing)       Start:  10/09/23    Expected End:  10/23/23            Pt will perform BUE exercises at mod I to improve strength and independence with functional tranfers and ADL tasks  (Progressing)       Start:  10/09/23    Expected End:  10/23/23

## 2023-10-10 NOTE — CONSULTS
History Of Present Illness:    Darrell Arreola is a 78 y.o. male presenting with history significant for history of chronic atrial fibrillation on systemic oral anticoagulation was recently admitted to rehab facility and was discharged 4 days ago to his home to the care of his nephew.  Patient was brought in for altered mental status changes and slurring of speech.  Initial diagnosis was acute CVA but work-up so far has been unremarkable with no evidence for acute CVA on the MRI of the brain that was performed during this hospitalization.  Patient does have slurring of speech but denies any symptoms of chest pain or shortness of breath.  Cardiology was consulted for evaluation of chronic atrial fibrillation and with possible CVA while patient was taking Eliquis 5 mg twice a day.  Patient is currently cared by his nephew and patient was noted to be having episodes of hypoglycemia and suspicion was raised at possibly the hypoglycemic episodes could explain his symptoms of altered mental status changes and slurring of speech.  He has no new physical deficit     Last Recorded Vitals:  Vitals:    10/10/23 0354 10/10/23 0405 10/10/23 0725 10/10/23 1126   BP:  116/73 123/77 100/72   BP Location:  Right arm Right arm Right arm   Patient Position:  Lying Lying Sitting   Pulse:  90 100 103   Resp:  19 15 14   Temp:  36 °C (96.8 °F) 36.1 °C (97 °F) 36.6 °C (97.9 °F)   TempSrc:  Temporal Temporal Temporal   SpO2: 94% 93% 97% 98%   Weight:  102 kg (225 lb 12 oz)     Height:           Last Labs:  CBC - 10/10/2023:  5:31 AM  4.5 11.1 127    35.0      CMP - 10/10/2023:  8:27 AM  8.8 7.5 24 --- 2.1   _ 4.0 13 159      PTT - 7/1/2023:  6:04 AM  1.3   13.0 34.7     Hemoglobin A1C   Date/Time Value Ref Range Status   10/10/2023 11:42 AM 5.3 See below % Final   07/02/2023 04:38 AM 5.6 4.0 - 6.0 % Final     Comment:     Hemoglobin A1C levels are related to mean blood glucose during the   preceding 2-3 months. The relationship table below  may be used as a   general guide. Each 1% increase in HGB A1C is a reflection of an   increase in mean glucose of approximately 30 mg/dl.   Reference: Diabetes Care, volume 29, supplement 1 Jan. 2006                        HGB A1C ................. Approx. Mean Glucose   _______________________________________________   6%   ...............................  120 mg/dl   7%   ...............................  150 mg/dl   8%   ...............................  180 mg/dl   9%   ...............................  210 mg/dl   10%  ...............................  240 mg/dl  Performed at [address] [address]     05/16/2019 01:52 PM 7.8 (H) 4.0 - 6.0 % Final     Comment:     Hemoglobin A1C levels are related to mean blood glucose during the   preceding 2-3 months. The relationship table below may be used as a   general guide. Each 1% increase in HGB A1C is a reflection of an   increase in mean glucose of approximately 30 mg/dl.   Reference: Diabetes Care, volume 29, supplement 1 Jan. 2006                        HGB A1C ................. Approx. Mean Glucose   _______________________________________________   6%   ...............................  120 mg/dl   7%   ...............................  150 mg/dl   8%   ...............................  180 mg/dl   9%   ...............................  210 mg/dl   10%  ...............................  240 mg/dl  Performed at [address] [address]       LDL Calculated   Date/Time Value Ref Range Status   07/02/2023 04:38 AM 23 (L) 65 - 130 MG/DL Final   05/17/2019 06:01 AM 42 (L) 65 - 130 MG/DL Final      Last I/O:  I/O last 3 completed shifts:  In: 2326.6 (22.7 mL/kg) [P.O.:2100; I.V.:226.6 (2.2 mL/kg)]  Out: 500 (4.9 mL/kg) [Urine:500 (0.1 mL/kg/hr)]  Weight: 102.4 kg     Past Cardiology Tests (Last 3 Years):  EKG:  No results found for this or any previous visit from the past 1095 days.    Echo:  No results found for this or any  "previous visit from the past 1095 days.    Ejection Fractions:  No results found for: \"EF\"  Cath:  No results found for this or any previous visit from the past 1095 days.    Stress Test:  No results found for this or any previous visit from the past 1095 days.    Cardiac Imaging:  No results found for this or any previous visit from the past 1095 days.      Past Medical History:  He has a past medical history of CHF (congestive heart failure) (CMS/Formerly [facility]), Diabetes mellitus (CMS/Formerly [facility]), Heart disease, Hypertension, and Stroke (CMS/Formerly [facility]).    Past Surgical History:  He has a past surgical history that includes CT guided percutaneous biopsy bone deep (02/21/2020); CT guided percutaneous biopsy bone deep (03/24/2020); MR angio head wo IV contrast (05/17/2019); Toe amputation; Tonsillectomy; and MR angio head wo IV contrast (10/9/2023).      Social History:  He reports that he has quit smoking. His smoking use included cigars and cigarettes. He has never used smokeless tobacco. He reports that he does not drink alcohol and does not use drugs.    Family History:  Family History   Family history unknown: Yes        Allergies:  Patient has no known allergies.    Inpatient Medications:  Scheduled medications   Medication Dose Route Frequency    apixaban  5 mg oral q12h    aspirin  81 mg oral Daily    Or    aspirin  81 mg oral Daily    Or    aspirin  81 mg nasogastric tube Daily    Or    aspirin  300 mg rectal Daily    cefTRIAXone  1 g intravenous q24h    famotidine  20 mg oral Daily    folic acid  1 mg oral Daily    insulin lispro  0-5 Units subcutaneous q4h    metoprolol succinate XL  12.5 mg oral BID    rosuvastatin  10 mg oral Daily    tamsulosin  0.4 mg oral Daily before breakfast     PRN medications   Medication    dextrose 10 % in water (D10W)    dextrose    glucagon    hydrALAZINE    Followed by    hydrALAZINE    labetaloL    oxygen     Continuous Medications   Medication Dose Last Rate    dilTIAZem  5-15 mg/hr 5 mg/hr " (10/10/23 0023)     Outpatient Medications:  Current Outpatient Medications   Medication Instructions    apixaban (ELIQUIS) 5 mg, oral, 2 times daily    famotidine (PEPCID) 20 mg, oral, Daily    folic acid (Folvite) 1 mg tablet oral, Daily    gabapentin (NEURONTIN) 100 mg, oral, 2 times daily    metoprolol succinate XL (TOPROL-XL) 12.5 mg, oral, 2 times daily, Do not crush or chew.    midodrine (PROAMATINE) 15 mg, oral, 3 times daily    rosuvastatin (CRESTOR) 10 mg, oral, Daily    SITagliptin phosphate (JANUVIA) 50 mg, oral, Daily    tamsulosin (FLOMAX) 0.4 mg, oral, Daily before breakfast    torsemide (DEMADEX) 20 mg, oral, Daily    traZODone (DESYREL) 50 mg, oral, Nightly    venlafaxine XR (EFFEXOR-XR) 150 mg, oral, Daily, Do not crush or chew.       Physical Exam:  HEENT PRL neck is supple lungs clear to auscultation bilaterally with good air entry heart S1-S2 present with no significant murmurs abdomen soft and nontender EXTR shows no evidence of pedal edema he does have diapers in place     Assessment/Plan   #1 chronic atrial fibrillation patient remains on IV Cardizem drip.  We will start patient on the long-acting beta-blockers and will optimize the dose and gradually wean and discontinue IV Cardizem drip.  We will continue patient on Eliquis 5 mg twice a day.  So far there is no evidence of acute CVA.  For now I would not pursue any work-up for ischemic heart disease.  2 hypertension currently well controlled   3.  Diabetes mellitus for which she remains on Januvia  4.  History of CVA and patient remains on rosuvastatin 10 mg nightly which be continued.  Site Assessment Clean;Dry;Intact 10/10/23 0000   Dressing Status Clean;Dry;Occlusive 10/10/23 0000   Number of days: 2       Code Status:  Full Code    I spent 35 minutes in the professional and overall care of this patient.        Simeon Lucas MD

## 2023-10-10 NOTE — PROGRESS NOTES
Physical Therapy    Physical Therapy Treatment    Patient Name: Darrell Arreola  MRN: [id number]  Today's Date: 10/10/2023  Time Calculation  Start Time: 0800  Stop Time: 0823  Time Calculation (min): 23 min       Assessment/Plan   PT Assessment  PT Assessment Results: Decreased strength, Decreased endurance, Impaired balance, Decreased mobility, Decreased coordination, Decreased safety awareness  Rehab Prognosis: Good  Evaluation/Treatment Tolerance: Other (Comment) (Elevated heart rate during mobility (155 bpm);  RN nontified)  Medical Staff Made Aware: Yes  End of Session Communication: Bedside nurse  Assessment Comment: Pt continues to present with decreased endurance and strength, pt remains a fall risk at this time.  End of Session Patient Position: Up in chair  PT Plan  Inpatient/Swing Bed or Outpatient: Inpatient  PT Plan  Treatment/Interventions: Bed mobility, Transfer training, Gait training, Balance training, Strengthening, Endurance training, Therapeutic exercise, Therapeutic activity  PT Plan: Skilled PT  PT Frequency: 4 times per week  PT Discharge Recommendations: Moderate intensity level of continued care  PT Recommended Transfer Status: Assist x1      General Visit Information:   PT  Visit  PT Received On: 10/10/23  General  Prior to Session Communication: Bedside nurse  Patient Position Received: Bed, 3 rail up  General Comment: Cleared by nursing to be seen for therapy, pt agreeable with tx, supine in bed upon arrival.    Subjective   Precautions:  Precautions  Hearing/Visual Limitations: glasses  Medical Precautions: Fall precautions  Vital Signs:       Objective   Pain:     Cognition:     Postural Control:     Activity Tolerance:  Activity Tolerance  Endurance: Tolerates 10 - 20 min exercise with multiple rests  Treatments:  Therapeutic Exercise  Therapeutic Exercise Performed: Yes  Therapeutic Exercise Activity 1: Bilateral ankle pumps x15  Therapeutic Exercise Activity 2: Seated bilateral hip  flexion x15  Therapeutic Exercise Activity 3: Seated bilateral knee extension x15  Therapeutic Exercise Activity 4: Hip abd/add x15         Balance/Neuromuscular Re-Education  Balance/Neuromuscular Re-Education Activity Performed: Yes  Balance/Neuromuscular Re-Education Activity 1: Fair seated static balance. Poor+ static standing balance with bilateral UE support on walker.    Bed Mobility  Bed Mobility: Yes  Bed Mobility 1  Bed Mobility 1: Supine to sitting  Level of Assistance 1: Minimum assistance  Bed Mobility Comments 1: Min assist for trunk up during sit to stand, pt able to bring LE's off EOB without assist from therapist.    Ambulation/Gait Training  Ambulation/Gait Training Performed: Yes  Ambulation/Gait Training 1  Surface 1: Level tile  Device 1: Rolling walker  Assistance 1: Minimum assistance  Comments/Distance (ft) 1: 5' with wheeled walker, presents with slow mari, decreased bilateral step length, mild flexed posture, bilateral flexed knees, min assist for balance.  Transfers  Transfer: Yes  Transfer 1  Transfer From 1: Sit to  Transfer to 1: Stand  Transfer Level of Assistance 1: Minimum assistance  Trials/Comments 1: Min assist for trunk up during sit to stand, cues for proper hand placement.    Stairs  Stairs: No    Outcome Measures:  [facility] Basic Mobility  Turning from your back to your side while in a flat bed without using bedrails: A little  Moving from lying on your back to sitting on the side of a flat bed without using bedrails: A little  Moving to and from bed to chair (including a wheelchair): A little  Standing up from a chair using your arms (e.g. wheelchair or bedside chair): A little  To walk in hospital room: A little  Climbing 3-5 steps with railing: Total  Basic Mobility - Total Score: 16    Education Documentation  No documentation found.  Education Comments  No comments found.        OP EDUCATION:  Education  Individual(s) Educated: Patient  Education Provided: Fall  Risk    Encounter Problems       Encounter Problems (Active)       Mobility       Pt will perform functional mobility household distances at distant supervision with use of RW (Progressing)       Start:  10/09/23    Expected End:  10/23/23               PT Problem       Patient will ambulate 50 distance using walker with supervision (Progressing)       Start:  10/09/23    Expected End:  10/23/23         Goal Note       Patient will ambulate 50 distance using walker with supervision              Patient will perform chair to and from bed transfer with supervision (Progressing)       Start:  10/09/23    Expected End:  10/23/23         Goal Note       Patient will perform chair to and from bed transfer with supervision              Patient will perform supine to sit on bed with supervision demonstrating control (Progressing)       Start:  10/09/23    Expected End:  10/23/23         Goal Note       Patient will perform supine to sit on bed with supervision demonstrating control              Patient will perform sit to supine on bed with supervision demonstrating control (Progressing)       Start:  10/09/23    Expected End:  10/23/23         Goal Note       Patient will perform sit to supine on bed with supervision demonstrating control                 Transfers       pt will perform functional transfers at distant supervision with use of DME prn (Progressing)       Start:  10/09/23    Expected End:  10/23/23            Pt will perform BUE exercises at mod I to improve strength and independence with functional tranfers and ADL tasks  (Progressing)       Start:  10/09/23    Expected End:  10/23/23

## 2023-10-10 NOTE — NURSING NOTE
Assumed care of patient.  Resting quietly in bed, awakens easily to nurse voice.  Afib on tele with rates in 100's-110's.  Cardizem gtt infusing at 5.  Denies chest pain/discomfort.  Speech improving.  Call light and commonly used items in reach.  Bed locked and in low position.

## 2023-10-10 NOTE — PROGRESS NOTES
[facility] met with pts nephrae Stanford who states he is pts medical POA though paperwork not on file and confirmed pt is normally very alert and oriented and this current confusion is not normal, pt does not have hx of dementia. Discussion around dc planning, Abdoulaye agrees pt would benefit from snf stay and gave [facility] as choice as pts niece and 4 young children live in that area and the pt likes to get visits from the young kids. Referral being sent via careport, precert is needed.

## 2023-10-10 NOTE — CARE PLAN
The patient's goals for the shift include      The clinical goals for the shift include rest    Over the shift, the patient did not make progress toward the following goals. Barriers to progression include N/A pt making progress. Recommendations to address these barriers include rest and continue to improve.

## 2023-10-10 NOTE — CARE PLAN
Problem: Fall/Injury  Goal: Not fall by end of shift  Outcome: Progressing  Goal: Be free from injury by end of the shift  Outcome: Progressing  Goal: Verbalize understanding of personal risk factors for fall in the hospital  Outcome: Progressing  Goal: Verbalize understanding of risk factor reduction measures to prevent injury from fall in the home  Outcome: Progressing  Goal: Use assistive devices by end of the shift  Outcome: Progressing  Goal: Pace activities to prevent fatigue by end of the shift  Outcome: Progressing   The patient's goals for the shift include      The clinical goals for the shift include maintain HR less than 100    Over the shift, the patient did not make progress toward the following goals. Barriers to progression include n/a. Recommendations to address these barriers include new medications ordered by cardio.

## 2023-10-10 NOTE — PROGRESS NOTES
"Occupational Therapy    OT Treatment    Patient Name: Darrell Arreola  MRN: [id number]  Today's Date: 10/10/2023  Time Calculation  Start Time: 1030  Stop Time: 1048  Time Calculation (min): 18 min         Assessment:        Plan:          Subjective   General:  OT Received On: 10/10/23  Prior to Session Communication: Bedside nurse  Patient Position Received: Up in chair  General Comment: . Pt cleared by nursing to be seen for therapy. Pt sitting up in chair upon arrival and initially declined therapy after stating \"I already did my therapy today.\" Educated pt on the difference of PT/OT and the goals/benefits of OT. Pt agreeable to treatment with increased time required.     Vital Signs:     Pain:  Pain Assessment  Pain Assessment: 0-10  Pain Score: 0 - No pain    Objective    Activities of Daily Living: Grooming  Grooming Level of Assistance: Setup  Grooming Where Assessed: Chair  Grooming Comments: to wash face    LE Dressing  LE Dressing: Yes  Sock Level of Assistance: Minimum assistance  LE Dressing Where Assessed: Chair  LE Dressing Comments: to doff/don socks with Min A for alignment after donning sock  Functional Standing Tolerance:     Bed Mobility/Transfers: Transfers  Transfer: No (Pt declined to complete functional transfers)      Outcome Measures:[facility] Daily Activity  Putting on and taking off regular lower body clothing: A little  Bathing (including washing, rinsing, drying): A little  Putting on and taking off regular upper body clothing: A little  Toileting, which includes using toilet, bedpan or urinal: A little  Taking care of personal grooming such as brushing teeth: A little  Eating Meals: None  Daily Activity - Total Score: 19        Education Documentation  No documentation found.  Education Comments  No comments found.        OP EDUCATION:       Goals:  Problem: Bathing  Goal: STG - Patient will perform UB bathing at mod I; LB bathing at distant supervision with use of AE prn  Outcome: " Progressing     Problem: Dressings Lower Extremities  Goal: STG - Patient to complete lower body dressing at distant supervision with use of AE prn  Outcome: Progressing     Problem: Dressing Upper Extremities  Goal: STG - Patient will dress upper body at mod I  Outcome: Progressing     Problem: Grooming  Goal: STG - Patient completes grooming at mod I  Outcome: Progressing     Problem: Mobility  Goal: Pt will perform functional mobility household distances at distant supervision with use of RW  Outcome: Progressing     Problem: Toileting  Goal: STG - Patient will complete toileting tasks at distant supervision  Outcome: Progressing     Problem: Transfers  Goal: pt will perform functional transfers at distant supervision with use of DME prn  Outcome: Progressing  Goal: Pt will perform BUE exercises at mod I to improve strength and independence with functional tranfers and ADL tasks   Outcome: Progressing

## 2023-10-10 NOTE — NURSING NOTE
Dr Lucas into see patient.  HR has been in 80's.  Per parameters cardizem gtt d/c'd.  Patient to be given another dose of metoprolol for HR control.

## 2023-10-10 NOTE — PROGRESS NOTES
All questions answered with help from pts nephew/medical YUE Stanford. Pt lives alone, is normally A&Ox3. Pt lives alone, uses walker and electric scooter. Pt with some falls this year. [facility] provided nephew with information on [facility] and encouraged him to discuss with the pt (once the pt is more clear headed) transitioning to assisted living vs snf and that this agency can assist with this.

## 2023-10-11 LAB
ERYTHROCYTE [DISTWIDTH] IN BLOOD BY AUTOMATED COUNT: 15.7 % (ref 11.5–14.5)
GLUCOSE BLD MANUAL STRIP-MCNC: 122 MG/DL (ref 74–99)
GLUCOSE BLD MANUAL STRIP-MCNC: 132 MG/DL (ref 74–99)
GLUCOSE BLD MANUAL STRIP-MCNC: 179 MG/DL (ref 74–99)
GLUCOSE BLD MANUAL STRIP-MCNC: 252 MG/DL (ref 74–99)
HCT VFR BLD AUTO: 37.5 % (ref 41–52)
HGB BLD-MCNC: 11.8 G/DL (ref 13.5–17.5)
LDLC SERPL DIRECT ASSAY-MCNC: 71 MG/DL (ref 0–129)
MCH RBC QN AUTO: 33.6 PG (ref 26–34)
MCHC RBC AUTO-ENTMCNC: 31.5 G/DL (ref 32–36)
MCV RBC AUTO: 107 FL (ref 80–100)
NRBC BLD-RTO: 0 /100 WBCS (ref 0–0)
PLATELET # BLD AUTO: 129 X10*3/UL (ref 150–450)
PMV BLD AUTO: 9.7 FL (ref 7.5–11.5)
RBC # BLD AUTO: 3.51 X10*6/UL (ref 4.5–5.9)
WBC # BLD AUTO: 4.8 X10*3/UL (ref 4.4–11.3)

## 2023-10-11 PROCEDURE — 97116 GAIT TRAINING THERAPY: CPT | Mod: GP

## 2023-10-11 PROCEDURE — 36415 COLL VENOUS BLD VENIPUNCTURE: CPT

## 2023-10-11 PROCEDURE — 82947 ASSAY GLUCOSE BLOOD QUANT: CPT

## 2023-10-11 PROCEDURE — 92526 ORAL FUNCTION THERAPY: CPT | Mod: GN | Performed by: SPEECH-LANGUAGE PATHOLOGIST

## 2023-10-11 PROCEDURE — 92507 TX SP LANG VOICE COMM INDIV: CPT | Mod: GN | Performed by: SPEECH-LANGUAGE PATHOLOGIST

## 2023-10-11 PROCEDURE — 97110 THERAPEUTIC EXERCISES: CPT | Mod: GP

## 2023-10-11 PROCEDURE — 2060000001 HC INTERMEDIATE ICU ROOM DAILY

## 2023-10-11 PROCEDURE — 2500000004 HC RX 250 GENERAL PHARMACY W/ HCPCS (ALT 636 FOR OP/ED): Performed by: NURSE PRACTITIONER

## 2023-10-11 PROCEDURE — 2500000001 HC RX 250 WO HCPCS SELF ADMINISTERED DRUGS (ALT 637 FOR MEDICARE OP): Performed by: NURSE PRACTITIONER

## 2023-10-11 PROCEDURE — 2500000001 HC RX 250 WO HCPCS SELF ADMINISTERED DRUGS (ALT 637 FOR MEDICARE OP): Performed by: INTERNAL MEDICINE

## 2023-10-11 PROCEDURE — 85027 COMPLETE CBC AUTOMATED: CPT

## 2023-10-11 PROCEDURE — 97535 SELF CARE MNGMENT TRAINING: CPT | Mod: GO,CO

## 2023-10-11 RX ORDER — METOPROLOL SUCCINATE 50 MG/1
50 TABLET, EXTENDED RELEASE ORAL 2 TIMES DAILY
Status: DISCONTINUED | OUTPATIENT
Start: 2023-10-11 | End: 2023-10-17

## 2023-10-11 RX ORDER — INSULIN LISPRO 100 [IU]/ML
0-10 INJECTION, SOLUTION INTRAVENOUS; SUBCUTANEOUS
Status: DISCONTINUED | OUTPATIENT
Start: 2023-10-11 | End: 2023-10-20 | Stop reason: HOSPADM

## 2023-10-11 RX ADMIN — APIXABAN 5 MG: 5 TABLET, FILM COATED ORAL at 05:51

## 2023-10-11 RX ADMIN — METOPROLOL SUCCINATE 25 MG: 25 TABLET, FILM COATED, EXTENDED RELEASE ORAL at 08:51

## 2023-10-11 RX ADMIN — ROSUVASTATIN CALCIUM 10 MG: 10 TABLET, COATED ORAL at 08:51

## 2023-10-11 RX ADMIN — TAMSULOSIN HYDROCHLORIDE 0.4 MG: 0.4 CAPSULE ORAL at 06:30

## 2023-10-11 RX ADMIN — METOPROLOL SUCCINATE 50 MG: 50 TABLET, EXTENDED RELEASE ORAL at 21:19

## 2023-10-11 RX ADMIN — FAMOTIDINE 20 MG: 20 TABLET ORAL at 08:52

## 2023-10-11 RX ADMIN — APIXABAN 5 MG: 5 TABLET, FILM COATED ORAL at 17:15

## 2023-10-11 RX ADMIN — FOLIC ACID 1 MG: 1 TABLET ORAL at 08:51

## 2023-10-11 RX ADMIN — ASPIRIN 81 MG CHEWABLE TABLET 81 MG: 81 TABLET CHEWABLE at 08:52

## 2023-10-11 RX ADMIN — CEFTRIAXONE SODIUM 1 G: 1 INJECTION, SOLUTION INTRAVENOUS at 17:15

## 2023-10-11 ASSESSMENT — PAIN SCALES - GENERAL
PAINLEVEL_OUTOF10: 0 - NO PAIN

## 2023-10-11 ASSESSMENT — COGNITIVE AND FUNCTIONAL STATUS - GENERAL
CLIMB 3 TO 5 STEPS WITH RAILING: TOTAL
PERSONAL GROOMING: A LITTLE
STANDING UP FROM CHAIR USING ARMS: A LITTLE
WALKING IN HOSPITAL ROOM: A LITTLE
MOVING TO AND FROM BED TO CHAIR: A LITTLE
HELP NEEDED FOR BATHING: A LITTLE
DRESSING REGULAR LOWER BODY CLOTHING: A LITTLE
DRESSING REGULAR UPPER BODY CLOTHING: A LITTLE
MOBILITY SCORE: 16
TOILETING: A LITTLE
TURNING FROM BACK TO SIDE WHILE IN FLAT BAD: A LITTLE
MOVING FROM LYING ON BACK TO SITTING ON SIDE OF FLAT BED WITH BEDRAILS: A LITTLE
DAILY ACTIVITIY SCORE: 19

## 2023-10-11 ASSESSMENT — PAIN - FUNCTIONAL ASSESSMENT
PAIN_FUNCTIONAL_ASSESSMENT: 0-10

## 2023-10-11 NOTE — NURSING NOTE
A&Ox3. Patient sitting up in a chair eating breakfast. No complaints. RA. Patient is able to make needs known. Call light in reach.

## 2023-10-11 NOTE — PROGRESS NOTES
Darrell Tucker is a 78 y.o. male on day 3 of admission presenting with Stroke-like symptoms.      Subjective   Patient seen and examined.  Is alert and oriented x3.  Expressive aphasia appears to be resolving.  Negative stroke work-up.  Has been seen by neurology.       Objective     Last Recorded Vitals  BP 98/81 (BP Location: Left arm, Patient Position: Sitting)   Pulse (!) 111   Temp 36.2 °C (97.2 °F) (Temporal)   Resp 18   Wt 102 kg (225 lb 8.5 oz)   SpO2 98%   Intake/Output last 3 Shifts:    Intake/Output Summary (Last 24 hours) at 10/11/2023 1225  Last data filed at 10/11/2023 0554  Gross per 24 hour   Intake 743.42 ml   Output 250 ml   Net 493.42 ml         Admission Weight  Weight: 104 kg (229 lb 4.5 oz) (10/08/23 1309)    Daily Weight  10/11/23 : 102 kg (225 lb 8.5 oz)    Image Results  Electrocardiogram, 12-lead PRN ACS symptoms  Atrial fibrillation with rapid ventricular response with premature ventricular or aberrantly conducted complexes  Nondiagnostic inferior Qwaves , age undetermined  ST & T wave abnormality, consider lateral ischemia  Abnormal ECG    Confirmed by Harpreet Freeman ([id number]) on 10/10/2023 10:35:40 PM  Transthoracic Echo (TTE) [place] [address]             Phone [phone]    TRANSTHORACIC ECHOCARDIOGRAM REPORT       Patient Name:      DARRELL TUCKER     Reading Physician:    [id number] Simeon Lucas MD  Study Date:        10/10/2023          Ordering Provider:    [id number] SE COLEMAN  MRN/PID:           [id number]            Fellow:  Accession#:        [id number]        Nurse:  Date of Birth/Age: 1945 / 78 years Sonographer:          Dorene RIVERA  Gender:            M                   Additional Staff:  Height:            185.00 cm           Admit Date:            10/8/2023  Weight:            102.00 kg           Admission Status:     Inpatient - Routine  BSA:               2.26 m2             Department Location:  Blood Pressure: 100 /72 mmHg    Study Type:    TRANSTHORACIC ECHO (TTE) COMPLETE  Diagnosis/ICD: Cerebral Infarction, unspecified-I63.9; Paroxysmal atrial                 fibrillation-I48.0  Indication:    Stroke like symptoms history of cerebrovascular accident                 paroxysmal atrial fibrillation  CPT Codes:     Echo Complete w Full [id number]    Patient History:  Pertinent History: CHF CAD Depression DMII HTN PVD.    Study Detail: The following Echo studies were performed: 2D, M-Mode, Doppler and                color flow. Technically challenging study due to body habitus,                patient lying in supine position, poor acoustic windows, the                patient's lack of cooperation and prominent lung artifact. Unable                to obtain subcostal view.       PHYSICIAN INTERPRETATION:  Left Ventricle: Left ventricular systolic function is severely decreased, with an estimated ejection fraction of 20-25%. There is global hypokinesis of the left ventricle with minor regional variations. The left ventricular cavity size is mild to moderately dilated. Spectral Doppler shows a pseudonormal pattern of left ventricular diastolic filling.  Left Atrium: The left atrium is moderate to severely dilated.  Right Ventricle: The right ventricle is normal in size. There is normal right ventricular global systolic function.  Right Atrium: The right atrium is mildly dilated.  Aortic Valve: The aortic valve is trileaflet. There is no evidence of aortic valve regurgitation. The peak instantaneous gradient of the aortic valve is 5.4 mmHg.  Mitral Valve: The mitral valve is mildly thickened. There is moderate mitral valve regurgitation which is posteriorly directed.  Tricuspid Valve: The tricuspid valve is structurally normal. There is mild tricuspid  regurgitation. The Doppler estimated RVSP is within normal limits at 27.0 mmHg.  Pulmonic Valve: The pulmonic valve is not well visualized. There is no indication of pulmonic valve regurgitation.  Pericardium: There is no pericardial effusion noted.  Aorta: The aortic root is normal. There is mild dilatation of the aortic root.       CONCLUSIONS:   1. Left ventricular systolic function is severely decreased with a 20-25% estimated ejection fraction.   2. Spectral Doppler shows a pseudonormal pattern of left ventricular diastolic filling.   3. The left atrium is moderate to severely dilated.   4. Moderate mitral valve regurgitation.   5. RVSP within normal limits.   6. There is global hypokinesis of the left ventricle with minor regional variations.    QUANTITATIVE DATA SUMMARY:  2D MEASUREMENTS:               Normal Ranges:  LAs: 5.90 cm (2.7-4.0cm)    LA VOLUME:                                Normal Ranges:  LA Vol A2C:        158.6 ml  LA Vol Index A2C:  70.2 ml/m2  LA Area A2C:       38.0 cm2  LA Major Axis A2C: 7.7 cm  LA Volume Index:   69.9 ml/m2  LA Vol A2C:        144.0 ml    RA VOLUME BY A/L METHOD:                                Normal Ranges:  RA Vol A4C:        128.0 ml   (8.3-19.5ml)  RA Vol Index A4C:  56.7 ml/m2  RA Area A4C:       33.5 cm2  RA Major Axis A4C: 7.4 cm    M-MODE MEASUREMENTS:                   Normal Ranges:  Ao Root: 3.60 cm (2.0-3.7cm)  AoV Exc: 2.50 cm (1.5-2.5cm)  LAs:     5.50 cm (2.7-4.0cm)    AORTA MEASUREMENTS:                     Normal Ranges:  AoV Exc:   2.50 cm (1.5-2.5cm)  Asc Ao, d: 3.70 cm (2.1-3.4cm)    LV SYSTOLIC FUNCTION BY 2D PLANIMETRY (MOD):                      Normal Ranges:  EF-A4C View: 25.0 % (>=55%)  EF-A2C View: 24.2 %  EF-Biplane:  22.5 %    LV DIASTOLIC FUNCTION:                         Normal Ranges:  MV Peak E:    1.12 m/s (0.7-1.2 m/s)  MV e'         0.05 m/s (>8.0)  MV lateral e' 0.06 m/s  MV medial e'  0.05 m/s  E/e' Ratio:   20.59    (<8.0)    MITRAL  VALVE:                       Normal Ranges:  MV Vmax:    1.18 m/s (<=1.3m/s)  MV peak P.6 mmHg (<5mmHg)  MV mean P.5 mmHg (<48mmHg)  MV DT:      138 msec (150-240msec)    MITRAL INSUFFICIENCY:                       Normal Ranges:  MR VTI:  127.00 cm  MR Vmax: 396.00 cm/s    AORTIC VALVE:                          Normal Ranges:  AoV Vmax:      1.16 m/s (<=1.7m/s)  AoV Peak P.4 mmHg (<20mmHg)  LVOT Max Gt:  0.98 m/s (<=1.1m/s)  LVOT VTI:      15.20 cm  LVOT Diameter: 2.60 cm  (1.8-2.4cm)  AoV Area,Vmax: 4.48 cm2 (2.5-4.5cm2)       RIGHT VENTRICLE:  RV s' 0.09 m/s    TRICUSPID VALVE/RVSP:                              Normal Ranges:  Peak TR Velocity: 2.45 m/s  RV Syst Pressure: 27.0 mmHg (< 30mmHg)    PULMONIC VALVE:                          Normal Ranges:  PV Accel Time: 77 msec  (>120ms)  PV Max Gt:    0.9 m/s  (0.6-0.9m/s)  PV Max PG:     3.6 mmHg       [id number] Simeon Luacs MD  Electronically signed on 10/10/2023 at 7:39:33 PM       ** Final **      Physical Exam  Vitals reviewed.   Constitutional:       Appearance: He is obese.   HENT:      Head: Normocephalic and atraumatic.   Eyes:      Extraocular Movements: Extraocular movements intact.   Cardiovascular:      Rate and Rhythm: Tachycardia present. Rhythm irregular.      Pulses: Normal pulses.   Pulmonary:      Effort: Pulmonary effort is normal.   Abdominal:      General: Abdomen is flat.      Palpations: Abdomen is soft.   Musculoskeletal:      Cervical back: Normal range of motion.      Comments: Generalized weakness   Skin:     General: Skin is warm and dry.      Capillary Refill: Capillary refill takes less than 2 seconds.   Neurological:      Mental Status: He is alert and oriented to person, place, and time. Mental status is at baseline.      Motor: Weakness present.      Comments: Oriented x1-2         Relevant Results       Results for orders placed or performed during the hospital encounter of 10/08/23 (from the past 24 hour(s))   TSH    Result Value Ref Range    Thyroid Stimulating Hormone 3.37 0.27 - 4.20 mIU/L   Vitamin D 25-Hydroxy,Total (for eval of Vitamin D levels)   Result Value Ref Range    Vitamin D, 25-Hydroxy, Total 44 31 - 100 ng/mL   Vitamin B12   Result Value Ref Range    Vitamin B12 1,864 (H) 211 - 946 pg/mL   Transthoracic Echo (TTE) Complete   Result Value Ref Range    LV A4C EF 25.0    POCT GLUCOSE   Result Value Ref Range    POCT Glucose 174 (H) 74 - 99 mg/dL   POCT GLUCOSE   Result Value Ref Range    POCT Glucose 251 (H) 74 - 99 mg/dL   Electrocardiogram, 12-lead PRN ACS symptoms   Result Value Ref Range    Ventricular Rate 129 BPM    QRS Duration 110 ms    QT Interval 314 ms    QTC Calculation(Bazett) 460 ms    R Axis 45 degrees    T Axis 213 degrees    QRS Count 21 beats    Q Onset 212 ms    T Offset 369 ms    QTC Fredericia 405 ms   CBC   Result Value Ref Range    WBC 4.8 4.4 - 11.3 x10*3/uL    nRBC 0.0 0.0 - 0.0 /100 WBCs    RBC 3.51 (L) 4.50 - 5.90 x10*6/uL    Hemoglobin 11.8 (L) 13.5 - 17.5 g/dL    Hematocrit 37.5 (L) 41.0 - 52.0 %     (H) 80 - 100 fL    MCH 33.6 26.0 - 34.0 pg    MCHC 31.5 (L) 32.0 - 36.0 g/dL    RDW 15.7 (H) 11.5 - 14.5 %    Platelets 129 (L) 150 - 450 x10*3/uL    MPV 9.7 7.5 - 11.5 fL   POCT GLUCOSE   Result Value Ref Range    POCT Glucose 132 (H) 74 - 99 mg/dL   POCT GLUCOSE   Result Value Ref Range    POCT Glucose 252 (H) 74 - 99 mg/dL               Assessment/Plan   This patient currently has cardiac telemetry ordered; if you would like to modify or discontinue the telemetry order, click here to go to the orders activity to modify/discontinue the order.    CVA  Suspected, aphasia  admit to SDU  monitor on tele  ASA/statin   Swallow eval  echocardiogram recently at [place], [place] with a EF of 27±5%, severe MR, moderately severe TR, AS; [facility] 7/27   MRI/MRA negative  US carotids  lipid panel ordered  neurostroke assessment  consult neurology, appreciate recommendations  PT/OT kasie     A-fib  RVR  A fib rate controlled  Beta-blocker  We will need to monitor blood pressure closely       Urinary tract infection  UA positive for WBCs and bacteria, ordered culture  IV ceftriaxone  Follow cultures     Diabetes mellitus  Sliding scale insulin  Monitor blood glucose  Hypoglycemia protocol  Hemoglobin A1c 5.3     Hypertension  Hold antihypertensives for now  Permissive hypertension  As needed labetalol and Apresoline for systolic greater than 220     CAD  Aspirin/statin     PAD  As above     Plan  Admit to stepdown unit for observation  Monitor on telemetry  Stroke work-up  Cardizem drip has been discontinued.  Heart rate controlled  Monitor blood pressure  Consult neurology and cardiology, appreciate recommendations  Okay to resume Eliquis per neurology  CBC and BMP in the morning    Awaiting discharge planning with care coordination and family.  I spent over 35 minutes in the care and management of this patient.    10/11/2023: Anticipate discharge to SNF 24 to 48 hours        Principal Problem:    Stroke-like symptoms        Amy Moss, APRN-CNP

## 2023-10-11 NOTE — PROGRESS NOTES
[facility] stating they can accept utilizing pts [facility] coverage, [facility] filled out appropriate document for referral to [facility] and appropriate clinicals faxed to them at [phone], await response.

## 2023-10-11 NOTE — PROGRESS NOTES
Speech-Language Pathology    Inpatient  Speech-Language Pathology Treatment     Patient Name: Darrell Arreola  MRN: [id number]  Today's Date: 10/11/2023  Time Calculation  Start Time: 1102  Stop Time: 1138  Time Calculation (min): 36 min         Current Problem:   Patient Active Problem List   Diagnosis    Cellulitis    Contusion of thigh    Non-traumatic rhabdomyolysis    Atrial fibrillation (CMS/HCC)    Diabetes mellitus (CMS/HCC)    PVD (peripheral vascular disease) (CMS/HCC)    Benign essential hypertension    Depression    Asthenia    At risk for falls    Coronary artery disease    Obesity    Stroke-like symptoms    Diabetes mellitus type 2 in obese (CMS/HCC)    History of cerebrovascular accident         SLP Assessment:  SLP TX Intervention Outcome: Making Progress Towards Goals  SLP Assessment Results: Expression deficits, Memory deficits, Problem solving deficits  Prognosis: Good  Treatment Provided: Yes   Treatment Tolerance: Patient tolerated treatment well  Medical Staff Made Aware: Yes (cornelius giron RN)  Barriers: Cognition, Comorbidities  Education Provided: Yes       Plan:  Inpatient/Swing Bed or Outpatient: Inpatient  Treatment/Interventions: Expressive Language, Cognitive communication functioning  SLP TX Plan: Continue Plan of Care for cognition, discharge dysphagia tx  SLP Plan: Skilled SLP  SLP Frequency: 2x per week  Duration: Current admission  SLP Discharge Recommendations: Continue skilled SLP services at the next level of care  Next Treatment Priority: cogntive tx  Discussed POC: Patient, Nursing  Discussed Risks/Benefits: Patient, Nursing  Patient/Caregiver Agreeable: Yes    GOALS:  Complete orientation tasls with 90% acc  2.   Complete STM tasks with 90% acc  3. . Complete problem solving tasks with 90% acc       Subjective   Current Problem:  Pt seated in chair, has dentures, which pt placed independently prior to PO trials. Pt reports improved speech, requested to have regular solids      General Visit Information:  Prior to Session Communication: Bedside nurse (cornelius giron)      Pain Assessment:   Pain Assessment: 0-10  Pain Score: 0 - No pain      Objective   Pt observed with the followin oz thin liquids via straw, 1 shelia cracker, mastication WFL, adequate oral clearance, no overt s/s aspiration. Pt demonstrated independent use of safe swallow strategies: seated upright, single bites and sips. RECOMMEND;upgrade to regular solids, no further dysphagia tx indicated.        Cognitive Linguistics:  Cognitive Linguistics Interventions: Orientation, Short term memory, Safety awareness  Cognitive Linguistics Comments: oriented to self, place, month date and year, able to recall morning events with 80% acc, able to respond to problem solving quesitons with 70%acc      Language Expression:  Language Expression Interventions: Automatic Language Tasks, Confrontation Naming, Conversational Language Tasks  Language Expression Comments: completed automatic tasks: 100% acc, Naming basic objects 100% acc, generative naming 8 items/category, responsive speech 100%acc, open ended quesitons:mild word findng difficulty, overall improved from evaluation                       Inpatient:  Education Documentation  Pt educated on recommendation for diet upgrade to regular solids, continue with cogntive tx. Pt verbalizes agreement

## 2023-10-11 NOTE — PROGRESS NOTES
Physical Therapy    Physical Therapy Treatment    Patient Name: Darrell Arreola  MRN: [id number]  Today's Date: 10/11/2023  Time Calculation  Start Time: 0807  Stop Time: 0831  Time Calculation (min): 24 min       Assessment/Plan   PT Assessment  PT Assessment Results: Decreased strength, Decreased endurance, Impaired balance, Decreased mobility, Decreased coordination, Decreased safety awareness  Rehab Prognosis: Good  Evaluation/Treatment Tolerance: Other (Comment) (Elevated heart rate during mobility (155 bpm);  RN nontified)  Medical Staff Made Aware: Yes  End of Session Communication: Bedside nurse  Assessment Comment: Improved ambulation distances, pt continues to present with decreased strength and endurance. Pt remains a fall risk at this time.  End of Session Patient Position: Up in chair  PT Plan  Inpatient/Swing Bed or Outpatient: Inpatient  PT Plan  Treatment/Interventions: Bed mobility, Transfer training, Gait training, Balance training, Strengthening, Endurance training, Therapeutic exercise, Therapeutic activity  PT Plan: Skilled PT  PT Frequency: 4 times per week  PT Discharge Recommendations: Moderate intensity level of continued care  PT Recommended Transfer Status: Assist x1      General Visit Information:   PT  Visit  PT Received On: 10/11/23  General  Prior to Session Communication: Bedside nurse  Patient Position Received: Bed, 3 rail up  General Comment: Cleared by nursing to be seen for therapy, pt agreeable with tx, supine in bed upon arrival.    Subjective   Precautions:  Precautions  Hearing/Visual Limitations: glasses  Medical Precautions: Fall precautions  Vital Signs:       Objective   Pain:  Pain Assessment  Pain Assessment: 0-10  Pain Score: 0 - No pain  Cognition:     Postural Control:     Activity Tolerance:  Activity Tolerance  Endurance: Tolerates 10 - 20 min exercise with multiple rests  Treatments:  Therapeutic Exercise  Therapeutic Exercise Performed: Yes  Therapeutic Exercise  Activity 1: Bilateral ankle pumps x15  Therapeutic Exercise Activity 2: Bilateral hip flexion x15  Therapeutic Exercise Activity 3: Bilateral knee extension x15  Therapeutic Exercise Activity 4: Hip abd/add x15         Balance/Neuromuscular Re-Education  Balance/Neuromuscular Re-Education Activity Performed: Yes  Balance/Neuromuscular Re-Education Activity 1: Fair seated static balance, poor+ static standing balance with UE support on walker.    Bed Mobility  Bed Mobility: Yes  Bed Mobility 1  Bed Mobility 1: Supine to sitting  Level of Assistance 1: Minimum assistance  Bed Mobility Comments 1: Min assist for trunk during supine to sit, pt able to bring LE's off EOB without assist from therapist.    Ambulation/Gait Training  Ambulation/Gait Training Performed: Yes  Ambulation/Gait Training 1  Surface 1: Level tile  Device 1: Rolling walker  Assistance 1: Minimum assistance  Comments/Distance (ft) 1: 20' with wheeled walker, presents with slow mari, decreased bilateral step length, bilateral flexed knees, requires cues for upright posture/cues to remain in ALDAIR of walker, min assist for balance.  Transfers  Transfer: Yes  Transfer 1  Transfer From 1: Sit to  Transfer to 1: Stand  Transfer Level of Assistance 1: Minimum assistance  Trials/Comments 1: Min assist for trunk up during sit to stand, cues for proper hand placement, decreased eccentric control in sitting.    Stairs  Stairs: No    Outcome Measures:  [facility] Basic Mobility  Turning from your back to your side while in a flat bed without using bedrails: A little  Moving from lying on your back to sitting on the side of a flat bed without using bedrails: A little  Moving to and from bed to chair (including a wheelchair): A little  Standing up from a chair using your arms (e.g. wheelchair or bedside chair): A little  To walk in hospital room: A little  Climbing 3-5 steps with railing: Total  Basic Mobility - Total Score: 16    Education Documentation  No  documentation found.  Education Comments  No comments found.        OP EDUCATION:  Education  Individual(s) Educated: Patient  Education Provided: Fall Risk    Encounter Problems       Encounter Problems (Active)       Mobility       Pt will perform functional mobility household distances at distant supervision with use of RW (Progressing)       Start:  10/09/23    Expected End:  10/23/23               PT Problem       Patient will ambulate 50 distance using walker with supervision (Progressing)       Start:  10/09/23    Expected End:  10/23/23         Goal Note       Patient will ambulate 50 distance using walker with supervision              Patient will perform chair to and from bed transfer with supervision (Progressing)       Start:  10/09/23    Expected End:  10/23/23         Goal Note       Patient will perform chair to and from bed transfer with supervision              Patient will perform supine to sit on bed with supervision demonstrating control (Progressing)       Start:  10/09/23    Expected End:  10/23/23         Goal Note       Patient will perform supine to sit on bed with supervision demonstrating control              Patient will perform sit to supine on bed with supervision demonstrating control (Progressing)       Start:  10/09/23    Expected End:  10/23/23         Goal Note       Patient will perform sit to supine on bed with supervision demonstrating control                 Transfers       pt will perform functional transfers at distant supervision with use of DME prn (Progressing)       Start:  10/09/23    Expected End:  10/23/23            Pt will perform BUE exercises at mod I to improve strength and independence with functional tranfers and ADL tasks  (Progressing)       Start:  10/09/23    Expected End:  10/23/23

## 2023-10-11 NOTE — PROGRESS NOTES
Subjective Data:  Patient denies any symptoms of chest pain shortness of breath is feeling much better.  He was sitting in the bed and admits that he did had dinner with no limitations     Overnight Events:    none     Objective Data:  Last Recorded Vitals:  Vitals:    10/11/23 0742 10/11/23 1145 10/11/23 1700 10/11/23 1849   BP: (!) 122/92 98/81 114/80 120/74   BP Location: Left arm Left arm Left arm Right arm   Patient Position: Lying Sitting Sitting Sitting   Pulse: 103 (!) 111 106 (!) 121   Resp: 16 18 18 18   Temp: 36 °C (96.8 °F) 36.2 °C (97.2 °F) 35.9 °C (96.6 °F) 36.3 °C (97.3 °F)   TempSrc: Temporal Temporal Temporal Temporal   SpO2: 94% 98% 98% 98%   Weight:       Height:           Last Labs:  CBC - 10/11/2023:  5:15 AM  4.8 11.8 129    37.5      CMP - 10/10/2023:  8:27 AM  8.8 7.5 24 --- 2.1   _ 4.0 13 159      PTT - 7/1/2023:  6:04 AM  1.3   13.0 34.7     HGBA1C   Date/Time Value Ref Range Status   10/10/2023 11:42 AM 5.3 See below % Final   07/02/2023 04:38 AM 5.6 4.0 - 6.0 % Final     Comment:     Hemoglobin A1C levels are related to mean blood glucose during the   preceding 2-3 months. The relationship table below may be used as a   general guide. Each 1% increase in HGB A1C is a reflection of an   increase in mean glucose of approximately 30 mg/dl.   Reference: Diabetes Care, volume 29, supplement 1 Jan. 2006                        HGB A1C ................. Approx. Mean Glucose   _______________________________________________   6%   ...............................  120 mg/dl   7%   ...............................  150 mg/dl   8%   ...............................  180 mg/dl   9%   ...............................  210 mg/dl   10%  ...............................  240 mg/dl  Performed at [address] [address]     05/16/2019 01:52 PM 7.8 4.0 - 6.0 % Final     Comment:     Hemoglobin A1C levels are related to mean blood glucose during the   preceding 2-3 months. The relationship  table below may be used as a   general guide. Each 1% increase in HGB A1C is a reflection of an   increase in mean glucose of approximately 30 mg/dl.   Reference: Diabetes Care, volume 29, supplement 1 Jan. 2006                        HGB A1C ................. Approx. Mean Glucose   _______________________________________________   6%   ...............................  120 mg/dl   7%   ...............................  150 mg/dl   8%   ...............................  180 mg/dl   9%   ...............................  210 mg/dl   10%  ...............................  240 mg/dl  Performed at [address] [address]       LDLCALC   Date/Time Value Ref Range Status   07/02/2023 04:38 AM 23 65 - 130 MG/DL Final   05/17/2019 06:01 AM 42 65 - 130 MG/DL Final      Last I/O:  I/O last 3 completed shifts:  In: 1343.4 (13.1 mL/kg) [P.O.:1320; I.V.:23.4 (0.2 mL/kg)]  Out: 250 (2.4 mL/kg) [Urine:250 (0.1 mL/kg/hr)]  Weight: 102.3 kg     Past Cardiology Tests (Last 3 Years):  EKG:  Electrocardiogram, 12-lead PRN ACS symptoms 10/10/2023    Echo:  Transthoracic Echo (TTE) Complete 10/10/2023  Inpatient Medications:  Scheduled medications   Medication Dose Route Frequency    apixaban  5 mg oral q12h    aspirin  81 mg oral Daily    Or    aspirin  81 mg oral Daily    Or    aspirin  81 mg nasogastric tube Daily    Or    aspirin  300 mg rectal Daily    cefTRIAXone  1 g intravenous q24h    famotidine  20 mg oral Daily    folic acid  1 mg oral Daily    insulin lispro  0-10 Units subcutaneous TID with meals    insulin lispro  0-5 Units subcutaneous q4h    metoprolol succinate XL  50 mg oral BID    rosuvastatin  10 mg oral Daily    tamsulosin  0.4 mg oral Daily before breakfast     PRN medications   Medication    dextrose 10 % in water (D10W)    dextrose    glucagon    hydrALAZINE    oxygen     Continuous Medications   Medication Dose Last Rate       Physical Exam:  Physical Exam  Constitutional:       Appearance:  Normal appearance.   HENT:      Head: Normocephalic.      Nose: Nose normal.      Mouth/Throat:      Mouth: Mucous membranes are moist.   Eyes:      Pupils: Pupils are equal, round, and reactive to light.   Cardiovascular:      Rate and Rhythm: Tachycardia present.      Pulses: Normal pulses.   Abdominal:      General: Abdomen is flat.      Palpations: Abdomen is soft.   Musculoskeletal:      Cervical back: Normal range of motion.      Comments: Both lower extremities has chronic venous stasis changes but no significant pedal edema   Skin:     General: Skin is warm.   Neurological:      General: No focal deficit present.      Mental Status: He is alert and oriented to person, place, and time.   Psychiatric:         Mood and Affect: Mood normal.         Behavior: Behavior normal.           Assessment/Plan   #1 chronic atrial fibrillation patient remains on IV Cardizem drip.  We will start patient on the long-acting beta-blockers and will optimize the dose and gradually wean and discontinue IV Cardizem drip.  We will continue patient on Eliquis 5 mg twice a day.  So far there is no evidence of acute CVA.  For now I would not pursue any work-up for ischemic heart disease.  He is currently remains on long-acting metoprolol succinate 25 mg twice a day.  In spite of this patient still remains in atrial fibrillation without ventricular response.  I would increase the dose of metoprolol succinate to 50 mg twice a day starting this evening and reevaluate his blood pressure response tomorrow.  Patient was taking only Metropol succinate 12.5 twice a day prior to his hospitalization.  2 hypertension currently well controlled   3.  Diabetes mellitus for which she remains on Januvia  4.  History of CVA and patient remains on rosuvastatin 10 mg nightly which be continued  Peripheral IV 10/08/23 22 G Left;Ventral Hand (Active)   Site Assessment Clean;Dry;Intact 10/11/23 1200   Dressing Status Clean;Dry;Occlusive 10/11/23 1200    Number of days: 3       Code Status:  Full Code    I spent 30 minutes in the professional and overall care of this patient.        Simeon Lucas MD

## 2023-10-11 NOTE — CARE PLAN
Problem: Bathing  Goal: STG - Patient will perform UB bathing at mod I; LB bathing at distant supervision with use of AE prn  Outcome: Not Progressing     Problem: Dressings Lower Extremities  Goal: STG - Patient to complete lower body dressing at distant supervision with use of AE prn  Outcome: Not Progressing     Problem: Dressing Upper Extremities  Goal: STG - Patient will dress upper body at mod I  Outcome: Not Progressing     Problem: Mobility  Goal: Pt will perform functional mobility household distances at distant supervision with use of RW  Outcome: Not Progressing     Problem: Toileting  Goal: STG - Patient will complete toileting tasks at distant supervision  Outcome: Not Progressing     Problem: Transfers  Goal: pt will perform functional transfers at distant supervision with use of DME prn  Outcome: Not Progressing  Goal: Pt will perform BUE exercises at mod I to improve strength and independence with functional tranfers and ADL tasks   Outcome: Not Progressing

## 2023-10-12 LAB
BACTERIA UR CULT: NO GROWTH
ERYTHROCYTE [DISTWIDTH] IN BLOOD BY AUTOMATED COUNT: 15.4 % (ref 11.5–14.5)
GLUCOSE BLD MANUAL STRIP-MCNC: 141 MG/DL (ref 74–99)
GLUCOSE BLD MANUAL STRIP-MCNC: 149 MG/DL (ref 74–99)
GLUCOSE BLD MANUAL STRIP-MCNC: 150 MG/DL (ref 74–99)
HCT VFR BLD AUTO: 39.1 % (ref 41–52)
HGB BLD-MCNC: 12.3 G/DL (ref 13.5–17.5)
HOLD SPECIMEN: NORMAL
MCH RBC QN AUTO: 34.1 PG (ref 26–34)
MCHC RBC AUTO-ENTMCNC: 31.5 G/DL (ref 32–36)
MCV RBC AUTO: 108 FL (ref 80–100)
NRBC BLD-RTO: 0 /100 WBCS (ref 0–0)
PLATELET # BLD AUTO: 128 X10*3/UL (ref 150–450)
PMV BLD AUTO: 9.6 FL (ref 7.5–11.5)
RBC # BLD AUTO: 3.61 X10*6/UL (ref 4.5–5.9)
WBC # BLD AUTO: 4.5 X10*3/UL (ref 4.4–11.3)

## 2023-10-12 PROCEDURE — 97116 GAIT TRAINING THERAPY: CPT | Mod: GP

## 2023-10-12 PROCEDURE — 97110 THERAPEUTIC EXERCISES: CPT | Mod: GO,CO

## 2023-10-12 PROCEDURE — 2500000001 HC RX 250 WO HCPCS SELF ADMINISTERED DRUGS (ALT 637 FOR MEDICARE OP): Performed by: INTERNAL MEDICINE

## 2023-10-12 PROCEDURE — 85027 COMPLETE CBC AUTOMATED: CPT

## 2023-10-12 PROCEDURE — 36415 COLL VENOUS BLD VENIPUNCTURE: CPT

## 2023-10-12 PROCEDURE — 92507 TX SP LANG VOICE COMM INDIV: CPT | Mod: GN | Performed by: SPEECH-LANGUAGE PATHOLOGIST

## 2023-10-12 PROCEDURE — 2060000001 HC INTERMEDIATE ICU ROOM DAILY

## 2023-10-12 PROCEDURE — 2500000004 HC RX 250 GENERAL PHARMACY W/ HCPCS (ALT 636 FOR OP/ED): Performed by: NURSE PRACTITIONER

## 2023-10-12 PROCEDURE — 82947 ASSAY GLUCOSE BLOOD QUANT: CPT

## 2023-10-12 PROCEDURE — 97110 THERAPEUTIC EXERCISES: CPT | Mod: GP

## 2023-10-12 PROCEDURE — 2500000001 HC RX 250 WO HCPCS SELF ADMINISTERED DRUGS (ALT 637 FOR MEDICARE OP): Performed by: NURSE PRACTITIONER

## 2023-10-12 PROCEDURE — 97535 SELF CARE MNGMENT TRAINING: CPT | Mod: GO,CO

## 2023-10-12 RX ORDER — TORSEMIDE 20 MG/1
20 TABLET ORAL DAILY
Status: DISCONTINUED | OUTPATIENT
Start: 2023-10-12 | End: 2023-10-13

## 2023-10-12 RX ADMIN — FAMOTIDINE 20 MG: 20 TABLET ORAL at 08:55

## 2023-10-12 RX ADMIN — FOLIC ACID 1 MG: 1 TABLET ORAL at 08:55

## 2023-10-12 RX ADMIN — APIXABAN 5 MG: 5 TABLET, FILM COATED ORAL at 17:49

## 2023-10-12 RX ADMIN — TORSEMIDE 20 MG: 20 TABLET ORAL at 20:51

## 2023-10-12 RX ADMIN — APIXABAN 5 MG: 5 TABLET, FILM COATED ORAL at 05:34

## 2023-10-12 RX ADMIN — ROSUVASTATIN CALCIUM 10 MG: 10 TABLET, COATED ORAL at 08:55

## 2023-10-12 RX ADMIN — CEFTRIAXONE SODIUM 1 G: 1 INJECTION, SOLUTION INTRAVENOUS at 17:49

## 2023-10-12 RX ADMIN — METOPROLOL SUCCINATE 50 MG: 50 TABLET, EXTENDED RELEASE ORAL at 08:55

## 2023-10-12 RX ADMIN — METOPROLOL SUCCINATE 50 MG: 50 TABLET, EXTENDED RELEASE ORAL at 20:51

## 2023-10-12 RX ADMIN — ASPIRIN 81 MG: 81 TABLET, COATED ORAL at 08:55

## 2023-10-12 RX ADMIN — TAMSULOSIN HYDROCHLORIDE 0.4 MG: 0.4 CAPSULE ORAL at 06:47

## 2023-10-12 ASSESSMENT — PAIN SCALES - GENERAL
PAINLEVEL_OUTOF10: 0 - NO PAIN

## 2023-10-12 ASSESSMENT — COGNITIVE AND FUNCTIONAL STATUS - GENERAL
TOILETING: A LITTLE
CLIMB 3 TO 5 STEPS WITH RAILING: A LOT
MOBILITY SCORE: 17
CLIMB 3 TO 5 STEPS WITH RAILING: A LITTLE
WALKING IN HOSPITAL ROOM: A LITTLE
DRESSING REGULAR UPPER BODY CLOTHING: A LITTLE
HELP NEEDED FOR BATHING: A LITTLE
TOILETING: A LITTLE
TOILETING: A LITTLE
MOVING TO AND FROM BED TO CHAIR: A LITTLE
TURNING FROM BACK TO SIDE WHILE IN FLAT BAD: A LITTLE
STANDING UP FROM CHAIR USING ARMS: A LITTLE
WALKING IN HOSPITAL ROOM: A LITTLE
DRESSING REGULAR UPPER BODY CLOTHING: A LITTLE
DAILY ACTIVITIY SCORE: 19
MOVING TO AND FROM BED TO CHAIR: A LITTLE
STANDING UP FROM CHAIR USING ARMS: A LITTLE
MOBILITY SCORE: 18
MOVING FROM LYING ON BACK TO SITTING ON SIDE OF FLAT BED WITH BEDRAILS: A LITTLE
DAILY ACTIVITIY SCORE: 20
HELP NEEDED FOR BATHING: A LITTLE
PERSONAL GROOMING: A LITTLE
DRESSING REGULAR UPPER BODY CLOTHING: A LITTLE
DRESSING REGULAR LOWER BODY CLOTHING: A LITTLE
TURNING FROM BACK TO SIDE WHILE IN FLAT BAD: A LITTLE
PERSONAL GROOMING: A LITTLE
MOVING FROM LYING ON BACK TO SITTING ON SIDE OF FLAT BED WITH BEDRAILS: A LITTLE
DAILY ACTIVITIY SCORE: 20
HELP NEEDED FOR BATHING: A LITTLE
MOBILITY SCORE: 23
DRESSING REGULAR LOWER BODY CLOTHING: A LITTLE
CLIMB 3 TO 5 STEPS WITH RAILING: A LITTLE

## 2023-10-12 ASSESSMENT — PAIN - FUNCTIONAL ASSESSMENT
PAIN_FUNCTIONAL_ASSESSMENT: 0-10

## 2023-10-12 NOTE — CARE PLAN
The patient's goals for the shift include      The clinical goals for the shift include no falls this shift    Over the shift, the patient did not make progress toward the following goals. Barriers to progression include unsteady gait/weak. Recommendations to address these barriers include ambulate with assistance.

## 2023-10-12 NOTE — NURSING NOTE
Assumed care of patient.  Assisted up to chair.  Patient denies pain/discomfort.  No SOB/chest pain.   Afib on tele with controlled rate in 90's.  Breakfast tray ordered.  Call light and commonly used items in reach.  Bed locked and in low position.

## 2023-10-12 NOTE — NURSING NOTE
Patient ambulating to bathroom with assistance.  No complaints of pain/discomfort.  Afib on monitor with controlled rate.  No SOB/chest pain.  Call light and commonly used items in reach.

## 2023-10-12 NOTE — PROGRESS NOTES
Speech-Language Pathology    Inpatient  Speech-Language Pathology Treatment     Patient Name: Darrell Arreola  MRN: [id number]  Today's Date: 10/12/2023  Time Calculation  Start Time: 1138  Stop Time: 1152  Time Calculation (min): 14 min         Current Problem:   Patient Active Problem List   Diagnosis    Cellulitis    Contusion of thigh    Non-traumatic rhabdomyolysis    Atrial fibrillation (CMS/HCC)    Diabetes mellitus (CMS/HCC)    PVD (peripheral vascular disease) (CMS/HCC)    Benign essential hypertension    Depression    Asthenia    At risk for falls    Coronary artery disease    Obesity    Stroke-like symptoms    Diabetes mellitus type 2 in obese (CMS/HCC)    History of cerebrovascular accident         SLP Assessment:  SLP TX Intervention Outcome: Making Progress Towards Goals  SLP Assessment Results: Expression deficits, Memory deficits, Problem solving deficits  Prognosis: Good  Treatment Provided: Yes   Treatment Tolerance: Patient tolerated treatment well  Medical Staff Made Aware: Yes  Barriers: Cognition, Comorbidities  Education Provided: Yes       Plan:  Inpatient/Swing Bed or Outpatient: Inpatient  Treatment/Interventions: Cognitive communication functioning  SLP TX Plan: Continue Plan of Care  SLP Plan: Skilled SLP  SLP Frequency: 2x per week  Duration: Current admission  SLP Discharge Recommendations: Continue skilled SLP services at the next level of care  Next Treatment Priority: cognitive tx  Discussed POC: Patient  Discussed Risks/Benefits: Patient, Nursing  Patient/Caregiver Agreeable: Yes      Subjective   Current Problem:  Pt lethargic, more confused than last session, but cooperative, required intermittent cues to  attend to task    General Visit Information:   Past Medical History Relevant to Rehab: prior CVA  Prior to Session Communication: Bedside nurse (jocelyn soler)      Pain Assessment:   Pain Assessment: 0-10  Pain Score: 0 - No pain      Objective   Seee cognitive/linguistic  section below          Cognitive Linguistics:  Cognitive Linguistics Interventions: Orientation, Short term memory  Cognitive Linguistics Comments: oriented to self,  and month , more confused, able to recall morning events via yes/no questions with 80% acc, unable to provide details unless cues given (decreased from yesterday). overall pt more lethargic today                      Inpatient:  Education Documentation  Pt educated on  continuing with cogntive tx. Pt verbalizes agreement

## 2023-10-12 NOTE — PROGRESS NOTES
Darrell Tucker is a 78 y.o. male on day 4 of admission presenting with Stroke-like symptoms.      Subjective   Patient seen and examined.  Is alert and oriented x3.  Expressive aphasia appears to be resolving.  Negative stroke work-up.  Has been seen by neurology.       Objective     Last Recorded Vitals  /77   Pulse 92   Temp 36.4 °C (97.5 °F) (Temporal)   Resp 15   Wt 102 kg (225 lb 8.5 oz)   SpO2 92%   Intake/Output last 3 Shifts:    Intake/Output Summary (Last 24 hours) at 10/12/2023 1341  Last data filed at 10/12/2023 0800  Gross per 24 hour   Intake 530 ml   Output --   Net 530 ml         Admission Weight  Weight: 104 kg (229 lb 4.5 oz) (10/08/23 1309)    Daily Weight  10/11/23 : 102 kg (225 lb 8.5 oz)    Image Results  Electrocardiogram, 12-lead PRN ACS symptoms  Atrial fibrillation with rapid ventricular response with premature ventricular or aberrantly conducted complexes  Nondiagnostic inferior Qwaves , age undetermined  ST & T wave abnormality, consider lateral ischemia  Abnormal ECG    Confirmed by Harpreet Freeman ([id number]) on 10/10/2023 10:35:40 PM  Transthoracic Echo (TTE) [place] [address]             Phone [phone]    TRANSTHORACIC ECHOCARDIOGRAM REPORT       Patient Name:      DARRELL TUCKER     Reading Physician:    [id number] Simeon Lucas MD  Study Date:        10/10/2023          Ordering Provider:    [id number] COLEMAN  MRN/PID:           [id number]            Fellow:  Accession#:        [id number]        Nurse:  Date of Birth/Age: 1945 / 78 years Sonographer:          Dorene RIVERA  Gender:            M                   Additional Staff:  Height:            185.00 cm           Admit Date:           10/8/2023  Weight:            102.00 kg           Admission Status:      Inpatient - Routine  BSA:               2.26 m2             Department Location:  Blood Pressure: 100 /72 mmHg    Study Type:    TRANSTHORACIC ECHO (TTE) COMPLETE  Diagnosis/ICD: Cerebral Infarction, unspecified-I63.9; Paroxysmal atrial                 fibrillation-I48.0  Indication:    Stroke like symptoms history of cerebrovascular accident                 paroxysmal atrial fibrillation  CPT Codes:     Echo Complete w Full [id number]    Patient History:  Pertinent History: CHF CAD Depression DMII HTN PVD.    Study Detail: The following Echo studies were performed: 2D, M-Mode, Doppler and                color flow. Technically challenging study due to body habitus,                patient lying in supine position, poor acoustic windows, the                patient's lack of cooperation and prominent lung artifact. Unable                to obtain subcostal view.       PHYSICIAN INTERPRETATION:  Left Ventricle: Left ventricular systolic function is severely decreased, with an estimated ejection fraction of 20-25%. There is global hypokinesis of the left ventricle with minor regional variations. The left ventricular cavity size is mild to moderately dilated. Spectral Doppler shows a pseudonormal pattern of left ventricular diastolic filling.  Left Atrium: The left atrium is moderate to severely dilated.  Right Ventricle: The right ventricle is normal in size. There is normal right ventricular global systolic function.  Right Atrium: The right atrium is mildly dilated.  Aortic Valve: The aortic valve is trileaflet. There is no evidence of aortic valve regurgitation. The peak instantaneous gradient of the aortic valve is 5.4 mmHg.  Mitral Valve: The mitral valve is mildly thickened. There is moderate mitral valve regurgitation which is posteriorly directed.  Tricuspid Valve: The tricuspid valve is structurally normal. There is mild tricuspid regurgitation. The Doppler estimated RVSP is within normal limits at 27.0  mmHg.  Pulmonic Valve: The pulmonic valve is not well visualized. There is no indication of pulmonic valve regurgitation.  Pericardium: There is no pericardial effusion noted.  Aorta: The aortic root is normal. There is mild dilatation of the aortic root.       CONCLUSIONS:   1. Left ventricular systolic function is severely decreased with a 20-25% estimated ejection fraction.   2. Spectral Doppler shows a pseudonormal pattern of left ventricular diastolic filling.   3. The left atrium is moderate to severely dilated.   4. Moderate mitral valve regurgitation.   5. RVSP within normal limits.   6. There is global hypokinesis of the left ventricle with minor regional variations.    QUANTITATIVE DATA SUMMARY:  2D MEASUREMENTS:               Normal Ranges:  LAs: 5.90 cm (2.7-4.0cm)    LA VOLUME:                                Normal Ranges:  LA Vol A2C:        158.6 ml  LA Vol Index A2C:  70.2 ml/m2  LA Area A2C:       38.0 cm2  LA Major Axis A2C: 7.7 cm  LA Volume Index:   69.9 ml/m2  LA Vol A2C:        144.0 ml    RA VOLUME BY A/L METHOD:                                Normal Ranges:  RA Vol A4C:        128.0 ml   (8.3-19.5ml)  RA Vol Index A4C:  56.7 ml/m2  RA Area A4C:       33.5 cm2  RA Major Axis A4C: 7.4 cm    M-MODE MEASUREMENTS:                   Normal Ranges:  Ao Root: 3.60 cm (2.0-3.7cm)  AoV Exc: 2.50 cm (1.5-2.5cm)  LAs:     5.50 cm (2.7-4.0cm)    AORTA MEASUREMENTS:                     Normal Ranges:  AoV Exc:   2.50 cm (1.5-2.5cm)  Asc Ao, d: 3.70 cm (2.1-3.4cm)    LV SYSTOLIC FUNCTION BY 2D PLANIMETRY (MOD):                      Normal Ranges:  EF-A4C View: 25.0 % (>=55%)  EF-A2C View: 24.2 %  EF-Biplane:  22.5 %    LV DIASTOLIC FUNCTION:                         Normal Ranges:  MV Peak E:    1.12 m/s (0.7-1.2 m/s)  MV e'         0.05 m/s (>8.0)  MV lateral e' 0.06 m/s  MV medial e'  0.05 m/s  E/e' Ratio:   20.59    (<8.0)    MITRAL VALVE:                       Normal Ranges:  MV Vmax:    1.18 m/s  (<=1.3m/s)  MV peak P.6 mmHg (<5mmHg)  MV mean P.5 mmHg (<48mmHg)  MV DT:      138 msec (150-240msec)    MITRAL INSUFFICIENCY:                       Normal Ranges:  MR VTI:  127.00 cm  MR Vmax: 396.00 cm/s    AORTIC VALVE:                          Normal Ranges:  AoV Vmax:      1.16 m/s (<=1.7m/s)  AoV Peak P.4 mmHg (<20mmHg)  LVOT Max Gt:  0.98 m/s (<=1.1m/s)  LVOT VTI:      15.20 cm  LVOT Diameter: 2.60 cm  (1.8-2.4cm)  AoV Area,Vmax: 4.48 cm2 (2.5-4.5cm2)       RIGHT VENTRICLE:  RV s' 0.09 m/s    TRICUSPID VALVE/RVSP:                              Normal Ranges:  Peak TR Velocity: 2.45 m/s  RV Syst Pressure: 27.0 mmHg (< 30mmHg)    PULMONIC VALVE:                          Normal Ranges:  PV Accel Time: 77 msec  (>120ms)  PV Max Gt:    0.9 m/s  (0.6-0.9m/s)  PV Max PG:     3.6 mmHg       [id number] Simeon Lucas MD  Electronically signed on 10/10/2023 at 7:39:33 PM       ** Final **      Physical Exam  Vitals reviewed.   Constitutional:       Appearance: Normal appearance. He is obese.   HENT:      Head: Normocephalic and atraumatic.   Eyes:      Extraocular Movements: Extraocular movements intact.   Cardiovascular:      Rate and Rhythm: Tachycardia present. Rhythm irregular.      Pulses: Normal pulses.   Pulmonary:      Effort: Pulmonary effort is normal.   Abdominal:      General: Abdomen is flat.      Palpations: Abdomen is soft.   Musculoskeletal:      Cervical back: Normal range of motion.      Comments: Generalized weakness   Skin:     General: Skin is warm and dry.      Capillary Refill: Capillary refill takes less than 2 seconds.   Neurological:      Mental Status: He is alert and oriented to person, place, and time. Mental status is at baseline.      Motor: Weakness present.         Relevant Results       Results for orders placed or performed during the hospital encounter of 10/08/23 (from the past 24 hour(s))   POCT GLUCOSE   Result Value Ref Range    POCT Glucose 179 (H) 74 - 99 mg/dL    POCT GLUCOSE   Result Value Ref Range    POCT Glucose 122 (H) 74 - 99 mg/dL   CBC   Result Value Ref Range    WBC 4.5 4.4 - 11.3 x10*3/uL    nRBC 0.0 0.0 - 0.0 /100 WBCs    RBC 3.61 (L) 4.50 - 5.90 x10*6/uL    Hemoglobin 12.3 (L) 13.5 - 17.5 g/dL    Hematocrit 39.1 (L) 41.0 - 52.0 %     (H) 80 - 100 fL    MCH 34.1 (H) 26.0 - 34.0 pg    MCHC 31.5 (L) 32.0 - 36.0 g/dL    RDW 15.4 (H) 11.5 - 14.5 %    Platelets 128 (L) 150 - 450 x10*3/uL    MPV 9.6 7.5 - 11.5 fL   PST Top   Result Value Ref Range    Extra Tube Hold for add-ons.    POCT GLUCOSE   Result Value Ref Range    POCT Glucose 141 (H) 74 - 99 mg/dL               Assessment/Plan   This patient currently has cardiac telemetry ordered; if you would like to modify or discontinue the telemetry order, click here to go to the orders activity to modify/discontinue the order.    CVA  Suspected, aphasia  admit to SDU  monitor on tele  ASA/statin   Swallow eval  echocardiogram recently at [place], echo with a EF of 27±5%, severe MR, moderately severe TR, AS; [facility] 7/27   MRI/MRA negative  US carotids  lipid panel ordered  neurostroke assessment  consult neurology, appreciate recommendations  PT/OT eval     A-fib RVR  A fib rate controlled  On Eliquis  Beta-blocker  Cardiology following  We will need to monitor blood pressure closely       Urinary tract infection  UA positive for WBCs and bacteria, ordered culture  IV ceftriaxone  Follow cultures     Diabetes mellitus  Sliding scale insulin  Monitor blood glucose  Hypoglycemia protocol  Hemoglobin A1c 5.3     Hypertension  Hold antihypertensives for now  Permissive hypertension  As needed labetalol and Apresoline for systolic greater than 220     CAD  Aspirin/statin     PAD  As above     Plan  Admit to stepdown unit for observation  Monitor on telemetry  Stroke work-up  Cardizem drip has been discontinued.  Heart rate controlled  Monitor blood pressure  Consult neurology and cardiology, appreciate  recommendations  Okay to resume Eliquis per neurology  CBC and BMP in the morning    Awaiting discharge planning with care coordination and family.  I spent over 35 minutes in the care and management of this patient.    10/12/2023: Anticipate discharge to SNF 24 to 48 hours          Principal Problem:    Stroke-like symptoms        Amy Moss, APRN-CNP

## 2023-10-12 NOTE — PROGRESS NOTES
REJI received erma from formerly [phone] who confirmed information received and is processed and she is just awaiting official [facility] approval for pt to go to [facility].

## 2023-10-12 NOTE — NURSING NOTE
Pt resting quietly in bed.  Offers no complaints of pain/discomfort.  Low BP noted.  Patient placed in bed.  Fresh fluids provided and lunch tray ordered.  Call light and commonly used items in reach.  Bed locked and in low position.  Bed alarm engaged.

## 2023-10-12 NOTE — CARE PLAN
Problem: Bathing  Goal: STG - Patient will perform UB bathing at mod I; LB bathing at distant supervision with use of AE prn  Outcome: Not Progressing     Problem: Mobility  Goal: Pt will perform functional mobility household distances at distant supervision with use of RW  Outcome: Not Progressing     Problem: Toileting  Goal: STG - Patient will complete toileting tasks at distant supervision  Outcome: Not Progressing     Problem: Transfers  Goal: pt will perform functional transfers at distant supervision with use of DME prn  Outcome: Not Progressing  Goal: Pt will perform BUE exercises at mod I to improve strength and independence with functional tranfers and ADL tasks   Outcome: Not Progressing     Problem: Dressings Lower Extremities  Goal: STG - Patient to complete lower body dressing at distant supervision with use of AE prn  Outcome: Progressing     Problem: Dressing Upper Extremities  Goal: STG - Patient will dress upper body at mod I  Outcome: Progressing     Problem: Grooming  Goal: STG - Patient completes grooming at mod I  Outcome: Progressing

## 2023-10-12 NOTE — NURSING NOTE
Assume care of patient. Pt sitting on side of bed. Pt denies any pain or needs at this time. Call light within reach. Bed alarm engaged. Will continue to monitor.

## 2023-10-12 NOTE — NURSING NOTE
Pt sitting on side of bed eating crackers. Pt denies any pain. No change from previous assessment other than pt pulled out his IV. New IV will be obtained. Pt denies any further needs at this time. Call light within reach. Will continue to monitor.

## 2023-10-12 NOTE — PROGRESS NOTES
Subjective Data:  Patient is feeling much better denies any symptoms of chest discomfort or shortness of breath.  Nursing informs that the patient appears more tired today.    Overnight Events:         Objective Data: Patient appears to have more swelling in both lower extremities today compared to previous days evaluation  Last Recorded Vitals:  Vitals:    10/12/23 0813 10/12/23 1201 10/12/23 1226 10/12/23 1550   BP: (!) 113/93 (!) 100/36 106/77 92/69   BP Location: Left arm Left arm  Right arm   Patient Position: Sitting Sitting  Lying   Pulse: 99 92  102   Resp: 16 15  20   Temp: 36.9 °C (98.4 °F) 36.4 °C (97.5 °F)  36.5 °C (97.7 °F)   TempSrc: Oral Temporal  Tympanic   SpO2: 96% 92%  98%   Weight:       Height:           Last Labs:  CBC - 10/12/2023:  5:26 AM  4.5 12.3 128    39.1      CMP - 10/10/2023:  8:27 AM  8.8 7.5 24 --- 2.1   _ 4.0 13 159      PTT - 7/1/2023:  6:04 AM  1.3   13.0 34.7     HGBA1C   Date/Time Value Ref Range Status   10/10/2023 11:42 AM 5.3 See below % Final   07/02/2023 04:38 AM 5.6 4.0 - 6.0 % Final     Comment:     Hemoglobin A1C levels are related to mean blood glucose during the   preceding 2-3 months. The relationship table below may be used as a   general guide. Each 1% increase in HGB A1C is a reflection of an   increase in mean glucose of approximately 30 mg/dl.   Reference: Diabetes Care, volume 29, supplement 1 Jan. 2006                        HGB A1C ................. Approx. Mean Glucose   _______________________________________________   6%   ...............................  120 mg/dl   7%   ...............................  150 mg/dl   8%   ...............................  180 mg/dl   9%   ...............................  210 mg/dl   10%  ...............................  240 mg/dl  Performed at [address] [address]     05/16/2019 01:52 PM 7.8 4.0 - 6.0 % Final     Comment:     Hemoglobin A1C levels are related to mean blood glucose during the    "preceding 2-3 months. The relationship table below may be used as a   general guide. Each 1% increase in HGB A1C is a reflection of an   increase in mean glucose of approximately 30 mg/dl.   Reference: Diabetes Care, volume 29, supplement 1 Jan. 2006                        HGB A1C ................. Approx. Mean Glucose   _______________________________________________   6%   ...............................  120 mg/dl   7%   ...............................  150 mg/dl   8%   ...............................  180 mg/dl   9%   ...............................  210 mg/dl   10%  ...............................  240 mg/dl  Performed at [address] [address]       LDLCALC   Date/Time Value Ref Range Status   07/02/2023 04:38 AM 23 65 - 130 MG/DL Final   05/17/2019 06:01 AM 42 65 - 130 MG/DL Final      Last I/O:  I/O last 3 completed shifts:  In: 1010 (9.9 mL/kg) [P.O.:960; IV Piggyback:50]  Out: - (0 mL/kg)   Weight: 102.3 kg     Past Cardiology Tests (Last 3 Years):  EKG:  Electrocardiogram, 12-lead PRN ACS symptoms 10/10/2023    Echo:  Transthoracic Echo (TTE) Complete 10/10/2023    Ejection Fractions:  No results found for: \"EF\"  Cath:  No results found for this or any previous visit from the past 1095 days.    Stress Test:  No results found for this or any previous visit from the past 1095 days.    Cardiac Imaging:  No results found for this or any previous visit from the past 1095 days.      Inpatient Medications:  Scheduled medications   Medication Dose Route Frequency    apixaban  5 mg oral q12h    aspirin  81 mg oral Daily    Or    aspirin  81 mg oral Daily    Or    aspirin  81 mg nasogastric tube Daily    Or    aspirin  300 mg rectal Daily    cefTRIAXone  1 g intravenous q24h    famotidine  20 mg oral Daily    folic acid  1 mg oral Daily    insulin lispro  0-10 Units subcutaneous TID with meals    metoprolol succinate XL  50 mg oral BID    rosuvastatin  10 mg oral Daily    tamsulosin  0.4 mg " oral Daily before breakfast    torsemide  20 mg oral Daily     PRN medications   Medication    dextrose 10 % in water (D10W)    dextrose    glucagon    hydrALAZINE    oxygen     Continuous Medications   Medication Dose Last Rate       Physical Exam:  HEENT PRL neck supple lungs clear clear to auscultation bilaterally with good air entry heart S1-S2 present no murmurs abdomen soft nontender EXTR shows trace edema to both lower extremities.     Assessment/Plan   #1 suspected CVA with no evidence of new CVA on work-up during this hospitalization  2.  Chronic atrial fibrillation rate controlled for which patient remains on Toprol-XL 50 mg twice a day and tolerating it well.  3.  Trace pedal edema with underlying cardiomyopathy ejection fraction of 27% -5% with severe mitral regurgitation and severe tricuspid regurgitation echocardiogram performed at [facility].  We will resume patient back on torsemide 20 mg once a day starting this evening and we will check a proBNP tomorrow to optimize his diuretics before he gets discharged.  4.  UTI recovering  Peripheral IV 10/12/23 22 G Right;Dorsal Hand (Active)   Site Assessment Clean;Dry;Intact 10/12/23 1600   Dressing Status Clean;Dry;Occlusive 10/12/23 1600   Number of days: 0       Code Status:  Full Code    I spent 25 minutes in the professional and overall care of this patient.        Simeon Lucas MD

## 2023-10-12 NOTE — PROGRESS NOTES
Physical Therapy    Physical Therapy Treatment    Patient Name: Darrell Arreola  MRN: [id number]  Today's Date: 10/12/2023  Time Calculation  Start Time: 1220  Stop Time: 1245  Time Calculation (min): 25 min       Assessment/Plan   PT Assessment  PT Assessment Results: Decreased strength, Decreased endurance, Impaired balance, Decreased mobility, Decreased coordination, Decreased safety awareness  Rehab Prognosis: Good  Evaluation/Treatment Tolerance: Other (Comment) (Elevated heart rate during mobility (155 bpm);  RN nontified)  Medical Staff Made Aware: Yes  End of Session Communication: Bedside nurse  Assessment Comment: Pt presents with increased fatigue and decreased endurance this date. RN aware. Pt remains a fall risk at this time.  End of Session Patient Position: Bed, 2 rail up  PT Plan  Inpatient/Swing Bed or Outpatient: Inpatient  PT Plan  Treatment/Interventions: Bed mobility, Transfer training, Gait training, Balance training, Strengthening, Endurance training, Therapeutic exercise, Therapeutic activity  PT Plan: Skilled PT  PT Frequency: 4 times per week  PT Discharge Recommendations: Moderate intensity level of continued care  PT Recommended Transfer Status: Assist x1      General Visit Information:   PT  Visit  PT Received On: 10/12/23  General  Prior to Session Communication: Bedside nurse  Patient Position Received: Bed, 2 rail up  General Comment: Cleared by nursing to be seen for therapy, pt agreeable with tx, seated EOB upon arrival.    Subjective   Precautions:  Precautions  Hearing/Visual Limitations: glasses  Medical Precautions: Fall precautions  Vital Signs:       Objective   Pain:  Pain Assessment  Pain Assessment: 0-10  Pain Score: 0 - No pain  Cognition:     Postural Control:     Activity Tolerance:  Activity Tolerance  Endurance: Tolerates 10 - 20 min exercise with multiple rests  Treatments:  Therapeutic Exercise  Therapeutic Exercise Performed: Yes  Therapeutic Exercise Activity 1:  Bilateral ankle pumps x15  Therapeutic Exercise Activity 2: Bilateral hip flexion x15  Therapeutic Exercise Activity 3: Bilateral knee extension x15  Therapeutic Exercise Activity 4: Resisted hip abd/add x15    Therapeutic Activity  Therapeutic Activity Performed: No    Balance/Neuromuscular Re-Education  Balance/Neuromuscular Re-Education Activity Performed: Yes  Balance/Neuromuscular Re-Education Activity 1: Fair- seated static balance, presents witha left lean due to fatigue requiring cues to maintain midline. Fair- static standing balance with bilateral UE support on walker.    Bed Mobility  Bed Mobility: No  Bed Mobility 1  Bed Mobility 1: Supine to sitting  Level of Assistance 1: Minimum assistance  Bed Mobility Comments 1: Min assist for trunk during supine to sit, pt able to bring LE's off EOB without assist from therapist.    Ambulation/Gait Training  Ambulation/Gait Training Performed: Yes  Ambulation/Gait Training 1  Surface 1: Level tile  Device 1: Rolling walker  Assistance 1: Minimum assistance  Comments/Distance (ft) 1: 20' with wheeled walker, presents with slow mari, decreased bilateral step length, mild flexed posture, narrow ALDAIR, requires cues to remain in ALDAIR of walker, mild instability noted requiring min assist for balance.  Transfers  Transfer: Yes  Transfer 1  Transfer From 1: Sit to  Transfer to 1: Stand  Transfer Level of Assistance 1: Minimum assistance  Trials/Comments 1: Min assist for trunk up during sit to stand, good hand placement, decreased eccentric control in sitting.    Stairs  Stairs: No    Outcome Measures:  [facility] Basic Mobility  Turning from your back to your side while in a flat bed without using bedrails: A little  Moving from lying on your back to sitting on the side of a flat bed without using bedrails: A little  Moving to and from bed to chair (including a wheelchair): A little  Standing up from a chair using your arms (e.g. wheelchair or bedside chair): A little  To  walk in hospital room: A little  Climbing 3-5 steps with railing: A lot  Basic Mobility - Total Score: 17    Education Documentation  No documentation found.  Education Comments  No comments found.        OP EDUCATION:  Education  Individual(s) Educated: Patient  Education Provided: Fall Risk    Encounter Problems       Encounter Problems (Active)       Mobility       Pt will perform functional mobility household distances at distant supervision with use of RW (Not Progressing)       Start:  10/09/23    Expected End:  10/23/23               PT Problem       Patient will ambulate 50 distance using walker with supervision (Progressing)       Start:  10/09/23    Expected End:  10/23/23         Goal Note       Patient will ambulate 50 distance using walker with supervision              Patient will perform chair to and from bed transfer with supervision (Progressing)       Start:  10/09/23    Expected End:  10/23/23         Goal Note       Patient will perform chair to and from bed transfer with supervision              Patient will perform supine to sit on bed with supervision demonstrating control (Progressing)       Start:  10/09/23    Expected End:  10/23/23         Goal Note       Patient will perform supine to sit on bed with supervision demonstrating control              Patient will perform sit to supine on bed with supervision demonstrating control (Progressing)       Start:  10/09/23    Expected End:  10/23/23         Goal Note       Patient will perform sit to supine on bed with supervision demonstrating control                 Transfers       pt will perform functional transfers at distant supervision with use of DME prn (Not Progressing)       Start:  10/09/23    Expected End:  10/23/23            Pt will perform BUE exercises at mod I to improve strength and independence with functional tranfers and ADL tasks  (Not Progressing)       Start:  10/09/23    Expected End:  10/23/23

## 2023-10-12 NOTE — PROGRESS NOTES
Occupational Therapy    OT Treatment    Patient Name: Darrell Arreola  MRN: [id number]  Today's Date: 10/12/2023  Time Calculation  Start Time: 1033  Stop Time: 1058  Time Calculation (min): 25 min         Assessment:        Plan:          Subjective   General:  OT Received On: 10/12/23  Prior to Session Communication: Bedside nurse  Patient Position Received: Up in chair  General Comment: Pt cleared by nursing to be seen for therapy. Pt sitting up in chair with eyes closed. Increased time required to facilitate pt participation but eventually pt agreeable for therapy.  Vital Signs:     Pain:  Pain Assessment  Pain Assessment: 0-10  Pain Score: 0 - No pain    Objective    Activities of Daily Living: Grooming  Grooming Level of Assistance: Setup  Grooming Where Assessed: Chair  Grooming Comments: Pt required set up to wash face with Mod verbal cues for thoroughness and Max verbal cues for task initiation to complete oral hygiene task. Pt educated on the importance of participating fully in therapy and ADL routine. Pt continues to decline to tasks despite being educated on goals/benefits.    UE Bathing  UE Bathing Comments: attempted and pt declined    LE Bathing  LE Bathing Comments: attempted and pt declined    Toileting  Toileting Comments: attempted and pt declined  Functional Standing Tolerance:     Bed Mobility/Transfers: Transfers  Transfer: No (Pt declined to complete functional transfers)  Transfer 1  Trials/Comments 1: Pt declined to complete functional transfers despite Max cues for encouragement       Therapy/Activity: Therapeutic Exercise  Therapeutic Exercise Performed: Yes  Therapeutic Exercise Activity 1: Bicep curls 1x10 utilizing red theraband  Therapeutic Exercise Activity 2: Sh flex 1x10 utilizing red theraband  Therapeutic Exercise Activity 3: Tricep press 1x10 utilizing red theraband. Pt required Max cues for instruction, attention to task and technique throughout UB exercises      Outcome  Measures:[facility] Daily Activity  Putting on and taking off regular lower body clothing: A little  Bathing (including washing, rinsing, drying): A little  Putting on and taking off regular upper body clothing: A little  Toileting, which includes using toilet, bedpan or urinal: A little  Taking care of personal grooming such as brushing teeth: A little  Eating Meals: None  Daily Activity - Total Score: 19        Education Documentation  No documentation found.  Education Comments  No comments found.        OP EDUCATION:       Goals:    Problem: Bathing  Goal: STG - Patient will perform UB bathing at mod I; LB bathing at distant supervision with use of AE prn  Outcome: Not Progressing     Problem: Mobility  Goal: Pt will perform functional mobility household distances at distant supervision with use of RW  Outcome: Not Progressing     Problem: Toileting  Goal: STG - Patient will complete toileting tasks at distant supervision  Outcome: Not Progressing     Problem: Transfers  Goal: pt will perform functional transfers at distant supervision with use of DME prn  Outcome: Not Progressing  Goal: Pt will perform BUE exercises at mod I to improve strength and independence with functional tranfers and ADL tasks   Outcome: Not Progressing     Problem: Dressings Lower Extremities  Goal: STG - Patient to complete lower body dressing at distant supervision with use of AE prn  Outcome: Progressing     Problem: Dressing Upper Extremities  Goal: STG - Patient will dress upper body at mod I  Outcome: Progressing     Problem: Grooming  Goal: STG - Patient completes grooming at mod I  Outcome: Progressing

## 2023-10-12 NOTE — PROGRESS NOTES
Occupational Therapy    OT Treatment    Patient Name: Darrell Arreola  MRN: [id number]  Today's Date: 10/12/2023  Time Calculation  Start Time: 0958  Stop Time: 1010  Time Calculation (min): 12 min         Assessment:        Plan:          Subjective   General:  OT Received On: 10/11/23  Prior to Session Communication: Bedside nurse  Patient Position Received: Up in chair  General Comment: Pt cleared by nursing to be seen for therapy. Pt sitting up in chair upon arrival and agreeable for treatment.  Vital Signs:     Pain:  Pain Assessment  Pain Assessment: 0-10  Pain Score: 0 - No pain    Objective    Activities of Daily Living: Grooming  Grooming Level of Assistance: Minimum assistance  Grooming Where Assessed: Chair  Grooming Comments: Pt required Min A to complete oral care task to open toothbrush plastic bag and toothpaste tab. Pt utilized wash cloth to wash face with set up. Pt declined to participate in any other ADLs on this date with Max cues for encouragement.        UE Dressing  UE Dressing Level of Assistance: Minimum assistance  UE Dressing Where Assessed: Chair  UE Dressing Comments: to doff/don gown with cues for instruction/technique    LE Dressing  LE Dressing: Yes  Sock Level of Assistance: Minimum assistance  LE Dressing Where Assessed: Chair  LE Dressing Comments: to doff/don socks with Min A for alignment after donning sock       Functional Standing Tolerance:     Bed Mobility/Transfers: Bed Mobility  Bed Mobility: No    Transfers  Transfer: No (Pt declined to complete functional transfers)  Transfer 1  Trials/Comments 1: Pt declined to participate in functional transfers      Outcome Measures:[facility] Daily Activity  Putting on and taking off regular lower body clothing: A little  Bathing (including washing, rinsing, drying): A little  Putting on and taking off regular upper body clothing: A little  Toileting, which includes using toilet, bedpan or urinal: A little  Taking care of personal grooming  such as brushing teeth: A little  Eating Meals: None  Daily Activity - Total Score: 19        Education Documentation  No documentation found.  Education Comments  No comments found.        OP EDUCATION:       Goals:    Problem: Bathing  Goal: STG - Patient will perform UB bathing at mod I; LB bathing at distant supervision with use of AE prn  Outcome: Not Progressing     Problem: Dressings Lower Extremities  Goal: STG - Patient to complete lower body dressing at distant supervision with use of AE prn  Outcome: Not Progressing     Problem: Dressing Upper Extremities  Goal: STG - Patient will dress upper body at mod I  Outcome: Not Progressing     Problem: Mobility  Goal: Pt will perform functional mobility household distances at distant supervision with use of RW  Outcome: Not Progressing     Problem: Toileting  Goal: STG - Patient will complete toileting tasks at distant supervision  Outcome: Not Progressing     Problem: Transfers  Goal: pt will perform functional transfers at distant supervision with use of DME prn  Outcome: Not Progressing  Goal: Pt will perform BUE exercises at mod I to improve strength and independence with functional tranfers and ADL tasks   Outcome: Not Progressing

## 2023-10-13 LAB
ERYTHROCYTE [DISTWIDTH] IN BLOOD BY AUTOMATED COUNT: 15.2 % (ref 11.5–14.5)
GLUCOSE BLD MANUAL STRIP-MCNC: 137 MG/DL (ref 74–99)
GLUCOSE BLD MANUAL STRIP-MCNC: 159 MG/DL (ref 74–99)
GLUCOSE BLD MANUAL STRIP-MCNC: 196 MG/DL (ref 74–99)
HCT VFR BLD AUTO: 36.9 % (ref 41–52)
HGB BLD-MCNC: 11.6 G/DL (ref 13.5–17.5)
MCH RBC QN AUTO: 33.3 PG (ref 26–34)
MCHC RBC AUTO-ENTMCNC: 31.4 G/DL (ref 32–36)
MCV RBC AUTO: 106 FL (ref 80–100)
NRBC BLD-RTO: 0 /100 WBCS (ref 0–0)
NT-PROBNP SERPL-MCNC: ABNORMAL PG/ML (ref 0–852)
PLATELET # BLD AUTO: 137 X10*3/UL (ref 150–450)
PMV BLD AUTO: 9.9 FL (ref 7.5–11.5)
RBC # BLD AUTO: 3.48 X10*6/UL (ref 4.5–5.9)
WBC # BLD AUTO: 5 X10*3/UL (ref 4.4–11.3)

## 2023-10-13 PROCEDURE — 85027 COMPLETE CBC AUTOMATED: CPT

## 2023-10-13 PROCEDURE — 82947 ASSAY GLUCOSE BLOOD QUANT: CPT

## 2023-10-13 PROCEDURE — 83880 ASSAY OF NATRIURETIC PEPTIDE: CPT | Performed by: INTERNAL MEDICINE

## 2023-10-13 PROCEDURE — 2060000001 HC INTERMEDIATE ICU ROOM DAILY

## 2023-10-13 PROCEDURE — 2500000004 HC RX 250 GENERAL PHARMACY W/ HCPCS (ALT 636 FOR OP/ED): Performed by: NURSE PRACTITIONER

## 2023-10-13 PROCEDURE — 2500000002 HC RX 250 W HCPCS SELF ADMINISTERED DRUGS (ALT 637 FOR MEDICARE OP, ALT 636 FOR OP/ED): Performed by: INTERNAL MEDICINE

## 2023-10-13 PROCEDURE — 2500000001 HC RX 250 WO HCPCS SELF ADMINISTERED DRUGS (ALT 637 FOR MEDICARE OP): Performed by: INTERNAL MEDICINE

## 2023-10-13 PROCEDURE — 36415 COLL VENOUS BLD VENIPUNCTURE: CPT | Performed by: INTERNAL MEDICINE

## 2023-10-13 PROCEDURE — 36415 COLL VENOUS BLD VENIPUNCTURE: CPT

## 2023-10-13 PROCEDURE — 97110 THERAPEUTIC EXERCISES: CPT | Mod: GP

## 2023-10-13 PROCEDURE — 2500000001 HC RX 250 WO HCPCS SELF ADMINISTERED DRUGS (ALT 637 FOR MEDICARE OP): Performed by: NURSE PRACTITIONER

## 2023-10-13 PROCEDURE — 92507 TX SP LANG VOICE COMM INDIV: CPT | Mod: GN | Performed by: SPEECH-LANGUAGE PATHOLOGIST

## 2023-10-13 PROCEDURE — 96372 THER/PROPH/DIAG INJ SC/IM: CPT | Performed by: INTERNAL MEDICINE

## 2023-10-13 PROCEDURE — 97535 SELF CARE MNGMENT TRAINING: CPT | Mod: GO,CO

## 2023-10-13 RX ORDER — TORSEMIDE 20 MG/1
40 TABLET ORAL
Status: DISPENSED | OUTPATIENT
Start: 2023-10-13 | End: 2023-10-15

## 2023-10-13 RX ADMIN — TAMSULOSIN HYDROCHLORIDE 0.4 MG: 0.4 CAPSULE ORAL at 05:47

## 2023-10-13 RX ADMIN — APIXABAN 5 MG: 5 TABLET, FILM COATED ORAL at 18:17

## 2023-10-13 RX ADMIN — INSULIN LISPRO 2 UNITS: 100 INJECTION, SOLUTION INTRAVENOUS; SUBCUTANEOUS at 18:18

## 2023-10-13 RX ADMIN — APIXABAN 5 MG: 5 TABLET, FILM COATED ORAL at 05:46

## 2023-10-13 RX ADMIN — METOPROLOL SUCCINATE 50 MG: 50 TABLET, EXTENDED RELEASE ORAL at 20:51

## 2023-10-13 RX ADMIN — TORSEMIDE 40 MG: 20 TABLET ORAL at 20:51

## 2023-10-13 RX ADMIN — FAMOTIDINE 20 MG: 20 TABLET ORAL at 08:47

## 2023-10-13 RX ADMIN — SACUBITRIL AND VALSARTAN 0.5 TABLET: 24; 26 TABLET, FILM COATED ORAL at 20:51

## 2023-10-13 RX ADMIN — FOLIC ACID 1 MG: 1 TABLET ORAL at 08:47

## 2023-10-13 RX ADMIN — ASPIRIN 81 MG CHEWABLE TABLET 81 MG: 81 TABLET CHEWABLE at 08:47

## 2023-10-13 RX ADMIN — INSULIN LISPRO 2 UNITS: 100 INJECTION, SOLUTION INTRAVENOUS; SUBCUTANEOUS at 12:13

## 2023-10-13 RX ADMIN — CEFTRIAXONE SODIUM 1 G: 1 INJECTION, SOLUTION INTRAVENOUS at 15:48

## 2023-10-13 RX ADMIN — METOPROLOL SUCCINATE 50 MG: 50 TABLET, EXTENDED RELEASE ORAL at 08:47

## 2023-10-13 RX ADMIN — ROSUVASTATIN CALCIUM 10 MG: 10 TABLET, COATED ORAL at 08:47

## 2023-10-13 RX ADMIN — TORSEMIDE 20 MG: 20 TABLET ORAL at 08:47

## 2023-10-13 ASSESSMENT — COGNITIVE AND FUNCTIONAL STATUS - GENERAL
PERSONAL GROOMING: A LITTLE
MOVING FROM LYING ON BACK TO SITTING ON SIDE OF FLAT BED WITH BEDRAILS: A LITTLE
WALKING IN HOSPITAL ROOM: A LITTLE
STANDING UP FROM CHAIR USING ARMS: A LITTLE
DRESSING REGULAR UPPER BODY CLOTHING: A LITTLE
TURNING FROM BACK TO SIDE WHILE IN FLAT BAD: A LITTLE
DAILY ACTIVITIY SCORE: 21
PERSONAL GROOMING: A LITTLE
HELP NEEDED FOR BATHING: A LITTLE
DAILY ACTIVITIY SCORE: 21
TOILETING: A LITTLE
CLIMB 3 TO 5 STEPS WITH RAILING: A LITTLE
MOBILITY SCORE: 17
HELP NEEDED FOR BATHING: A LITTLE
MOVING TO AND FROM BED TO CHAIR: A LITTLE
CLIMB 3 TO 5 STEPS WITH RAILING: A LOT

## 2023-10-13 ASSESSMENT — PAIN SCALES - GENERAL
PAINLEVEL_OUTOF10: 0 - NO PAIN

## 2023-10-13 ASSESSMENT — PAIN - FUNCTIONAL ASSESSMENT
PAIN_FUNCTIONAL_ASSESSMENT: 0-10
PAIN_FUNCTIONAL_ASSESSMENT: 0-10

## 2023-10-13 NOTE — CARE PLAN
Problem: Fall/Injury  Goal: Not fall by end of shift  Outcome: Progressing  Goal: Be free from injury by end of the shift  Outcome: Progressing  Goal: Verbalize understanding of personal risk factors for fall in the hospital  Outcome: Progressing  Goal: Verbalize understanding of risk factor reduction measures to prevent injury from fall in the home  Outcome: Progressing  Goal: Use assistive devices by end of the shift  Outcome: Progressing  Goal: Pace activities to prevent fatigue by end of the shift  Outcome: Progressing     Problem: Pain  Goal: My pain/discomfort is manageable  Outcome: Progressing     Problem: Safety  Goal: Patient will be injury free during hospitalization  Outcome: Progressing  Goal: I will remain free of falls  Outcome: Progressing     Problem: Daily Care  Goal: Daily care needs are met  Outcome: Progressing     Problem: Bathing  Goal: STG - Patient will perform UB bathing at mod I; LB bathing at distant supervision with use of AE prn  Outcome: Progressing     Problem: Dressings Lower Extremities  Goal: STG - Patient to complete lower body dressing at distant supervision with use of AE prn  Outcome: Progressing     Problem: Dressing Upper Extremities  Goal: STG - Patient will dress upper body at mod I  Outcome: Progressing     Problem: Grooming  Goal: STG - Patient completes grooming at mod I  Outcome: Progressing     Problem: Toileting  Goal: STG - Patient will complete toileting tasks at distant supervision  Outcome: Progressing   The patient's goals for the shift include rest.     The clinical goals for the shift include safety, neuro checks.

## 2023-10-13 NOTE — CARE PLAN
Problem: Dressings Lower Extremities  Goal: STG - Patient to complete lower body dressing at distant supervision with use of AE prn  Outcome: Progressing     Problem: Dressing Upper Extremities  Goal: STG - Patient will dress upper body at mod I  Outcome: Progressing     Problem: Grooming  Goal: STG - Patient completes grooming at mod I  Outcome: Progressing     Problem: Mobility  Goal: Pt will perform functional mobility household distances at distant supervision with use of RW  Outcome: Progressing     Problem: Transfers  Goal: pt will perform functional transfers at distant supervision with use of DME prn  Outcome: Progressing  Goal: Pt will perform BUE exercises at mod I to improve strength and independence with functional tranfers and ADL tasks   Outcome: Progressing     Problem: Bathing  Goal: STG - Patient will perform UB bathing at mod I; LB bathing at distant supervision with use of AE prn  Outcome: Not Progressing     Problem: Toileting  Goal: STG - Patient will complete toileting tasks at distant supervision  Outcome: Not Progressing

## 2023-10-13 NOTE — PROGRESS NOTES
Speech-Language Pathology    SLP ADULT Inpatient Speech-Language Cognition    Patient Name: Darrell Arreola  MRN: [id number]  Today's Date: 10/13/2023   Time Calculation  Start Time: 1452  Stop Time: 1519  Time Calculation (min): 27 min         Current Problem:   Patient Active Problem List   Diagnosis    Cellulitis    Contusion of thigh    Non-traumatic rhabdomyolysis    Atrial fibrillation (CMS/HCC)    Diabetes mellitus (CMS/HCC)    PVD (peripheral vascular disease) (CMS/HCC)    Benign essential hypertension    Depression    Asthenia    At risk for falls    Coronary artery disease    Obesity    Stroke-like symptoms    Diabetes mellitus type 2 in obese (CMS/HCC)    History of cerebrovascular accident         SLP Assessment:  SLP Assessment  SLP TX Intervention Outcome: Making Progress Towards Goals  SLP Assessment Results: Expression deficits, Memory deficits, Problem solving deficits  Prognosis: Good  Treatment Provided: Yes                                                                                                         Treatment Tolerance: Patient tolerated treatment well  Medical Staff Made Aware: Yes  Barriers: Cognition  Education Provided: Yes      SLP Plan:  Plan  Inpatient/Swing Bed or Outpatient: Inpatient  Treatment/Interventions: Cognitive communication functioning  SLP TX Plan: Continue Plan of Care  SLP Plan: Skilled SLP  SLP Frequency: 2x per week  Duration: Current admission  SLP Discharge Recommendations: Continue skilled SLP services at the next level of care  Next Treatment Priority: cognitive tx  Discussed POC: Patient  Discussed Risks/Benefits: Patient, Nursing  Patient/Caregiver Agreeable: Yes    GOALS:  Complete orientation tasls with 90% acc  2.   Complete STM tasks with 90% acc  3. . Complete problem solving tasks with 90% acc    Subjective   Pt alert, cooperative, seated on EOB                   General Visit Information:  General Information  Reason for Referral: assess swallow per  CVA protocol, pt passed NSS per RN, edentulous, unsure of diet order  Past Medical History Relevant to Rehab: prior CVA  Prior to Session Communication:  (sree GARCIA)      Objective   Seee cognitive/linguistic section below     Pain:  Pain Assessment  Pain Assessment: 0-10  Pain Score: 0 - No pain      Cognition:      Cognitive Linguistics:  Cognitive Linguistics Interventions: Orientation, Short term memory  Cognitive Linguistics Comments: oriented to self, place, month and year without cues, with v/c used whiteboard to tell SLP date, able to recall morning events via yes/no questions with 80% and able provide details with 70% acc. Problem  solving/safety awareness: pt did not use walker when he ambulated to the BR, (when SLP entered room pt in BR) RN aware and assisted pt to EOB. Pt was able to indicate reason for using walker, just didn't want to use it, was able to verbally provide reason for using walker, and stated he would use walker next time he used the BR                                                        Inpatient:  Education Documentation  Pt educated on  continuing with cogntive tx. Pt verbalizes agreement

## 2023-10-13 NOTE — PROGRESS NOTES
Darrell Tucker is a 78 y.o. male on day 5 of admission presenting with Stroke-like symptoms.      Subjective   Patient seen and examined.  Is alert and oriented x3.  Denies pain, denies shortness of breath.  No acute distress.  Awaiting acceptance to skilled nursing facility..       Objective     Last Recorded Vitals  BP 97/64 (BP Location: Right arm, Patient Position: Lying)   Pulse 64   Temp 36.3 °C (97.3 °F) (Temporal)   Resp 15   Wt 102 kg (225 lb 8.5 oz)   SpO2 100%   Intake/Output last 3 Shifts:    Intake/Output Summary (Last 24 hours) at 10/13/2023 1314  Last data filed at 10/13/2023 0500  Gross per 24 hour   Intake 1020 ml   Output --   Net 1020 ml         Admission Weight  Weight: 104 kg (229 lb 4.5 oz) (10/08/23 1309)    Daily Weight  10/11/23 : 102 kg (225 lb 8.5 oz)    Image Results  Electrocardiogram, 12-lead PRN ACS symptoms  Atrial fibrillation with rapid ventricular response with premature ventricular or aberrantly conducted complexes  Nondiagnostic inferior Qwaves , age undetermined  ST & T wave abnormality, consider lateral ischemia  Abnormal ECG    Confirmed by Harpreet Freeman ([id number]) on 10/10/2023 10:35:40 PM  Transthoracic Echo (TTE) [place] [address]             Phone [phone]    TRANSTHORACIC ECHOCARDIOGRAM REPORT       Patient Name:      DARRELL TUCKER     Reading Physician:    [id number] Simeon Lucas MD  Study Date:        10/10/2023          Ordering Provider:    [id number] COLEMAN  MRN/PID:           [id number]            Fellow:  Accession#:        [id number]        Nurse:  Date of Birth/Age: 1945 / 78 years Sonographer:          Dorene RIVERA  Gender:            M                   Additional Staff:  Height:            185.00 cm           Admit Date:            10/8/2023  Weight:            102.00 kg           Admission Status:     Inpatient - Routine  BSA:               2.26 m2             Department Location:  Blood Pressure: 100 /72 mmHg    Study Type:    TRANSTHORACIC ECHO (TTE) COMPLETE  Diagnosis/ICD: Cerebral Infarction, unspecified-I63.9; Paroxysmal atrial                 fibrillation-I48.0  Indication:    Stroke like symptoms history of cerebrovascular accident                 paroxysmal atrial fibrillation  CPT Codes:     Echo Complete w Full [id number]    Patient History:  Pertinent History: CHF CAD Depression DMII HTN PVD.    Study Detail: The following Echo studies were performed: 2D, M-Mode, Doppler and                color flow. Technically challenging study due to body habitus,                patient lying in supine position, poor acoustic windows, the                patient's lack of cooperation and prominent lung artifact. Unable                to obtain subcostal view.       PHYSICIAN INTERPRETATION:  Left Ventricle: Left ventricular systolic function is severely decreased, with an estimated ejection fraction of 20-25%. There is global hypokinesis of the left ventricle with minor regional variations. The left ventricular cavity size is mild to moderately dilated. Spectral Doppler shows a pseudonormal pattern of left ventricular diastolic filling.  Left Atrium: The left atrium is moderate to severely dilated.  Right Ventricle: The right ventricle is normal in size. There is normal right ventricular global systolic function.  Right Atrium: The right atrium is mildly dilated.  Aortic Valve: The aortic valve is trileaflet. There is no evidence of aortic valve regurgitation. The peak instantaneous gradient of the aortic valve is 5.4 mmHg.  Mitral Valve: The mitral valve is mildly thickened. There is moderate mitral valve regurgitation which is posteriorly directed.  Tricuspid Valve: The tricuspid valve is structurally normal. There is mild tricuspid  regurgitation. The Doppler estimated RVSP is within normal limits at 27.0 mmHg.  Pulmonic Valve: The pulmonic valve is not well visualized. There is no indication of pulmonic valve regurgitation.  Pericardium: There is no pericardial effusion noted.  Aorta: The aortic root is normal. There is mild dilatation of the aortic root.       CONCLUSIONS:   1. Left ventricular systolic function is severely decreased with a 20-25% estimated ejection fraction.   2. Spectral Doppler shows a pseudonormal pattern of left ventricular diastolic filling.   3. The left atrium is moderate to severely dilated.   4. Moderate mitral valve regurgitation.   5. RVSP within normal limits.   6. There is global hypokinesis of the left ventricle with minor regional variations.    QUANTITATIVE DATA SUMMARY:  2D MEASUREMENTS:               Normal Ranges:  LAs: 5.90 cm (2.7-4.0cm)    LA VOLUME:                                Normal Ranges:  LA Vol A2C:        158.6 ml  LA Vol Index A2C:  70.2 ml/m2  LA Area A2C:       38.0 cm2  LA Major Axis A2C: 7.7 cm  LA Volume Index:   69.9 ml/m2  LA Vol A2C:        144.0 ml    RA VOLUME BY A/L METHOD:                                Normal Ranges:  RA Vol A4C:        128.0 ml   (8.3-19.5ml)  RA Vol Index A4C:  56.7 ml/m2  RA Area A4C:       33.5 cm2  RA Major Axis A4C: 7.4 cm    M-MODE MEASUREMENTS:                   Normal Ranges:  Ao Root: 3.60 cm (2.0-3.7cm)  AoV Exc: 2.50 cm (1.5-2.5cm)  LAs:     5.50 cm (2.7-4.0cm)    AORTA MEASUREMENTS:                     Normal Ranges:  AoV Exc:   2.50 cm (1.5-2.5cm)  Asc Ao, d: 3.70 cm (2.1-3.4cm)    LV SYSTOLIC FUNCTION BY 2D PLANIMETRY (MOD):                      Normal Ranges:  EF-A4C View: 25.0 % (>=55%)  EF-A2C View: 24.2 %  EF-Biplane:  22.5 %    LV DIASTOLIC FUNCTION:                         Normal Ranges:  MV Peak E:    1.12 m/s (0.7-1.2 m/s)  MV e'         0.05 m/s (>8.0)  MV lateral e' 0.06 m/s  MV medial e'  0.05 m/s  E/e' Ratio:   20.59    (<8.0)    MITRAL  VALVE:                       Normal Ranges:  MV Vmax:    1.18 m/s (<=1.3m/s)  MV peak P.6 mmHg (<5mmHg)  MV mean P.5 mmHg (<48mmHg)  MV DT:      138 msec (150-240msec)    MITRAL INSUFFICIENCY:                       Normal Ranges:  MR VTI:  127.00 cm  MR Vmax: 396.00 cm/s    AORTIC VALVE:                          Normal Ranges:  AoV Vmax:      1.16 m/s (<=1.7m/s)  AoV Peak P.4 mmHg (<20mmHg)  LVOT Max Gt:  0.98 m/s (<=1.1m/s)  LVOT VTI:      15.20 cm  LVOT Diameter: 2.60 cm  (1.8-2.4cm)  AoV Area,Vmax: 4.48 cm2 (2.5-4.5cm2)       RIGHT VENTRICLE:  RV s' 0.09 m/s    TRICUSPID VALVE/RVSP:                              Normal Ranges:  Peak TR Velocity: 2.45 m/s  RV Syst Pressure: 27.0 mmHg (< 30mmHg)    PULMONIC VALVE:                          Normal Ranges:  PV Accel Time: 77 msec  (>120ms)  PV Max Gt:    0.9 m/s  (0.6-0.9m/s)  PV Max PG:     3.6 mmHg       [id number] Simeon Lucas MD  Electronically signed on 10/10/2023 at 7:39:33 PM       ** Final **      Physical Exam  Vitals reviewed.   Constitutional:       Appearance: Normal appearance. He is obese.   HENT:      Head: Normocephalic and atraumatic.   Eyes:      Extraocular Movements: Extraocular movements intact.   Cardiovascular:      Rate and Rhythm: Tachycardia present. Rhythm irregular.      Pulses: Normal pulses.   Pulmonary:      Effort: Pulmonary effort is normal.   Abdominal:      General: Abdomen is flat.      Palpations: Abdomen is soft.   Musculoskeletal:      Cervical back: Normal range of motion.      Comments: Generalized weakness   Skin:     General: Skin is warm and dry.      Capillary Refill: Capillary refill takes less than 2 seconds.   Neurological:      Mental Status: He is alert and oriented to person, place, and time. Mental status is at baseline.      Motor: Weakness present.         Relevant Results       Results for orders placed or performed during the hospital encounter of 10/08/23 (from the past 24 hour(s))   POCT GLUCOSE    Result Value Ref Range    POCT Glucose 149 (H) 74 - 99 mg/dL   POCT GLUCOSE   Result Value Ref Range    POCT Glucose 150 (H) 74 - 99 mg/dL   CBC   Result Value Ref Range    WBC 5.0 4.4 - 11.3 x10*3/uL    nRBC 0.0 0.0 - 0.0 /100 WBCs    RBC 3.48 (L) 4.50 - 5.90 x10*6/uL    Hemoglobin 11.6 (L) 13.5 - 17.5 g/dL    Hematocrit 36.9 (L) 41.0 - 52.0 %     (H) 80 - 100 fL    MCH 33.3 26.0 - 34.0 pg    MCHC 31.4 (L) 32.0 - 36.0 g/dL    RDW 15.2 (H) 11.5 - 14.5 %    Platelets 137 (L) 150 - 450 x10*3/uL    MPV 9.9 7.5 - 11.5 fL   NT Pro-BNP   Result Value Ref Range    PROBNP 22,597 (H) 0 - 852 pg/mL   POCT GLUCOSE   Result Value Ref Range    POCT Glucose 137 (H) 74 - 99 mg/dL   POCT GLUCOSE   Result Value Ref Range    POCT Glucose 196 (H) 74 - 99 mg/dL               Assessment/Plan   This patient currently has cardiac telemetry ordered; if you would like to modify or discontinue the telemetry order, click here to go to the orders activity to modify/discontinue the order.    CVA  Suspected, aphasia  admit to SDU  monitor on tele  ASA/statin   Swallow eval  echocardiogram recently at [facility], echo with a EF of 27±5%, severe MR, moderately severe TR, AS; formerly [facility] 7/27   MRI/MRA negative  US carotids  lipid panel ordered  neurostroke assessment  consult neurology, appreciate recommendations  PT/OT eval     A-fib RVR  A fib rate controlled  On Eliquis  Beta-blocker  Cardiology following  We will need to monitor blood pressure closely       Urinary tract infection  UA positive for WBCs and bacteria, ordered culture  IV ceftriaxone  Follow cultures     Diabetes mellitus  Sliding scale insulin  Monitor blood glucose  Hypoglycemia protocol  Hemoglobin A1c 5.3     Hypertension  Hold antihypertensives for now  Permissive hypertension  As needed labetalol and Apresoline for systolic greater than 220     CAD  Aspirin/statin     PAD  As above     Plan  Admit to stepdown unit for observation  Monitor on telemetry  Stroke  work-up  Cardizem drip has been discontinued.  Heart rate controlled  Monitor blood pressure  Consult neurology and cardiology, appreciate recommendations  Okay to resume Eliquis per neurology  CBC and BMP in the morning    Awaiting discharge planning with care coordination and family.  I spent over 35 minutes in the care and management of this patient.    10/12/2023: Anticipate discharge to SNF 24 to 48 hours  10/13/2023: No acute events overnight.  Is awaiting acceptance to skilled nursing facility.          Principal Problem:    Stroke-like symptoms        Amy Moss, APRN-CNP

## 2023-10-13 NOTE — NURSING NOTE
Order review end of shift completed.  Pt had a very restless night  He was up and down all night peeing, pt is refusing to use urinal so we measure his output. I had placed a hat in the toilet and he took it out.

## 2023-10-13 NOTE — PROGRESS NOTES
Subjective Data:  Patient denies any symptoms of chest discomfort or shortness of breath.  Eating in the bed and eating his dinner and he still remains in atrial fibrillation with ventricular to 101 104 bpm on telemetry.    Overnight Events:         Objective Data:  Last Recorded Vitals:  Vitals:    10/13/23 0318 10/13/23 0802 10/13/23 1137 10/13/23 1707   BP: 115/79 129/66 97/64 100/53   BP Location: Left arm Left arm Right arm Left arm   Patient Position: Sitting Lying Lying Lying   Pulse: 97 86 64 83   Resp: 17 16 15 16   Temp: 36.1 °C (97 °F) 36.1 °C (97 °F) 36.3 °C (97.3 °F) 36.4 °C (97.5 °F)   TempSrc: Temporal Temporal Temporal Temporal   SpO2: 100% 98% 100% 98%   Weight:       Height:           Last Labs:  CBC - 10/13/2023:  5:05 AM  5.0 11.6 137    36.9      CMP - 10/10/2023:  8:27 AM  8.8 7.5 24 --- 2.1   _ 4.0 13 159      PTT - 7/1/2023:  6:04 AM  1.3   13.0 34.7     HGBA1C   Date/Time Value Ref Range Status   10/10/2023 11:42 AM 5.3 See below % Final   07/02/2023 04:38 AM 5.6 4.0 - 6.0 % Final     Comment:     Hemoglobin A1C levels are related to mean blood glucose during the   preceding 2-3 months. The relationship table below may be used as a   general guide. Each 1% increase in HGB A1C is a reflection of an   increase in mean glucose of approximately 30 mg/dl.   Reference: Diabetes Care, volume 29, supplement 1 Jan. 2006                        HGB A1C ................. Approx. Mean Glucose   _______________________________________________   6%   ...............................  120 mg/dl   7%   ...............................  150 mg/dl   8%   ...............................  180 mg/dl   9%   ...............................  210 mg/dl   10%  ...............................  240 mg/dl  Performed at [address] [address]     05/16/2019 01:52 PM 7.8 4.0 - 6.0 % Final     Comment:     Hemoglobin A1C levels are related to mean blood glucose during the   preceding 2-3 months. The  "relationship table below may be used as a   general guide. Each 1% increase in HGB A1C is a reflection of an   increase in mean glucose of approximately 30 mg/dl.   Reference: Diabetes Care, volume 29, supplement 1 Jan. 2006                        HGB A1C ................. Approx. Mean Glucose   _______________________________________________   6%   ...............................  120 mg/dl   7%   ...............................  150 mg/dl   8%   ...............................  180 mg/dl   9%   ...............................  210 mg/dl   10%  ...............................  240 mg/dl  Performed at [address] [address]       LDLCALC   Date/Time Value Ref Range Status   07/02/2023 04:38 AM 23 65 - 130 MG/DL Final   05/17/2019 06:01 AM 42 65 - 130 MG/DL Final      Last I/O:  I/O last 3 completed shifts:  In: 1620 (15.8 mL/kg) [P.O.:1620]  Out: - (0 mL/kg)   Weight: 102.3 kg     Past Cardiology Tests (Last 3 Years):  EKG:  Electrocardiogram, 12-lead PRN ACS symptoms 10/10/2023    Echo:  Transthoracic Echo (TTE) Complete 10/10/2023    Ejection Fractions:  No results found for: \"EF\"  Cath:  No results found for this or any previous visit from the past 1095 days.    Stress Test:  No results found for this or any previous visit from the past 1095 days.    Cardiac Imaging:  No results found for this or any previous visit from the past 1095 days.      Inpatient Medications:  Scheduled medications   Medication Dose Route Frequency    apixaban  5 mg oral q12h    aspirin  81 mg oral Daily    Or    aspirin  81 mg oral Daily    Or    aspirin  81 mg nasogastric tube Daily    Or    aspirin  300 mg rectal Daily    cefTRIAXone  1 g intravenous q24h    famotidine  20 mg oral Daily    folic acid  1 mg oral Daily    insulin lispro  0-10 Units subcutaneous TID with meals    metoprolol succinate XL  50 mg oral BID    rosuvastatin  10 mg oral Daily    tamsulosin  0.4 mg oral Daily before breakfast    torsemide  " 40 mg oral BID with meals     PRN medications   Medication    dextrose 10 % in water (D10W)    dextrose    glucagon    hydrALAZINE    oxygen     Continuous Medications   Medication Dose Last Rate       Physical Exam:  HEENT PRL neck is supple lungs clear to auscultation bilaterally heart S1-S2 present no murmurs abdomen soft nontender extremity shows evidence of 1+ edema in both lower extremities.     Assessment/Plan   #1 acute on chronic LV systolic dysfunction.  His proBNP significant elevated.  Initially there diets have been hold secondary to prerenal azotemia.  But his renal function is back to baseline.  I will start the patient on torsemide 40 mg twice daily for 3 doses and thereafter resume the patient on torsemide 40 mg daily.  Dr. Griffin will be covering for me over the weekend.  We will start patient on small dose of Entresto 24/26 mg half tablet twice a day and see how he tolerates it  2.  Hypertension currently well current well controlled  3.  Atrial fibrillation with rapid ventricular response patient remains on long-acting metoprolol succinate 50 mg twice a day.  I cannot increase the dose blood pressure is quite soft.  We will continue current dose of Metropol succinate and hopefully once patient is diuresed his heart rate will is nicely improved.  The patient has no symptoms of angina or anginal equivalent this time.  [facility] and [facility]-CERT to be transferred to rehab facility.  He has not had basic metabolic panel done since the ninth of this month we will request BMP to be done tomorrow   Site Assessment Clean;Dry;Intact 10/13/23 1600   Dressing Status Clean;Dry 10/13/23 1600   Number of days: 1       Code Status:  Full Code    I spent 35 minutes in the professional and overall care of this patient.        Simeon Lucas MD

## 2023-10-13 NOTE — PROGRESS NOTES
Occupational Therapy    OT Treatment    Patient Name: Darrell Arreola  MRN: [id number]  Today's Date: 10/13/2023  Time Calculation  Start Time: 0930  Stop Time: 0953  Time Calculation (min): 23 min         Assessment:        Plan:          Subjective   General:  OT Received On: 10/13/23  Prior to Session Communication: Bedside nurse  Patient Position Received: Up in chair  General Comment: Pt cleared by nursing to be seen for therapy. Pt sitting up in chair and agreeable for treatment with encouragement to participate fully with functional transfers/dressing tasks  Vital Signs:     Pain:  Pain Assessment  Pain Assessment: 0-10  Pain Score: 0 - No pain    Objective    Activities of Daily Living: Grooming  Grooming Level of Assistance: Close supervision  Grooming Where Assessed: Standing sinkside  Grooming Comments: to use mouthwash and wash hands/face    UE Dressing  UE Dressing Level of Assistance: Setup  UE Dressing Where Assessed: Chair  UE Dressing Comments: to doff/don gown    LE Dressing  LE Dressing: Yes  Pants Level of Assistance: Close supervision  Sock Level of Assistance: Minimum assistance  LE Dressing Where Assessed: Chair  LE Dressing Comments: to doff/don two pairs of pants secondary to the first one being too tight    Toileting  Toileting Comments: attempted and pt declined  Functional Standing Tolerance:     Bed Mobility/Transfers: Transfers  Transfer: No (Pt declined to complete functional transfers)  Transfer 1  Transfer From 1: Chair with arms to  Transfer to 1: Stand  Transfer Device 1: Walker  Transfer Level of Assistance 1: Close supervision  Trials/Comments 1: Pt transferred from chair to bathroom sink and back to chair with 1 verbal cue to maintain wide ALDAIR      Outcome Measures: [facility] Daily Activity  Putting on and taking off regular lower body clothing: None  Bathing (including washing, rinsing, drying): A little  Putting on and taking off regular upper body clothing: None  Toileting, which  includes using toilet, bedpan or urinal: A little  Taking care of personal grooming such as brushing teeth: A little  Eating Meals: None  Daily Activity - Total Score: 21        Education Documentation  No documentation found.  Education Comments  No comments found.        OP EDUCATION:       Goals:      Problem: Dressings Lower Extremities  Goal: STG - Patient to complete lower body dressing at distant supervision with use of AE prn  Outcome: Progressing     Problem: Dressing Upper Extremities  Goal: STG - Patient will dress upper body at mod I  Outcome: Progressing     Problem: Grooming  Goal: STG - Patient completes grooming at mod I  Outcome: Progressing     Problem: Mobility  Goal: Pt will perform functional mobility household distances at distant supervision with use of RW  Outcome: Progressing     Problem: Transfers  Goal: pt will perform functional transfers at distant supervision with use of DME prn  Outcome: Progressing  Goal: Pt will perform BUE exercises at mod I to improve strength and independence with functional tranfers and ADL tasks   Outcome: Progressing     Problem: Bathing  Goal: STG - Patient will perform UB bathing at mod I; LB bathing at distant supervision with use of AE prn  Outcome: Not Progressing     Problem: Toileting  Goal: STG - Patient will complete toileting tasks at distant supervision  Outcome: Not Progressing

## 2023-10-13 NOTE — PROGRESS NOTES
Physical Therapy    Physical Therapy Treatment    Patient Name: Darrell Arreola  MRN: [id number]  Today's Date: 10/13/2023  Time Calculation  Start Time: 1025  Stop Time: 1034  Time Calculation (min): 9 min       Assessment/Plan   PT Assessment  PT Assessment Results: Decreased strength, Decreased endurance, Impaired balance, Decreased mobility, Decreased coordination, Decreased safety awareness  Rehab Prognosis: Good  Evaluation/Treatment Tolerance: Other (Comment) (Elevated heart rate during mobility (155 bpm);  RN nontified)  Medical Staff Made Aware: Yes  End of Session Communication: Bedside nurse  Assessment Comment: Poor tolerance to activity, significant fatigue noted, poor ability to arouse, minimal eye opening with cues, pt remains a high fall risk at this time.  End of Session Patient Position: Up in chair  PT Plan  Inpatient/Swing Bed or Outpatient: Inpatient  PT Plan  Treatment/Interventions: Bed mobility, Transfer training, Gait training, Balance training, Strengthening, Endurance training, Therapeutic exercise, Therapeutic activity  PT Plan: Skilled PT  PT Frequency: 4 times per week  PT Discharge Recommendations: Moderate intensity level of continued care  PT Recommended Transfer Status: Assist x1      General Visit Information:   PT  Visit  PT Received On: 10/13/23  General  Prior to Session Communication: Bedside nurse  Patient Position Received: Up in chair  General Comment: Cleared by nursing to be seen for therapy, pt seated in chair sleeping upon arrival. Difficult to arouse, poor tolerance to activity.    Subjective   Precautions:  Precautions  Hearing/Visual Limitations: glasses  Medical Precautions: Fall precautions  Vital Signs:       Objective   Pain:  Pain Assessment  Pain Assessment: 0-10  Pain Score: 0 - No pain  Cognition:     Postural Control:     Activity Tolerance:  Activity Tolerance  Endurance: Tolerates less than 10 min exercise, no significant change in vital  signs  Treatments:  Therapeutic Exercise  Therapeutic Exercise Performed: Yes  Therapeutic Exercise Activity 1: AAROM bilateral ankle pumps to tolerance  Therapeutic Exercise Activity 2: AAROM bilateral knee extension to tolerance  Therapeutic Exercise Activity 3: AAROM bilateral hip flexion to tolerance  Therapeutic Exercise Activity 4: Resisted hip abd/add x15    Balance/Neuromuscular Re-Education  Balance/Neuromuscular Re-Education Activity Performed: No    Bed Mobility  Bed Mobility: No  Bed Mobility 1    Ambulation/Gait Training  Ambulation/Gait Training Performed: No    Stairs  Stairs: No    Outcome Measures:  [facility] Basic Mobility  Turning from your back to your side while in a flat bed without using bedrails: A little  Moving from lying on your back to sitting on the side of a flat bed without using bedrails: A little  Moving to and from bed to chair (including a wheelchair): A little  Standing up from a chair using your arms (e.g. wheelchair or bedside chair): A little  To walk in hospital room: A little  Climbing 3-5 steps with railing: A lot  Basic Mobility - Total Score: 17    Education Documentation  No documentation found.  Education Comments  No comments found.        OP EDUCATION:  Education  Individual(s) Educated: Patient  Education Provided: Fall Risk  Education Comment: Poor ability to educate due to patient significantly lethargic.    Encounter Problems       Encounter Problems (Active)       Mobility       Pt will perform functional mobility household distances at distant supervision with use of RW (Not Progressing)       Start:  10/09/23    Expected End:  10/23/23               PT Problem       Patient will ambulate 50 distance using walker with supervision (Progressing)       Start:  10/09/23    Expected End:  10/23/23         Goal Note       Patient will ambulate 50 distance using walker with supervision              Patient will perform chair to and from bed transfer with supervision  (Progressing)       Start:  10/09/23    Expected End:  10/23/23         Goal Note       Patient will perform chair to and from bed transfer with supervision              Patient will perform supine to sit on bed with supervision demonstrating control (Progressing)       Start:  10/09/23    Expected End:  10/23/23         Goal Note       Patient will perform supine to sit on bed with supervision demonstrating control              Patient will perform sit to supine on bed with supervision demonstrating control (Progressing)       Start:  10/09/23    Expected End:  10/23/23         Goal Note       Patient will perform sit to supine on bed with supervision demonstrating control                 Transfers       pt will perform functional transfers at distant supervision with use of DME prn (Not Progressing)       Start:  10/09/23    Expected End:  10/23/23            Pt will perform BUE exercises at mod I to improve strength and independence with functional tranfers and ADL tasks  (Not Progressing)       Start:  10/09/23    Expected End:  10/23/23

## 2023-10-13 NOTE — PROGRESS NOTES
PAOLA called [facility]argas [phone] requesting update regarding [facility] approval for pt to go to [facility], message left with this workers phone number.   [facility] approval pending as of 12:41 today.    12:54 - Alicia returned call, stated approval still pending.    14:29 - Alicia again called, stated she is reaching out to the person that approves for update, also requested information regarding transportation as she states she will set up transport. [facility] advised pt can dc via ambulette. [facility] Approval still pending.

## 2023-10-13 NOTE — CARE PLAN
The patient's goals for the shift include      The clinical goals for the shift include rest    Over the shift, the patient did not make progress toward the following goals. Barriers to progression include N/A pt progressing. Recommendations to address these barriers include rest.

## 2023-10-14 LAB
ANION GAP SERPL CALC-SCNC: 11 MMOL/L
BUN SERPL-MCNC: 25 MG/DL (ref 8–25)
CALCIUM SERPL-MCNC: 9 MG/DL (ref 8.5–10.4)
CHLORIDE SERPL-SCNC: 99 MMOL/L (ref 97–107)
CO2 SERPL-SCNC: 33 MMOL/L (ref 24–31)
CREAT SERPL-MCNC: 1.6 MG/DL (ref 0.4–1.6)
ERYTHROCYTE [DISTWIDTH] IN BLOOD BY AUTOMATED COUNT: 15.5 % (ref 11.5–14.5)
GFR SERPL CREATININE-BSD FRML MDRD: 44 ML/MIN/1.73M*2
GLUCOSE BLD MANUAL STRIP-MCNC: 119 MG/DL (ref 74–99)
GLUCOSE BLD MANUAL STRIP-MCNC: 175 MG/DL (ref 74–99)
GLUCOSE BLD MANUAL STRIP-MCNC: 204 MG/DL (ref 74–99)
GLUCOSE BLD MANUAL STRIP-MCNC: 212 MG/DL (ref 74–99)
GLUCOSE SERPL-MCNC: 127 MG/DL (ref 65–99)
HCT VFR BLD AUTO: 39.9 % (ref 41–52)
HGB BLD-MCNC: 13.1 G/DL (ref 13.5–17.5)
MCH RBC QN AUTO: 34.4 PG (ref 26–34)
MCHC RBC AUTO-ENTMCNC: 32.8 G/DL (ref 32–36)
MCV RBC AUTO: 105 FL (ref 80–100)
NRBC BLD-RTO: 0 /100 WBCS (ref 0–0)
PLATELET # BLD AUTO: 143 X10*3/UL (ref 150–450)
PMV BLD AUTO: 9.7 FL (ref 7.5–11.5)
POTASSIUM SERPL-SCNC: 3.6 MMOL/L (ref 3.4–5.1)
RBC # BLD AUTO: 3.81 X10*6/UL (ref 4.5–5.9)
SODIUM SERPL-SCNC: 143 MMOL/L (ref 133–145)
WBC # BLD AUTO: 5.4 X10*3/UL (ref 4.4–11.3)

## 2023-10-14 PROCEDURE — 82947 ASSAY GLUCOSE BLOOD QUANT: CPT

## 2023-10-14 PROCEDURE — 96372 THER/PROPH/DIAG INJ SC/IM: CPT | Performed by: INTERNAL MEDICINE

## 2023-10-14 PROCEDURE — 85027 COMPLETE CBC AUTOMATED: CPT

## 2023-10-14 PROCEDURE — 2500000004 HC RX 250 GENERAL PHARMACY W/ HCPCS (ALT 636 FOR OP/ED): Performed by: NURSE PRACTITIONER

## 2023-10-14 PROCEDURE — 2060000001 HC INTERMEDIATE ICU ROOM DAILY

## 2023-10-14 PROCEDURE — 80048 BASIC METABOLIC PNL TOTAL CA: CPT

## 2023-10-14 PROCEDURE — 2500000002 HC RX 250 W HCPCS SELF ADMINISTERED DRUGS (ALT 637 FOR MEDICARE OP, ALT 636 FOR OP/ED): Performed by: INTERNAL MEDICINE

## 2023-10-14 PROCEDURE — 2500000001 HC RX 250 WO HCPCS SELF ADMINISTERED DRUGS (ALT 637 FOR MEDICARE OP): Performed by: NURSE PRACTITIONER

## 2023-10-14 PROCEDURE — 36415 COLL VENOUS BLD VENIPUNCTURE: CPT

## 2023-10-14 PROCEDURE — 2500000001 HC RX 250 WO HCPCS SELF ADMINISTERED DRUGS (ALT 637 FOR MEDICARE OP): Performed by: INTERNAL MEDICINE

## 2023-10-14 RX ADMIN — FOLIC ACID 1 MG: 1 TABLET ORAL at 10:09

## 2023-10-14 RX ADMIN — TORSEMIDE 40 MG: 20 TABLET ORAL at 18:10

## 2023-10-14 RX ADMIN — ASPIRIN 81 MG: 81 TABLET, COATED ORAL at 10:09

## 2023-10-14 RX ADMIN — APIXABAN 5 MG: 5 TABLET, FILM COATED ORAL at 06:42

## 2023-10-14 RX ADMIN — FAMOTIDINE 20 MG: 20 TABLET ORAL at 10:09

## 2023-10-14 RX ADMIN — SACUBITRIL AND VALSARTAN 0.5 TABLET: 24; 26 TABLET, FILM COATED ORAL at 10:09

## 2023-10-14 RX ADMIN — INSULIN LISPRO 2 UNITS: 100 INJECTION, SOLUTION INTRAVENOUS; SUBCUTANEOUS at 18:10

## 2023-10-14 RX ADMIN — TAMSULOSIN HYDROCHLORIDE 0.4 MG: 0.4 CAPSULE ORAL at 06:42

## 2023-10-14 RX ADMIN — ROSUVASTATIN CALCIUM 10 MG: 10 TABLET, COATED ORAL at 10:09

## 2023-10-14 RX ADMIN — INSULIN LISPRO 4 UNITS: 100 INJECTION, SOLUTION INTRAVENOUS; SUBCUTANEOUS at 13:37

## 2023-10-14 RX ADMIN — APIXABAN 5 MG: 5 TABLET, FILM COATED ORAL at 18:10

## 2023-10-14 ASSESSMENT — COGNITIVE AND FUNCTIONAL STATUS - GENERAL
DAILY ACTIVITIY SCORE: 24
WALKING IN HOSPITAL ROOM: A LITTLE
CLIMB 3 TO 5 STEPS WITH RAILING: A LITTLE
PERSONAL GROOMING: A LITTLE
CLIMB 3 TO 5 STEPS WITH RAILING: A LITTLE
MOBILITY SCORE: 22
HELP NEEDED FOR BATHING: A LITTLE
DAILY ACTIVITIY SCORE: 22
MOBILITY SCORE: 23
DAILY ACTIVITIY SCORE: 24
MOBILITY SCORE: 24

## 2023-10-14 ASSESSMENT — PAIN - FUNCTIONAL ASSESSMENT
PAIN_FUNCTIONAL_ASSESSMENT: 0-10

## 2023-10-14 ASSESSMENT — PAIN SCALES - GENERAL
PAINLEVEL_OUTOF10: 0 - NO PAIN

## 2023-10-14 NOTE — NURSING NOTE
Assumed care of patient.  Resting quietly in bed with eyes closed.  Respirations even and unlabored on RA.  HOB elevated.  Call light and commonly used items in reach.  Bed locked and in low position.

## 2023-10-14 NOTE — CARE PLAN
Problem: Fall/Injury  Goal: Not fall by end of shift  Outcome: Progressing  Goal: Be free from injury by end of the shift  Outcome: Progressing  Goal: Verbalize understanding of personal risk factors for fall in the hospital  Outcome: Progressing  Goal: Verbalize understanding of risk factor reduction measures to prevent injury from fall in the home  Outcome: Progressing  Goal: Use assistive devices by end of the shift  Outcome: Progressing  Goal: Pace activities to prevent fatigue by end of the shift  Outcome: Progressing   The patient's goals for the shift include      The clinical goals for the shift include rest    Over the shift, the patient did not make progress toward the following goals. Barriers to progression include n/a. Recommendations to address these barriers include n/a.

## 2023-10-14 NOTE — NURSING NOTE
Patient ambulating to bathroom.  Nurse assisted with bathing and linen change.  Patient awake and alert, pleasant mood.  Offers no complaints of pain/discomfort.  Skin assessment completed and new blister observed to L shin.  Intact fluid filled blister.  Torsemide and PO metoprolol held this AM d/t low BP.  Call light in reach.  Walker at bedside.

## 2023-10-14 NOTE — PROGRESS NOTES
Darrell Tucker is a 78 y.o. male on day 6 of admission presenting with Stroke-like symptoms.      Subjective   Patient seen and examined.  Is alert and oriented x3.  He denies acute events overnight.  Denies pain or shortness of breath.  He is awaiting precertification to skilled nursing facility.       Objective     Last Recorded Vitals  BP 97/73 (BP Location: Left arm, Patient Position: Lying)   Pulse 90   Temp 35.9 °C (96.6 °F) (Tympanic)   Resp 18   Wt 98.1 kg (216 lb 4.3 oz)   SpO2 100%   Intake/Output last 3 Shifts:    Intake/Output Summary (Last 24 hours) at 10/14/2023 1046  Last data filed at 10/13/2023 1618  Gross per 24 hour   Intake 100 ml   Output --   Net 100 ml         Admission Weight  Weight: 104 kg (229 lb 4.5 oz) (10/08/23 1309)    Daily Weight  10/14/23 : 98.1 kg (216 lb 4.3 oz)    Image Results  Electrocardiogram, 12-lead PRN ACS symptoms  Atrial fibrillation with rapid ventricular response with premature ventricular or aberrantly conducted complexes  Nondiagnostic inferior Qwaves , age undetermined  ST & T wave abnormality, consider lateral ischemia  Abnormal ECG    Confirmed by Harpreet Freeman ([id number]) on 10/10/2023 10:35:40 PM  Transthoracic Echo (TTE) [place] [address]             Phone [phone]    TRANSTHORACIC ECHOCARDIOGRAM REPORT       Patient Name:      DARRELL TUCKER     Reading Physician:    [id number] Simeon Lucas MD  Study Date:        10/10/2023          Ordering Provider:    [id number]  MRN/PID:           [id number]            Fellow:  Accession#:        [id number]        Nurse:  Date of Birth/Age: 1945 / 78 years Sonographer:          Dorene RIVERA  Gender:            M                   Additional Staff:  Height:            185.00 cm           Admit Date:            10/8/2023  Weight:            102.00 kg           Admission Status:     Inpatient - Routine  BSA:               2.26 m2             Department Location:  Blood Pressure: 100 /72 mmHg    Study Type:    TRANSTHORACIC ECHO (TTE) COMPLETE  Diagnosis/ICD: Cerebral Infarction, unspecified-I63.9; Paroxysmal atrial                 fibrillation-I48.0  Indication:    Stroke like symptoms history of cerebrovascular accident                 paroxysmal atrial fibrillation  CPT Codes:     Echo Complete w Full [id number]    Patient History:  Pertinent History: CHF CAD Depression DMII HTN PVD.    Study Detail: The following Echo studies were performed: 2D, M-Mode, Doppler and                color flow. Technically challenging study due to body habitus,                patient lying in supine position, poor acoustic windows, the                patient's lack of cooperation and prominent lung artifact. Unable                to obtain subcostal view.       PHYSICIAN INTERPRETATION:  Left Ventricle: Left ventricular systolic function is severely decreased, with an estimated ejection fraction of 20-25%. There is global hypokinesis of the left ventricle with minor regional variations. The left ventricular cavity size is mild to moderately dilated. Spectral Doppler shows a pseudonormal pattern of left ventricular diastolic filling.  Left Atrium: The left atrium is moderate to severely dilated.  Right Ventricle: The right ventricle is normal in size. There is normal right ventricular global systolic function.  Right Atrium: The right atrium is mildly dilated.  Aortic Valve: The aortic valve is trileaflet. There is no evidence of aortic valve regurgitation. The peak instantaneous gradient of the aortic valve is 5.4 mmHg.  Mitral Valve: The mitral valve is mildly thickened. There is moderate mitral valve regurgitation which is posteriorly directed.  Tricuspid Valve: The tricuspid valve is structurally normal. There is mild tricuspid  regurgitation. The Doppler estimated RVSP is within normal limits at 27.0 mmHg.  Pulmonic Valve: The pulmonic valve is not well visualized. There is no indication of pulmonic valve regurgitation.  Pericardium: There is no pericardial effusion noted.  Aorta: The aortic root is normal. There is mild dilatation of the aortic root.       CONCLUSIONS:   1. Left ventricular systolic function is severely decreased with a 20-25% estimated ejection fraction.   2. Spectral Doppler shows a pseudonormal pattern of left ventricular diastolic filling.   3. The left atrium is moderate to severely dilated.   4. Moderate mitral valve regurgitation.   5. RVSP within normal limits.   6. There is global hypokinesis of the left ventricle with minor regional variations.    QUANTITATIVE DATA SUMMARY:  2D MEASUREMENTS:               Normal Ranges:  LAs: 5.90 cm (2.7-4.0cm)    LA VOLUME:                                Normal Ranges:  LA Vol A2C:        158.6 ml  LA Vol Index A2C:  70.2 ml/m2  LA Area A2C:       38.0 cm2  LA Major Axis A2C: 7.7 cm  LA Volume Index:   69.9 ml/m2  LA Vol A2C:        144.0 ml    RA VOLUME BY A/L METHOD:                                Normal Ranges:  RA Vol A4C:        128.0 ml   (8.3-19.5ml)  RA Vol Index A4C:  56.7 ml/m2  RA Area A4C:       33.5 cm2  RA Major Axis A4C: 7.4 cm    M-MODE MEASUREMENTS:                   Normal Ranges:  Ao Root: 3.60 cm (2.0-3.7cm)  AoV Exc: 2.50 cm (1.5-2.5cm)  LAs:     5.50 cm (2.7-4.0cm)    AORTA MEASUREMENTS:                     Normal Ranges:  AoV Exc:   2.50 cm (1.5-2.5cm)  Asc Ao, d: 3.70 cm (2.1-3.4cm)    LV SYSTOLIC FUNCTION BY 2D PLANIMETRY (MOD):                      Normal Ranges:  EF-A4C View: 25.0 % (>=55%)  EF-A2C View: 24.2 %  EF-Biplane:  22.5 %    LV DIASTOLIC FUNCTION:                         Normal Ranges:  MV Peak E:    1.12 m/s (0.7-1.2 m/s)  MV e'         0.05 m/s (>8.0)  MV lateral e' 0.06 m/s  MV medial e'  0.05 m/s  E/e' Ratio:   20.59    (<8.0)    MITRAL  VALVE:                       Normal Ranges:  MV Vmax:    1.18 m/s (<=1.3m/s)  MV peak P.6 mmHg (<5mmHg)  MV mean P.5 mmHg (<48mmHg)  MV DT:      138 msec (150-240msec)    MITRAL INSUFFICIENCY:                       Normal Ranges:  MR VTI:  127.00 cm  MR Vmax: 396.00 cm/s    AORTIC VALVE:                          Normal Ranges:  AoV Vmax:      1.16 m/s (<=1.7m/s)  AoV Peak P.4 mmHg (<20mmHg)  LVOT Max Gt:  0.98 m/s (<=1.1m/s)  LVOT VTI:      15.20 cm  LVOT Diameter: 2.60 cm  (1.8-2.4cm)  AoV Area,Vmax: 4.48 cm2 (2.5-4.5cm2)       RIGHT VENTRICLE:  RV s' 0.09 m/s    TRICUSPID VALVE/RVSP:                              Normal Ranges:  Peak TR Velocity: 2.45 m/s  RV Syst Pressure: 27.0 mmHg (< 30mmHg)    PULMONIC VALVE:                          Normal Ranges:  PV Accel Time: 77 msec  (>120ms)  PV Max Gt:    0.9 m/s  (0.6-0.9m/s)  PV Max PG:     3.6 mmHg       [id number] Simeon Lucas MD  Electronically signed on 10/10/2023 at 7:39:33 PM       ** Final **      Physical Exam  Vitals reviewed.   Constitutional:       Appearance: Normal appearance. He is obese.   HENT:      Head: Normocephalic and atraumatic.   Eyes:      Extraocular Movements: Extraocular movements intact.   Cardiovascular:      Rate and Rhythm: Tachycardia present. Rhythm irregular.      Pulses: Normal pulses.   Pulmonary:      Effort: Pulmonary effort is normal.   Abdominal:      General: Abdomen is flat.      Palpations: Abdomen is soft.   Musculoskeletal:      Cervical back: Normal range of motion.      Comments: Generalized weakness   Skin:     General: Skin is warm and dry.      Capillary Refill: Capillary refill takes less than 2 seconds.   Neurological:      Mental Status: He is alert and oriented to person, place, and time. Mental status is at baseline.      Motor: Weakness present.         Relevant Results       Results for orders placed or performed during the hospital encounter of 10/08/23 (from the past 24 hour(s))   POCT GLUCOSE    Result Value Ref Range    POCT Glucose 196 (H) 74 - 99 mg/dL   POCT GLUCOSE   Result Value Ref Range    POCT Glucose 159 (H) 74 - 99 mg/dL   POCT GLUCOSE   Result Value Ref Range    POCT Glucose 119 (H) 74 - 99 mg/dL   Basic metabolic panel   Result Value Ref Range    Glucose 127 (H) 65 - 99 mg/dL    Sodium 143 133 - 145 mmol/L    Potassium 3.6 3.4 - 5.1 mmol/L    Chloride 99 97 - 107 mmol/L    Bicarbonate 33 (H) 24 - 31 mmol/L    Urea Nitrogen 25 8 - 25 mg/dL    Creatinine 1.60 0.40 - 1.60 mg/dL    eGFR 44 (L) >60 mL/min/1.73m*2    Calcium 9.0 8.5 - 10.4 mg/dL    Anion Gap 11 <=19 mmol/L   CBC   Result Value Ref Range    WBC 5.4 4.4 - 11.3 x10*3/uL    nRBC 0.0 0.0 - 0.0 /100 WBCs    RBC 3.81 (L) 4.50 - 5.90 x10*6/uL    Hemoglobin 13.1 (L) 13.5 - 17.5 g/dL    Hematocrit 39.9 (L) 41.0 - 52.0 %     (H) 80 - 100 fL    MCH 34.4 (H) 26.0 - 34.0 pg    MCHC 32.8 32.0 - 36.0 g/dL    RDW 15.5 (H) 11.5 - 14.5 %    Platelets 143 (L) 150 - 450 x10*3/uL    MPV 9.7 7.5 - 11.5 fL               Assessment/Plan   This patient currently has cardiac telemetry ordered; if you would like to modify or discontinue the telemetry order, click here to go to the orders activity to modify/discontinue the order.    CVA  Resolved  admit to SDU  monitor on tele  ASA/statin   Swallow eval  echocardiogram recently at [place], echo with a EF of 27±5%, severe MR, moderately severe TR, AS; [facility] 7/27   MRI/MRA negative  US carotids  lipid panel ordered  neurostroke assessment  consult neurology, appreciate recommendations  PT/OT eval     A-fib RVR  Cardiology recommendations appreciated.  Remains on Eliquis for anticoagulation  Cardiology following  We will need to monitor blood pressure closely       Urinary tract infection  Negative culture, IV antibiotics stopped  Follow cultures     Diabetes mellitus  Sliding scale insulin  Monitor blood glucose  Hypoglycemia protocol  Hemoglobin A1c 5.3     Hypertension  Hold antihypertensives for  now  Is on Entresto for heart failure  As needed labetalol and Apresoline for systolic greater than 220    Heart failure  Diuresis per cardiology.  Entresto has been added.  Monitor labs     CAD  Aspirin/statin     PAD  As above         10/14/2023: Is awaiting precertification for skilled nursing facility.  Per care coordinator some challenges with [facility] coverage.            Principal Problem:    Stroke-like symptoms        Amy Moss, APRN-CNP

## 2023-10-14 NOTE — NURSING NOTE
Secure chat to [facility] hospitalist about ordering patient AM labs.  Awaiting response.

## 2023-10-14 NOTE — CARE PLAN
The patient's goals for the shift include      The clinical goals for the shift include rest    Over the shift, the patient did not make progress toward the following goals. Barriers to progression include N/A pt Is progressing. Recommendations to address these barriers include Rest.

## 2023-10-15 LAB
ANION GAP SERPL CALC-SCNC: 11 MMOL/L
BUN SERPL-MCNC: 31 MG/DL (ref 8–25)
CALCIUM SERPL-MCNC: 9.1 MG/DL (ref 8.5–10.4)
CHLORIDE SERPL-SCNC: 104 MMOL/L (ref 97–107)
CO2 SERPL-SCNC: 32 MMOL/L (ref 24–31)
CREAT SERPL-MCNC: 1.9 MG/DL (ref 0.4–1.6)
ERYTHROCYTE [DISTWIDTH] IN BLOOD BY AUTOMATED COUNT: 15 % (ref 11.5–14.5)
GFR SERPL CREATININE-BSD FRML MDRD: 36 ML/MIN/1.73M*2
GLUCOSE BLD MANUAL STRIP-MCNC: 105 MG/DL (ref 74–99)
GLUCOSE BLD MANUAL STRIP-MCNC: 135 MG/DL (ref 74–99)
GLUCOSE BLD MANUAL STRIP-MCNC: 275 MG/DL (ref 74–99)
GLUCOSE BLD MANUAL STRIP-MCNC: 283 MG/DL (ref 74–99)
GLUCOSE SERPL-MCNC: 140 MG/DL (ref 65–99)
HCT VFR BLD AUTO: 43.8 % (ref 41–52)
HGB BLD-MCNC: 14 G/DL (ref 13.5–17.5)
MCH RBC QN AUTO: 33.7 PG (ref 26–34)
MCHC RBC AUTO-ENTMCNC: 32 G/DL (ref 32–36)
MCV RBC AUTO: 105 FL (ref 80–100)
NRBC BLD-RTO: 0 /100 WBCS (ref 0–0)
PLATELET # BLD AUTO: 170 X10*3/UL (ref 150–450)
PMV BLD AUTO: 9.6 FL (ref 7.5–11.5)
POTASSIUM SERPL-SCNC: 4.4 MMOL/L (ref 3.4–5.1)
RBC # BLD AUTO: 4.16 X10*6/UL (ref 4.5–5.9)
SODIUM SERPL-SCNC: 147 MMOL/L (ref 133–145)
WBC # BLD AUTO: 6.2 X10*3/UL (ref 4.4–11.3)

## 2023-10-15 PROCEDURE — 2060000001 HC INTERMEDIATE ICU ROOM DAILY

## 2023-10-15 PROCEDURE — 85027 COMPLETE CBC AUTOMATED: CPT

## 2023-10-15 PROCEDURE — 80048 BASIC METABOLIC PNL TOTAL CA: CPT

## 2023-10-15 PROCEDURE — 96372 THER/PROPH/DIAG INJ SC/IM: CPT | Performed by: INTERNAL MEDICINE

## 2023-10-15 PROCEDURE — 2500000004 HC RX 250 GENERAL PHARMACY W/ HCPCS (ALT 636 FOR OP/ED): Performed by: INTERNAL MEDICINE

## 2023-10-15 PROCEDURE — 2500000001 HC RX 250 WO HCPCS SELF ADMINISTERED DRUGS (ALT 637 FOR MEDICARE OP): Performed by: NURSE PRACTITIONER

## 2023-10-15 PROCEDURE — 2500000004 HC RX 250 GENERAL PHARMACY W/ HCPCS (ALT 636 FOR OP/ED)

## 2023-10-15 PROCEDURE — 36415 COLL VENOUS BLD VENIPUNCTURE: CPT

## 2023-10-15 PROCEDURE — 2500000002 HC RX 250 W HCPCS SELF ADMINISTERED DRUGS (ALT 637 FOR MEDICARE OP, ALT 636 FOR OP/ED): Performed by: INTERNAL MEDICINE

## 2023-10-15 PROCEDURE — 82947 ASSAY GLUCOSE BLOOD QUANT: CPT

## 2023-10-15 PROCEDURE — 99232 SBSQ HOSP IP/OBS MODERATE 35: CPT | Performed by: INTERNAL MEDICINE

## 2023-10-15 PROCEDURE — 2500000004 HC RX 250 GENERAL PHARMACY W/ HCPCS (ALT 636 FOR OP/ED): Performed by: NURSE PRACTITIONER

## 2023-10-15 RX ORDER — SODIUM CHLORIDE 450 MG/100ML
80 INJECTION, SOLUTION INTRAVENOUS CONTINUOUS
Status: ACTIVE | OUTPATIENT
Start: 2023-10-15 | End: 2023-10-16

## 2023-10-15 RX ADMIN — APIXABAN 5 MG: 5 TABLET, FILM COATED ORAL at 05:33

## 2023-10-15 RX ADMIN — INSULIN LISPRO 6 UNITS: 100 INJECTION, SOLUTION INTRAVENOUS; SUBCUTANEOUS at 11:56

## 2023-10-15 RX ADMIN — ROSUVASTATIN CALCIUM 10 MG: 10 TABLET, COATED ORAL at 09:34

## 2023-10-15 RX ADMIN — TAMSULOSIN HYDROCHLORIDE 0.4 MG: 0.4 CAPSULE ORAL at 09:33

## 2023-10-15 RX ADMIN — SODIUM CHLORIDE 80 ML/HR: 450 INJECTION, SOLUTION INTRAVENOUS at 11:56

## 2023-10-15 RX ADMIN — APIXABAN 5 MG: 5 TABLET, FILM COATED ORAL at 18:20

## 2023-10-15 RX ADMIN — FAMOTIDINE 20 MG: 20 TABLET ORAL at 09:33

## 2023-10-15 RX ADMIN — FOLIC ACID 1 MG: 1 TABLET ORAL at 09:34

## 2023-10-15 RX ADMIN — ASPIRIN 81 MG: 81 TABLET, COATED ORAL at 09:33

## 2023-10-15 RX ADMIN — SODIUM CHLORIDE 500 ML: 900 INJECTION, SOLUTION INTRAVENOUS at 00:20

## 2023-10-15 ASSESSMENT — PAIN - FUNCTIONAL ASSESSMENT
PAIN_FUNCTIONAL_ASSESSMENT: 0-10
PAIN_FUNCTIONAL_ASSESSMENT: 0-10

## 2023-10-15 ASSESSMENT — COGNITIVE AND FUNCTIONAL STATUS - GENERAL
DRESSING REGULAR UPPER BODY CLOTHING: A LITTLE
CLIMB 3 TO 5 STEPS WITH RAILING: A LOT
TOILETING: A LITTLE
STANDING UP FROM CHAIR USING ARMS: A LITTLE
DAILY ACTIVITIY SCORE: 19
WALKING IN HOSPITAL ROOM: A LITTLE
PERSONAL GROOMING: A LITTLE
MOBILITY SCORE: 18
TOILETING: A LITTLE
DRESSING REGULAR UPPER BODY CLOTHING: A LITTLE
MOBILITY SCORE: 17
HELP NEEDED FOR BATHING: A LITTLE
STANDING UP FROM CHAIR USING ARMS: A LITTLE
TURNING FROM BACK TO SIDE WHILE IN FLAT BAD: A LITTLE
CLIMB 3 TO 5 STEPS WITH RAILING: A LITTLE
DRESSING REGULAR LOWER BODY CLOTHING: A LITTLE
PERSONAL GROOMING: A LITTLE
TURNING FROM BACK TO SIDE WHILE IN FLAT BAD: A LITTLE
MOVING TO AND FROM BED TO CHAIR: A LITTLE
MOVING FROM LYING ON BACK TO SITTING ON SIDE OF FLAT BED WITH BEDRAILS: A LITTLE
WALKING IN HOSPITAL ROOM: A LITTLE
HELP NEEDED FOR BATHING: A LITTLE
MOVING FROM LYING ON BACK TO SITTING ON SIDE OF FLAT BED WITH BEDRAILS: A LITTLE
DRESSING REGULAR LOWER BODY CLOTHING: A LITTLE
DAILY ACTIVITIY SCORE: 19
MOVING TO AND FROM BED TO CHAIR: A LITTLE

## 2023-10-15 ASSESSMENT — PAIN SCALES - GENERAL
PAINLEVEL_OUTOF10: 0 - NO PAIN

## 2023-10-15 NOTE — NURSING NOTE
Low BP reported to Amy ROBLES.  IVF infusing as ordered.  Patient denies lightheadedness and dizziness.  Currently on bedrest for safety concerns with low BP.  Bed alarm engaged.  Lloyd has been made aware.  Call light and commonly used items in reach.

## 2023-10-15 NOTE — NURSING NOTE
Patient asleep at this time, no signs of pain and or discomfort observed. No need for interventions at this time.

## 2023-10-15 NOTE — NURSING NOTE
Patient resting comfortably in bed at this time with no complaints of pain and or discomfort. No need for interventions at this time.

## 2023-10-15 NOTE — PROGRESS NOTES
Darrell Tucker is a 78 y.o. male on day 7 of admission presenting with Stroke-like symptoms.      Subjective   Patient seen and examined.  Is alert and oriented x3, somewhat delayed response today.  Blood pressure has been low overnight.  Inform nursing to hold all cardiac medicine.  Will be receiving maintenance fluids today.   Denies pain or shortness of breath.       Objective     Last Recorded Vitals  BP 73/56 (BP Location: Right arm, Patient Position: Lying)   Pulse 105   Temp 36.5 °C (97.7 °F) (Oral)   Resp 16   Wt 98.6 kg (217 lb 6 oz)   SpO2 98%   Intake/Output last 3 Shifts:    Intake/Output Summary (Last 24 hours) at 10/15/2023 0940  Last data filed at 10/15/2023 0317  Gross per 24 hour   Intake 720 ml   Output 350 ml   Net 370 ml         Admission Weight  Weight: 104 kg (229 lb 4.5 oz) (10/08/23 1309)    Daily Weight  10/15/23 : 98.6 kg (217 lb 6 oz)    Image Results  Electrocardiogram, 12-lead PRN ACS symptoms  Atrial fibrillation with rapid ventricular response with premature ventricular or aberrantly conducted complexes  Nondiagnostic inferior Qwaves , age undetermined  ST & T wave abnormality, consider lateral ischemia  Abnormal ECG    Confirmed by Harpreet Freeman ([id number]) on 10/10/2023 10:35:40 PM  Transthoracic Echo (TTE) [place] [address]             Phone [phone]    TRANSTHORACIC ECHOCARDIOGRAM REPORT       Patient Name:      DARRELL TUCKER     Reading Physician:    [id number] Simeon Lucas MD  Study Date:        10/10/2023          Ordering Provider:    [id number]  MRN/PID:           [id number]            Fellow:  Accession#:        [id number]        Nurse:  Date of Birth/Age: 1945 / 78 years Sonographer:          Dorene RIVERA  Gender:            M                    Additional Staff:  Height:            185.00 cm           Admit Date:           10/8/2023  Weight:            102.00 kg           Admission Status:     Inpatient - Routine  BSA:               2.26 m2             Department Location:  Blood Pressure: 100 /72 mmHg    Study Type:    TRANSTHORACIC ECHO (TTE) COMPLETE  Diagnosis/ICD: Cerebral Infarction, unspecified-I63.9; Paroxysmal atrial                 fibrillation-I48.0  Indication:    Stroke like symptoms history of cerebrovascular accident                 paroxysmal atrial fibrillation  CPT Codes:     Echo Complete w Full Doppler-[id number]    Patient History:  Pertinent History: CHF CAD Depression DMII HTN PVD.    Study Detail: The following Echo studies were performed: 2D, M-Mode, Doppler and                color flow. Technically challenging study due to body habitus,                patient lying in supine position, poor acoustic windows, the                patient's lack of cooperation and prominent lung artifact. Unable                to obtain subcostal view.       PHYSICIAN INTERPRETATION:  Left Ventricle: Left ventricular systolic function is severely decreased, with an estimated ejection fraction of 20-25%. There is global hypokinesis of the left ventricle with minor regional variations. The left ventricular cavity size is mild to moderately dilated. Spectral Doppler shows a pseudonormal pattern of left ventricular diastolic filling.  Left Atrium: The left atrium is moderate to severely dilated.  Right Ventricle: The right ventricle is normal in size. There is normal right ventricular global systolic function.  Right Atrium: The right atrium is mildly dilated.  Aortic Valve: The aortic valve is trileaflet. There is no evidence of aortic valve regurgitation. The peak instantaneous gradient of the aortic valve is 5.4 mmHg.  Mitral Valve: The mitral valve is mildly thickened. There is moderate mitral valve regurgitation which is posteriorly directed.  Tricuspid  Valve: The tricuspid valve is structurally normal. There is mild tricuspid regurgitation. The Doppler estimated RVSP is within normal limits at 27.0 mmHg.  Pulmonic Valve: The pulmonic valve is not well visualized. There is no indication of pulmonic valve regurgitation.  Pericardium: There is no pericardial effusion noted.  Aorta: The aortic root is normal. There is mild dilatation of the aortic root.       CONCLUSIONS:   1. Left ventricular systolic function is severely decreased with a 20-25% estimated ejection fraction.   2. Spectral Doppler shows a pseudonormal pattern of left ventricular diastolic filling.   3. The left atrium is moderate to severely dilated.   4. Moderate mitral valve regurgitation.   5. RVSP within normal limits.   6. There is global hypokinesis of the left ventricle with minor regional variations.    QUANTITATIVE DATA SUMMARY:  2D MEASUREMENTS:               Normal Ranges:  LAs: 5.90 cm (2.7-4.0cm)    LA VOLUME:                                Normal Ranges:  LA Vol A2C:        158.6 ml  LA Vol Index A2C:  70.2 ml/m2  LA Area A2C:       38.0 cm2  LA Major Axis A2C: 7.7 cm  LA Volume Index:   69.9 ml/m2  LA Vol A2C:        144.0 ml    RA VOLUME BY A/L METHOD:                                Normal Ranges:  RA Vol A4C:        128.0 ml   (8.3-19.5ml)  RA Vol Index A4C:  56.7 ml/m2  RA Area A4C:       33.5 cm2  RA Major Axis A4C: 7.4 cm    M-MODE MEASUREMENTS:                   Normal Ranges:  Ao Root: 3.60 cm (2.0-3.7cm)  AoV Exc: 2.50 cm (1.5-2.5cm)  LAs:     5.50 cm (2.7-4.0cm)    AORTA MEASUREMENTS:                     Normal Ranges:  AoV Exc:   2.50 cm (1.5-2.5cm)  Asc Ao, d: 3.70 cm (2.1-3.4cm)    LV SYSTOLIC FUNCTION BY 2D PLANIMETRY (MOD):                      Normal Ranges:  EF-A4C View: 25.0 % (>=55%)  EF-A2C View: 24.2 %  EF-Biplane:  22.5 %    LV DIASTOLIC FUNCTION:                         Normal Ranges:  MV Peak E:    1.12 m/s (0.7-1.2 m/s)  MV e'         0.05 m/s (>8.0)  MV lateral  e' 0.06 m/s  MV medial e'  0.05 m/s  E/e' Ratio:   20.59    (<8.0)    MITRAL VALVE:                       Normal Ranges:  MV Vmax:    1.18 m/s (<=1.3m/s)  MV peak P.6 mmHg (<5mmHg)  MV mean P.5 mmHg (<48mmHg)  MV DT:      138 msec (150-240msec)    MITRAL INSUFFICIENCY:                       Normal Ranges:  MR VTI:  127.00 cm  MR Vmax: 396.00 cm/s    AORTIC VALVE:                          Normal Ranges:  AoV Vmax:      1.16 m/s (<=1.7m/s)  AoV Peak P.4 mmHg (<20mmHg)  LVOT Max Gt:  0.98 m/s (<=1.1m/s)  LVOT VTI:      15.20 cm  LVOT Diameter: 2.60 cm  (1.8-2.4cm)  AoV Area,Vmax: 4.48 cm2 (2.5-4.5cm2)       RIGHT VENTRICLE:  RV s' 0.09 m/s    TRICUSPID VALVE/RVSP:                              Normal Ranges:  Peak TR Velocity: 2.45 m/s  RV Syst Pressure: 27.0 mmHg (< 30mmHg)    PULMONIC VALVE:                          Normal Ranges:  PV Accel Time: 77 msec  (>120ms)  PV Max Gt:    0.9 m/s  (0.6-0.9m/s)  PV Max PG:     3.6 mmHg       [id number] Simeon Lucas MD  Electronically signed on 10/10/2023 at 7:39:33 PM       ** Final **      Physical Exam  Vitals reviewed.   Constitutional:       Appearance: Normal appearance. He is obese.   HENT:      Head: Normocephalic and atraumatic.   Eyes:      Extraocular Movements: Extraocular movements intact.   Cardiovascular:      Rate and Rhythm: Tachycardia present. Rhythm irregular.      Pulses: Normal pulses.   Pulmonary:      Effort: Pulmonary effort is normal.   Abdominal:      General: Abdomen is flat.      Palpations: Abdomen is soft.   Musculoskeletal:      Cervical back: Normal range of motion.      Comments: Generalized weakness   Skin:     General: Skin is warm and dry.      Capillary Refill: Capillary refill takes less than 2 seconds.   Neurological:      Mental Status: He is alert and oriented to person, place, and time. Mental status is at baseline.      Motor: Weakness present.         Relevant Results       Results for orders placed or performed during  the hospital encounter of 10/08/23 (from the past 24 hour(s))   POCT GLUCOSE   Result Value Ref Range    POCT Glucose 212 (H) 74 - 99 mg/dL   POCT GLUCOSE   Result Value Ref Range    POCT Glucose 175 (H) 74 - 99 mg/dL   POCT GLUCOSE   Result Value Ref Range    POCT Glucose 204 (H) 74 - 99 mg/dL   Basic metabolic panel   Result Value Ref Range    Glucose 140 (H) 65 - 99 mg/dL    Sodium 147 (H) 133 - 145 mmol/L    Potassium 4.4 3.4 - 5.1 mmol/L    Chloride 104 97 - 107 mmol/L    Bicarbonate 32 (H) 24 - 31 mmol/L    Urea Nitrogen 31 (H) 8 - 25 mg/dL    Creatinine 1.90 (H) 0.40 - 1.60 mg/dL    eGFR 36 (L) >60 mL/min/1.73m*2    Calcium 9.1 8.5 - 10.4 mg/dL    Anion Gap 11 <=19 mmol/L   CBC   Result Value Ref Range    WBC 6.2 4.4 - 11.3 x10*3/uL    nRBC 0.0 0.0 - 0.0 /100 WBCs    RBC 4.16 (L) 4.50 - 5.90 x10*6/uL    Hemoglobin 14.0 13.5 - 17.5 g/dL    Hematocrit 43.8 41.0 - 52.0 %     (H) 80 - 100 fL    MCH 33.7 26.0 - 34.0 pg    MCHC 32.0 32.0 - 36.0 g/dL    RDW 15.0 (H) 11.5 - 14.5 %    Platelets 170 150 - 450 x10*3/uL    MPV 9.6 7.5 - 11.5 fL   POCT GLUCOSE   Result Value Ref Range    POCT Glucose 135 (H) 74 - 99 mg/dL               Assessment/Plan   This patient currently has cardiac telemetry ordered; if you would like to modify or discontinue the telemetry order, click here to go to the orders activity to modify/discontinue the order.    CVA  Resolved  admit to SDU  monitor on tele  ASA/statin   Swallow eval  echocardiogram recently at [place], [place] with a EF of 27±5%, severe MR, moderately severe TR, AS; [facility] 7/27   MRI/MRA negative  US carotids  lipid panel ordered  neurostroke assessment  consult neurology, appreciate recommendations  PT/OT eval     A-fib RVR  Cardiology recommendations appreciated.  Remains on Eliquis for anticoagulation  Cardiology following  We will need to monitor blood pressure closely    Urinary tract infection  Negative culture, IV antibiotics stopped  Follow cultures      Diabetes mellitus  Sliding scale insulin  Monitor blood glucose  Hypoglycemia protocol  Hemoglobin A1c 5.3     Hypotension  Hold all cardiac medication for now  Initiate half-normal saline at 80 an hour x1 L  Have contacted cardiology to adjust meds  Monitor blood pressure closely    Elevated creatinine  1.6-1.9 overnight  Initiate fluids  Hold ACEs, ARB's  Unremarkable bladder scan    Heart failure  Hold diuresis for hypotension  Monitor labs  Contact cardiology for adjustments     CAD  Aspirin/statin     PAD  As above         10/14/2023: Is awaiting precertification for skilled nursing facility.  Per care coordinator some challenges with [facility] coverage.    10/15/2023: With hypotension last night and this morning.  Initiating fluids.  Holding all cardiac medication.  Will contact cardiology regarding adjustments.          Principal Problem:    Stroke-like symptoms        Amy Moss, APRN-CNP

## 2023-10-15 NOTE — PROGRESS NOTES
Subjective Data:  Patient seen and examined by history of systolic heart failure.  Patient currently low blood pressure due to Demadex as well as the Entresto.  No chest pain or tightness.  Patient with a confusion on and off.  Continue current treatment.  Swallow evaluation.  Patient with underlying atrial fibrillation on Eliquis with anticoagulation.  Echocardiogram was done at [place] and ejection fraction about 27% with a severe MR and TR seen.    Overnight Events:    Episode of low blood pressure currently hold all the medication including beta-blocker, diuretic, Entresto     Objective Data:  Last Recorded Vitals:  Vitals:    10/15/23 0317 10/15/23 0828 10/15/23 1136 10/15/23 1500   BP: (!) 79/44 73/56 84/50 95/62   BP Location: Right arm Right arm Right arm Left arm   Patient Position: Lying Lying Lying Sitting   Pulse: 108 105 87    Resp: 16 16 16    Temp: 36.9 °C (98.4 °F) 36.5 °C (97.7 °F) 36.4 °C (97.5 °F)    TempSrc: Oral Oral Temporal    SpO2: 98% 98% 98%    Weight: 98.6 kg (217 lb 6 oz)      Height:           Last Labs:  CBC - 10/15/2023:  5:25 AM  6.2 14.0 170    43.8      CMP - 10/15/2023:  5:25 AM  9.1 7.5 24 --- 2.1   _ 4.0 13 159      PTT - 7/1/2023:  6:04 AM  1.3   13.0 34.7     HGBA1C   Date/Time Value Ref Range Status   10/10/2023 11:42 AM 5.3 See below % Final   07/02/2023 04:38 AM 5.6 4.0 - 6.0 % Final     Comment:     Hemoglobin A1C levels are related to mean blood glucose during the   preceding 2-3 months. The relationship table below may be used as a   general guide. Each 1% increase in HGB A1C is a reflection of an   increase in mean glucose of approximately 30 mg/dl.   Reference: Diabetes Care, volume 29, supplement 1 Jan. 2006                        HGB A1C ................. Approx. Mean Glucose   _______________________________________________   6%   ...............................  120 mg/dl   7%   ...............................  150 mg/dl   8%   ...............................  180  "mg/dl   9%   ...............................  210 mg/dl   10%  ...............................  240 mg/dl  Performed at [address] [address]     05/16/2019 01:52 PM 7.8 4.0 - 6.0 % Final     Comment:     Hemoglobin A1C levels are related to mean blood glucose during the   preceding 2-3 months. The relationship table below may be used as a   general guide. Each 1% increase in HGB A1C is a reflection of an   increase in mean glucose of approximately 30 mg/dl.   Reference: Diabetes Care, volume 29, supplement 1 Jan. 2006                        HGB A1C ................. Approx. Mean Glucose   _______________________________________________   6%   ...............................  120 mg/dl   7%   ...............................  150 mg/dl   8%   ...............................  180 mg/dl   9%   ...............................  210 mg/dl   10%  ...............................  240 mg/dl  Performed at [address] [address]       LDLCALC   Date/Time Value Ref Range Status   07/02/2023 04:38 AM 23 65 - 130 MG/DL Final   05/17/2019 06:01 AM 42 65 - 130 MG/DL Final      Last I/O:  I/O last 3 completed shifts:  In: 1200 (12.2 mL/kg) [P.O.:1200]  Out: 350 (3.5 mL/kg) [Urine:350 (0.1 mL/kg/hr)]  Weight: 98.6 kg     Past Cardiology Tests (Last 3 Years):  EKG:  Electrocardiogram, 12-lead PRN ACS symptoms 10/10/2023    Echo:  Transthoracic Echo (TTE) Complete 10/10/2023    Ejection Fractions:  No results found for: \"EF\"  Cath:  No results found for this or any previous visit from the past 1095 days.    Stress Test:  No results found for this or any previous visit from the past 1095 days.    Cardiac Imaging:  No results found for this or any previous visit from the past 1095 days.      Inpatient Medications:  Scheduled medications   Medication Dose Route Frequency    apixaban  5 mg oral q12h    aspirin  81 mg oral Daily    Or    aspirin  81 mg oral Daily    Or    aspirin  81 mg " nasogastric tube Daily    Or    aspirin  300 mg rectal Daily    famotidine  20 mg oral Daily    folic acid  1 mg oral Daily    insulin lispro  0-10 Units subcutaneous TID with meals    [Held by provider] metoprolol succinate XL  50 mg oral BID    rosuvastatin  10 mg oral Daily    [Held by provider] sacubitriL-valsartan  0.5 tablet oral BID    tamsulosin  0.4 mg oral Daily before breakfast     PRN medications   Medication    dextrose 10 % in water (D10W)    dextrose    glucagon    hydrALAZINE    oxygen     Continuous Medications   Medication Dose Last Rate    sodium chloride  80 mL/hr 80 mL/hr (10/15/23 1500)       Physical Exam:  HEENT: Normocephalic/atraumatic pupils equal react light  Neck exam no JVD, no bruit  Lung exam few crackles at the bases  Cardiac exam is regular rhythm S1-S2, soft slight murmur heard.  No S3 heard.  Abdomen soft nontender, nondistended  Extremities no clubbing, cyanosis but trace edema  Neuro exam grossly intact.       Assessment/Plan   Patient has a history of dilated cardiomyopathy with last echocardiogram showed a ejection fraction about 25%, continue rate control medication for atrial fibrillation as well as Eliquis..  Now with CHF, acute on chronic systolic CHF.  Patient has a low blood pressure due to diuretics as well as Entresto at the moment continue currently holding Entresto due to hypotensive episode.  Patient is getting low-dose of hydration at the moment.  Patient probably needs a short-term LifeVest followed by repeat echocardiogram.       Code Status:  Full Code    I spent 35 minutes in the professional and overall care of this patient.        Hamlet Griffin MD

## 2023-10-15 NOTE — NURSING NOTE
Assumed care of patient.  Resting quietly on edge of bed.  Offers no complaints of pain/discomfort.  Call light and commonly used items in reach.  Bed locked and in low position.

## 2023-10-15 NOTE — NURSING NOTE
Assumed care of patient from dayshift nurse. Patient sitting at side of bed at this time with no complaints of pain and or discomfort. No bed alarm on per patient request, patient ambulating to bathroom with walker w/o aide from nursing staff. Bed in lowest position and locked and call bell and personal belongings within reach. No further interventions at this time.

## 2023-10-15 NOTE — NURSING NOTE
Amy ROBLES at bedside.  Made aware of overnight events of low BP and meds that were held on 10/14.  Amy savage will contact Dr Lucas regarding adjusting cardiac meds.

## 2023-10-15 NOTE — CARE PLAN
Problem: Fall/Injury  Goal: Not fall by end of shift  Outcome: Progressing  Goal: Be free from injury by end of the shift  Outcome: Progressing  Goal: Verbalize understanding of personal risk factors for fall in the hospital  Outcome: Progressing  Goal: Verbalize understanding of risk factor reduction measures to prevent injury from fall in the home  Outcome: Progressing  Goal: Use assistive devices by end of the shift  Outcome: Progressing  Goal: Pace activities to prevent fatigue by end of the shift  Outcome: Progressing     Problem: Pain  Goal: My pain/discomfort is manageable  Outcome: Progressing     Problem: Safety  Goal: Patient will be injury free during hospitalization  Outcome: Progressing  Goal: I will remain free of falls  Outcome: Progressing     Problem: Daily Care  Goal: Daily care needs are met  Outcome: Progressing     Problem: Bathing  Goal: STG - Patient will perform UB bathing at mod I; LB bathing at distant supervision with use of AE prn  Outcome: Progressing     Problem: Dressings Lower Extremities  Goal: STG - Patient to complete lower body dressing at distant supervision with use of AE prn  Outcome: Progressing     Problem: Dressing Upper Extremities  Goal: STG - Patient will dress upper body at mod I  Outcome: Progressing     Problem: Grooming  Goal: STG - Patient completes grooming at mod I  Outcome: Progressing     Problem: Toileting  Goal: STG - Patient will complete toileting tasks at distant supervision  Outcome: Progressing   The patient's goals for the shift include      The clinical goals for the shift include rest    Over the shift, the patient did not make progress toward the following goals. Barriers to progression include lack of comfort in bed. Recommendations to address these barriers include re-positioning patient for comfort.

## 2023-10-15 NOTE — CARE PLAN
Problem: Fall/Injury  Goal: Not fall by end of shift  Outcome: Progressing  Goal: Be free from injury by end of the shift  Outcome: Progressing  Goal: Verbalize understanding of personal risk factors for fall in the hospital  Outcome: Progressing  Goal: Verbalize understanding of risk factor reduction measures to prevent injury from fall in the home  Outcome: Progressing  Goal: Use assistive devices by end of the shift  Outcome: Progressing  Goal: Pace activities to prevent fatigue by end of the shift  Outcome: Progressing   The patient's goals for the shift include      The clinical goals for the shift include maintain stable vitals    Over the shift, the patient did not make progress toward the following goals. Barriers to progression include long acting cardiac meds potentially affecting patient's BP. Recommendations to address these barriers include medications have been placed on hold and IVF infusing.

## 2023-10-15 NOTE — NURSING NOTE
Strict bedrest order noted.  Patient sitting on edge of bed eating lunch.  's - afib rhythm.    Patient IVF infusing.    Dr Griffin rounded, made aware of events.  Continue to hold cardiac meds.

## 2023-10-16 LAB
ANION GAP SERPL CALC-SCNC: 11 MMOL/L
BUN SERPL-MCNC: 28 MG/DL (ref 8–25)
CALCIUM SERPL-MCNC: 8.6 MG/DL (ref 8.5–10.4)
CHLORIDE SERPL-SCNC: 100 MMOL/L (ref 97–107)
CO2 SERPL-SCNC: 29 MMOL/L (ref 24–31)
CREAT SERPL-MCNC: 1.8 MG/DL (ref 0.4–1.6)
ERYTHROCYTE [DISTWIDTH] IN BLOOD BY AUTOMATED COUNT: 15.1 % (ref 11.5–14.5)
GFR SERPL CREATININE-BSD FRML MDRD: 38 ML/MIN/1.73M*2
GLUCOSE BLD MANUAL STRIP-MCNC: 109 MG/DL (ref 74–99)
GLUCOSE BLD MANUAL STRIP-MCNC: 204 MG/DL (ref 74–99)
GLUCOSE BLD MANUAL STRIP-MCNC: 217 MG/DL (ref 74–99)
GLUCOSE BLD MANUAL STRIP-MCNC: 226 MG/DL (ref 74–99)
GLUCOSE SERPL-MCNC: 209 MG/DL (ref 65–99)
HCT VFR BLD AUTO: 38.4 % (ref 41–52)
HCT VFR BLD AUTO: 39.8 % (ref 41–52)
HGB BLD-MCNC: 12.2 G/DL (ref 13.5–17.5)
HGB BLD-MCNC: 12.8 G/DL (ref 13.5–17.5)
MCH RBC QN AUTO: 33.5 PG (ref 26–34)
MCHC RBC AUTO-ENTMCNC: 32.2 G/DL (ref 32–36)
MCV RBC AUTO: 104 FL (ref 80–100)
NRBC BLD-RTO: 0 /100 WBCS (ref 0–0)
PLATELET # BLD AUTO: 165 X10*3/UL (ref 150–450)
PMV BLD AUTO: 9.6 FL (ref 7.5–11.5)
POTASSIUM SERPL-SCNC: 3.7 MMOL/L (ref 3.4–5.1)
RBC # BLD AUTO: 3.82 X10*6/UL (ref 4.5–5.9)
SODIUM SERPL-SCNC: 140 MMOL/L (ref 133–145)
WBC # BLD AUTO: 6.6 X10*3/UL (ref 4.4–11.3)

## 2023-10-16 PROCEDURE — 36415 COLL VENOUS BLD VENIPUNCTURE: CPT | Performed by: NURSE PRACTITIONER

## 2023-10-16 PROCEDURE — 97116 GAIT TRAINING THERAPY: CPT | Mod: GP

## 2023-10-16 PROCEDURE — 2500000001 HC RX 250 WO HCPCS SELF ADMINISTERED DRUGS (ALT 637 FOR MEDICARE OP): Performed by: NURSE PRACTITIONER

## 2023-10-16 PROCEDURE — 96372 THER/PROPH/DIAG INJ SC/IM: CPT | Performed by: INTERNAL MEDICINE

## 2023-10-16 PROCEDURE — 2060000001 HC INTERMEDIATE ICU ROOM DAILY

## 2023-10-16 PROCEDURE — 97110 THERAPEUTIC EXERCISES: CPT | Mod: GP

## 2023-10-16 PROCEDURE — 85027 COMPLETE CBC AUTOMATED: CPT

## 2023-10-16 PROCEDURE — 2500000002 HC RX 250 W HCPCS SELF ADMINISTERED DRUGS (ALT 637 FOR MEDICARE OP, ALT 636 FOR OP/ED): Performed by: INTERNAL MEDICINE

## 2023-10-16 PROCEDURE — 82947 ASSAY GLUCOSE BLOOD QUANT: CPT

## 2023-10-16 PROCEDURE — 97530 THERAPEUTIC ACTIVITIES: CPT | Mod: GO,CO

## 2023-10-16 PROCEDURE — 80048 BASIC METABOLIC PNL TOTAL CA: CPT

## 2023-10-16 PROCEDURE — 36415 COLL VENOUS BLD VENIPUNCTURE: CPT

## 2023-10-16 PROCEDURE — 85014 HEMATOCRIT: CPT | Performed by: NURSE PRACTITIONER

## 2023-10-16 PROCEDURE — 97535 SELF CARE MNGMENT TRAINING: CPT | Mod: GO,CO

## 2023-10-16 PROCEDURE — 2500000004 HC RX 250 GENERAL PHARMACY W/ HCPCS (ALT 636 FOR OP/ED): Performed by: NURSE PRACTITIONER

## 2023-10-16 RX ADMIN — INSULIN LISPRO 4 UNITS: 100 INJECTION, SOLUTION INTRAVENOUS; SUBCUTANEOUS at 09:34

## 2023-10-16 RX ADMIN — ROSUVASTATIN CALCIUM 10 MG: 10 TABLET, COATED ORAL at 09:33

## 2023-10-16 RX ADMIN — APIXABAN 5 MG: 5 TABLET, FILM COATED ORAL at 18:03

## 2023-10-16 RX ADMIN — FOLIC ACID 1 MG: 1 TABLET ORAL at 09:33

## 2023-10-16 RX ADMIN — ASPIRIN 81 MG CHEWABLE TABLET 81 MG: 81 TABLET CHEWABLE at 09:34

## 2023-10-16 RX ADMIN — SODIUM CHLORIDE 500 ML: 900 INJECTION, SOLUTION INTRAVENOUS at 12:47

## 2023-10-16 RX ADMIN — FAMOTIDINE 20 MG: 20 TABLET ORAL at 09:34

## 2023-10-16 RX ADMIN — APIXABAN 5 MG: 5 TABLET, FILM COATED ORAL at 06:16

## 2023-10-16 RX ADMIN — TAMSULOSIN HYDROCHLORIDE 0.4 MG: 0.4 CAPSULE ORAL at 06:17

## 2023-10-16 ASSESSMENT — COGNITIVE AND FUNCTIONAL STATUS - GENERAL
TURNING FROM BACK TO SIDE WHILE IN FLAT BAD: A LITTLE
MOBILITY SCORE: 17
CLIMB 3 TO 5 STEPS WITH RAILING: A LITTLE
MOVING FROM LYING ON BACK TO SITTING ON SIDE OF FLAT BED WITH BEDRAILS: A LITTLE
STANDING UP FROM CHAIR USING ARMS: A LITTLE
WALKING IN HOSPITAL ROOM: A LITTLE
DRESSING REGULAR LOWER BODY CLOTHING: A LITTLE
DAILY ACTIVITIY SCORE: 19
HELP NEEDED FOR BATHING: A LOT
WALKING IN HOSPITAL ROOM: A LITTLE
HELP NEEDED FOR BATHING: A LITTLE
PERSONAL GROOMING: A LITTLE
MOBILITY SCORE: 17
DRESSING REGULAR UPPER BODY CLOTHING: A LITTLE
MOVING FROM LYING ON BACK TO SITTING ON SIDE OF FLAT BED WITH BEDRAILS: A LITTLE
CLIMB 3 TO 5 STEPS WITH RAILING: A LOT
DAILY ACTIVITIY SCORE: 17
TOILETING: A LOT
CLIMB 3 TO 5 STEPS WITH RAILING: A LOT
TOILETING: A LITTLE
MOBILITY SCORE: 23
DRESSING REGULAR UPPER BODY CLOTHING: A LITTLE
DRESSING REGULAR LOWER BODY CLOTHING: A LITTLE
MOVING TO AND FROM BED TO CHAIR: A LITTLE
TURNING FROM BACK TO SIDE WHILE IN FLAT BAD: A LITTLE
MOVING TO AND FROM BED TO CHAIR: A LITTLE
STANDING UP FROM CHAIR USING ARMS: A LITTLE
PERSONAL GROOMING: A LITTLE

## 2023-10-16 ASSESSMENT — PAIN SCALES - GENERAL
PAINLEVEL_OUTOF10: 0 - NO PAIN

## 2023-10-16 ASSESSMENT — PAIN - FUNCTIONAL ASSESSMENT: PAIN_FUNCTIONAL_ASSESSMENT: 0-10

## 2023-10-16 NOTE — PROGRESS NOTES
[facility] received call late on Friday from [facility] stating that the [facility] reviewer was stating that therapy notes had not been received even as [facility] had sent them and that updates MD progress notes needed. [facility] faxed this information to Rena this morning as well as called and left message with request for return call. [facility] approval for [facility] is currently pending.    Update: Per rounds pt with low BP previous day, receiving IV fluids, cardiac meds being held. [facility] received call from Rn at [facility] who confirmed receipt of information but questioning whether pt is ready for dc and stating they cannot approve stay at SNF until pt is medically stable and that the clinicals sent on Friday were from 10/12 and she needed more updated ones, [facility] reminded her Friday was the 13th and that the clinicals to that point were up to date. [facility] advised updated clinicals including vitals will be sent when they are available on this day, questioned why approval cannot be had until the last minute, assured the Rn that the hospital will figure out the plan for pts cardiac meds which are currently on hold prior to the pt discharging.     DC Plan: [facility] approval still pending for [facility].

## 2023-10-16 NOTE — NURSING NOTE
Assumed care of pt.pt drowsy and oriented X2-3. Pt denies needs. NAD. Denies pain. RA. BSSR completed

## 2023-10-16 NOTE — NURSING NOTE
Rounded on pt. Pt confused, alert X2. Pt just finished breakfast. Not hungry for lunch. NAD. Denies pain. Asking about discharge plan. Told pt we are still waiting on a bed. No other needs. Continuing to monitor

## 2023-10-16 NOTE — PROGRESS NOTES
Darrell Tucker is a 78 y.o. male on day 8 of admission presenting with Stroke-like symptoms.      Subjective   Patient seen and examined.  Alert, oriented to self. Slow to respond, tells me his is in [place] and that the year is 20...then forgets the question.  Denies chest pain, shortness of breath, nausea, or vomiting. No abdominal discomfort.     Objective     Last Recorded Vitals  BP 94/54 (BP Location: Left arm, Patient Position: Sitting)   Pulse 102   Temp 36.2 °C (97.2 °F) (Temporal)   Resp 18   Wt 98.6 kg (217 lb 6 oz)   SpO2 98%   Intake/Output last 3 Shifts:    Intake/Output Summary (Last 24 hours) at 10/16/2023 1111  Last data filed at 10/16/2023 0600  Gross per 24 hour   Intake 1564 ml   Output 300 ml   Net 1264 ml         Admission Weight  Weight: 104 kg (229 lb 4.5 oz) (10/08/23 1309)    Daily Weight  10/15/23 : 98.6 kg (217 lb 6 oz)    Image Results  Electrocardiogram, 12-lead PRN ACS symptoms  Atrial fibrillation with rapid ventricular response with premature ventricular or aberrantly conducted complexes  Nondiagnostic inferior Qwaves , age undetermined  ST & T wave abnormality, consider lateral ischemia  Abnormal ECG    Confirmed by Harpreet Freeman ([id number]) on 10/10/2023 10:35:40 PM  Transthoracic Echo (TTE) [place] [address]             Phone [phone]    TRANSTHORACIC ECHOCARDIOGRAM REPORT       Patient Name:      DARRELL TUCKER     Reading Physician:    [id number] Simeon Lucas MD  Study Date:        10/10/2023          Ordering Provider:    [id number] [address] COLEMAN  MRN/PID:           [id number]            Fellow:  Accession#:        [id number]        Nurse:  Date of Birth/Age: 1945 / 78 years Sonographer:          Dorene RIVERA  Gender:            M                   Additional  Staff:  Height:            185.00 cm           Admit Date:           10/8/2023  Weight:            102.00 kg           Admission Status:     Inpatient - Routine  BSA:               2.26 m2             Department Location:  Blood Pressure: 100 /72 mmHg    Study Type:    TRANSTHORACIC ECHO (TTE) COMPLETE  Diagnosis/ICD: Cerebral Infarction, unspecified-I63.9; Paroxysmal atrial                 fibrillation-I48.0  Indication:    Stroke like symptoms history of cerebrovascular accident                 paroxysmal atrial fibrillation  CPT Codes:     Echo Complete w Full [id number]    Patient History:  Pertinent History: CHF CAD Depression DMII HTN PVD.    Study Detail: The following Echo studies were performed: 2D, M-Mode, Doppler and                color flow. Technically challenging study due to body habitus,                patient lying in supine position, poor acoustic windows, the                patient's lack of cooperation and prominent lung artifact. Unable                to obtain subcostal view.       PHYSICIAN INTERPRETATION:  Left Ventricle: Left ventricular systolic function is severely decreased, with an estimated ejection fraction of 20-25%. There is global hypokinesis of the left ventricle with minor regional variations. The left ventricular cavity size is mild to moderately dilated. Spectral Doppler shows a pseudonormal pattern of left ventricular diastolic filling.  Left Atrium: The left atrium is moderate to severely dilated.  Right Ventricle: The right ventricle is normal in size. There is normal right ventricular global systolic function.  Right Atrium: The right atrium is mildly dilated.  Aortic Valve: The aortic valve is trileaflet. There is no evidence of aortic valve regurgitation. The peak instantaneous gradient of the aortic valve is 5.4 mmHg.  Mitral Valve: The mitral valve is mildly thickened. There is moderate mitral valve regurgitation which is posteriorly directed.  Tricuspid Valve: The  tricuspid valve is structurally normal. There is mild tricuspid regurgitation. The Doppler estimated RVSP is within normal limits at 27.0 mmHg.  Pulmonic Valve: The pulmonic valve is not well visualized. There is no indication of pulmonic valve regurgitation.  Pericardium: There is no pericardial effusion noted.  Aorta: The aortic root is normal. There is mild dilatation of the aortic root.       CONCLUSIONS:   1. Left ventricular systolic function is severely decreased with a 20-25% estimated ejection fraction.   2. Spectral Doppler shows a pseudonormal pattern of left ventricular diastolic filling.   3. The left atrium is moderate to severely dilated.   4. Moderate mitral valve regurgitation.   5. RVSP within normal limits.   6. There is global hypokinesis of the left ventricle with minor regional variations.    QUANTITATIVE DATA SUMMARY:  2D MEASUREMENTS:               Normal Ranges:  LAs: 5.90 cm (2.7-4.0cm)    LA VOLUME:                                Normal Ranges:  LA Vol A2C:        158.6 ml  LA Vol Index A2C:  70.2 ml/m2  LA Area A2C:       38.0 cm2  LA Major Axis A2C: 7.7 cm  LA Volume Index:   69.9 ml/m2  LA Vol A2C:        144.0 ml    RA VOLUME BY A/L METHOD:                                Normal Ranges:  RA Vol A4C:        128.0 ml   (8.3-19.5ml)  RA Vol Index A4C:  56.7 ml/m2  RA Area A4C:       33.5 cm2  RA Major Axis A4C: 7.4 cm    M-MODE MEASUREMENTS:                   Normal Ranges:  Ao Root: 3.60 cm (2.0-3.7cm)  AoV Exc: 2.50 cm (1.5-2.5cm)  LAs:     5.50 cm (2.7-4.0cm)    AORTA MEASUREMENTS:                     Normal Ranges:  AoV Exc:   2.50 cm (1.5-2.5cm)  Asc Ao, d: 3.70 cm (2.1-3.4cm)    LV SYSTOLIC FUNCTION BY 2D PLANIMETRY (MOD):                      Normal Ranges:  EF-A4C View: 25.0 % (>=55%)  EF-A2C View: 24.2 %  EF-Biplane:  22.5 %    LV DIASTOLIC FUNCTION:                         Normal Ranges:  MV Peak E:    1.12 m/s (0.7-1.2 m/s)  MV e'         0.05 m/s (>8.0)  MV lateral e' 0.06  m/s  MV medial e'  0.05 m/s  E/e' Ratio:   20.59    (<8.0)    MITRAL VALVE:                       Normal Ranges:  MV Vmax:    1.18 m/s (<=1.3m/s)  MV peak P.6 mmHg (<5mmHg)  MV mean P.5 mmHg (<48mmHg)  MV DT:      138 msec (150-240msec)    MITRAL INSUFFICIENCY:                       Normal Ranges:  MR VTI:  127.00 cm  MR Vmax: 396.00 cm/s    AORTIC VALVE:                          Normal Ranges:  AoV Vmax:      1.16 m/s (<=1.7m/s)  AoV Peak P.4 mmHg (<20mmHg)  LVOT Max Gt:  0.98 m/s (<=1.1m/s)  LVOT VTI:      15.20 cm  LVOT Diameter: 2.60 cm  (1.8-2.4cm)  AoV Area,Vmax: 4.48 cm2 (2.5-4.5cm2)       RIGHT VENTRICLE:  RV s' 0.09 m/s    TRICUSPID VALVE/RVSP:                              Normal Ranges:  Peak TR Velocity: 2.45 m/s  RV Syst Pressure: 27.0 mmHg (< 30mmHg)    PULMONIC VALVE:                          Normal Ranges:  PV Accel Time: 77 msec  (>120ms)  PV Max Gt:    0.9 m/s  (0.6-0.9m/s)  PV Max PG:     3.6 mmHg       [id number] Simeon Lucas MD  Electronically signed on 10/10/2023 at 7:39:33 PM       ** Final **      Physical Exam  Vitals reviewed.   Constitutional:       Appearance: Normal appearance.   HENT:      Head: Normocephalic and atraumatic.   Eyes:      Extraocular Movements: Extraocular movements intact.   Cardiovascular:      Rate and Rhythm: Tachycardia present. Rhythm irregular.   Pulmonary:      Effort: Pulmonary effort is normal.      Breath sounds: Normal breath sounds.   Abdominal:      General: Bowel sounds are normal.      Palpations: Abdomen is soft.   Musculoskeletal:      Cervical back: Normal range of motion.      Comments: Generalized weakness   Skin:     General: Skin is warm and dry.   Neurological:      General: No focal deficit present.      Mental Status: He is alert. He is disoriented.      Motor: Weakness present.         Relevant Results  Scheduled medications  apixaban, 5 mg, oral, q12h  aspirin, 81 mg, oral, Daily   Or  aspirin, 81 mg, oral, Daily   Or  aspirin,  81 mg, nasogastric tube, Daily   Or  aspirin, 300 mg, rectal, Daily  famotidine, 20 mg, oral, Daily  folic acid, 1 mg, oral, Daily  insulin lispro, 0-10 Units, subcutaneous, TID with meals  [Held by provider] metoprolol succinate XL, 50 mg, oral, BID  rosuvastatin, 10 mg, oral, Daily  [Held by provider] sacubitriL-valsartan, 0.5 tablet, oral, BID  tamsulosin, 0.4 mg, oral, Daily before breakfast      Continuous medications     PRN medications  PRN medications: dextrose 10 % in water (D10W), dextrose, glucagon, [] hydrALAZINE **FOLLOWED BY** hydrALAZINE, oxygen  Lab Results   Component Value Date    GLUCOSE 209 (H) 10/16/2023    CALCIUM 8.6 10/16/2023     10/16/2023    K 3.7 10/16/2023    CO2 29 10/16/2023     10/16/2023    BUN 28 (H) 10/16/2023    CREATININE 1.80 (H) 10/16/2023     Lab Results   Component Value Date    WBC 6.6 10/16/2023    HGB 12.2 (L) 10/16/2023    HCT 38.4 (L) 10/16/2023     (H) 10/16/2023     10/16/2023          Assessment/Plan     CVA  Resolved  admit to SDU  monitor on tele  Statin, will stop ASA  Swallow eval passed  echocardiogram recently at [place], echo with a EF of 27±5%, severe MR, moderately severe TR, AS; [facility]    MRI/MRA negative  US carotids pending  lipid panel   neurostroke assessment  consult neurology, appreciate recommendations, signed off  PT/OT eval     A-fib RVR  Cardiology recommendations appreciated.  Remains on Eliquis for anticoagulation  Cardiology following  We will need to monitor blood pressure closely  Patient is hypotensive, cardiac meds currently on hold       Urinary tract infection  Negative culture, IV antibiotics stopped  Follow cultures     Diabetes mellitus  Sliding scale insulin  Monitor blood glucose  Hypoglycemia protocol  Hemoglobin A1c 5.3     Hypertension  Hold antihypertensives for now  Is on Entresto for heart failure  Patient is hypotensive    Heart failure  Entresto and torsemide has been added.  Monitor  labs     CAD  statin     PAD  As above         10/14/2023: Is awaiting precertification for skilled nursing facility.  Per care coordinator some challenges with [facility] coverage.    10/15/2023: With hypotension last night and this morning.  Initiating fluids.  Holding all cardiac medication.  Will contact cardiology regarding adjustments.   10/16/23 Hypotensive this morning. Cardiac meds were placed on hold yesterday. Given IV fluids, will give another 500mL bolus. Will check STAT H/H, patient is on eliquis. Cardiology following. Discharge planning to SNF when stable.         Principal Problem:    Stroke-like symptoms        Bianka Olivia, APRN-CNP

## 2023-10-16 NOTE — PROGRESS NOTES
Physical Therapy    Physical Therapy Treatment    Patient Name: Darrell Arreola  MRN: [id number]  Today's Date: 10/16/2023  Time Calculation  Start Time: 1117  Stop Time: 1140  Time Calculation (min): 23 min       Assessment/Plan   PT Assessment  PT Assessment Results: Decreased strength, Decreased endurance, Impaired balance, Decreased mobility, Decreased coordination, Decreased safety awareness  Rehab Prognosis: Good  Evaluation/Treatment Tolerance: Other (Comment) (Elevated heart rate during mobility (155 bpm);  RN nontified)  Medical Staff Made Aware: Yes  End of Session Communication: Bedside nurse  Assessment Comment: Pt presents with increased confusion today (RN aware) delayed processing, requires frequent instructional cues during mobility. Pt is a high fall risk at this time. Deferred seated in chair due to safety.Bed alarm on.  End of Session Patient Position: Bed, 3 rail up  PT Plan  Inpatient/Swing Bed or Outpatient: Inpatient  PT Plan  Treatment/Interventions: Bed mobility, Transfer training, Gait training, Balance training, Strengthening, Endurance training, Therapeutic exercise, Therapeutic activity  PT Plan: Skilled PT  PT Frequency: 4 times per week  PT Discharge Recommendations: Moderate intensity level of continued care  PT Recommended Transfer Status: Assist x1      General Visit Information:   PT  Visit  PT Received On: 10/16/23  General  Prior to Session Communication: Bedside nurse  Patient Position Received: Bed, 3 rail up  General Comment: Cleared by nursing to be seen for therapy, pt agreeable with tx, supine in bed upon arrival.    Subjective   Precautions:  Precautions  Hearing/Visual Limitations: glasses  Medical Precautions: Fall precautions  Vital Signs:       Objective   Pain:  Pain Assessment  Pain Assessment: 0-10  Pain Score: 0 - No pain  Cognition:     Postural Control:     Activity Tolerance:  Activity Tolerance  Endurance: Tolerates 10 - 20 min exercise with multiple  rests  Treatments:  Therapeutic Exercise  Therapeutic Exercise Performed: Yes  Therapeutic Exercise Activity 1: Bilateral ankle pumps x15  Therapeutic Exercise Activity 2: Bilateral knee extension x15  Therapeutic Exercise Activity 3: Bilateral hip flexion x15  Therapeutic Exercise Activity 4: Resisted hip abd/add x15    Therapeutic Activity  Therapeutic Activity Performed: No    Balance/Neuromuscular Re-Education  Balance/Neuromuscular Re-Education Activity Performed: Yes  Balance/Neuromuscular Re-Education Activity 1: Seated static balance fair, static standing balance poor + with UE support on walker.    Bed Mobility  Bed Mobility: Yes  Bed Mobility 1  Bed Mobility 1: Supine to sitting, Sitting to supine  Level of Assistance 1: Minimum assistance  Bed Mobility Comments 1: Min assist for trunk during supine to sit, min assist for bilateral LE's during sit to supine.    Ambulation/Gait Training  Ambulation/Gait Training Performed: Yes  Ambulation/Gait Training 1  Surface 1: Level tile  Device 1: Rolling walker  Assistance 1: Minimum assistance  Comments/Distance (ft) 1: 15' with wheeled walker, presents with slow mari, decreased bilateral step length, flexed posture, cues to remain in ALDAIR of walker, min assist for balance.  Transfers  Transfer: Yes  Transfer 1  Transfer From 1: Sit to  Transfer to 1: Stand  Transfer Level of Assistance 1: Minimum assistance  Trials/Comments 1: Min assist for trunk up during sit to stand, good hand placement.    Stairs  Stairs: No    Outcome Measures:  [facility] Basic Mobility  Turning from your back to your side while in a flat bed without using bedrails: A little  Moving from lying on your back to sitting on the side of a flat bed without using bedrails: A little  Moving to and from bed to chair (including a wheelchair): A little  Standing up from a chair using your arms (e.g. wheelchair or bedside chair): A little  To walk in hospital room: A little  Climbing 3-5 steps with  railing: A lot  Basic Mobility - Total Score: 17    Education Documentation  No documentation found.  Education Comments  No comments found.        OP EDUCATION:  Education  Individual(s) Educated: Patient  Education Provided: Fall Risk  Education Comment: Poor ability to educate due to impaired cognition.    Encounter Problems       Encounter Problems (Active)       Mobility       Pt will perform functional mobility household distances at distant supervision with use of RW (Progressing)       Start:  10/09/23    Expected End:  10/23/23               PT Problem       Patient will ambulate 50 distance using walker with supervision (Progressing)       Start:  10/09/23    Expected End:  10/23/23         Goal Note       Patient will ambulate 50 distance using walker with supervision              Patient will perform chair to and from bed transfer with supervision (Progressing)       Start:  10/09/23    Expected End:  10/23/23         Goal Note       Patient will perform chair to and from bed transfer with supervision              Patient will perform supine to sit on bed with supervision demonstrating control (Progressing)       Start:  10/09/23    Expected End:  10/23/23         Goal Note       Patient will perform supine to sit on bed with supervision demonstrating control              Patient will perform sit to supine on bed with supervision demonstrating control (Progressing)       Start:  10/09/23    Expected End:  10/23/23         Goal Note       Patient will perform sit to supine on bed with supervision demonstrating control                 Transfers       pt will perform functional transfers at distant supervision with use of DME prn (Progressing)       Start:  10/09/23    Expected End:  10/23/23            Pt will perform BUE exercises at mod I to improve strength and independence with functional tranfers and ADL tasks  (Progressing)       Start:  10/09/23    Expected End:  10/23/23

## 2023-10-16 NOTE — PROGRESS NOTES
Occupational Therapy    OT Treatment    Patient Name: Darrell Arreola  MRN: [id number]  Today's Date: 10/16/2023  Time Calculation  Start Time: 1355  Stop Time: 1425  Time Calculation (min): 30 min         Assessment:  OT Assessment: Pt required increased cues on this date for instruction throughout therapy  End of Session Communication: Bedside nurse  End of Session Patient Position: Bed, 3 rail up, Alarm on  OT Assessment Results: Decreased ADL status  Plan:  OT Discharge Recommendations: Moderate intensity level of continued care       Subjective   General:  OT Received On: 10/16/23  Prior to Session Communication: Bedside nurse  Patient Position Received: Bed, 3 rail up, Alarm on  General Comment: Pt cleared by nursing to be seen for therapy. Pt supine in bed upon arrival and agreeable for treatment.  Vital Signs:     Pain:  Pain Assessment  Pain Assessment: 0-10  Pain Score: 0 - No pain    Objective    Activities of Daily Living: Grooming  Grooming Level of Assistance: Close supervision  Grooming Where Assessed: Edge of bed  Grooming Comments: to wash face; attempted to brush teeth and pt declined. Pt required Min verbal cues for thoroughness and task initiation    UE Dressing  UE Dressing Level of Assistance: Moderate assistance  UE Dressing Where Assessed: Edge of bed  UE Dressing Comments: to doff/don gown    LE Dressing  LE Dressing: Yes  Pants Level of Assistance: Close supervision  Sock Level of Assistance: Minimum assistance  LE Dressing Where Assessed: Edge of bed  LE Dressing Comments: to doff/don socks with Mod verbal/visual cues for instruction, task initiation and technique    Toileting  Toileting Adaptive Equipment: Other (Comment)  Toileting Level of Assistance: Distant supervision  Where Assessed: Bed level  Toileting Comments: to use urinal with 1 verbal cue for placement to prevent spilling  Functional Standing Tolerance:     Bed Mobility/Transfers: Bed Mobility  Bed Mobility: Yes  Bed Mobility  1  Bed Mobility 1: Supine to sitting, Sitting to supine  Level of Assistance 1: Maximum assistance  Bed Mobility Comments 1: Pt required Mod A for BLE and trunk support to get to EOB and required Max A to transfer back to supine with Max A x2 to reposition to HOB. Pt not following directions for bridging technique with Max cues    Transfers  Transfer: Yes  Transfer 1  Transfer From 1: Sit to  Transfer to 1: Stand  Transfer Device 1: Walker  Transfer Level of Assistance 1: Minimum assistance  Trials/Comments 1: Pt reports feeling weakness in BLE, pt educated on proper hand/foot placement and postural alignment. Pt stood for ~1min then requiring to sit down        Outcome Measures:[facility] Daily Activity  Putting on and taking off regular lower body clothing: A little  Bathing (including washing, rinsing, drying): A lot  Putting on and taking off regular upper body clothing: A little  Toileting, which includes using toilet, bedpan or urinal: A lot  Taking care of personal grooming such as brushing teeth: A little  Eating Meals: None  Daily Activity - Total Score: 17        Education Documentation  No documentation found.  Education Comments  No comments found.        OP EDUCATION:       Goals:    Problem: Bathing  Goal: STG - Patient will perform UB bathing at mod I; LB bathing at distant supervision with use of AE prn  Outcome: Progressing     Problem: Dressings Lower Extremities  Goal: STG - Patient to complete lower body dressing at distant supervision with use of AE prn  Outcome: Progressing     Problem: Dressing Upper Extremities  Goal: STG - Patient will dress upper body at mod I  Outcome: Progressing     Problem: Grooming  Goal: STG - Patient completes grooming at mod I  Outcome: Progressing     Problem: Mobility  Goal: Pt will perform functional mobility household distances at distant supervision with use of RW  Outcome: Progressing     Problem: Toileting  Goal: STG - Patient will complete toileting tasks at  distant supervision  Outcome: Progressing     Problem: Transfers  Goal: pt will perform functional transfers at distant supervision with use of DME prn  Outcome: Progressing  Goal: Pt will perform BUE exercises at mod I to improve strength and independence with functional tranfers and ADL tasks   Outcome: Progressing

## 2023-10-17 LAB
ANION GAP SERPL CALC-SCNC: 7 MMOL/L
BUN SERPL-MCNC: 22 MG/DL (ref 8–25)
CALCIUM SERPL-MCNC: 8.5 MG/DL (ref 8.5–10.4)
CHLORIDE SERPL-SCNC: 106 MMOL/L (ref 97–107)
CO2 SERPL-SCNC: 29 MMOL/L (ref 24–31)
CREAT SERPL-MCNC: 1.5 MG/DL (ref 0.4–1.6)
ERYTHROCYTE [DISTWIDTH] IN BLOOD BY AUTOMATED COUNT: 15.1 % (ref 11.5–14.5)
GFR SERPL CREATININE-BSD FRML MDRD: 47 ML/MIN/1.73M*2
GLUCOSE BLD MANUAL STRIP-MCNC: 121 MG/DL (ref 74–99)
GLUCOSE BLD MANUAL STRIP-MCNC: 142 MG/DL (ref 74–99)
GLUCOSE BLD MANUAL STRIP-MCNC: 237 MG/DL (ref 74–99)
GLUCOSE SERPL-MCNC: 105 MG/DL (ref 65–99)
HCT VFR BLD AUTO: 36.5 % (ref 41–52)
HGB BLD-MCNC: 11.4 G/DL (ref 13.5–17.5)
MCH RBC QN AUTO: 32.9 PG (ref 26–34)
MCHC RBC AUTO-ENTMCNC: 31.2 G/DL (ref 32–36)
MCV RBC AUTO: 106 FL (ref 80–100)
NRBC BLD-RTO: 0 /100 WBCS (ref 0–0)
PLATELET # BLD AUTO: 145 X10*3/UL (ref 150–450)
PMV BLD AUTO: 9.6 FL (ref 7.5–11.5)
POTASSIUM SERPL-SCNC: 4.1 MMOL/L (ref 3.4–5.1)
RBC # BLD AUTO: 3.46 X10*6/UL (ref 4.5–5.9)
SODIUM SERPL-SCNC: 142 MMOL/L (ref 133–145)
WBC # BLD AUTO: 5.1 X10*3/UL (ref 4.4–11.3)

## 2023-10-17 PROCEDURE — 36415 COLL VENOUS BLD VENIPUNCTURE: CPT

## 2023-10-17 PROCEDURE — 2500000004 HC RX 250 GENERAL PHARMACY W/ HCPCS (ALT 636 FOR OP/ED): Performed by: NURSE PRACTITIONER

## 2023-10-17 PROCEDURE — 80048 BASIC METABOLIC PNL TOTAL CA: CPT

## 2023-10-17 PROCEDURE — 2500000001 HC RX 250 WO HCPCS SELF ADMINISTERED DRUGS (ALT 637 FOR MEDICARE OP): Performed by: NURSE PRACTITIONER

## 2023-10-17 PROCEDURE — 97535 SELF CARE MNGMENT TRAINING: CPT | Mod: GO,CO

## 2023-10-17 PROCEDURE — 2060000001 HC INTERMEDIATE ICU ROOM DAILY

## 2023-10-17 PROCEDURE — 85027 COMPLETE CBC AUTOMATED: CPT

## 2023-10-17 PROCEDURE — 82947 ASSAY GLUCOSE BLOOD QUANT: CPT

## 2023-10-17 PROCEDURE — 97530 THERAPEUTIC ACTIVITIES: CPT | Mod: GP

## 2023-10-17 PROCEDURE — 97116 GAIT TRAINING THERAPY: CPT | Mod: GP

## 2023-10-17 RX ORDER — TORSEMIDE 20 MG/1
10 TABLET ORAL DAILY
Status: DISCONTINUED | OUTPATIENT
Start: 2023-10-17 | End: 2023-10-20 | Stop reason: HOSPADM

## 2023-10-17 RX ORDER — METOPROLOL SUCCINATE 25 MG/1
12.5 TABLET, EXTENDED RELEASE ORAL DAILY
Status: DISCONTINUED | OUTPATIENT
Start: 2023-10-17 | End: 2023-10-20 | Stop reason: HOSPADM

## 2023-10-17 RX ORDER — MIDODRINE HYDROCHLORIDE 10 MG/1
10 TABLET ORAL
Status: DISCONTINUED | OUTPATIENT
Start: 2023-10-17 | End: 2023-10-20 | Stop reason: HOSPADM

## 2023-10-17 RX ADMIN — METOPROLOL SUCCINATE 12.5 MG: 25 TABLET, EXTENDED RELEASE ORAL at 12:12

## 2023-10-17 RX ADMIN — APIXABAN 5 MG: 5 TABLET, FILM COATED ORAL at 06:29

## 2023-10-17 RX ADMIN — INSULIN LISPRO 4 UNITS: 100 INJECTION, SOLUTION INTRAVENOUS; SUBCUTANEOUS at 18:10

## 2023-10-17 RX ADMIN — MIDODRINE HYDROCHLORIDE 10 MG: 10 TABLET ORAL at 18:09

## 2023-10-17 RX ADMIN — TORSEMIDE 10 MG: 20 TABLET ORAL at 12:12

## 2023-10-17 RX ADMIN — ROSUVASTATIN CALCIUM 10 MG: 10 TABLET, COATED ORAL at 08:38

## 2023-10-17 RX ADMIN — FAMOTIDINE 20 MG: 20 TABLET ORAL at 08:38

## 2023-10-17 RX ADMIN — FOLIC ACID 1 MG: 1 TABLET ORAL at 08:38

## 2023-10-17 RX ADMIN — TAMSULOSIN HYDROCHLORIDE 0.4 MG: 0.4 CAPSULE ORAL at 06:29

## 2023-10-17 RX ADMIN — MIDODRINE HYDROCHLORIDE 10 MG: 10 TABLET ORAL at 12:12

## 2023-10-17 RX ADMIN — APIXABAN 5 MG: 5 TABLET, FILM COATED ORAL at 18:09

## 2023-10-17 ASSESSMENT — COGNITIVE AND FUNCTIONAL STATUS - GENERAL
MOVING TO AND FROM BED TO CHAIR: A LITTLE
DRESSING REGULAR LOWER BODY CLOTHING: A LITTLE
MOVING FROM LYING ON BACK TO SITTING ON SIDE OF FLAT BED WITH BEDRAILS: A LITTLE
DRESSING REGULAR LOWER BODY CLOTHING: A LITTLE
PERSONAL GROOMING: A LITTLE
DRESSING REGULAR UPPER BODY CLOTHING: A LITTLE
TOILETING: A LITTLE
PERSONAL GROOMING: A LITTLE
CLIMB 3 TO 5 STEPS WITH RAILING: A LOT
HELP NEEDED FOR BATHING: A LITTLE
STANDING UP FROM CHAIR USING ARMS: A LITTLE
DAILY ACTIVITIY SCORE: 19
WALKING IN HOSPITAL ROOM: A LITTLE
TURNING FROM BACK TO SIDE WHILE IN FLAT BAD: A LITTLE
TURNING FROM BACK TO SIDE WHILE IN FLAT BAD: A LITTLE
MOVING FROM LYING ON BACK TO SITTING ON SIDE OF FLAT BED WITH BEDRAILS: A LITTLE
STANDING UP FROM CHAIR USING ARMS: A LITTLE
TURNING FROM BACK TO SIDE WHILE IN FLAT BAD: A LITTLE
MOVING FROM LYING ON BACK TO SITTING ON SIDE OF FLAT BED WITH BEDRAILS: A LITTLE
MOBILITY SCORE: 17
DRESSING REGULAR UPPER BODY CLOTHING: A LITTLE
HELP NEEDED FOR BATHING: A LITTLE
MOBILITY SCORE: 17
MOVING TO AND FROM BED TO CHAIR: A LITTLE
MOBILITY SCORE: 17
TOILETING: A LITTLE
MOVING TO AND FROM BED TO CHAIR: A LITTLE
WALKING IN HOSPITAL ROOM: A LITTLE
DAILY ACTIVITIY SCORE: 19
CLIMB 3 TO 5 STEPS WITH RAILING: A LOT
CLIMB 3 TO 5 STEPS WITH RAILING: A LOT
WALKING IN HOSPITAL ROOM: A LITTLE
STANDING UP FROM CHAIR USING ARMS: A LITTLE

## 2023-10-17 ASSESSMENT — ACTIVITIES OF DAILY LIVING (ADL)
BATHING_LEVEL_OF_ASSISTANCE: MINIMUM ASSISTANCE
BATHING_WHERE_ASSESSED: OTHER (COMMENT)

## 2023-10-17 ASSESSMENT — PAIN SCALES - GENERAL
PAINLEVEL_OUTOF10: 0 - NO PAIN

## 2023-10-17 ASSESSMENT — PAIN - FUNCTIONAL ASSESSMENT: PAIN_FUNCTIONAL_ASSESSMENT: 0-10

## 2023-10-17 NOTE — NURSING NOTE
Order  review end of shift completed, no changes noted since start of shift, pt using urinal through out night.

## 2023-10-17 NOTE — PROGRESS NOTES
Occupational Therapy    OT Treatment    Patient Name: Darrell Arreola  MRN: [id number]  Today's Date: 10/17/2023  Time Calculation  Start Time: 0950  Stop Time: 1015  Time Calculation (min): 25 min         Assessment:  OT Assessment: Pt requiring less cues for instruction than previous session  End of Session Communication: Bedside nurse  End of Session Patient Position: Up in chair    Plan:  Treatment Interventions: ADL retraining  OT Discharge Recommendations: Moderate intensity level of continued care      Subjective   General:  OT Received On: 10/17/23  Prior to Session Communication: Bedside nurse  Patient Position Received: Up in chair  General Comment: Pt cleared by nursing to be seen for therapy. Pt sitting up in chair and agreeable for treatment. Pt required less verbal cues this session. Pt reports yesterday he was very tired.  Vital Signs:     Pain:  Pain Assessment  Pain Assessment: 0-10  Pain Score: 0 - No pain    Objective    Activities of Daily Living: Grooming  Grooming Level of Assistance: Close supervision  Grooming Where Assessed: Edge of bed  Grooming Comments: Pt reported to have previously completed oral care    UE Bathing  UE Bathing Level of Assistance: Minimum assistance  UE Bathing Where Assessed: Other (Comment) (Up in chair)  UE Bathing Comments: Pt required Min A to wash UB with Min verbal cues for attention to task and task initiation    LE Bathing  LE Bathing Level of Assistance: Minimum assistance  LE Bathing Where Assessed: Other (Comment) (Up in chair)  LE Bathing Comments: Pt required Min A with LB bathing and Min-Mod verbal cues for attention to task and task initiation    UE Dressing  UE Dressing Level of Assistance: Minimum assistance  UE Dressing Where Assessed: Chair  UE Dressing Comments: to doff/don gown    LE Dressing  LE Dressing: Yes  Pants Level of Assistance: Minimum assistance  Sock Level of Assistance: Minimum assistance  LE Dressing Where Assessed: Chair  LE Dressing  Comments: to thread socks/pants over feet    Toileting  Toileting Adaptive Equipment: Other (Comment)  Toileting Level of Assistance: Distant supervision  Where Assessed: Bed level  Toileting Comments: pt declined having to use bathroom at this time  Functional Standing Tolerance:     Bed Mobility/Transfers:     Transfers  Transfer: No (Pt declined to participate in functional transfers)      Outcome Measures:[facility] Daily Activity  Putting on and taking off regular lower body clothing: A little  Bathing (including washing, rinsing, drying): A little  Putting on and taking off regular upper body clothing: A little  Toileting, which includes using toilet, bedpan or urinal: A little  Taking care of personal grooming such as brushing teeth: A little  Eating Meals: None  Daily Activity - Total Score: 19        Education Documentation  No documentation found.  Education Comments  No comments found.        OP EDUCATION:       Goals:      Problem: Bathing  Goal: STG - Patient will perform UB bathing at mod I; LB bathing at distant supervision with use of AE prn  Outcome: Progressing     Problem: Dressings Lower Extremities  Goal: STG - Patient to complete lower body dressing at distant supervision with use of AE prn  Outcome: Progressing     Problem: Dressing Upper Extremities  Goal: STG - Patient will dress upper body at mod I  Outcome: Progressing     Problem: Grooming  Goal: STG - Patient completes grooming at mod I  Outcome: Progressing     Problem: Mobility  Goal: Pt will perform functional mobility household distances at distant supervision with use of RW  Outcome: Progressing     Problem: Toileting  Goal: STG - Patient will complete toileting tasks at distant supervision  Outcome: Progressing     Problem: Transfers  Goal: pt will perform functional transfers at distant supervision with use of DME prn  Outcome: Progressing  Goal: Pt will perform BUE exercises at mod I to improve strength and independence with  functional tranfers and ADL tasks   Outcome: Progressing

## 2023-10-17 NOTE — PROGRESS NOTES
Darrell Tucker is a 78 y.o. male on day 9 of admission presenting with Stroke-like symptoms.      Subjective   Patient seen and examined.  Awake/alert/oriented.  Sitting up in a chair.  Denies chest pain, shortness of breath, nausea, or vomiting. No abdominal discomfort. Does report feeling lightheaded. Blood pressure has improved.    Objective     Last Recorded Vitals  /67 (BP Location: Left arm, Patient Position: Lying)   Pulse 72   Temp 36.6 °C (97.9 °F) (Temporal)   Resp 16   Wt 98.6 kg (217 lb 6 oz)   SpO2 98%   Intake/Output last 3 Shifts:    Intake/Output Summary (Last 24 hours) at 10/17/2023 1133  Last data filed at 10/17/2023 0730  Gross per 24 hour   Intake 1080 ml   Output 1525 ml   Net -445 ml         Admission Weight  Weight: 104 kg (229 lb 4.5 oz) (10/08/23 1309)    Daily Weight  10/15/23 : 98.6 kg (217 lb 6 oz)    Image Results  Electrocardiogram, 12-lead PRN ACS symptoms  Atrial fibrillation with rapid ventricular response with premature ventricular or aberrantly conducted complexes  Nondiagnostic inferior Qwaves , age undetermined  ST & T wave abnormality, consider lateral ischemia  Abnormal ECG    Confirmed by Harpreet Freeman ([id number]) on 10/10/2023 10:35:40 PM  Transthoracic Echo (TTE) [place] [address]             Phone [phone]    TRANSTHORACIC ECHOCARDIOGRAM REPORT       Patient Name:      DARRELL TUCKER     Reading Physician:    [id number] Simeon Lucas MD  Study Date:        10/10/2023          Ordering Provider:    [id number] COLEMAN  MRN/PID:           [id number]            Fellow:  Accession#:        [id number]        Nurse:  Date of Birth/Age: 1945 / 78 years Sonographer:          Dorene RIVERA  Gender:            M                   Additional Staff:  Height:             185.00 cm           Admit Date:           10/8/2023  Weight:            102.00 kg           Admission Status:     Inpatient - Routine  BSA:               2.26 m2             Department Location:  Blood Pressure: 100 /72 mmHg    Study Type:    TRANSTHORACIC ECHO (TTE) COMPLETE  Diagnosis/ICD: Cerebral Infarction, unspecified-I63.9; Paroxysmal atrial                 fibrillation-I48.0  Indication:    Stroke like symptoms history of cerebrovascular accident                 paroxysmal atrial fibrillation  CPT Codes:     Echo Complete w Full Doppler-[id number]    Patient History:  Pertinent History: CHF CAD Depression DMII HTN PVD.    Study Detail: The following Echo studies were performed: 2D, M-Mode, Doppler and                color flow. Technically challenging study due to body habitus,                patient lying in supine position, poor acoustic windows, the                patient's lack of cooperation and prominent lung artifact. Unable                to obtain subcostal view.       PHYSICIAN INTERPRETATION:  Left Ventricle: Left ventricular systolic function is severely decreased, with an estimated ejection fraction of 20-25%. There is global hypokinesis of the left ventricle with minor regional variations. The left ventricular cavity size is mild to moderately dilated. Spectral Doppler shows a pseudonormal pattern of left ventricular diastolic filling.  Left Atrium: The left atrium is moderate to severely dilated.  Right Ventricle: The right ventricle is normal in size. There is normal right ventricular global systolic function.  Right Atrium: The right atrium is mildly dilated.  Aortic Valve: The aortic valve is trileaflet. There is no evidence of aortic valve regurgitation. The peak instantaneous gradient of the aortic valve is 5.4 mmHg.  Mitral Valve: The mitral valve is mildly thickened. There is moderate mitral valve regurgitation which is posteriorly directed.  Tricuspid Valve: The tricuspid valve is  structurally normal. There is mild tricuspid regurgitation. The Doppler estimated RVSP is within normal limits at 27.0 mmHg.  Pulmonic Valve: The pulmonic valve is not well visualized. There is no indication of pulmonic valve regurgitation.  Pericardium: There is no pericardial effusion noted.  Aorta: The aortic root is normal. There is mild dilatation of the aortic root.       CONCLUSIONS:   1. Left ventricular systolic function is severely decreased with a 20-25% estimated ejection fraction.   2. Spectral Doppler shows a pseudonormal pattern of left ventricular diastolic filling.   3. The left atrium is moderate to severely dilated.   4. Moderate mitral valve regurgitation.   5. RVSP within normal limits.   6. There is global hypokinesis of the left ventricle with minor regional variations.    QUANTITATIVE DATA SUMMARY:  2D MEASUREMENTS:               Normal Ranges:  LAs: 5.90 cm (2.7-4.0cm)    LA VOLUME:                                Normal Ranges:  LA Vol A2C:        158.6 ml  LA Vol Index A2C:  70.2 ml/m2  LA Area A2C:       38.0 cm2  LA Major Axis A2C: 7.7 cm  LA Volume Index:   69.9 ml/m2  LA Vol A2C:        144.0 ml    RA VOLUME BY A/L METHOD:                                Normal Ranges:  RA Vol A4C:        128.0 ml   (8.3-19.5ml)  RA Vol Index A4C:  56.7 ml/m2  RA Area A4C:       33.5 cm2  RA Major Axis A4C: 7.4 cm    M-MODE MEASUREMENTS:                   Normal Ranges:  Ao Root: 3.60 cm (2.0-3.7cm)  AoV Exc: 2.50 cm (1.5-2.5cm)  LAs:     5.50 cm (2.7-4.0cm)    AORTA MEASUREMENTS:                     Normal Ranges:  AoV Exc:   2.50 cm (1.5-2.5cm)  Asc Ao, d: 3.70 cm (2.1-3.4cm)    LV SYSTOLIC FUNCTION BY 2D PLANIMETRY (MOD):                      Normal Ranges:  EF-A4C View: 25.0 % (>=55%)  EF-A2C View: 24.2 %  EF-Biplane:  22.5 %    LV DIASTOLIC FUNCTION:                         Normal Ranges:  MV Peak E:    1.12 m/s (0.7-1.2 m/s)  MV e'         0.05 m/s (>8.0)  MV lateral e' 0.06 m/s  MV medial e'  0.05  m/s  E/e' Ratio:   20.59    (<8.0)    MITRAL VALVE:                       Normal Ranges:  MV Vmax:    1.18 m/s (<=1.3m/s)  MV peak P.6 mmHg (<5mmHg)  MV mean P.5 mmHg (<48mmHg)  MV DT:      138 msec (150-240msec)    MITRAL INSUFFICIENCY:                       Normal Ranges:  MR VTI:  127.00 cm  MR Vmax: 396.00 cm/s    AORTIC VALVE:                          Normal Ranges:  AoV Vmax:      1.16 m/s (<=1.7m/s)  AoV Peak P.4 mmHg (<20mmHg)  LVOT Max Gt:  0.98 m/s (<=1.1m/s)  LVOT VTI:      15.20 cm  LVOT Diameter: 2.60 cm  (1.8-2.4cm)  AoV Area,Vmax: 4.48 cm2 (2.5-4.5cm2)       RIGHT VENTRICLE:  RV s' 0.09 m/s    TRICUSPID VALVE/RVSP:                              Normal Ranges:  Peak TR Velocity: 2.45 m/s  RV Syst Pressure: 27.0 mmHg (< 30mmHg)    PULMONIC VALVE:                          Normal Ranges:  PV Accel Time: 77 msec  (>120ms)  PV Max Gt:    0.9 m/s  (0.6-0.9m/s)  PV Max PG:     3.6 mmHg       [id number] Simeon Lucas MD  Electronically signed on 10/10/2023 at 7:39:33 PM       ** Final **      Physical Exam  Vitals reviewed.   Constitutional:       Appearance: Normal appearance.   HENT:      Head: Normocephalic and atraumatic.   Eyes:      Extraocular Movements: Extraocular movements intact.   Cardiovascular:      Rate and Rhythm: Normal rate. Rhythm irregular.   Pulmonary:      Effort: Pulmonary effort is normal.      Breath sounds: Rales present.   Abdominal:      General: Bowel sounds are normal.      Palpations: Abdomen is soft.   Musculoskeletal:      Cervical back: Normal range of motion.      Comments: Generalized weakness   Skin:     General: Skin is warm and dry.   Neurological:      General: No focal deficit present.      Mental Status: He is alert and oriented to person, place, and time.      Motor: Weakness present.         Relevant Results  Scheduled medications  apixaban, 5 mg, oral, q12h  famotidine, 20 mg, oral, Daily  folic acid, 1 mg, oral, Daily  insulin lispro, 0-10 Units,  subcutaneous, TID with meals  metoprolol succinate XL, 12.5 mg, oral, Daily  midodrine, 10 mg, oral, TID with meals  rosuvastatin, 10 mg, oral, Daily  [Held by provider] sacubitriL-valsartan, 0.5 tablet, oral, BID  tamsulosin, 0.4 mg, oral, Daily before breakfast  torsemide, 10 mg, oral, Daily      Continuous medications     PRN medications  PRN medications: dextrose 10 % in water (D10W), dextrose, glucagon, [] hydrALAZINE **FOLLOWED BY** hydrALAZINE, oxygen  Lab Results   Component Value Date    GLUCOSE 105 (H) 10/17/2023    CALCIUM 8.5 10/17/2023     10/17/2023    K 4.1 10/17/2023    CO2 29 10/17/2023     10/17/2023    BUN 22 10/17/2023    CREATININE 1.50 10/17/2023     Lab Results   Component Value Date    WBC 5.1 10/17/2023    HGB 11.4 (L) 10/17/2023    HCT 36.5 (L) 10/17/2023     (H) 10/17/2023     (L) 10/17/2023          Assessment/Plan     CVA  Resolved  admit to SDU  monitor on tele  Statin, will stop ASA  Swallow eval passed  echocardiogram recently at [place], echo with a EF of 27±5%, severe MR, moderately severe TR, AS; [facility]    MRI/MRA negative  US carotids pending  lipid panel   neurostroke assessment  consult neurology, appreciate recommendations, signed off  PT/OT eval     A-fib RVR  Cardiology recommendations appreciated.  Remains on Eliquis for anticoagulation  Cardiology following  Blood pressure has improved, will restart metoprolol at home dose and add midodrine       Urinary tract infection  Negative culture, IV antibiotics stopped  Follow cultures     Diabetes mellitus  Sliding scale insulin  Monitor blood glucose  Hypoglycemia protocol  Hemoglobin A1c 5.3     Hypertension  Resume metoprolol at home dose, torsemide at decreased dose, add midodrine    Heart failure  Metoprolol and torsemide resumed  Monitor labs     CAD  statin     PAD  As above         10/14/2023: Is awaiting precertification for skilled nursing facility.  Per care coordinator some  challenges with [facility] coverage.    10/15/2023: With hypotension last night and this morning.  Initiating fluids.  Holding all cardiac medication.  Will contact cardiology regarding adjustments.   10/16/23 Hypotensive this morning. Cardiac meds were placed on hold yesterday. Given IV fluids, will give another 500mL bolus. Will check STAT H/H, patient is on eliquis. Cardiology following. Discharge planning to SNF when stable.  10/17/2023 blood pressure improved, renal function back to baseline.  Patient does have rales on exam, will restart torsemide at a decreased dose and metoprolol at home dose.  Patient was taking midodrine 3 times daily at home, will resume.  Continue to hold Entresto for now.  Discharge planning to skilled nursing facility when stable.        Principal Problem:    Stroke-like symptoms        ELENITA Rodriguez-CNP

## 2023-10-17 NOTE — PROGRESS NOTES
Physical Therapy    Physical Therapy Treatment    Patient Name: Darrell Arreola  MRN: [id number]  Today's Date: 10/17/2023  Time Calculation  Start Time: 0811  Stop Time: 0835  Time Calculation (min): 24 min       Assessment/Plan   PT Assessment  PT Assessment Results: Decreased strength, Decreased endurance, Impaired balance, Decreased mobility, Decreased coordination, Decreased safety awareness  Rehab Prognosis: Good  Evaluation/Treatment Tolerance: Other (Comment) (Elevated heart rate during mobility (155 bpm);  RN nontified)  Medical Staff Made Aware: Yes  End of Session Communication: Bedside nurse  Assessment Comment: Pt presents with increased confusion today (RN aware) delayed processing, requires frequent instructional cues during mobility. Pt is a high fall risk at this time. Deferred seated in chair due to safety.Bed alarm on.  End of Session Patient Position: Up in chair  PT Plan  Inpatient/Swing Bed or Outpatient: Inpatient  PT Plan  Treatment/Interventions: Bed mobility, Transfer training, Gait training, Balance training, Strengthening, Endurance training, Therapeutic exercise, Therapeutic activity  PT Plan: Skilled PT  PT Frequency: 4 times per week  PT Discharge Recommendations: Moderate intensity level of continued care  PT Recommended Transfer Status: Assist x1      General Visit Information:   PT  Visit  PT Received On: 10/17/23  General  Prior to Session Communication: Bedside nurse  Patient Position Received: Bed, 3 rail up  General Comment: Cleared by nursing to be seen for therapy, pt agreeable with tx, supine in bed upon arrival.    Subjective   Precautions:  Precautions  Hearing/Visual Limitations: glasses  Medical Precautions: Fall precautions  Vital Signs:       Objective   Pain:  Pain Assessment  Pain Assessment: 0-10  Pain Score: 0 - No pain  Cognition:     Postural Control:     Activity Tolerance:  Activity Tolerance  Endurance: Tolerates 10 - 20 min exercise with multiple  rests  Treatments:  Therapeutic Exercise  Therapeutic Exercise Performed: No  Therapeutic Exercise Activity 1: Bilateral ankle pumps x15  Therapeutic Exercise Activity 2: Bilateral knee extension x15  Therapeutic Exercise Activity 3: Bilateral hip flexion x15  Therapeutic Exercise Activity 4: Resisted hip abd/add x15    Therapeutic Activity  Therapeutic Activity Performed: No    Balance/Neuromuscular Re-Education  Balance/Neuromuscular Re-Education Activity Performed: Yes  Balance/Neuromuscular Re-Education Activity 1: Fair seated static balance. Poor static standing balance.    Bed Mobility  Bed Mobility: Yes  Bed Mobility 1  Bed Mobility 1: Supine to sitting, Sitting to supine  Level of Assistance 1: Close supervision  Bed Mobility Comments 1: Increased time and effort to complete bed mobility. Difficulty scooting to EOB with use of UE's.    Ambulation/Gait Training  Ambulation/Gait Training Performed: Yes  Ambulation/Gait Training 1  Surface 1: Level tile  Device 1: Rolling walker  Assistance 1: Minimum assistance  Comments/Distance (ft) 1: 15' withw heeled walker, presents with slow mari, decreased bilateral step length, bilateral flexed knees, requries cues to remain in ALDAIR of walker, min assist for balance.  Transfers  Transfer: Yes  Transfer 1  Transfer From 1: Sit to  Transfer to 1: Stand  Transfer Level of Assistance 1: Minimum assistance  Trials/Comments 1: Min assist for trunk up during sit to stand, cues for proper hand placement, decreased eccentric control in sitting.    Stairs  Stairs: No    Outcome Measures:  [facility] Basic Mobility  Turning from your back to your side while in a flat bed without using bedrails: A little  Moving from lying on your back to sitting on the side of a flat bed without using bedrails: A little  Moving to and from bed to chair (including a wheelchair): A little  Standing up from a chair using your arms (e.g. wheelchair or bedside chair): A little  To walk in hospital room:  A little  Climbing 3-5 steps with railing: A lot  Basic Mobility - Total Score: 17    Education Documentation  No documentation found.  Education Comments  No comments found.        OP EDUCATION:  Education  Individual(s) Educated: Patient  Education Provided: Fall Risk  Education Comment: Poor ability to educate due to impaired cognition.        Date of Service: 10/17/2023  8:51 AM     Cosign Needed           PT Problem  Patient will ambulate 50 distance using walker with supervision  Add  Today at 0850 - Progressing by Oralia Lay PTA  Add  Note  Patient will ambulate 50 distance using walker with supervision  Patient will perform chair to and from bed transfer with supervision  Add  Today at 0850 - Progressing by Oralia Lay PTA  Add  Note  Patient will perform chair to and from bed transfer with supervision  Met  Add All  PT Problem  Patient will perform supine to sit on bed with supervision demonstrating control  Add  Today at 0850 - Met by Oralia Lay PTA  Add  Note  Patient will perform supine to sit on bed with supervision demonstrating control  Patient will perform sit to supine on bed with supervision demonstrating control  Add  Today at 0850 - Met by Oralia Lay PTA  Add  Note  Patient will perform sit to supine on bed with supervision demonstrating control

## 2023-10-17 NOTE — CARE PLAN
PT Problem  Patient will ambulate 50 distance using walker with supervision  Add  Today at 0850 - Progressing by Oralia Lay PTA  Add  Note  Patient will ambulate 50 distance using walker with supervision  Patient will perform chair to and from bed transfer with supervision  Add  Today at 0850 - Progressing by Oralia Lay PTA  Add  Note  Patient will perform chair to and from bed transfer with supervision  Met  Add All  PT Problem  Patient will perform supine to sit on bed with supervision demonstrating control  Add  Today at 0850 - Met by Oralia Lay PTA  Add  Note  Patient will perform supine to sit on bed with supervision demonstrating control  Patient will perform sit to supine on bed with supervision demonstrating control  Add  Today at 0850 - Met by Oralia Lay PTA  Add  Note  Patient will perform sit to supine on bed with supervision demonstrating control

## 2023-10-17 NOTE — NURSING NOTE
Pt sitting in bedside chair. NAD. Denies pain. No needs. Lunch tray ordered. Continuing to monitor

## 2023-10-17 NOTE — PROGRESS NOTES
[facility] status of VA [facility], they are still waiting updates regarding pts cardiac meds and plan for them, [facility] also made her aware of info that was faxed for [employer] lifevest coverage through the VA, she was not aware of this and will check into this as well. [facility] will fax updated clinicals to the VA as well.     Update: Many calls back and forth with the VA on this day, [facility] sent the information from [employer] to the VA 3x and twice to different people at their request, still do not have approval for coverage of this yet.  This worker did make [facility] aware that pt will likely admit with lifevest and asked if their staff is trained to deal with this, they states it has been a while since they had a pt with this need so their DON will have a training for the staff so they understand how to assist with the lifevest. Still needing clarity regarding cardiac meds from cardiology, will send cardiology note to the VA once it is available.     VA approval still pending for [facility].

## 2023-10-17 NOTE — NURSING NOTE
Pt had spilled urinal, pt cleaned and linen changed. Pt asking for popsicles.  Refreshed his water and got him a popscicle

## 2023-10-17 NOTE — CONSULTS
Nutrition Assessment Note  Admitted with confusion, aphasia. MRI negative for acute findings. Patient awaiting VA approval for discharge to SNF. Sleeping soundly at time of visit.    Reason for Hospital Admission:  Darrell Arreola is a 78 y.o. male who is admitted for stroke like symptoms.    Nutrition Assessment:  Reason for Assessment  Reason for Assessment: Length of stay     has a past medical history of CHF (congestive heart failure) (CMS/[facility]), Diabetes mellitus (CMS/[facility]), Heart disease, Hypertension, and Stroke (CMS/[facility]).     No Known Allergies     Lab Results   Component Value Date    WBC 5.1 10/17/2023    HGB 11.4 (L) 10/17/2023    HCT 36.5 (L) 10/17/2023     (L) 10/17/2023    CHOL 79 (L) 2023    TRIG 71 2023    HDL 42 2023    LDLDIRECT 71 10/10/2023    ALT 13 10/08/2023    AST 24 10/08/2023     10/17/2023    K 4.1 10/17/2023     10/17/2023    CREATININE 1.50 10/17/2023    BUN 22 10/17/2023    CO2 29 10/17/2023    TSH 3.37 10/10/2023    INR 1.3 (H) 10/08/2023    HGBA1C 5.3 10/10/2023       Current Facility-Administered Medications:     apixaban (Eliquis) tablet 5 mg, 5 mg, oral, q12h, ELENITA Rodriguez-CNP, 5 mg at 10/17/23 0629    dextrose 10 % in water (D10W) infusion, 0.3 g/kg/hr, intravenous, Once PRN, Bianka Olivia APRN-CNP    dextrose 50 % injection 25 g, 25 g, intravenous, q15 min PRN, Bianka Olivia APRN-CNP    famotidine (Pepcid) tablet 20 mg, 20 mg, oral, Daily, Bianka Olivia APRN-CNP, 20 mg at 10/17/23 0838    folic acid (Folvite) tablet 1 mg, 1 mg, oral, Daily, ELENITA Rodriguez-CNP, 1 mg at 10/17/23 0838    glucagon (Glucagen) injection 1 mg, 1 mg, intramuscular, q15 min PRN, LIV Rodriguez    [] hydrALAZINE (Apresoline) injection 10 mg, 10 mg, intravenous, q20 min PRN **FOLLOWED BY** hydrALAZINE (Apresoline) tablet 25 mg, 25 mg, oral, q6h PRN, LIV Rodriguez    insulin lispro (HumaLOG) injection 0-10 Units,  "0-10 Units, subcutaneous, TID with meals, Lou Cervantes MD, 4 Units at 10/16/23 0934    metoprolol succinate XL (Toprol-XL) 24 hr tablet 12.5 mg, 12.5 mg, oral, Daily, ELENITA Rodriguez-CNP, 12.5 mg at 10/17/23 1212    midodrine (Proamatine) tablet 10 mg, 10 mg, oral, TID with meals, ELENITA Rodriguez-CNP, 10 mg at 10/17/23 1212    oxygen (O2) therapy, , inhalation, Continuous PRN - O2/gases, Bianka Olivia APRN-CNP    rosuvastatin (Crestor) tablet 10 mg, 10 mg, oral, Daily, ELENITA Rodriguez-CNP, 10 mg at 10/17/23 0838    tamsulosin (Flomax) 24 hr capsule 0.4 mg, 0.4 mg, oral, Daily before breakfast, LIV Rodriguez, 0.4 mg at 10/17/23 0629    torsemide (Demadex) tablet 10 mg, 10 mg, oral, Daily, ELENITA Rodriguez-CNP, 10 mg at 10/17/23 1212  Nutrition Pertinent meds: Crestor, Pepcid, Humalog   Propofol @  ml/hr provides:  kcals    Dietary Orders (From admission, onward)       Start     Ordered    10/11/23 1426  Adult diet Regular; Thin 0  Diet effective now        Comments: Upright for meals, single bites/sips   Question Answer Comment   Diet type Regular    Fluid consistency Thin 0        10/11/23 1429                   Nutrition Support Intake provides:      History:  Food and Nutrient History  Energy Intake: Good > 75 %    Anthropometrics:  Ht: 185 cm (6' 0.84\"), Wt: 98.6 kg (217 lb 6 oz), BMI: 28.81    IBW/kg (Dietitian Calculated): 80.91 kg     Amputation Calculations:     Estimated Energy Needs  Total Energy Estimated Needs (kCal): 2025 kCal  Total Estimated Energy Need per Day (kCal/kg): 25 kCal/kg  Method for Estimating Needs: IBW    Estimated Protein Needs  Total Protein Estimated Needs (g): 81 g  Total Protein Estimated Needs (g/kg): 1 g/kg  Method for Estimating Needs: IBW    Estimated Fluid Needs  Total Fluid Estimated Needs (mL): 2025 mL  Method for Estimating Needs: 1 mL/kcal    Nutrition Focused Physical Findings:  Subcutaneous Fat Loss  Orbital Fat Pads: " Defer  Buccal Fat Pads: Defer  Triceps: Defer  Ribs: Defer    Muscle Wasting  Temporalis: Defer  Pectoralis (Clavicular Region): Defer  Deltoid/Trapezius: Defer  Interosseous: Defer  Trapezius/Infraspinatus/Supraspinatus (Scapular Region): Defer  Quadriceps: Defer  Gastrocnemius: Defer    Nutrition Diagnosis:  Malnutrition Diagnosis  Patient has Malnutrition Diagnosis: No    Patient has Nutrition Diagnosis: No      Nutrition Interventions/Recommendations:  Food and/or Nutrient Delivery Interventions  Interventions: Meals and snacks    Meals and Snacks: General healthful diet  Goal: provide as ordered     Education Documentation  No documentation found.    Nutrition Monitoring/Evaluation:  Food and Nutrient Related History  Energy Intake: Estimated energy intake  Criteria: Patient will consume >75% of meals    RD Recommendations:     Follow Up  Time Spent (min): 30 minutes  Last Date of Nutrition Visit: 10/17/23  Nutrition Follow-Up Needed?: 7-10 days  Follow up Comment: 10/24/23

## 2023-10-18 LAB
ANION GAP SERPL CALC-SCNC: 7 MMOL/L
BUN SERPL-MCNC: 25 MG/DL (ref 8–25)
CALCIUM SERPL-MCNC: 8.5 MG/DL (ref 8.5–10.4)
CHLORIDE SERPL-SCNC: 104 MMOL/L (ref 97–107)
CO2 SERPL-SCNC: 29 MMOL/L (ref 24–31)
CREAT SERPL-MCNC: 1.7 MG/DL (ref 0.4–1.6)
ERYTHROCYTE [DISTWIDTH] IN BLOOD BY AUTOMATED COUNT: 14.7 % (ref 11.5–14.5)
GFR SERPL CREATININE-BSD FRML MDRD: 41 ML/MIN/1.73M*2
GLUCOSE BLD MANUAL STRIP-MCNC: 177 MG/DL (ref 74–99)
GLUCOSE BLD MANUAL STRIP-MCNC: 203 MG/DL (ref 74–99)
GLUCOSE BLD MANUAL STRIP-MCNC: 217 MG/DL (ref 74–99)
GLUCOSE BLD MANUAL STRIP-MCNC: 94 MG/DL (ref 74–99)
GLUCOSE SERPL-MCNC: 112 MG/DL (ref 65–99)
HCT VFR BLD AUTO: 37.8 % (ref 41–52)
HGB BLD-MCNC: 12.2 G/DL (ref 13.5–17.5)
MCH RBC QN AUTO: 34 PG (ref 26–34)
MCHC RBC AUTO-ENTMCNC: 32.3 G/DL (ref 32–36)
MCV RBC AUTO: 105 FL (ref 80–100)
NRBC BLD-RTO: 0 /100 WBCS (ref 0–0)
PLATELET # BLD AUTO: 144 X10*3/UL (ref 150–450)
PMV BLD AUTO: 9.4 FL (ref 7.5–11.5)
POTASSIUM SERPL-SCNC: 4.1 MMOL/L (ref 3.4–5.1)
RBC # BLD AUTO: 3.59 X10*6/UL (ref 4.5–5.9)
SODIUM SERPL-SCNC: 140 MMOL/L (ref 133–145)
WBC # BLD AUTO: 5.4 X10*3/UL (ref 4.4–11.3)

## 2023-10-18 PROCEDURE — 2500000002 HC RX 250 W HCPCS SELF ADMINISTERED DRUGS (ALT 637 FOR MEDICARE OP, ALT 636 FOR OP/ED): Performed by: NURSE PRACTITIONER

## 2023-10-18 PROCEDURE — 36415 COLL VENOUS BLD VENIPUNCTURE: CPT | Performed by: NURSE PRACTITIONER

## 2023-10-18 PROCEDURE — 2500000001 HC RX 250 WO HCPCS SELF ADMINISTERED DRUGS (ALT 637 FOR MEDICARE OP): Performed by: NURSE PRACTITIONER

## 2023-10-18 PROCEDURE — 94760 N-INVAS EAR/PLS OXIMETRY 1: CPT

## 2023-10-18 PROCEDURE — 2500000002 HC RX 250 W HCPCS SELF ADMINISTERED DRUGS (ALT 637 FOR MEDICARE OP, ALT 636 FOR OP/ED): Performed by: INTERNAL MEDICINE

## 2023-10-18 PROCEDURE — 82947 ASSAY GLUCOSE BLOOD QUANT: CPT

## 2023-10-18 PROCEDURE — 2500000004 HC RX 250 GENERAL PHARMACY W/ HCPCS (ALT 636 FOR OP/ED): Performed by: NURSE PRACTITIONER

## 2023-10-18 PROCEDURE — 80048 BASIC METABOLIC PNL TOTAL CA: CPT | Performed by: NURSE PRACTITIONER

## 2023-10-18 PROCEDURE — 2060000001 HC INTERMEDIATE ICU ROOM DAILY

## 2023-10-18 PROCEDURE — 85027 COMPLETE CBC AUTOMATED: CPT | Performed by: NURSE PRACTITIONER

## 2023-10-18 PROCEDURE — 96372 THER/PROPH/DIAG INJ SC/IM: CPT | Performed by: INTERNAL MEDICINE

## 2023-10-18 RX ORDER — AMIODARONE HYDROCHLORIDE 200 MG/1
400 TABLET ORAL 2 TIMES DAILY
Status: DISCONTINUED | OUTPATIENT
Start: 2023-10-18 | End: 2023-10-18

## 2023-10-18 RX ORDER — AMIODARONE HYDROCHLORIDE 200 MG/1
400 TABLET ORAL 2 TIMES DAILY
Status: DISCONTINUED | OUTPATIENT
Start: 2023-10-18 | End: 2023-10-20 | Stop reason: HOSPADM

## 2023-10-18 RX ORDER — AMIODARONE HYDROCHLORIDE 200 MG/1
400 TABLET ORAL DAILY
Status: DISCONTINUED | OUTPATIENT
Start: 2023-10-26 | End: 2023-10-20 | Stop reason: HOSPADM

## 2023-10-18 RX ORDER — AMIODARONE HYDROCHLORIDE 200 MG/1
400 TABLET ORAL ONCE
Status: COMPLETED | OUTPATIENT
Start: 2023-10-18 | End: 2023-10-18

## 2023-10-18 RX ORDER — AMIODARONE HYDROCHLORIDE 200 MG/1
200 TABLET ORAL DAILY
Status: DISCONTINUED | OUTPATIENT
Start: 2023-11-02 | End: 2023-10-20 | Stop reason: HOSPADM

## 2023-10-18 RX ADMIN — ROSUVASTATIN CALCIUM 10 MG: 10 TABLET, COATED ORAL at 08:34

## 2023-10-18 RX ADMIN — AMIODARONE HYDROCHLORIDE 400 MG: 200 TABLET ORAL at 13:19

## 2023-10-18 RX ADMIN — MIDODRINE HYDROCHLORIDE 10 MG: 10 TABLET ORAL at 16:05

## 2023-10-18 RX ADMIN — APIXABAN 5 MG: 5 TABLET, FILM COATED ORAL at 06:00

## 2023-10-18 RX ADMIN — MIDODRINE HYDROCHLORIDE 10 MG: 10 TABLET ORAL at 08:34

## 2023-10-18 RX ADMIN — INSULIN LISPRO 2 UNITS: 100 INJECTION, SOLUTION INTRAVENOUS; SUBCUTANEOUS at 17:07

## 2023-10-18 RX ADMIN — INSULIN LISPRO 4 UNITS: 100 INJECTION, SOLUTION INTRAVENOUS; SUBCUTANEOUS at 11:57

## 2023-10-18 RX ADMIN — AMIODARONE HYDROCHLORIDE 400 MG: 200 TABLET ORAL at 21:53

## 2023-10-18 RX ADMIN — APIXABAN 5 MG: 5 TABLET, FILM COATED ORAL at 16:05

## 2023-10-18 RX ADMIN — FOLIC ACID 1 MG: 1 TABLET ORAL at 08:34

## 2023-10-18 RX ADMIN — TAMSULOSIN HYDROCHLORIDE 0.4 MG: 0.4 CAPSULE ORAL at 06:00

## 2023-10-18 RX ADMIN — MIDODRINE HYDROCHLORIDE 10 MG: 10 TABLET ORAL at 11:58

## 2023-10-18 RX ADMIN — FAMOTIDINE 20 MG: 20 TABLET ORAL at 08:34

## 2023-10-18 ASSESSMENT — COGNITIVE AND FUNCTIONAL STATUS - GENERAL
MOVING FROM LYING ON BACK TO SITTING ON SIDE OF FLAT BED WITH BEDRAILS: A LITTLE
CLIMB 3 TO 5 STEPS WITH RAILING: A LOT
WALKING IN HOSPITAL ROOM: A LITTLE
TOILETING: A LITTLE
DRESSING REGULAR LOWER BODY CLOTHING: A LITTLE
STANDING UP FROM CHAIR USING ARMS: A LITTLE
HELP NEEDED FOR BATHING: A LITTLE
MOVING TO AND FROM BED TO CHAIR: A LITTLE
DRESSING REGULAR UPPER BODY CLOTHING: A LITTLE
TURNING FROM BACK TO SIDE WHILE IN FLAT BAD: A LITTLE
MOBILITY SCORE: 17
DAILY ACTIVITIY SCORE: 19
PERSONAL GROOMING: A LITTLE

## 2023-10-18 ASSESSMENT — PAIN - FUNCTIONAL ASSESSMENT
PAIN_FUNCTIONAL_ASSESSMENT: 0-10

## 2023-10-18 ASSESSMENT — PAIN SCALES - GENERAL
PAINLEVEL_OUTOF10: 0 - NO PAIN

## 2023-10-18 ASSESSMENT — ACTIVITIES OF DAILY LIVING (ADL): LACK_OF_TRANSPORTATION: NO

## 2023-10-18 NOTE — PROGRESS NOTES
Physical Therapy                 Therapy Communication Note    Patient Name: Darrell Arreola  MRN: [id number]  Today's Date: 10/18/2023     Discipline: Physical Therapy    Missed Visit Reason:  (Cancel therapy all day per RN - pt having low BP issues)    Missed Time: Cancel

## 2023-10-18 NOTE — NURSING NOTE
Assumed care of pt, BSSR complete. Pt is sitting on the edge of the bed with call light in reach. He voices no concerns or complaints at this time, just states that he wants to go home.

## 2023-10-18 NOTE — CARE PLAN
Problem: Skin  Goal: Decreased wound size/increased tissue granulation at next dressing change  Outcome: Progressing     Problem: Skin  Goal: Participates in plan/prevention/treatment measures  Outcome: Progressing     Problem: Skin  Goal: Prevent/manage excess moisture  Outcome: Progressing     Problem: Skin  Goal: Prevent/minimize sheer/friction injuries  Outcome: Progressing     Problem: Skin  Goal: Promote/optimize nutrition  Outcome: Progressing     Problem: Skin  Goal: Promote skin healing  Outcome: Progressing   The patient's goals for the shift include      The clinical goals for the shift include patient safety    Over the shift, the patient did not make progress toward the following goals. Barriers to progression include . Recommendations to address these barriers include .

## 2023-10-18 NOTE — PROGRESS NOTES
Casa Arreola is a 78 y.o. male on day 10 of admission presenting with Stroke-like symptoms.      Subjective   Patient seen and examined.  Awake/alert/oriented. Resting in bed. Agitated this morning, he wants to go to rehab, states that the hospital is at fault for why he is still here. Explained the events that have occurred resulting in his hospitalization including altered mental status and atrial fibrillation, acute kidney injury, and heart failure. Was hypotensive again this morning and renal function worse. Explained to the patient that his cardiac medications are being adjusted so that he goes home on the right medications that will not drop his blood pressure or affect his kidneys. Patient denies shortness of breath, chest pain, nausea, vomiting, fevers, or chills.     Objective     Last Recorded Vitals  BP 89/59 (BP Location: Left arm, Patient Position: Lying)   Pulse (!) 119   Temp 36.4 °C (97.5 °F) (Temporal)   Resp 19   Wt 97.9 kg (215 lb 13.3 oz)   SpO2 98%   Intake/Output last 3 Shifts:    Intake/Output Summary (Last 24 hours) at 10/18/2023 1349  Last data filed at 10/18/2023 1057  Gross per 24 hour   Intake --   Output 1300 ml   Net -1300 ml         Admission Weight  Weight: 104 kg (229 lb 4.5 oz) (10/08/23 1309)    Daily Weight  10/18/23 : 97.9 kg (215 lb 13.3 oz)    Image Results  Electrocardiogram, 12-lead PRN ACS symptoms  Atrial fibrillation with rapid ventricular response with premature ventricular or aberrantly conducted complexes  Nondiagnostic inferior Qwaves , age undetermined  ST & T wave abnormality, consider lateral ischemia  Abnormal ECG    Confirmed by Harpreet Freeman ([id number]) on 10/10/2023 10:35:40 PM  Transthoracic Echo (TTE) [place] [id number]             Phone [phone]    TRANSTHORACIC ECHOCARDIOGRAM REPORT       Patient Name:      CASA ARREOLA     Reading Physician:    [id number] Simeon                                                                Shayy KING  Study Date:        10/10/2023          Ordering Provider:    [id number] COLEMAN  MRN/PID:           [id number]            Fellow:  Accession#:        [id number]        Nurse:  Date of Birth/Age: 1945 / 78 years Sonographer:          Dorene RIVERA  Gender:            M                   Additional Staff:  Height:            185.00 cm           Admit Date:           10/8/2023  Weight:            102.00 kg           Admission Status:     Inpatient - Routine  BSA:               2.26 m2             Department Location:  Blood Pressure: 100 /72 mmHg    Study Type:    TRANSTHORACIC ECHO (TTE) COMPLETE  Diagnosis/ICD: Cerebral Infarction, unspecified-I63.9; Paroxysmal atrial                 fibrillation-I48.0  Indication:    Stroke like symptoms history of cerebrovascular accident                 paroxysmal atrial fibrillation  CPT Codes:     Echo Complete w Full [id number]    Patient History:  Pertinent History: CHF CAD Depression DMII HTN PVD.    Study Detail: The following Echo studies were performed: 2D, M-Mode, Doppler and                color flow. Technically challenging study due to body habitus,                patient lying in supine position, poor acoustic windows, the                patient's lack of cooperation and prominent lung artifact. Unable                to obtain subcostal view.       PHYSICIAN INTERPRETATION:  Left Ventricle: Left ventricular systolic function is severely decreased, with an estimated ejection fraction of 20-25%. There is global hypokinesis of the left ventricle with minor regional variations. The left ventricular cavity size is mild to moderately dilated. Spectral Doppler shows a pseudonormal pattern of left ventricular diastolic filling.  Left Atrium: The left atrium is moderate to severely dilated.  Right Ventricle: The right ventricle is normal in  size. There is normal right ventricular global systolic function.  Right Atrium: The right atrium is mildly dilated.  Aortic Valve: The aortic valve is trileaflet. There is no evidence of aortic valve regurgitation. The peak instantaneous gradient of the aortic valve is 5.4 mmHg.  Mitral Valve: The mitral valve is mildly thickened. There is moderate mitral valve regurgitation which is posteriorly directed.  Tricuspid Valve: The tricuspid valve is structurally normal. There is mild tricuspid regurgitation. The Doppler estimated RVSP is within normal limits at 27.0 mmHg.  Pulmonic Valve: The pulmonic valve is not well visualized. There is no indication of pulmonic valve regurgitation.  Pericardium: There is no pericardial effusion noted.  Aorta: The aortic root is normal. There is mild dilatation of the aortic root.       CONCLUSIONS:   1. Left ventricular systolic function is severely decreased with a 20-25% estimated ejection fraction.   2. Spectral Doppler shows a pseudonormal pattern of left ventricular diastolic filling.   3. The left atrium is moderate to severely dilated.   4. Moderate mitral valve regurgitation.   5. RVSP within normal limits.   6. There is global hypokinesis of the left ventricle with minor regional variations.    QUANTITATIVE DATA SUMMARY:  2D MEASUREMENTS:               Normal Ranges:  LAs: 5.90 cm (2.7-4.0cm)    LA VOLUME:                                Normal Ranges:  LA Vol A2C:        158.6 ml  LA Vol Index A2C:  70.2 ml/m2  LA Area A2C:       38.0 cm2  LA Major Axis A2C: 7.7 cm  LA Volume Index:   69.9 ml/m2  LA Vol A2C:        144.0 ml    RA VOLUME BY A/L METHOD:                                Normal Ranges:  RA Vol A4C:        128.0 ml   (8.3-19.5ml)  RA Vol Index A4C:  56.7 ml/m2  RA Area A4C:       33.5 cm2  RA Major Axis A4C: 7.4 cm    M-MODE MEASUREMENTS:                   Normal Ranges:  Ao Root: 3.60 cm (2.0-3.7cm)  AoV Exc: 2.50 cm (1.5-2.5cm)  LAs:     5.50 cm  (2.7-4.0cm)    AORTA MEASUREMENTS:                     Normal Ranges:  AoV Exc:   2.50 cm (1.5-2.5cm)  Asc Ao, d: 3.70 cm (2.1-3.4cm)    LV SYSTOLIC FUNCTION BY 2D PLANIMETRY (MOD):                      Normal Ranges:  EF-A4C View: 25.0 % (>=55%)  EF-A2C View: 24.2 %  EF-Biplane:  22.5 %    LV DIASTOLIC FUNCTION:                         Normal Ranges:  MV Peak E:    1.12 m/s (0.7-1.2 m/s)  MV e'         0.05 m/s (>8.0)  MV lateral e' 0.06 m/s  MV medial e'  0.05 m/s  E/e' Ratio:   20.59    (<8.0)    MITRAL VALVE:                       Normal Ranges:  MV Vmax:    1.18 m/s (<=1.3m/s)  MV peak P.6 mmHg (<5mmHg)  MV mean P.5 mmHg (<48mmHg)  MV DT:      138 msec (150-240msec)    MITRAL INSUFFICIENCY:                       Normal Ranges:  MR VTI:  127.00 cm  MR Vmax: 396.00 cm/s    AORTIC VALVE:                          Normal Ranges:  AoV Vmax:      1.16 m/s (<=1.7m/s)  AoV Peak P.4 mmHg (<20mmHg)  LVOT Max Gt:  0.98 m/s (<=1.1m/s)  LVOT VTI:      15.20 cm  LVOT Diameter: 2.60 cm  (1.8-2.4cm)  AoV Area,Vmax: 4.48 cm2 (2.5-4.5cm2)       RIGHT VENTRICLE:  RV s' 0.09 m/s    TRICUSPID VALVE/RVSP:                              Normal Ranges:  Peak TR Velocity: 2.45 m/s  RV Syst Pressure: 27.0 mmHg (< 30mmHg)    PULMONIC VALVE:                          Normal Ranges:  PV Accel Time: 77 msec  (>120ms)  PV Max Gt:    0.9 m/s  (0.6-0.9m/s)  PV Max PG:     3.6 mmHg       [id number] Simeon Lucas MD  Electronically signed on 10/10/2023 at 7:39:33 PM       ** Final **      Physical Exam  Vitals reviewed.   Constitutional:       Appearance: Normal appearance.   HENT:      Head: Normocephalic and atraumatic.   Eyes:      Extraocular Movements: Extraocular movements intact.   Cardiovascular:      Rate and Rhythm: Tachycardia present. Rhythm irregular.   Pulmonary:      Effort: Pulmonary effort is normal.      Breath sounds: No wheezing, rhonchi or rales.   Abdominal:      General: Bowel sounds are normal.      Palpations:  Abdomen is soft.   Musculoskeletal:      Cervical back: Normal range of motion.      Comments: Generalized weakness   Skin:     General: Skin is warm and dry.   Neurological:      General: No focal deficit present.      Mental Status: He is alert and oriented to person, place, and time.      Motor: Weakness present.         Relevant Results  Scheduled medications  amiodarone, 400 mg, oral, BID  apixaban, 5 mg, oral, q12h  famotidine, 20 mg, oral, Daily  folic acid, 1 mg, oral, Daily  insulin lispro, 0-10 Units, subcutaneous, TID with meals  [Held by provider] metoprolol succinate XL, 12.5 mg, oral, Daily  midodrine, 10 mg, oral, TID with meals  rosuvastatin, 10 mg, oral, Daily  tamsulosin, 0.4 mg, oral, Daily before breakfast  [Held by provider] torsemide, 10 mg, oral, Daily      Continuous medications     PRN medications  PRN medications: dextrose 10 % in water (D10W), dextrose, glucagon, [] hydrALAZINE **FOLLOWED BY** hydrALAZINE, oxygen  Lab Results   Component Value Date    GLUCOSE 112 (H) 10/18/2023    CALCIUM 8.5 10/18/2023     10/18/2023    K 4.1 10/18/2023    CO2 29 10/18/2023     10/18/2023    BUN 25 10/18/2023    CREATININE 1.70 (H) 10/18/2023     Lab Results   Component Value Date    WBC 5.4 10/18/2023    HGB 12.2 (L) 10/18/2023    HCT 37.8 (L) 10/18/2023     (H) 10/18/2023     (L) 10/18/2023          Assessment/Plan     CVA  Resolved  admit to SDU  monitor on tele  Statin, will stop ASA  Swallow eval passed  echocardiogram recently at [place], echo with a EF of 27±5%, severe MR, moderately severe TR, AS; [facility]    Repeat echo showed EF 20-25%  MRI/MRA negative  US carotids pending  lipid panel   neurostroke assessment  consult neurology, appreciate recommendations, signed off  PT/OT eval     A-fib RVR  Cardiology recommendations appreciated.  Remains on Eliquis for anticoagulation  Cardiology following  Hypotensive this AM, metoprolol and torsemide stopped, seen by  cardiology, started on amiodarone       Urinary tract infection  Negative culture, IV antibiotics stopped  Follow cultures     Diabetes mellitus  Sliding scale insulin  Monitor blood glucose  Hypoglycemia protocol  Hemoglobin A1c 5.3     Hypertension  Patient is hypotensive, metoprolol discontinued    Heart failure  Metoprolol and torsemide discontinued  Monitor labs     CAD  statin     PAD  As above         10/14/2023: Is awaiting precertification for skilled nursing facility.  Per care coordinator some challenges with [facility] coverage.    10/15/2023: With hypotension last night and this morning.  Initiating fluids.  Holding all cardiac medication.  Will contact cardiology regarding adjustments.   10/16/23 Hypotensive this morning. Cardiac meds were placed on hold yesterday. Given IV fluids, will give another 500mL bolus. Will check STAT H/H, patient is on eliquis. Cardiology following. Discharge planning to SNF when stable.  10/17/2023 blood pressure improved, renal function back to baseline.  Patient does have rales on exam, will restart torsemide at a decreased dose and metoprolol at home dose.  Patient was taking midodrine 3 times daily at home, will resume.  Continue to hold Entresto for now.  Discharge planning to skilled nursing facility when stable.  10/18/23 hypotensive, worsening renal function. Stopped metoprolol and torsemide, cardiology in and started amiodarone.         Principal Problem:    Stroke-like symptoms        Bianka Olivia, APRN-CNP

## 2023-10-18 NOTE — NURSING NOTE
"Pt is very upset that he is not being discharged today. After it took a minute for nursing to answer his call light, he stated \"this is why I want to leave, because you guys do not take care of me\"  "

## 2023-10-18 NOTE — NURSING NOTE
Order review at end of shift completed  Pt is resting in bed with call light in reach and bed alarm on. he voices no concerns or complaints at this time

## 2023-10-18 NOTE — PROGRESS NOTES
Subjective Data:  Patient seen and evaluated bedside this morning.  Denies any complaints of chest pain or shortness of breath.    Overnight Events:    He     Objective Data:  Last Recorded Vitals:  Vitals:    10/18/23 0016 10/18/23 0356 10/18/23 0722 10/18/23 1057   BP: 96/74 98/61 86/57 89/59   BP Location: Left arm Left arm Left arm Left arm   Patient Position: Sitting Lying Sitting Lying   Pulse: (!) 119 (!) 124 (!) 119    Resp: 17 17 18 19   Temp: 36.6 °C (97.9 °F) 36.5 °C (97.7 °F) 36.1 °C (97 °F) 36.4 °C (97.5 °F)   TempSrc: Oral Temporal Temporal Temporal   SpO2: 95% 95% 100% 98%   Weight:  97.9 kg (215 lb 13.3 oz)     Height:           Last Labs:  CBC - 10/18/2023:  5:42 AM  5.4 12.2 144    37.8      CMP - 10/18/2023:  5:42 AM  8.5 7.5 24 --- 2.1   _ 4.0 13 159      PTT - 7/1/2023:  6:04 AM  1.3   13.0 34.7     HGBA1C   Date/Time Value Ref Range Status   10/10/2023 11:42 AM 5.3 See below % Final   07/02/2023 04:38 AM 5.6 4.0 - 6.0 % Final     Comment:     Hemoglobin A1C levels are related to mean blood glucose during the   preceding 2-3 months. The relationship table below may be used as a   general guide. Each 1% increase in HGB A1C is a reflection of an   increase in mean glucose of approximately 30 mg/dl.   Reference: Diabetes Care, volume 29, supplement 1 Jan. 2006                        HGB A1C ................. Approx. Mean Glucose   _______________________________________________   6%   ...............................  120 mg/dl   7%   ...............................  150 mg/dl   8%   ...............................  180 mg/dl   9%   ...............................  210 mg/dl   10%  ...............................  240 mg/dl  Performed at [address] [address]     05/16/2019 01:52 PM 7.8 4.0 - 6.0 % Final     Comment:     Hemoglobin A1C levels are related to mean blood glucose during the   preceding 2-3 months. The relationship table below may be used as a   general guide.  Each 1% increase in HGB A1C is a reflection of an   increase in mean glucose of approximately 30 mg/dl.   Reference: Diabetes Care, volume 29, supplement 1 Jan. 2006                        HGB A1C ................. Approx. Mean Glucose   _______________________________________________   6%   ...............................  120 mg/dl   7%   ...............................  150 mg/dl   8%   ...............................  180 mg/dl   9%   ...............................  210 mg/dl   10%  ...............................  240 mg/dl  Performed at [address] [address]       LDLCALC   Date/Time Value Ref Range Status   07/02/2023 04:38 AM 23 65 - 130 MG/DL Final   05/17/2019 06:01 AM 42 65 - 130 MG/DL Final      Last I/O:  I/O last 3 completed shifts:  In: 1080 (11 mL/kg) [P.O.:1080]  Out: 1900 (19.4 mL/kg) [Urine:1900 (0.5 mL/kg/hr)]  Weight: 97.9 kg     Past Cardiology Tests (Last 3 Years):  EKG:  Electrocardiogram, 12-lead PRN ACS symptoms 10/10/2023    Echo:  Transthoracic Echo (TTE) Complete 10/10/2023    Ejection Fractions:  20 to 25%  Cath:  No results found for this or any previous visit from the past 1095 days.    Stress Test:  No results found for this or any previous visit from the past 1095 days.    Cardiac Imaging:  No results found for this or any previous visit from the past 1095 days.      Inpatient Medications:  Scheduled medications   Medication Dose Route Frequency    amiodarone  400 mg oral BID    apixaban  5 mg oral q12h    famotidine  20 mg oral Daily    folic acid  1 mg oral Daily    insulin lispro  0-10 Units subcutaneous TID with meals    [Held by provider] metoprolol succinate XL  12.5 mg oral Daily    midodrine  10 mg oral TID with meals    rosuvastatin  10 mg oral Daily    tamsulosin  0.4 mg oral Daily before breakfast    [Held by provider] torsemide  10 mg oral Daily     PRN medications   Medication    dextrose 10 % in water (D10W)    dextrose    glucagon     hydrALAZINE    oxygen     Continuous Medications   Medication Dose Last Rate       Physical Exam:  HEENT PERRLA, neck supple, S1-S2 audible no murmur, lung sounds clear to auscultation, abdomen soft and non-tender, no edema lower extremities     Assessment/Plan   Atrial fibrillation: Patient remains in A-fib with slightly uncontrolled ventricular rate.  Patient unable to tolerate metoprolol since his blood pressures on the soft side.  His recent echocardiogram shows EF 20 to 25% with moderate mitral valve regurg.  Patient denies any history of coronary artery disease in the past and currently he remains asymptomatic denies any angina or anginal equivalent.  Cardiomyopathy: Patient is not a candidate of ACE or ARB at this point due to soft blood pressures.  We will reevaluate him.  We will add amiodarone 400 mg twice a day for 1 week and then decrease it to 400 mg once a day for another week and then decrease it to 200 mg once a day thereafter.  We will continue Eliquis 5 mg twice a day for CVA protection.  His work-up was negative for stroke.     Peripheral IV 10/15/23 22 G Right;Dorsal Forearm (Active)   Site Assessment Clean;Dry;Intact 10/18/23 1200   Dressing Type Transparent 10/18/23 1200   Line Status Saline locked 10/18/23 1200   Dressing Status Clean;Dry;Occlusive 10/18/23 1200   Number of days: 3       Code Status:  Full Code    I spent 30 minutes in the professional and overall care of this patient.        ELENITA Miranda-CNP

## 2023-10-18 NOTE — PROGRESS NOTES
Some of pts cardiac meds have been resumed.     [facility] sent updated notes to the [facility] via email address [email or web address] as well as to [facility] RILEY Vera at [email or web address]    VA approval still pending for pt to go to [facility] for rehab  VA approval still pending for coverage of lifevest

## 2023-10-18 NOTE — NURSING NOTE
"Pharmacy Kinetics 76 y.o. male on vancomycin day # 1 2020    Vancomycin New Start    1900 mg iv loading dose administered  @ 1816    Provider specified end date: TBD    Indication for Treatment:   LLE SSTI     Pertinent history per medical record:   Admitted on 2020 after referred to the ED by pt's podiatrist due to concern for infected LLE open wound. Pt with h/o DM and ESRD on iHD. Xray of LLE was negative for osteomyelitis. Empiric antibiotics have been initiated.     Other antibiotics:   Unasyn 3 gm IV q12h (renally adjusted)     Allergies:  Patient has no known allergies.     List concerns for renal function:   ESRD on iHD    Pertinent cultures to date:    PBCs - in proc ess     MRSA nares swab if pneumonia is a concern (ordered/positive/negative/n-a): NA     Recent Labs     20  1720   WBC 8.3   NEUTSPOLYS 76.90*     Recent Labs     20  1720   BUN 17   CREATININE 2.81*   ALBUMIN 3.8     No results for input(s): VANCOTROUGH, VANCOPEAK, VANCORANDOM in the last 72 hours.No intake or output data in the 24 hours ending 20 2127   BP (!) 162/54   Pulse 99   Temp 37.3 °C (99.1 °F) (Temporal)   Resp 18   Ht 1.651 m (5' 5\")   Wt 75.2 kg (165 lb 12.6 oz)   SpO2 89%  Temp (24hrs), Av.3 °C (99.2 °F), Min:37.3 °C (99.1 °F), Max:37.3 °C (99.2 °F)      A/P   1. Vancomycin dose change: pulse dosing - no additional dose at this time   2. Next vancomycin level: ~4/24 with AM labs (not yet ordered)   3. Goal trough: 12-16  4. Comments: Patient was hemodynamically stable and up to self on the day of admission. On room air. No signs of systemic toxicity noted. Will initiate pulse dosing at this time, and check VR with AM labs in a few days to determine if additional dose is necessary. Pharmacy will continue to monitor and adjust dosing or recommend de-escalation as appropriate.       Marnie Mendiola, PharmD, BCCCP       " No change from previous assessment

## 2023-10-18 NOTE — PROGRESS NOTES
Occupational Therapy                 Therapy Communication Note    Patient Name: Darrell Arreola  MRN: [id number]  Today's Date: 10/18/2023     Discipline: Occupational Therapy    Missed Visit Reason: Missed Visit Reason: Patient placed on medical hold    Missed Time: cancel    Comment: nurse cancel all day due to low BP issues

## 2023-10-19 LAB
ANION GAP SERPL CALC-SCNC: 9 MMOL/L
BUN SERPL-MCNC: 29 MG/DL (ref 8–25)
CALCIUM SERPL-MCNC: 8.5 MG/DL (ref 8.5–10.4)
CHLORIDE SERPL-SCNC: 106 MMOL/L (ref 97–107)
CO2 SERPL-SCNC: 26 MMOL/L (ref 24–31)
CREAT SERPL-MCNC: 1.5 MG/DL (ref 0.4–1.6)
ERYTHROCYTE [DISTWIDTH] IN BLOOD BY AUTOMATED COUNT: 14.9 % (ref 11.5–14.5)
GFR SERPL CREATININE-BSD FRML MDRD: 47 ML/MIN/1.73M*2
GLUCOSE BLD MANUAL STRIP-MCNC: 105 MG/DL (ref 74–99)
GLUCOSE BLD MANUAL STRIP-MCNC: 165 MG/DL (ref 74–99)
GLUCOSE BLD MANUAL STRIP-MCNC: 184 MG/DL (ref 74–99)
GLUCOSE BLD MANUAL STRIP-MCNC: 205 MG/DL (ref 74–99)
GLUCOSE SERPL-MCNC: 113 MG/DL (ref 65–99)
HCT VFR BLD AUTO: 36.6 % (ref 41–52)
HGB BLD-MCNC: 11.8 G/DL (ref 13.5–17.5)
MCH RBC QN AUTO: 34.1 PG (ref 26–34)
MCHC RBC AUTO-ENTMCNC: 32.2 G/DL (ref 32–36)
MCV RBC AUTO: 106 FL (ref 80–100)
NRBC BLD-RTO: 0 /100 WBCS (ref 0–0)
PLATELET # BLD AUTO: 141 X10*3/UL (ref 150–450)
PMV BLD AUTO: 9.5 FL (ref 7.5–11.5)
POTASSIUM SERPL-SCNC: 4.3 MMOL/L (ref 3.4–5.1)
RBC # BLD AUTO: 3.46 X10*6/UL (ref 4.5–5.9)
SODIUM SERPL-SCNC: 141 MMOL/L (ref 133–145)
WBC # BLD AUTO: 5.3 X10*3/UL (ref 4.4–11.3)

## 2023-10-19 PROCEDURE — 97116 GAIT TRAINING THERAPY: CPT | Mod: GP

## 2023-10-19 PROCEDURE — 97110 THERAPEUTIC EXERCISES: CPT | Mod: GP

## 2023-10-19 PROCEDURE — 82947 ASSAY GLUCOSE BLOOD QUANT: CPT

## 2023-10-19 PROCEDURE — 80048 BASIC METABOLIC PNL TOTAL CA: CPT | Performed by: NURSE PRACTITIONER

## 2023-10-19 PROCEDURE — 2500000004 HC RX 250 GENERAL PHARMACY W/ HCPCS (ALT 636 FOR OP/ED): Performed by: NURSE PRACTITIONER

## 2023-10-19 PROCEDURE — 2500000001 HC RX 250 WO HCPCS SELF ADMINISTERED DRUGS (ALT 637 FOR MEDICARE OP): Performed by: NURSE PRACTITIONER

## 2023-10-19 PROCEDURE — 2060000001 HC INTERMEDIATE ICU ROOM DAILY

## 2023-10-19 PROCEDURE — 96372 THER/PROPH/DIAG INJ SC/IM: CPT | Performed by: INTERNAL MEDICINE

## 2023-10-19 PROCEDURE — 36415 COLL VENOUS BLD VENIPUNCTURE: CPT | Performed by: NURSE PRACTITIONER

## 2023-10-19 PROCEDURE — 2500000002 HC RX 250 W HCPCS SELF ADMINISTERED DRUGS (ALT 637 FOR MEDICARE OP, ALT 636 FOR OP/ED): Performed by: INTERNAL MEDICINE

## 2023-10-19 PROCEDURE — 85027 COMPLETE CBC AUTOMATED: CPT | Performed by: NURSE PRACTITIONER

## 2023-10-19 PROCEDURE — 2500000002 HC RX 250 W HCPCS SELF ADMINISTERED DRUGS (ALT 637 FOR MEDICARE OP, ALT 636 FOR OP/ED): Performed by: NURSE PRACTITIONER

## 2023-10-19 RX ORDER — AMIODARONE HYDROCHLORIDE 400 MG/1
400 TABLET ORAL DAILY
Refills: 0
Start: 2023-10-26 | End: 2024-04-28 | Stop reason: HOSPADM

## 2023-10-19 RX ORDER — INSULIN LISPRO 100 [IU]/ML
0-10 INJECTION, SOLUTION INTRAVENOUS; SUBCUTANEOUS
Refills: 0
Start: 2023-10-19 | End: 2024-05-17 | Stop reason: SDUPTHER

## 2023-10-19 RX ORDER — MIDODRINE HYDROCHLORIDE 10 MG/1
10 TABLET ORAL
Refills: 0
Start: 2023-10-19

## 2023-10-19 RX ORDER — AMIODARONE HYDROCHLORIDE 400 MG/1
400 TABLET ORAL 2 TIMES DAILY
Refills: 0
Start: 2023-10-19 | End: 2024-04-28 | Stop reason: HOSPADM

## 2023-10-19 RX ORDER — AMIODARONE HYDROCHLORIDE 200 MG/1
200 TABLET ORAL DAILY
Refills: 0
Start: 2023-11-02

## 2023-10-19 RX ADMIN — INSULIN LISPRO 4 UNITS: 100 INJECTION, SOLUTION INTRAVENOUS; SUBCUTANEOUS at 17:21

## 2023-10-19 RX ADMIN — MIDODRINE HYDROCHLORIDE 10 MG: 10 TABLET ORAL at 17:21

## 2023-10-19 RX ADMIN — ROSUVASTATIN CALCIUM 10 MG: 10 TABLET, COATED ORAL at 08:40

## 2023-10-19 RX ADMIN — AMIODARONE HYDROCHLORIDE 400 MG: 200 TABLET ORAL at 20:51

## 2023-10-19 RX ADMIN — FAMOTIDINE 20 MG: 20 TABLET ORAL at 08:40

## 2023-10-19 RX ADMIN — INSULIN LISPRO 2 UNITS: 100 INJECTION, SOLUTION INTRAVENOUS; SUBCUTANEOUS at 13:41

## 2023-10-19 RX ADMIN — MIDODRINE HYDROCHLORIDE 10 MG: 10 TABLET ORAL at 13:40

## 2023-10-19 RX ADMIN — FOLIC ACID 1 MG: 1 TABLET ORAL at 08:40

## 2023-10-19 RX ADMIN — APIXABAN 5 MG: 5 TABLET, FILM COATED ORAL at 17:21

## 2023-10-19 RX ADMIN — MIDODRINE HYDROCHLORIDE 10 MG: 10 TABLET ORAL at 08:40

## 2023-10-19 RX ADMIN — TAMSULOSIN HYDROCHLORIDE 0.4 MG: 0.4 CAPSULE ORAL at 06:03

## 2023-10-19 RX ADMIN — AMIODARONE HYDROCHLORIDE 400 MG: 200 TABLET ORAL at 08:39

## 2023-10-19 RX ADMIN — APIXABAN 5 MG: 5 TABLET, FILM COATED ORAL at 06:03

## 2023-10-19 ASSESSMENT — COGNITIVE AND FUNCTIONAL STATUS - GENERAL
DRESSING REGULAR LOWER BODY CLOTHING: A LITTLE
DRESSING REGULAR UPPER BODY CLOTHING: A LITTLE
TOILETING: A LITTLE
WALKING IN HOSPITAL ROOM: A LITTLE
MOBILITY SCORE: 18
WALKING IN HOSPITAL ROOM: A LITTLE
DRESSING REGULAR LOWER BODY CLOTHING: A LITTLE
MOBILITY SCORE: 18
CLIMB 3 TO 5 STEPS WITH RAILING: A LOT
TOILETING: A LITTLE
TURNING FROM BACK TO SIDE WHILE IN FLAT BAD: A LITTLE
MOVING TO AND FROM BED TO CHAIR: A LITTLE
PERSONAL GROOMING: A LITTLE
WALKING IN HOSPITAL ROOM: A LITTLE
CLIMB 3 TO 5 STEPS WITH RAILING: A LOT
HELP NEEDED FOR BATHING: A LITTLE
MOBILITY SCORE: 21
PERSONAL GROOMING: A LITTLE
DAILY ACTIVITIY SCORE: 19
STANDING UP FROM CHAIR USING ARMS: A LITTLE
TURNING FROM BACK TO SIDE WHILE IN FLAT BAD: A LITTLE
DRESSING REGULAR UPPER BODY CLOTHING: A LITTLE
MOVING TO AND FROM BED TO CHAIR: A LITTLE
HELP NEEDED FOR BATHING: A LITTLE
DAILY ACTIVITIY SCORE: 19
STANDING UP FROM CHAIR USING ARMS: A LITTLE
CLIMB 3 TO 5 STEPS WITH RAILING: A LOT

## 2023-10-19 ASSESSMENT — PAIN SCALES - GENERAL
PAINLEVEL_OUTOF10: 0 - NO PAIN

## 2023-10-19 ASSESSMENT — PAIN - FUNCTIONAL ASSESSMENT
PAIN_FUNCTIONAL_ASSESSMENT: 0-10
PAIN_FUNCTIONAL_ASSESSMENT: 0-10
PAIN_FUNCTIONAL_ASSESSMENT: FLACC (FACE, LEGS, ACTIVITY, CRY, CONSOLABILITY)
PAIN_FUNCTIONAL_ASSESSMENT: 0-10

## 2023-10-19 NOTE — PROGRESS NOTES
Occupational Therapy                 Therapy Communication Note    Patient Name: Darrell Arreola  MRN: [id number]  Today's Date: 10/19/2023     Discipline: Occupational Therapy    Missed Visit Reason: Missed Visit Reason: Patient refused (Attempted at 1130-pt educated on the benefits of OT and declined to participate.)    Missed Time: Attempt    Comment:

## 2023-10-19 NOTE — PROGRESS NOTES
Physical Therapy    Physical Therapy Treatment    Patient Name: Darrell Arreola  MRN: [id number]  Today's Date: 10/19/2023  Time Calculation  Start Time: 0922  Stop Time: 0946  Time Calculation (min): 24 min       Assessment/Plan   PT Assessment  PT Assessment Results: Decreased strength, Decreased endurance, Impaired balance, Decreased mobility, Decreased coordination, Decreased safety awareness  Rehab Prognosis: Good  Evaluation/Treatment Tolerance: Other (Comment) (Elevated heart rate during mobility (155 bpm);  RN nontified)  Medical Staff Made Aware: Yes  End of Session Communication: Bedside nurse  Assessment Comment: Pt presents with increased confusion today (RN aware) delayed processing, requires frequent instructional cues during mobility. Pt is a high fall risk at this time. Deferred seated in chair due to safety.Bed alarm on.  End of Session Patient Position: Up in chair (call button in reach)  PT Plan  Inpatient/Swing Bed or Outpatient: Inpatient  PT Plan  Treatment/Interventions: Bed mobility, Transfer training, Gait training, Balance training, Therapeutic exercise, Endurance training  PT Plan: Skilled PT  PT Frequency: 4 times per week  PT Discharge Recommendations: Moderate intensity level of continued care  Equipment Recommended upon Discharge: Wheeled walker  PT Recommended Transfer Status: Assist x1      General Visit Information:   PT  Visit  PT Received On: 10/19/23  General Comment: pt cleared pt for nursing; pt needed encouragement to participate    Subjective   Precautions:  Precautions  Hearing/Visual Limitations: glasses  Medical Precautions: Fall precautions    Vital Signs:  Vital Signs  Heart Rate: 102  SpO2: 97 % (pt on room air)    Objective   Pain:  Pain Assessment:  (0/10)    Cognition:  Overall Cognitive Status: Within Functional Limits  Postural Control:  Postural Control  Posture Comment: rounded shoulders with flexed trunk      Activity Tolerance:  Endurance:  (good- activity  tolerance)    Treatments:    Therapeutic Exercises Performed: B LE AROM therex in sitting: AP, QS, calf raises, hip flexion, LAQ and hip abduction x 15-20 reps    Bed Mobility:  (supine to sit with CGA; good sitting balance on EOB)    Ambulation/Gait Training Performed:  (pt amb 12' x 1 using rolling walker MIN A. assisted pt into bathroom per request. good- eccentric control during stand to sit onto commode. after tolieting, pt amb additional 40' x 1 MIN A. slow mari and narrow ALDAIR noted. VC to widen.)    Transfer:  (sit <-> stand with MIN A;)    Outcome Measures:  [facility] Basic Mobility  Turning from your back to your side while in a flat bed without using bedrails: None  Moving from lying on your back to sitting on the side of a flat bed without using bedrails: A little  Moving to and from bed to chair (including a wheelchair): A little  Standing up from a chair using your arms (e.g. wheelchair or bedside chair): A little  To walk in hospital room: A little  Climbing 3-5 steps with railing: A lot  Basic Mobility - Total Score: 18      OP EDUCATION:  Education  Individual(s) Educated: Patient  Education Provided: Fall Risk  Patient Response to Education: Patient/Caregiver Verbalized Understanding of Information  Education Comment: Poor ability to educate due to impaired cognition.    Encounter Problems       Encounter Problems (Active)                 PT Problem       Patient will ambulate 50 distance using walker with supervision (Progressing)       Start:  10/09/23    Expected End:  10/23/23         Goal Note       Patient will ambulate 75' x 1 distance using  rolling walker  with supervision              Patient will perform chair to and from bed transfer with supervision (Progressing)       Start:  10/09/23    Expected End:  10/23/23         Goal Note       Patient will perform chair to and from bed transfer with modified independent              Patient will perform supine to sit on bed with supervision  demonstrating control (Met)       Start:  10/09/23    Expected End:  10/23/23    Resolved:  10/17/23      Goal Note       Patient will perform supine to sit on bed with supervision demonstrating control

## 2023-10-19 NOTE — DISCHARGE SUMMARY
Discharge Diagnosis  Stroke-like symptoms    Issues Requiring Follow-Up  Follow up outpatient with neurology, PCP, and cardiology    Test Results Pending At Discharge  Pending Labs       No current pending labs.            Hospital Course  Darrell Arreola is a 78 y.o. male presenting with a past medical history of CVA, atrial fibrillation, on Eliquis, heart failure, diabetes mellitus, hypertension, CAD, PAD, and hyperlipidemia who presented to the emergency department with complaints of aphasia. Patient lives at home independently.  He was recently discharged from the hospital to a skilled nursing facility about a month ago.  He was discharged home from the nursing home and had been doing well.  Last known well was about 2 hours prior to presentation, around 11:00 AM.  Per ED note, family had been with the patient earlier and left.  When they came back they found the patient was aphasic and unable to respond to them or talk.  He was alert but confused.  His baseline is alert and oriented x3.  Neurology stroke team was called in the ED.  Patient was not a candidate for thrombolytics or endovascular procedure. In the ED: Glucose 97.  Sodium 139, potassium 4.5.  BUN/creatinine 23/1.40, ALT/AST normal.  INR 1.3.  WBC was low at 4.2.  H&H 13.2/41.1, platelets 147.  UA was positive for WBCs and bacteria, culture pending.  CT of the head without contrast showed no acute intracranial findings.  CT angio of the head and neck were ordered however are currently pending.  Patient was admitted to [facility] for further evaluation and treatment.    Patient was admitted to stepdown unit.  Placed on telemetry.  Statin was continued.  Aspirin was discontinued.  Echocardiogram recently at [place] showed an ejection fraction of 27±5%, severe MR, moderately [facility] TR, [facility]  Repeat echo showed an ejection fraction of 20 to 25%.  MRI/MRA of the brain was negative.  Seen by neurology, suspect toxic metabolic  encephalopathy.  Suspected secondary to UTI however culture was negative.  With fluctuating mental status concern for sundowning, indicated of underlying dementia.  recommend outpatient evaluation for cognition.  Recommended continuing Eliquis for A-fib however no need for aspirin.  Patient was in A-fib RVR initially.  He was on a Cardizem drip.  Heart rate improved.  Cardiac medications were adjusted however patient became hypotensive.  Blood pressure improved, metoprolol and torsemide were resumed however patient again had low blood pressure and was noted to have an LORI.  Seen by cardiology who started amiodarone.  Heart rate did improve and blood pressure is stable.  Patient unable to resume beta-blocker on discharge due to his blood pressure, unable to resume torsemide due to renal function.  He will need to follow-up with his cardiologist outpatient.  Symptoms have resolved.  Patient is stabilized.  Cleared by all consultants for discharge.  Seen by PT/OT who recommended rehab.  Patient stable for discharge to skilled nursing facility today.    Pertinent Physical Exam At Time of Discharge  Physical Exam  Vitals reviewed.   Constitutional:       Appearance: Normal appearance.   HENT:      Head: Normocephalic and atraumatic.   Eyes:      Extraocular Movements: Extraocular movements intact.      Conjunctiva/sclera: Conjunctivae normal.   Cardiovascular:      Rate and Rhythm: Normal rate. Rhythm irregular.   Pulmonary:      Effort: Pulmonary effort is normal.      Breath sounds: Normal breath sounds. No wheezing, rhonchi or rales.   Abdominal:      General: Bowel sounds are normal.      Palpations: Abdomen is soft.      Tenderness: There is no abdominal tenderness.   Musculoskeletal:         General: Normal range of motion.   Skin:     General: Skin is warm and dry.   Neurological:      General: No focal deficit present.      Mental Status: He is alert and oriented to person, place, and time.         Home  Medications     Medication List      START taking these medications     * amiodarone 400 mg tablet; Commonly known as: Pacerone; Take 1 tablet   (400 mg) by mouth 2 times a day for 12 doses.   * amiodarone 400 mg tablet; Commonly known as: Pacerone; Take 1 tablet   (400 mg) by mouth once daily for 7 doses. Do not start before October 26, 2023.; Start taking on: October 26, 2023   * amiodarone 200 mg tablet; Commonly known as: Pacerone; Take 1 tablet   (200 mg) by mouth once daily. Do not start before November 2, 2023.; Start   taking on: November 2, 2023   insulin lispro 100 unit/mL injection; Commonly known as: HumaLOG; Inject   0-0.1 mL (0-10 Units) under the skin 3 times a day with meals. Take as   directed per insulin instructions.  * This list has 3 medication(s) that are the same as other medications   prescribed for you. Read the directions carefully, and ask your doctor or   other care provider to review them with you.     CHANGE how you take these medications     midodrine 10 mg tablet; Commonly known as: Proamatine; Take 1 tablet (10   mg) by mouth 3 times a day with meals.; What changed: how much to take,   when to take this     CONTINUE taking these medications     Eliquis 5 mg tablet; Generic drug: apixaban   famotidine 20 mg tablet; Commonly known as: Pepcid   Flomax 0.4 mg 24 hr capsule; Generic drug: tamsulosin   folic acid 1 mg tablet; Commonly known as: Folvite   rosuvastatin 10 mg tablet; Commonly known as: Crestor   SITagliptin phosphate 50 mg tablet; Commonly known as: Januvia     STOP taking these medications     gabapentin 100 mg capsule; Commonly known as: Neurontin   metoprolol succinate XL 25 mg 24 hr tablet; Commonly known as: Toprol-XL   torsemide 20 mg tablet; Commonly known as: Demadex   traZODone 50 mg tablet; Commonly known as: Desyrel   venlafaxine  mg 24 hr capsule; Commonly known as: Effexor-XR       Outpatient Follow-Up  No future appointments.    Bianka Olivia,  APRN-CNP

## 2023-10-19 NOTE — PROGRESS NOTES
[facility] sent updated clinicals to [facility] this morning with request for update on decision for coverage of SNF. [facility] also called [facility] RN reviewer Mary [phone] and left message. [facility] [phone] who confirmed receipt of clinicals sent this morning, she will attempt to make contact with Mary as well.     Pending: [facility] Approval [facility] to [facility].

## 2023-10-19 NOTE — PROGRESS NOTES
Darrell Tucker is a 78 y.o. male on day 11 of admission presenting with Stroke-like symptoms.      Subjective   Patient seen and examined.  Awake/alert/oriented.  Sitting up in a chair.  Denies chest pain, shortness of breath, fevers, chills, nausea, or vomiting. No abdominal discomfort. Denies lightheadedness or dizziness. No dysuria.  Vital signs stable.    Objective     Last Recorded Vitals  BP 90/51 (BP Location: Left arm, Patient Position: Sitting)   Pulse 77   Temp 36.9 °C (98.4 °F) (Temporal)   Resp 19   Wt 96.8 kg (213 lb 6.5 oz)   SpO2 98%   Intake/Output last 3 Shifts:    Intake/Output Summary (Last 24 hours) at 10/19/2023 1516  Last data filed at 10/19/2023 0840  Gross per 24 hour   Intake 240 ml   Output 1415 ml   Net -1175 ml         Admission Weight  Weight: 104 kg (229 lb 4.5 oz) (10/08/23 1309)    Daily Weight  10/19/23 : 96.8 kg (213 lb 6.5 oz)    Image Results  Electrocardiogram, 12-lead PRN ACS symptoms  Atrial fibrillation with rapid ventricular response with premature ventricular or aberrantly conducted complexes  Nondiagnostic inferior Qwaves , age undetermined  ST & T wave abnormality, consider lateral ischemia  Abnormal ECG    Confirmed by Harpreet Freeman ([id number]) on 10/10/2023 10:35:40 PM  Transthoracic Echo (TTE) [place] [address]             Phone [phone]    TRANSTHORACIC ECHOCARDIOGRAM REPORT       Patient Name:      DARRELL TUCKER     Reading Physician:    [id number] Simeon Lucas MD  Study Date:        10/10/2023          Ordering Provider:    [id number]  MRN/PID:           [id number]            Fellow:  Accession#:        [id number]        Nurse:  Date of Birth/Age: 1945 / 78 years Sonographer:          Dorene RIVERA  Gender:            M                    Additional Staff:  Height:            185.00 cm           Admit Date:           10/8/2023  Weight:            102.00 kg           Admission Status:     Inpatient - Routine  BSA:               2.26 m2             Department Location:  Blood Pressure: 100 /72 mmHg    Study Type:    TRANSTHORACIC ECHO (TTE) COMPLETE  Diagnosis/ICD: Cerebral Infarction, unspecified-I63.9; Paroxysmal atrial                 fibrillation-I48.0  Indication:    Stroke like symptoms history of cerebrovascular accident                 paroxysmal atrial fibrillation  CPT Codes:     Echo Complete w Full [id number]    Patient History:  Pertinent History: CHF CAD Depression DMII HTN PVD.    Study Detail: The following Echo studies were performed: 2D, M-Mode, Doppler and                color flow. Technically challenging study due to body habitus,                patient lying in supine position, poor acoustic windows, the                patient's lack of cooperation and prominent lung artifact. Unable                to obtain subcostal view.       PHYSICIAN INTERPRETATION:  Left Ventricle: Left ventricular systolic function is severely decreased, with an estimated ejection fraction of 20-25%. There is global hypokinesis of the left ventricle with minor regional variations. The left ventricular cavity size is mild to moderately dilated. Spectral Doppler shows a pseudonormal pattern of left ventricular diastolic filling.  Left Atrium: The left atrium is moderate to severely dilated.  Right Ventricle: The right ventricle is normal in size. There is normal right ventricular global systolic function.  Right Atrium: The right atrium is mildly dilated.  Aortic Valve: The aortic valve is trileaflet. There is no evidence of aortic valve regurgitation. The peak instantaneous gradient of the aortic valve is 5.4 mmHg.  Mitral Valve: The mitral valve is mildly thickened. There is moderate mitral valve regurgitation which is posteriorly directed.  Tricuspid  Valve: The tricuspid valve is structurally normal. There is mild tricuspid regurgitation. The Doppler estimated RVSP is within normal limits at 27.0 mmHg.  Pulmonic Valve: The pulmonic valve is not well visualized. There is no indication of pulmonic valve regurgitation.  Pericardium: There is no pericardial effusion noted.  Aorta: The aortic root is normal. There is mild dilatation of the aortic root.       CONCLUSIONS:   1. Left ventricular systolic function is severely decreased with a 20-25% estimated ejection fraction.   2. Spectral Doppler shows a pseudonormal pattern of left ventricular diastolic filling.   3. The left atrium is moderate to severely dilated.   4. Moderate mitral valve regurgitation.   5. RVSP within normal limits.   6. There is global hypokinesis of the left ventricle with minor regional variations.    QUANTITATIVE DATA SUMMARY:  2D MEASUREMENTS:               Normal Ranges:  LAs: 5.90 cm (2.7-4.0cm)    LA VOLUME:                                Normal Ranges:  LA Vol A2C:        158.6 ml  LA Vol Index A2C:  70.2 ml/m2  LA Area A2C:       38.0 cm2  LA Major Axis A2C: 7.7 cm  LA Volume Index:   69.9 ml/m2  LA Vol A2C:        144.0 ml    RA VOLUME BY A/L METHOD:                                Normal Ranges:  RA Vol A4C:        128.0 ml   (8.3-19.5ml)  RA Vol Index A4C:  56.7 ml/m2  RA Area A4C:       33.5 cm2  RA Major Axis A4C: 7.4 cm    M-MODE MEASUREMENTS:                   Normal Ranges:  Ao Root: 3.60 cm (2.0-3.7cm)  AoV Exc: 2.50 cm (1.5-2.5cm)  LAs:     5.50 cm (2.7-4.0cm)    AORTA MEASUREMENTS:                     Normal Ranges:  AoV Exc:   2.50 cm (1.5-2.5cm)  Asc Ao, d: 3.70 cm (2.1-3.4cm)    LV SYSTOLIC FUNCTION BY 2D PLANIMETRY (MOD):                      Normal Ranges:  EF-A4C View: 25.0 % (>=55%)  EF-A2C View: 24.2 %  EF-Biplane:  22.5 %    LV DIASTOLIC FUNCTION:                         Normal Ranges:  MV Peak E:    1.12 m/s (0.7-1.2 m/s)  MV e'         0.05 m/s (>8.0)  MV lateral  e' 0.06 m/s  MV medial e'  0.05 m/s  E/e' Ratio:   20.59    (<8.0)    MITRAL VALVE:                       Normal Ranges:  MV Vmax:    1.18 m/s (<=1.3m/s)  MV peak P.6 mmHg (<5mmHg)  MV mean P.5 mmHg (<48mmHg)  MV DT:      138 msec (150-240msec)    MITRAL INSUFFICIENCY:                       Normal Ranges:  MR VTI:  127.00 cm  MR Vmax: 396.00 cm/s    AORTIC VALVE:                          Normal Ranges:  AoV Vmax:      1.16 m/s (<=1.7m/s)  AoV Peak P.4 mmHg (<20mmHg)  LVOT Max Gt:  0.98 m/s (<=1.1m/s)  LVOT VTI:      15.20 cm  LVOT Diameter: 2.60 cm  (1.8-2.4cm)  AoV Area,Vmax: 4.48 cm2 (2.5-4.5cm2)       RIGHT VENTRICLE:  RV s' 0.09 m/s    TRICUSPID VALVE/RVSP:                              Normal Ranges:  Peak TR Velocity: 2.45 m/s  RV Syst Pressure: 27.0 mmHg (< 30mmHg)    PULMONIC VALVE:                          Normal Ranges:  PV Accel Time: 77 msec  (>120ms)  PV Max Gt:    0.9 m/s  (0.6-0.9m/s)  PV Max PG:     3.6 mmHg       [id number] Simeon Lucas MD  Electronically signed on 10/10/2023 at 7:39:33 PM       ** Final **      Physical Exam  Vitals reviewed.   Constitutional:       Appearance: Normal appearance.   HENT:      Head: Normocephalic and atraumatic.   Eyes:      Extraocular Movements: Extraocular movements intact.   Cardiovascular:      Rate and Rhythm: Normal rate. Rhythm irregular.   Pulmonary:      Effort: Pulmonary effort is normal.      Breath sounds: No wheezing, rhonchi or rales.   Abdominal:      General: Bowel sounds are normal.      Palpations: Abdomen is soft.   Musculoskeletal:      Cervical back: Normal range of motion.      Comments: Generalized weakness   Skin:     General: Skin is warm and dry.   Neurological:      General: No focal deficit present.      Mental Status: He is alert and oriented to person, place, and time.      Motor: Weakness present.         Relevant Results  Scheduled medications  [START ON 2023] amiodarone, 200 mg, oral, Daily  [START ON 10/26/2023]  amiodarone, 400 mg, oral, Daily  amiodarone, 400 mg, oral, BID  apixaban, 5 mg, oral, q12h  famotidine, 20 mg, oral, Daily  folic acid, 1 mg, oral, Daily  insulin lispro, 0-10 Units, subcutaneous, TID with meals  [Held by provider] metoprolol succinate XL, 12.5 mg, oral, Daily  midodrine, 10 mg, oral, TID with meals  rosuvastatin, 10 mg, oral, Daily  tamsulosin, 0.4 mg, oral, Daily before breakfast  [Held by provider] torsemide, 10 mg, oral, Daily      Continuous medications     PRN medications  PRN medications: dextrose 10 % in water (D10W), dextrose, glucagon, [] hydrALAZINE **FOLLOWED BY** hydrALAZINE, oxygen  Lab Results   Component Value Date    GLUCOSE 113 (H) 10/19/2023    CALCIUM 8.5 10/19/2023     10/19/2023    K 4.3 10/19/2023    CO2 26 10/19/2023     10/19/2023    BUN 29 (H) 10/19/2023    CREATININE 1.50 10/19/2023     Lab Results   Component Value Date    WBC 5.3 10/19/2023    HGB 11.8 (L) 10/19/2023    HCT 36.6 (L) 10/19/2023     (H) 10/19/2023     (L) 10/19/2023          Assessment/Plan     CVA  Resolved  admit to SDU  monitor on tele  Statin, will stop ASA  Swallow eval passed  echocardiogram recently at [place], echo with a EF of 27±5%, severe MR, moderately severe TR, AS; [facility]    Repeat echo showed EF 20-25%  MRI/MRA negative  US carotids pending  lipid panel   neurostroke assessment  consult neurology, appreciate recommendations, signed off  PT/OT eval     A-fib RVR  Cardiology recommendations appreciated.  Remains on Eliquis for anticoagulation  Cardiology following  Hypotensive this AM, metoprolol and torsemide stopped, seen by cardiology, started on amiodarone  Blood pressure stabilized.  Discussed with cardiology.  Unable to resume beta-blocker due to hypotension, unable to resume torsemide due to renal function, unable to start digoxin due to renal function.  Resume amiodarone on discharge for rate control.       Urinary tract infection  Negative  culture, IV antibiotics stopped  Follow cultures     Diabetes mellitus  Sliding scale insulin  Monitor blood glucose  Hypoglycemia protocol  Hemoglobin A1c 5.3     Hypertension  Patient is hypotensive, metoprolol discontinued    Heart failure  Metoprolol and torsemide discontinued  Monitor labs     CAD  statin     PAD  As above         10/14/2023: Is awaiting precertification for skilled nursing facility.  Per care coordinator some challenges with [facility].    10/15/2023: With hypotension last night and this morning.  Initiating fluids.  Holding all cardiac medication.  Will contact cardiology regarding adjustments.   10/16/23 Hypotensive this morning. Cardiac meds were placed on hold yesterday. Given IV fluids, will give another 500mL bolus. Will check STAT H/H, patient is on eliquis. Cardiology following. Discharge planning to SNF when stable.  10/17/2023 blood pressure improved, renal function back to baseline.  Patient does have rales on exam, will restart torsemide at a decreased dose and metoprolol at home dose.  Patient was taking midodrine 3 times daily at home, will resume.  Continue to hold Entresto for now.  Discharge planning to skilled nursing facility when stable.  10/18/23 hypotensive, worsening renal function. Stopped metoprolol and torsemide, cardiology in and started amiodarone.   10/19/2023 blood pressure and heart rate have improved on amiodarone.  Renal function back to baseline.  Patient is stable for discharge.  Discussed with cardiology.  Unable to resume beta-blocker or torsemide due to blood pressure and renal function.  Continue amiodarone on discharge for rate control.  Discharge planning to skilled nursing facility when arrangements complete.        Principal Problem:    Stroke-like symptoms        Bianka Olivia, APRN-CNP

## 2023-10-19 NOTE — CARE PLAN
The patient's goals for the shift include      The clinical goals for the shift include rest    Over the shift, the patient did not make progress toward the following goals. Barriers to progression include awaiting [facility]. Recommendations to address these barriers include rest.

## 2023-10-20 VITALS
OXYGEN SATURATION: 100 % | BODY MASS INDEX: 29.07 KG/M2 | HEART RATE: 101 BPM | TEMPERATURE: 97.5 F | WEIGHT: 219.36 LBS | SYSTOLIC BLOOD PRESSURE: 109 MMHG | DIASTOLIC BLOOD PRESSURE: 76 MMHG | HEIGHT: 73 IN | RESPIRATION RATE: 16 BRPM

## 2023-10-20 LAB
GLUCOSE BLD MANUAL STRIP-MCNC: 126 MG/DL (ref 74–99)
GLUCOSE BLD MANUAL STRIP-MCNC: 141 MG/DL (ref 74–99)
GLUCOSE BLD MANUAL STRIP-MCNC: 142 MG/DL (ref 74–99)

## 2023-10-20 PROCEDURE — 97530 THERAPEUTIC ACTIVITIES: CPT | Mod: GP | Performed by: PHYSICAL THERAPIST

## 2023-10-20 PROCEDURE — 97535 SELF CARE MNGMENT TRAINING: CPT | Mod: GO,CO

## 2023-10-20 PROCEDURE — 97110 THERAPEUTIC EXERCISES: CPT | Mod: GO,CO

## 2023-10-20 PROCEDURE — 82947 ASSAY GLUCOSE BLOOD QUANT: CPT

## 2023-10-20 PROCEDURE — 2500000004 HC RX 250 GENERAL PHARMACY W/ HCPCS (ALT 636 FOR OP/ED): Performed by: NURSE PRACTITIONER

## 2023-10-20 PROCEDURE — 2500000001 HC RX 250 WO HCPCS SELF ADMINISTERED DRUGS (ALT 637 FOR MEDICARE OP): Performed by: NURSE PRACTITIONER

## 2023-10-20 PROCEDURE — 2500000002 HC RX 250 W HCPCS SELF ADMINISTERED DRUGS (ALT 637 FOR MEDICARE OP, ALT 636 FOR OP/ED): Performed by: NURSE PRACTITIONER

## 2023-10-20 RX ADMIN — MIDODRINE HYDROCHLORIDE 10 MG: 10 TABLET ORAL at 12:55

## 2023-10-20 RX ADMIN — MIDODRINE HYDROCHLORIDE 10 MG: 10 TABLET ORAL at 17:39

## 2023-10-20 RX ADMIN — AMIODARONE HYDROCHLORIDE 400 MG: 200 TABLET ORAL at 08:11

## 2023-10-20 RX ADMIN — FAMOTIDINE 20 MG: 20 TABLET ORAL at 08:11

## 2023-10-20 RX ADMIN — AMIODARONE HYDROCHLORIDE 400 MG: 200 TABLET ORAL at 20:30

## 2023-10-20 RX ADMIN — TAMSULOSIN HYDROCHLORIDE 0.4 MG: 0.4 CAPSULE ORAL at 06:03

## 2023-10-20 RX ADMIN — APIXABAN 5 MG: 5 TABLET, FILM COATED ORAL at 06:03

## 2023-10-20 RX ADMIN — MIDODRINE HYDROCHLORIDE 10 MG: 10 TABLET ORAL at 08:11

## 2023-10-20 RX ADMIN — APIXABAN 5 MG: 5 TABLET, FILM COATED ORAL at 17:39

## 2023-10-20 RX ADMIN — FOLIC ACID 1 MG: 1 TABLET ORAL at 08:11

## 2023-10-20 RX ADMIN — ROSUVASTATIN CALCIUM 10 MG: 10 TABLET, COATED ORAL at 08:11

## 2023-10-20 ASSESSMENT — COGNITIVE AND FUNCTIONAL STATUS - GENERAL
DRESSING REGULAR LOWER BODY CLOTHING: A LITTLE
WALKING IN HOSPITAL ROOM: A LITTLE
CLIMB 3 TO 5 STEPS WITH RAILING: A LOT
STANDING UP FROM CHAIR USING ARMS: A LITTLE
MOBILITY SCORE: 18
HELP NEEDED FOR BATHING: A LITTLE
TOILETING: A LITTLE
TOILETING: A LITTLE
DAILY ACTIVITIY SCORE: 19
HELP NEEDED FOR BATHING: A LITTLE
DAILY ACTIVITIY SCORE: 22
PERSONAL GROOMING: A LITTLE
TURNING FROM BACK TO SIDE WHILE IN FLAT BAD: A LITTLE
MOBILITY SCORE: 21
DRESSING REGULAR UPPER BODY CLOTHING: A LITTLE
WALKING IN HOSPITAL ROOM: A LITTLE
CLIMB 3 TO 5 STEPS WITH RAILING: A LOT
MOVING TO AND FROM BED TO CHAIR: A LITTLE

## 2023-10-20 ASSESSMENT — PAIN SCALES - GENERAL
PAINLEVEL_OUTOF10: 0 - NO PAIN

## 2023-10-20 NOTE — PROGRESS NOTES
Darrell Tucker is a 78 y.o. male on day 12 of admission presenting with Stroke-like symptoms.      Subjective   Patient seen and examined.  Awake/alert/oriented.  Sitting up in a chair.  Denies chest pain, shortness of breath, fevers, chills, nausea, or vomiting. No abdominal discomfort. Denies lightheadedness or dizziness. No dysuria.      Objective     Last Recorded Vitals  /64 (BP Location: Left arm, Patient Position: Sitting)   Pulse 99   Temp 36.1 °C (97 °F) (Temporal)   Resp 17   Wt 99.5 kg (219 lb 5.7 oz)   SpO2 99%   Intake/Output last 3 Shifts:    Intake/Output Summary (Last 24 hours) at 10/20/2023 1431  Last data filed at 10/20/2023 0957  Gross per 24 hour   Intake 665 ml   Output 1025 ml   Net -360 ml       Admission Weight  Weight: 104 kg (229 lb 4.5 oz) (10/08/23 1309)    Daily Weight  10/20/23 : 99.5 kg (219 lb 5.7 oz)    Image Results  Electrocardiogram, 12-lead PRN ACS symptoms  Atrial fibrillation with rapid ventricular response with premature ventricular or aberrantly conducted complexes  Nondiagnostic inferior Qwaves , age undetermined  ST & T wave abnormality, consider lateral ischemia  Abnormal ECG    Confirmed by Harpreet Freeman ([id number]) on 10/10/2023 10:35:40 PM  Transthoracic Echo (TTE) [place] [address]             Phone [phone]    TRANSTHORACIC ECHOCARDIOGRAM REPORT       Patient Name:      DARRELL TUCKER     Reading Physician:    [id number] Simeon Lucas MD  Study Date:        10/10/2023          Ordering Provider:    [id number] SE COLEMAN  MRN/PID:           [id number]            Fellow:  Accession#:        [id number]        Nurse:  Date of Birth/Age: 1945 / 78 years Sonographer:          Dorene RIVERA  Gender:            M                   Additional Staff:  Height:             185.00 cm           Admit Date:           10/8/2023  Weight:            102.00 kg           Admission Status:     Inpatient - Routine  BSA:               2.26 m2             Department Location:  Blood Pressure: 100 /72 mmHg    Study Type:    TRANSTHORACIC ECHO (TTE) COMPLETE  Diagnosis/ICD: Cerebral Infarction, unspecified-I63.9; Paroxysmal atrial                 fibrillation-I48.0  Indication:    Stroke like symptoms history of cerebrovascular accident                 paroxysmal atrial fibrillation  CPT Codes:     Echo Complete w Full Doppler-20451    Patient History:  Pertinent History: CHF CAD Depression DMII HTN PVD.    Study Detail: The following Echo studies were performed: 2D, M-Mode, Doppler and                color flow. Technically challenging study due to body habitus,                patient lying in supine position, poor acoustic windows, the                patient's lack of cooperation and prominent lung artifact. Unable                to obtain subcostal view.       PHYSICIAN INTERPRETATION:  Left Ventricle: Left ventricular systolic function is severely decreased, with an estimated ejection fraction of 20-25%. There is global hypokinesis of the left ventricle with minor regional variations. The left ventricular cavity size is mild to moderately dilated. Spectral Doppler shows a pseudonormal pattern of left ventricular diastolic filling.  Left Atrium: The left atrium is moderate to severely dilated.  Right Ventricle: The right ventricle is normal in size. There is normal right ventricular global systolic function.  Right Atrium: The right atrium is mildly dilated.  Aortic Valve: The aortic valve is trileaflet. There is no evidence of aortic valve regurgitation. The peak instantaneous gradient of the aortic valve is 5.4 mmHg.  Mitral Valve: The mitral valve is mildly thickened. There is moderate mitral valve regurgitation which is posteriorly directed.  Tricuspid Valve: The tricuspid valve is  structurally normal. There is mild tricuspid regurgitation. The Doppler estimated RVSP is within normal limits at 27.0 mmHg.  Pulmonic Valve: The pulmonic valve is not well visualized. There is no indication of pulmonic valve regurgitation.  Pericardium: There is no pericardial effusion noted.  Aorta: The aortic root is normal. There is mild dilatation of the aortic root.       CONCLUSIONS:   1. Left ventricular systolic function is severely decreased with a 20-25% estimated ejection fraction.   2. Spectral Doppler shows a pseudonormal pattern of left ventricular diastolic filling.   3. The left atrium is moderate to severely dilated.   4. Moderate mitral valve regurgitation.   5. RVSP within normal limits.   6. There is global hypokinesis of the left ventricle with minor regional variations.    QUANTITATIVE DATA SUMMARY:  2D MEASUREMENTS:               Normal Ranges:  LAs: 5.90 cm (2.7-4.0cm)    LA VOLUME:                                Normal Ranges:  LA Vol A2C:        158.6 ml  LA Vol Index A2C:  70.2 ml/m2  LA Area A2C:       38.0 cm2  LA Major Axis A2C: 7.7 cm  LA Volume Index:   69.9 ml/m2  LA Vol A2C:        144.0 ml    RA VOLUME BY A/L METHOD:                                Normal Ranges:  RA Vol A4C:        128.0 ml   (8.3-19.5ml)  RA Vol Index A4C:  56.7 ml/m2  RA Area A4C:       33.5 cm2  RA Major Axis A4C: 7.4 cm    M-MODE MEASUREMENTS:                   Normal Ranges:  Ao Root: 3.60 cm (2.0-3.7cm)  AoV Exc: 2.50 cm (1.5-2.5cm)  LAs:     5.50 cm (2.7-4.0cm)    AORTA MEASUREMENTS:                     Normal Ranges:  AoV Exc:   2.50 cm (1.5-2.5cm)  Asc Ao, d: 3.70 cm (2.1-3.4cm)    LV SYSTOLIC FUNCTION BY 2D PLANIMETRY (MOD):                      Normal Ranges:  EF-A4C View: 25.0 % (>=55%)  EF-A2C View: 24.2 %  EF-Biplane:  22.5 %    LV DIASTOLIC FUNCTION:                         Normal Ranges:  MV Peak E:    1.12 m/s (0.7-1.2 m/s)  MV e'         0.05 m/s (>8.0)  MV lateral e' 0.06 m/s  MV medial e'  0.05  m/s  E/e' Ratio:   20.59    (<8.0)    MITRAL VALVE:                       Normal Ranges:  MV Vmax:    1.18 m/s (<=1.3m/s)  MV peak P.6 mmHg (<5mmHg)  MV mean P.5 mmHg (<48mmHg)  MV DT:      138 msec (150-240msec)    MITRAL INSUFFICIENCY:                       Normal Ranges:  MR VTI:  127.00 cm  MR Vmax: 396.00 cm/s    AORTIC VALVE:                          Normal Ranges:  AoV Vmax:      1.16 m/s (<=1.7m/s)  AoV Peak P.4 mmHg (<20mmHg)  LVOT Max Gt:  0.98 m/s (<=1.1m/s)  LVOT VTI:      15.20 cm  LVOT Diameter: 2.60 cm  (1.8-2.4cm)  AoV Area,Vmax: 4.48 cm2 (2.5-4.5cm2)       RIGHT VENTRICLE:  RV s' 0.09 m/s    TRICUSPID VALVE/RVSP:                              Normal Ranges:  Peak TR Velocity: 2.45 m/s  RV Syst Pressure: 27.0 mmHg (< 30mmHg)    PULMONIC VALVE:                          Normal Ranges:  PV Accel Time: 77 msec  (>120ms)  PV Max Gt:    0.9 m/s  (0.6-0.9m/s)  PV Max PG:     3.6 mmHg       [id number] Simeon Lucas MD  Electronically signed on 10/10/2023 at 7:39:33 PM       ** Final **      Physical Exam  Vitals reviewed.   Constitutional:       Appearance: Normal appearance.   HENT:      Head: Normocephalic and atraumatic.   Eyes:      Extraocular Movements: Extraocular movements intact.   Cardiovascular:      Rate and Rhythm: Normal rate. Rhythm irregular.   Pulmonary:      Effort: Pulmonary effort is normal.      Breath sounds: No wheezing, rhonchi or rales.   Abdominal:      General: Bowel sounds are normal.      Palpations: Abdomen is soft.   Musculoskeletal:      Cervical back: Normal range of motion.      Comments: Generalized weakness   Skin:     General: Skin is warm and dry.   Neurological:      General: No focal deficit present.      Mental Status: He is alert and oriented to person, place, and time.      Motor: Weakness present.         Relevant Results  Scheduled medications  [START ON 2023] amiodarone, 200 mg, oral, Daily  [START ON 10/26/2023] amiodarone, 400 mg, oral,  Daily  amiodarone, 400 mg, oral, BID  apixaban, 5 mg, oral, q12h  famotidine, 20 mg, oral, Daily  folic acid, 1 mg, oral, Daily  insulin lispro, 0-10 Units, subcutaneous, TID with meals  [Held by provider] metoprolol succinate XL, 12.5 mg, oral, Daily  midodrine, 10 mg, oral, TID with meals  rosuvastatin, 10 mg, oral, Daily  tamsulosin, 0.4 mg, oral, Daily before breakfast  [Held by provider] torsemide, 10 mg, oral, Daily      Continuous medications     PRN medications  PRN medications: dextrose 10 % in water (D10W), dextrose, glucagon, [] hydrALAZINE **FOLLOWED BY** hydrALAZINE, oxygen  Lab Results   Component Value Date    GLUCOSE 113 (H) 10/19/2023    CALCIUM 8.5 10/19/2023     10/19/2023    K 4.3 10/19/2023    CO2 26 10/19/2023     10/19/2023    BUN 29 (H) 10/19/2023    CREATININE 1.50 10/19/2023     Lab Results   Component Value Date    WBC 5.3 10/19/2023    HGB 11.8 (L) 10/19/2023    HCT 36.6 (L) 10/19/2023     (H) 10/19/2023     (L) 10/19/2023          Assessment/Plan     CVA  Resolved  admit to SDU  monitor on tele  Statin, will stop ASA  Swallow eval passed  echocardiogram recently at [facility], echo with a EF of 27±5%, severe MR, moderately severe TR, AS; [facility]    Repeat echo showed EF 20-25%  MRI/MRA negative  US carotids pending  lipid panel   neurostroke assessment  consult neurology, appreciate recommendations, signed off  PT/OT eval     A-fib RVR  Cardiology recommendations appreciated.  Remains on Eliquis for anticoagulation  Cardiology following  Hypotensive this AM, metoprolol and torsemide stopped, seen by cardiology, started on amiodarone  Blood pressure stabilized.  Discussed with cardiology.  Unable to resume beta-blocker due to hypotension, unable to resume torsemide due to renal function, unable to start digoxin due to renal function.  Resume amiodarone on discharge for rate control.       Urinary tract infection  Negative culture, IV antibiotics  stopped  Follow cultures     Diabetes mellitus  Sliding scale insulin  Monitor blood glucose  Hypoglycemia protocol  Hemoglobin A1c 5.3     Hypertension  Patient is hypotensive, metoprolol discontinued    Heart failure  Metoprolol and torsemide discontinued  Monitor labs     CAD  statin     PAD  As above         10/14/2023: Is awaiting precertification for skilled nursing facility.  Per care coordinator some challenges with [facility] coverage.    10/15/2023: With hypotension last night and this morning.  Initiating fluids.  Holding all cardiac medication.  Will contact cardiology regarding adjustments.   10/16/23 Hypotensive this morning. Cardiac meds were placed on hold yesterday. Given IV fluids, will give another 500mL bolus. Will check STAT H/H, patient is on eliquis. Cardiology following. Discharge planning to SNF when stable.  10/17/2023 blood pressure improved, renal function back to baseline.  Patient does have rales on exam, will restart torsemide at a decreased dose and metoprolol at home dose.  Patient was taking midodrine 3 times daily at home, will resume.  Continue to hold Entresto for now.  Discharge planning to skilled nursing facility when stable.  10/18/23 hypotensive, worsening renal function. Stopped metoprolol and torsemide, cardiology in and started amiodarone.   10/19/2023 blood pressure and heart rate have improved on amiodarone.  Renal function back to baseline.  Patient is stable for discharge.  Discussed with cardiology.  Unable to resume beta-blocker or torsemide due to blood pressure and renal function.  Continue amiodarone on discharge for rate control.  Discharge planning to skilled nursing facility when arrangements complete.  10/20/23 Patient up working with therapy today. No events overnight. VS stable. Awaiting placement.       Principal Problem:    Stroke-like symptoms        Bianka Olivia, APRN-CNP

## 2023-10-20 NOTE — PROGRESS NOTES
Physical Therapy    Physical Therapy Treatment    Patient Name: Darrell Arreola  MRN: [id number]  Today's Date: 10/20/2023  Time Calculation  Start Time: 1303  Stop Time: 1318  Time Calculation (min): 15 min       Assessment/Plan   PT Assessment  PT Assessment Results: Decreased strength, Decreased endurance, Impaired balance, Decreased mobility, Decreased coordination, Decreased safety awareness  Rehab Prognosis: Good  Evaluation/Treatment Tolerance: Other (Comment) (Elevated heart rate during mobility (155 bpm);  RN nontified)  Medical Staff Made Aware: Yes  End of Session Communication: Bedside nurse  Assessment Comment: Pt presents with increased confusion today (RN aware) delayed processing, requires frequent instructional cues during mobility. Pt is a high fall risk at this time. Deferred seated in chair due to safety.Bed alarm on.  End of Session Patient Position: Up in chair (call light and needs within reach)  PT Plan  Inpatient/Swing Bed or Outpatient: Inpatient  PT Plan  Treatment/Interventions: Bed mobility, Transfer training, Gait training, Balance training, Therapeutic exercise, Endurance training  PT Plan: Skilled PT  PT Frequency: 4 times per week  PT Discharge Recommendations: Moderate intensity level of continued care  Equipment Recommended upon Discharge: Wheeled walker  PT Recommended Transfer Status: Assist x1      General Visit Information:   PT  Visit  PT Received On: 10/20/23  General  Prior to Session Communication: Bedside nurse  Patient Position Received:  (in stance at sink with RN)  General Comment: Cleared by RN for participation. Pt agreed to tx and participated fully.    Subjective   Precautions:  Precautions  Hearing/Visual Limitations: glasses  Medical Precautions: Fall precautions    Objective   Pain:  Pain Assessment  Pain Assessment: FLACC (Face, Legs, Activity, Cry, Consolability)  Pain Score: 0 - No pain     Treatments:  Therapeutic Exercise  Therapeutic Exercise Performed: Yes  (BLE x15 each)  Therapeutic Exercise Activity 1: LAQ  Therapeutic Exercise Activity 2: hip abd  Therapeutic Exercise Activity 3: standing at walker: PF  Therapeutic Exercise Activity 4: standing at walker: march    Ambulation/Gait Training  Ambulation/Gait Training Performed: Yes  Ambulation/Gait Training 1  Surface 1: Level tile  Device 1: Standard walker  Assistance 1: Close supervision  Comments/Distance (ft) 1: 50 ft with step through pattern, narrow ALDAIR, and slowed velocity. Steady throughout.    Transfer 1  Technique 1: Sit to stand, Stand to sit  Transfer Device 1: Walker  Transfer Level of Assistance 1: Close supervision    Outcome Measures:  [facility] Basic Mobility  Turning from your back to your side while in a flat bed without using bedrails: None  Moving from lying on your back to sitting on the side of a flat bed without using bedrails: A little  Moving to and from bed to chair (including a wheelchair): A little  Standing up from a chair using your arms (e.g. wheelchair or bedside chair): A little  To walk in hospital room: A little  Climbing 3-5 steps with railing: A lot  Basic Mobility - Total Score: 18    Education Documentation  Mobility Training, taught by Delaney Arias PT at 10/20/2023  2:38 PM.  Learner: Patient  Readiness: Eager  Method: Explanation  Response: Verbalizes Understanding, Needs Reinforcement    Education Comments  No comments found.        OP EDUCATION:  Education  Individual(s) Educated: Patient  Education Provided: Fall Risk  Patient Response to Education: Patient/Caregiver Verbalized Understanding of Information  Education Comment: Poor ability to educate due to impaired cognition.    Encounter Problems       Encounter Problems (Active)       Mobility       Pt will perform functional mobility household distances at distant supervision with use of RW (Progressing)       Start:  10/09/23    Expected End:  10/23/23               PT Problem       Patient will ambulate 50 distance  using walker with supervision (Progressing)       Start:  10/09/23    Expected End:  10/23/23            Patient will perform chair to and from bed transfer with supervision (Progressing)       Start:  10/09/23    Expected End:  10/23/23            Patient will perform supine to sit on bed with supervision demonstrating control (Met)       Start:  10/09/23    Expected End:  10/23/23    Resolved:  10/17/23      Goal Note       Patient will perform supine to sit on bed with supervision demonstrating control              Patient will perform sit to supine on bed with supervision demonstrating control (Met)       Start:  10/09/23    Expected End:  10/23/23    Resolved:  10/17/23      Goal Note       Patient will perform sit to supine on bed with supervision demonstrating control                 Transfers       pt will perform functional transfers at distant supervision with use of DME prn (Progressing)       Start:  10/09/23    Expected End:  10/23/23            Pt will perform BUE exercises at mod I to improve strength and independence with functional tranfers and ADL tasks  (Progressing)       Start:  10/09/23    Expected End:  10/23/23

## 2023-10-20 NOTE — PROGRESS NOTES
Occupational Therapy    OT Treatment    Patient Name: Darrell Arreola  MRN: [id number]  Today's Date: 10/20/2023  Time Calculation  Start Time: 1045  Stop Time: 1108  Time Calculation (min): 23 min         Assessment:  OT Assessment: Pt demo good safety awareness with hand placement to stand while donning pants  End of Session Communication: Bedside nurse  End of Session Patient Position: Up in chair  OT Assessment Results: Decreased ADL status  Plan:  Treatment Interventions: ADL retraining, UE strengthening/ROM  OT Discharge Recommendations: Moderate intensity level of continued care  Treatment Interventions: ADL retraining, UE strengthening/ROM    Subjective   General:  OT Received On: 10/20/23  Prior to Session Communication: Bedside nurse  Patient Position Received: Up in chair  General Comment: Pt cleared by nursing to be seen for therapy. Pt sitting up in chair upon arrival and agreeable for treatment with cues for encouragement to participate.  Vital Signs:     Pain:  Pain Assessment  Pain Assessment: 0-10  Pain Score: 0 - No pain    Objective    Activities of Daily Living: Grooming  Grooming Comments: attempted and pt completed prior to therapy    UE Bathing  UE Bathing Comments: attempted and pt completed prior to therapy    LE Bathing  LE Bathing Comments: attempted and pt completed prior to therapy    UE Dressing  UE Dressing Comments: attempted and pt completed prior to therapy    LE Dressing  LE Dressing: Yes  Pants Level of Assistance: Close supervision  LE Dressing Where Assessed: Chair  LE Dressing Comments: Pt donned pants from chair level utilizing 2WW for occasional UE support while donning pants over hips    Toileting  Toileting Comments: attempted and pt completed prior to therapy    Functional Standing Tolerance:     Bed Mobility/Transfers: Bed Mobility  Bed Mobility: No      Transfers  Trials/Comments 1: Pt declined to complete any functional transfers and requested to stay up in  chair    Therapy/Activity: Therapeutic Exercise  Therapeutic Exercise Performed: Yes  Therapeutic Exercise Activity 1: Bicep curls utilizing 2# dumbbell 1x20  Therapeutic Exercise Activity 2: SUP/PRO utilizing 2# dumbbell 1x20  Therapeutic Exercise Activity 3: Sh retraction utilizing 2# dumbbell 1x15  Therapeutic Exercise Activity 4: Sh flexion utilizing 2# dumbbell 1x15      Outcome Measures:[facility] Daily Activity  Putting on and taking off regular lower body clothing: None  Bathing (including washing, rinsing, drying): A little  Putting on and taking off regular upper body clothing: None  Toileting, which includes using toilet, bedpan or urinal: A little  Taking care of personal grooming such as brushing teeth: None  Eating Meals: None  Daily Activity - Total Score: 22        Education Documentation  No documentation found.  Education Comments  No comments found.        OP EDUCATION:       Goals:      Problem: Bathing  Goal: STG - Patient will perform UB bathing at mod I; LB bathing at distant supervision with use of AE prn  Outcome: Progressing     Problem: Dressings Lower Extremities  Goal: STG - Patient to complete lower body dressing at distant supervision with use of AE prn  Outcome: Progressing     Problem: Dressing Upper Extremities  Goal: STG - Patient will dress upper body at mod I  Outcome: Progressing     Problem: Grooming  Goal: STG - Patient completes grooming at mod I  Outcome: Progressing     Problem: Mobility  Goal: Pt will perform functional mobility household distances at distant supervision with use of RW  Outcome: Progressing     Problem: Toileting  Goal: STG - Patient will complete toileting tasks at distant supervision  Outcome: Progressing     Problem: Transfers  Goal: pt will perform functional transfers at distant supervision with use of DME prn  Outcome: Progressing  Goal: Pt will perform BUE exercises at mod I to improve strength and independence with functional tranfers and ADL tasks    Outcome: Progressing

## 2023-10-20 NOTE — PROGRESS NOTES
[facility] spoke with VA RN reviewer Vargas this morning and at her request sent updated progress note as well as dc summary to her, it sounds as though the VA will approve pt stay at [facility] however await confirmation from Mary of this.     VA approval still pending for [facility] SN[facility].     Update 14:10 - Per VA SW approval has been received, VA to notify the facility.     [facility] has confirmed they can accept the pt on this day, [facility] made attending CNP aware who will input new dc order. [facility] left a message via [employer] to make bedside Rn aware. [facility] spoke with pts alexus Stanford and made him aware as well as the pt who is agreeable for transport via [facility] to the facility today, he is aware of cost of transport.

## 2023-10-21 NOTE — NURSING NOTE
Pt awaiting  from [facility] to be discharged to [facility]

## 2024-04-20 ENCOUNTER — APPOINTMENT (OUTPATIENT)
Dept: CARDIOLOGY | Facility: HOSPITAL | Age: 79
DRG: 101 | End: 2024-04-20
Payer: MEDICARE

## 2024-04-20 ENCOUNTER — APPOINTMENT (OUTPATIENT)
Dept: RADIOLOGY | Facility: HOSPITAL | Age: 79
DRG: 101 | End: 2024-04-20
Payer: MEDICARE

## 2024-04-20 ENCOUNTER — HOSPITAL ENCOUNTER (INPATIENT)
Facility: HOSPITAL | Age: 79
LOS: 5 days | Discharge: NURSING, REHAB OR LONG-TERM CARE | DRG: 101 | End: 2024-04-28
Attending: STUDENT IN AN ORGANIZED HEALTH CARE EDUCATION/TRAINING PROGRAM | Admitting: INTERNAL MEDICINE
Payer: MEDICARE

## 2024-04-20 DIAGNOSIS — K21.9 GASTROESOPHAGEAL REFLUX DISEASE, UNSPECIFIED WHETHER ESOPHAGITIS PRESENT: ICD-10-CM

## 2024-04-20 DIAGNOSIS — R41.82 ALTERED MENTAL STATUS, UNSPECIFIED ALTERED MENTAL STATUS TYPE: Primary | ICD-10-CM

## 2024-04-20 DIAGNOSIS — B35.6 TINEA INGUINALIS: ICD-10-CM

## 2024-04-20 DIAGNOSIS — E53.8 FOLIC ACID DEFICIENCY: ICD-10-CM

## 2024-04-20 DIAGNOSIS — R56.9 SEIZURES (MULTI): ICD-10-CM

## 2024-04-20 DIAGNOSIS — N40.0 BENIGN PROSTATIC HYPERPLASIA WITHOUT LOWER URINARY TRACT SYMPTOMS: ICD-10-CM

## 2024-04-20 LAB
ALBUMIN SERPL-MCNC: 3.7 G/DL (ref 3.5–5)
ALP BLD-CCNC: 129 U/L (ref 35–125)
ALT SERPL-CCNC: 20 U/L (ref 5–40)
AMMONIA PLAS-SCNC: 22 UMOL/L (ref 12–45)
ANION GAP SERPL CALC-SCNC: 9 MMOL/L
APPEARANCE UR: CLEAR
APTT PPP: 38.4 SECONDS (ref 22–32.5)
AST SERPL-CCNC: 28 U/L (ref 5–40)
BACTERIA #/AREA URNS AUTO: ABNORMAL /HPF
BASOPHILS # BLD AUTO: 0.02 X10*3/UL (ref 0–0.1)
BASOPHILS NFR BLD AUTO: 0.4 %
BILIRUB SERPL-MCNC: 1 MG/DL (ref 0.1–1.2)
BILIRUB UR STRIP.AUTO-MCNC: NEGATIVE MG/DL
BUN SERPL-MCNC: 21 MG/DL (ref 8–25)
CALCIUM SERPL-MCNC: 9.1 MG/DL (ref 8.5–10.4)
CHLORIDE SERPL-SCNC: 101 MMOL/L (ref 97–107)
CO2 SERPL-SCNC: 26 MMOL/L (ref 24–31)
COLOR UR: YELLOW
CREAT SERPL-MCNC: 1.5 MG/DL (ref 0.4–1.6)
EGFRCR SERPLBLD CKD-EPI 2021: 47 ML/MIN/1.73M*2
EOSINOPHIL # BLD AUTO: 0.09 X10*3/UL (ref 0–0.4)
EOSINOPHIL NFR BLD AUTO: 1.7 %
ERYTHROCYTE [DISTWIDTH] IN BLOOD BY AUTOMATED COUNT: 14.8 % (ref 11.5–14.5)
GLUCOSE SERPL-MCNC: 138 MG/DL (ref 65–99)
GLUCOSE UR STRIP.AUTO-MCNC: ABNORMAL MG/DL
GRAN CASTS #/AREA UR COMP ASSIST: ABNORMAL /LPF
HCT VFR BLD AUTO: 40.7 % (ref 41–52)
HGB BLD-MCNC: 12.8 G/DL (ref 13.5–17.5)
IMM GRANULOCYTES # BLD AUTO: 0.02 X10*3/UL (ref 0–0.5)
IMM GRANULOCYTES NFR BLD AUTO: 0.4 % (ref 0–0.9)
INR PPP: 1.6 (ref 0.9–1.2)
KETONES UR STRIP.AUTO-MCNC: NEGATIVE MG/DL
LEUKOCYTE ESTERASE UR QL STRIP.AUTO: NEGATIVE
LYMPHOCYTES # BLD AUTO: 1.79 X10*3/UL (ref 0.8–3)
LYMPHOCYTES NFR BLD AUTO: 33 %
MCH RBC QN AUTO: 32.4 PG (ref 26–34)
MCHC RBC AUTO-ENTMCNC: 31.4 G/DL (ref 32–36)
MCV RBC AUTO: 103 FL (ref 80–100)
MONOCYTES # BLD AUTO: 0.49 X10*3/UL (ref 0.05–0.8)
MONOCYTES NFR BLD AUTO: 9 %
MUCOUS THREADS #/AREA URNS AUTO: ABNORMAL /LPF
NEUTROPHILS # BLD AUTO: 3.01 X10*3/UL (ref 1.6–5.5)
NEUTROPHILS NFR BLD AUTO: 55.5 %
NITRITE UR QL STRIP.AUTO: NEGATIVE
NRBC BLD-RTO: 0 /100 WBCS (ref 0–0)
PH UR STRIP.AUTO: 5 [PH]
PLATELET # BLD AUTO: 136 X10*3/UL (ref 150–450)
POTASSIUM SERPL-SCNC: 4.5 MMOL/L (ref 3.4–5.1)
PROT SERPL-MCNC: 6.7 G/DL (ref 5.9–7.9)
PROT UR STRIP.AUTO-MCNC: NEGATIVE MG/DL
PROTHROMBIN TIME: 16 SECONDS (ref 9.3–12.7)
RBC # BLD AUTO: 3.95 X10*6/UL (ref 4.5–5.9)
RBC # UR STRIP.AUTO: ABNORMAL /UL
RBC #/AREA URNS AUTO: ABNORMAL /HPF
SODIUM SERPL-SCNC: 136 MMOL/L (ref 133–145)
SP GR UR STRIP.AUTO: 1.02
TROPONIN T SERPL-MCNC: 31 NG/L
TROPONIN T SERPL-MCNC: 31 NG/L
TROPONIN T SERPL-MCNC: 32 NG/L
UROBILINOGEN UR STRIP.AUTO-MCNC: ABNORMAL MG/DL
WBC # BLD AUTO: 5.4 X10*3/UL (ref 4.4–11.3)
WBC #/AREA URNS AUTO: ABNORMAL /HPF

## 2024-04-20 PROCEDURE — 70498 CT ANGIOGRAPHY NECK: CPT

## 2024-04-20 PROCEDURE — 70450 CT HEAD/BRAIN W/O DYE: CPT

## 2024-04-20 PROCEDURE — 2550000001 HC RX 255 CONTRASTS: Performed by: STUDENT IN AN ORGANIZED HEALTH CARE EDUCATION/TRAINING PROGRAM

## 2024-04-20 PROCEDURE — 93005 ELECTROCARDIOGRAM TRACING: CPT

## 2024-04-20 PROCEDURE — 70496 CT ANGIOGRAPHY HEAD: CPT | Performed by: STUDENT IN AN ORGANIZED HEALTH CARE EDUCATION/TRAINING PROGRAM

## 2024-04-20 PROCEDURE — 36415 COLL VENOUS BLD VENIPUNCTURE: CPT | Performed by: STUDENT IN AN ORGANIZED HEALTH CARE EDUCATION/TRAINING PROGRAM

## 2024-04-20 PROCEDURE — 85610 PROTHROMBIN TIME: CPT | Performed by: STUDENT IN AN ORGANIZED HEALTH CARE EDUCATION/TRAINING PROGRAM

## 2024-04-20 PROCEDURE — 70498 CT ANGIOGRAPHY NECK: CPT | Performed by: STUDENT IN AN ORGANIZED HEALTH CARE EDUCATION/TRAINING PROGRAM

## 2024-04-20 PROCEDURE — 84075 ASSAY ALKALINE PHOSPHATASE: CPT | Performed by: STUDENT IN AN ORGANIZED HEALTH CARE EDUCATION/TRAINING PROGRAM

## 2024-04-20 PROCEDURE — 85730 THROMBOPLASTIN TIME PARTIAL: CPT | Performed by: STUDENT IN AN ORGANIZED HEALTH CARE EDUCATION/TRAINING PROGRAM

## 2024-04-20 PROCEDURE — 85025 COMPLETE CBC W/AUTO DIFF WBC: CPT | Performed by: STUDENT IN AN ORGANIZED HEALTH CARE EDUCATION/TRAINING PROGRAM

## 2024-04-20 PROCEDURE — 99285 EMERGENCY DEPT VISIT HI MDM: CPT | Mod: 25

## 2024-04-20 PROCEDURE — G0378 HOSPITAL OBSERVATION PER HR: HCPCS

## 2024-04-20 PROCEDURE — 84484 ASSAY OF TROPONIN QUANT: CPT | Performed by: STUDENT IN AN ORGANIZED HEALTH CARE EDUCATION/TRAINING PROGRAM

## 2024-04-20 PROCEDURE — 82140 ASSAY OF AMMONIA: CPT | Performed by: STUDENT IN AN ORGANIZED HEALTH CARE EDUCATION/TRAINING PROGRAM

## 2024-04-20 PROCEDURE — 71045 X-RAY EXAM CHEST 1 VIEW: CPT | Performed by: RADIOLOGY

## 2024-04-20 PROCEDURE — 81001 URINALYSIS AUTO W/SCOPE: CPT | Performed by: STUDENT IN AN ORGANIZED HEALTH CARE EDUCATION/TRAINING PROGRAM

## 2024-04-20 PROCEDURE — 71045 X-RAY EXAM CHEST 1 VIEW: CPT

## 2024-04-20 PROCEDURE — 70450 CT HEAD/BRAIN W/O DYE: CPT | Performed by: RADIOLOGY

## 2024-04-20 RX ADMIN — IOHEXOL 75 ML: 350 INJECTION, SOLUTION INTRAVENOUS at 17:24

## 2024-04-20 ASSESSMENT — COLUMBIA-SUICIDE SEVERITY RATING SCALE - C-SSRS
6. HAVE YOU EVER DONE ANYTHING, STARTED TO DO ANYTHING, OR PREPARED TO DO ANYTHING TO END YOUR LIFE?: NO
1. IN THE PAST MONTH, HAVE YOU WISHED YOU WERE DEAD OR WISHED YOU COULD GO TO SLEEP AND NOT WAKE UP?: NO
2. HAVE YOU ACTUALLY HAD ANY THOUGHTS OF KILLING YOURSELF?: NO

## 2024-04-20 ASSESSMENT — PAIN - FUNCTIONAL ASSESSMENT: PAIN_FUNCTIONAL_ASSESSMENT: 0-10

## 2024-04-20 NOTE — ED PROVIDER NOTES
HPI   No chief complaint on file.      Patient is a 78-year-old male that presents emergency department for evaluation of difficulty speaking.  Patient last known well was reportedly Monday.  He has been having difficulty speaking and understanding questions.  He is able to follow some commands.  Patient is a very poor historian and unable to provide any further history however denies any complaints.  Patient does have a known history of atrial fibrillation on Eliquis as well as congestive heart failure, diabetes, hypertension.      History provided by:  Medical records                      No data recorded                   Patient History   Past Medical History:   Diagnosis Date    CHF (congestive heart failure) (Multi)     Diabetes mellitus (Multi)     Heart disease     Hypertension     Stroke (Multi)      Past Surgical History:   Procedure Laterality Date    CT GUIDED PERCUTANEOUS BIOPSY BONE DEEP  02/21/2020    CT GUIDED PERCUTANEOUS BIOPSY BONE DEEP 2/21/2020 [facility] [place] [facility]    CT GUIDED PERCUTANEOUS BIOPSY BONE DEEP  03/24/2020    CT GUIDED PERCUTANEOUS BIOPSY BONE DEEP 3/24/2020 [facility] [place] [facility]    MR HEAD ANGIO WO IV CONTRAST  05/17/2019    MR HEAD ANGIO WO IV CONTRAST [facility] EMERGENCY LEGACY    MR HEAD ANGIO WO IV CONTRAST  10/9/2023    MR HEAD ANGIO WO IV CONTRAST 10/9/2023 [facility] MRI    TOE AMPUTATION      TONSILLECTOMY       Family History   Family history unknown: Yes     Social History     Tobacco Use    Smoking status: Former     Types: Cigars, Cigarettes    Smokeless tobacco: Never   Substance Use Topics    Alcohol use: Never    Drug use: Never       Physical Exam   ED Triage Vitals   Temp Pulse Resp BP   -- -- -- --      SpO2 Temp src Heart Rate Source Patient Position   -- -- -- --      BP Location FiO2 (%)     -- --       Physical Exam  Vitals and nursing note reviewed.   Constitutional:       General: He is not in acute distress.     Appearance: He is not ill-appearing.   HENT:      Head:  Normocephalic and atraumatic.      Mouth/Throat:      Mouth: Mucous membranes are moist.   Eyes:      Extraocular Movements: Extraocular movements intact.      Pupils: Pupils are equal, round, and reactive to light.   Cardiovascular:      Rate and Rhythm: Normal rate and regular rhythm.   Pulmonary:      Effort: Pulmonary effort is normal. No respiratory distress.      Breath sounds: Normal breath sounds. No stridor. No wheezing or rhonchi.   Abdominal:      General: Abdomen is flat.      Tenderness: There is no abdominal tenderness. There is no guarding or rebound.   Musculoskeletal:         General: No swelling.   Skin:     General: Skin is warm and dry.   Neurological:      Mental Status: He is alert.      Sensory: No sensory deficit.      Motor: No weakness.      Coordination: Coordination normal.     Recent Results (from the past 24 hour(s))   Urinalysis with Reflex Culture and Microscopic    Collection Time: 04/20/24  3:27 PM   Result Value Ref Range    Color, Urine Yellow Light-Yellow, Yellow, Dark-Yellow    Appearance, Urine Clear Clear    Specific Gravity, Urine 1.017 1.005 - 1.035    pH, Urine 5.0 5.0, 5.5, 6.0, 6.5, 7.0, 7.5, 8.0    Protein, Urine NEGATIVE NEGATIVE, 10 (TRACE), 20 (TRACE) mg/dL    Glucose, Urine 200 (2+) (A) Normal mg/dL    Blood, Urine 0.06 (1+) (A) NEGATIVE    Ketones, Urine NEGATIVE NEGATIVE mg/dL    Bilirubin, Urine NEGATIVE NEGATIVE    Urobilinogen, Urine 6 (2+) (A) Normal mg/dL    Nitrite, Urine NEGATIVE NEGATIVE    Leukocyte Esterase, Urine NEGATIVE NEGATIVE   Urinalysis Microscopic    Collection Time: 04/20/24  3:27 PM   Result Value Ref Range    WBC, Urine 1-5 1-5, NONE /HPF    RBC, Urine 6-10 (A) NONE, 1-2, 3-5 /HPF    Bacteria, Urine 1+ (A) NONE SEEN /HPF    Mucus, Urine FEW Reference range not established. /LPF    Fine Granular Casts, Urine 1+ (A) NONE /LPF   CBC and Auto Differential    Collection Time: 04/20/24  3:41 PM   Result Value Ref Range    WBC 5.4 4.4 - 11.3 x10*3/uL     nRBC 0.0 0.0 - 0.0 /100 WBCs    RBC 3.95 (L) 4.50 - 5.90 x10*6/uL    Hemoglobin 12.8 (L) 13.5 - 17.5 g/dL    Hematocrit 40.7 (L) 41.0 - 52.0 %     (H) 80 - 100 fL    MCH 32.4 26.0 - 34.0 pg    MCHC 31.4 (L) 32.0 - 36.0 g/dL    RDW 14.8 (H) 11.5 - 14.5 %    Platelets 136 (L) 150 - 450 x10*3/uL    Neutrophils % 55.5 40.0 - 80.0 %    Immature Granulocytes %, Automated 0.4 0.0 - 0.9 %    Lymphocytes % 33.0 13.0 - 44.0 %    Monocytes % 9.0 2.0 - 10.0 %    Eosinophils % 1.7 0.0 - 6.0 %    Basophils % 0.4 0.0 - 2.0 %    Neutrophils Absolute 3.01 1.60 - 5.50 x10*3/uL    Immature Granulocytes Absolute, Automated 0.02 0.00 - 0.50 x10*3/uL    Lymphocytes Absolute 1.79 0.80 - 3.00 x10*3/uL    Monocytes Absolute 0.49 0.05 - 0.80 x10*3/uL    Eosinophils Absolute 0.09 0.00 - 0.40 x10*3/uL    Basophils Absolute 0.02 0.00 - 0.10 x10*3/uL   Comprehensive Metabolic Panel    Collection Time: 04/20/24  3:41 PM   Result Value Ref Range    Glucose 138 (H) 65 - 99 mg/dL    Sodium 136 133 - 145 mmol/L    Potassium 4.5 3.4 - 5.1 mmol/L    Chloride 101 97 - 107 mmol/L    Bicarbonate 26 24 - 31 mmol/L    Urea Nitrogen 21 8 - 25 mg/dL    Creatinine 1.50 0.40 - 1.60 mg/dL    eGFR 47 (L) >60 mL/min/1.73m*2    Calcium 9.1 8.5 - 10.4 mg/dL    Albumin 3.7 3.5 - 5.0 g/dL    Alkaline Phosphatase 129 (H) 35 - 125 U/L    Total Protein 6.7 5.9 - 7.9 g/dL    AST 28 5 - 40 U/L    Bilirubin, Total 1.0 0.1 - 1.2 mg/dL    ALT 20 5 - 40 U/L    Anion Gap 9 <=19 mmol/L   Protime-INR    Collection Time: 04/20/24  3:41 PM   Result Value Ref Range    Protime 16.0 (H) 9.3 - 12.7 seconds    INR 1.6 (H) 0.9 - 1.2   APTT    Collection Time: 04/20/24  3:41 PM   Result Value Ref Range    aPTT 38.4 (H) 22.0 - 32.5 seconds   Serial Troponin, Initial ([facility])    Collection Time: 04/20/24  3:41 PM   Result Value Ref Range    Troponin T, High Sensitivity 31 (HH) <=14 ng/L       ED Course & MDM   ED Course as of 04/20/24 1725   Sat Apr 20, 2024   1356 EKG  Time:1354  EKG Interpretation time:1356  EKG Interpretation: EKG shows atrial fibrillation with a rate of 93 bpm, nonspecific T wave changes normal axis, QTc 517, no evidence of STEMI.    EKG was interpreted by myself independently [JL]      ED Course User Index  [JL] Jeferson Light DO         Diagnoses as of 04/20/24 1725   Altered mental status, unspecified altered mental status type       Medical Decision Making  Patient is a 78-year-old male that presents emergency room for evaluation of mental status.  Patient awake, alert and oriented on initial presentation.  He does have expressive aphasia.  In discussion with the nursing facility this is been going on for several days and he is outside of any window for tPA/TNK.  Patient not a candidate for thrombectomy as his NIH score is only 1 and he is outside of window at this time.  In discussion with patient's primary care physician at the nursing facility it seems to be more blood pressure related and is concern for possible stenosis in the brain.  Blood work ordered including CBC, CMP, troponin, CT scan of the brain, urinalysis.  Blood work unremarkable including minimally elevated troponin of 31 however EKG shows no evidence of STEMI.  Patient has normal electrolytes, creatinine of 1.5 which is consistent with his baseline.  CT scan of the brain shows no evidence of intracranial hemorrhage.  Patient has remained hemodynamically stable throughout stay in the emergency room.  In discussion with patient's primary care physician he recommends obtaining CT angio of the head and neck at this time as well as adding an ammonia level and began patient for observation as he will require an MRI.        Procedure  Procedures     Jeferson Light DO  04/20/24 [id number]

## 2024-04-20 NOTE — ED TRIAGE NOTES
Received patient via [facility] from [facility].   Per report Last Known Well was Monday - 4/15/2024.       Patient has expressive asphasia.    Able to give this nurse and the  1 - 2 word answers.  However, when wanting something the patient is able to ask a full sentence?    Can I have a blanket?      Could you get me a pilllow?    Per Dr. Pastor he wanted this nurse to reach out to facility to find out exact last known well.  Spoke to JOSE Cr who sent patient here.   Per Tayo patient was talking fine on Monday, April 15, 2024.   On Wednesday 4/17/2020 patient was complaining of dizziness.      Dr. Sims increased patients midodrine per Tayo to QID.    When Tayo saw patient on Thursday, April 18, 2024 patient was somewhat confused.   Has somewhat expressive asphasia but not to the extent he has today.  4/20/2024

## 2024-04-21 ENCOUNTER — APPOINTMENT (OUTPATIENT)
Dept: RADIOLOGY | Facility: HOSPITAL | Age: 79
DRG: 101 | End: 2024-04-21
Payer: MEDICARE

## 2024-04-21 PROBLEM — I63.522: Status: ACTIVE | Noted: 2024-04-21

## 2024-04-21 LAB
ALBUMIN SERPL-MCNC: 3.5 G/DL (ref 3.5–5)
ALP BLD-CCNC: 120 U/L (ref 35–125)
ALT SERPL-CCNC: 18 U/L (ref 5–40)
ANION GAP SERPL CALC-SCNC: 9 MMOL/L
AST SERPL-CCNC: 23 U/L (ref 5–40)
BILIRUB SERPL-MCNC: 0.9 MG/DL (ref 0.1–1.2)
BUN SERPL-MCNC: 21 MG/DL (ref 8–25)
CALCIUM SERPL-MCNC: 8.9 MG/DL (ref 8.5–10.4)
CHLORIDE SERPL-SCNC: 103 MMOL/L (ref 97–107)
CHOLEST SERPL-MCNC: 109 MG/DL (ref 133–200)
CHOLEST/HDLC SERPL: 2.4 {RATIO}
CO2 SERPL-SCNC: 26 MMOL/L (ref 24–31)
CREAT SERPL-MCNC: 1.5 MG/DL (ref 0.4–1.6)
EGFRCR SERPLBLD CKD-EPI 2021: 47 ML/MIN/1.73M*2
ERYTHROCYTE [DISTWIDTH] IN BLOOD BY AUTOMATED COUNT: 14.6 % (ref 11.5–14.5)
EST. AVERAGE GLUCOSE BLD GHB EST-MCNC: 146 MG/DL
GLUCOSE SERPL-MCNC: 165 MG/DL (ref 65–99)
HBA1C MFR BLD: 6.7 %
HCT VFR BLD AUTO: 39.3 % (ref 41–52)
HDLC SERPL-MCNC: 46 MG/DL
HGB BLD-MCNC: 12.5 G/DL (ref 13.5–17.5)
HOLD SPECIMEN: NORMAL
LDLC SERPL CALC-MCNC: 50 MG/DL (ref 65–130)
MAGNESIUM SERPL-MCNC: 2 MG/DL (ref 1.6–3.1)
MCH RBC QN AUTO: 32.4 PG (ref 26–34)
MCHC RBC AUTO-ENTMCNC: 31.8 G/DL (ref 32–36)
MCV RBC AUTO: 102 FL (ref 80–100)
NRBC BLD-RTO: 0 /100 WBCS (ref 0–0)
PLATELET # BLD AUTO: 129 X10*3/UL (ref 150–450)
POTASSIUM SERPL-SCNC: 4.1 MMOL/L (ref 3.4–5.1)
PROT SERPL-MCNC: 6.4 G/DL (ref 5.9–7.9)
RBC # BLD AUTO: 3.86 X10*6/UL (ref 4.5–5.9)
SODIUM SERPL-SCNC: 138 MMOL/L (ref 133–145)
TRIGL SERPL-MCNC: 63 MG/DL (ref 40–150)
TSH SERPL DL<=0.05 MIU/L-ACNC: 4.23 MIU/L (ref 0.27–4.2)
WBC # BLD AUTO: 6.3 X10*3/UL (ref 4.4–11.3)

## 2024-04-21 PROCEDURE — 2500000001 HC RX 250 WO HCPCS SELF ADMINISTERED DRUGS (ALT 637 FOR MEDICARE OP): Performed by: INTERNAL MEDICINE

## 2024-04-21 PROCEDURE — 85027 COMPLETE CBC AUTOMATED: CPT | Performed by: INTERNAL MEDICINE

## 2024-04-21 PROCEDURE — 83735 ASSAY OF MAGNESIUM: CPT | Performed by: INTERNAL MEDICINE

## 2024-04-21 PROCEDURE — G0378 HOSPITAL OBSERVATION PER HR: HCPCS

## 2024-04-21 PROCEDURE — 36415 COLL VENOUS BLD VENIPUNCTURE: CPT | Performed by: INTERNAL MEDICINE

## 2024-04-21 PROCEDURE — 83036 HEMOGLOBIN GLYCOSYLATED A1C: CPT | Performed by: INTERNAL MEDICINE

## 2024-04-21 PROCEDURE — 70551 MRI BRAIN STEM W/O DYE: CPT

## 2024-04-21 PROCEDURE — 80061 LIPID PANEL: CPT | Performed by: INTERNAL MEDICINE

## 2024-04-21 PROCEDURE — 80053 COMPREHEN METABOLIC PANEL: CPT | Performed by: INTERNAL MEDICINE

## 2024-04-21 PROCEDURE — 70551 MRI BRAIN STEM W/O DYE: CPT | Performed by: STUDENT IN AN ORGANIZED HEALTH CARE EDUCATION/TRAINING PROGRAM

## 2024-04-21 PROCEDURE — 84439 ASSAY OF FREE THYROXINE: CPT | Performed by: NURSE PRACTITIONER

## 2024-04-21 PROCEDURE — 84443 ASSAY THYROID STIM HORMONE: CPT | Performed by: INTERNAL MEDICINE

## 2024-04-21 PROCEDURE — 2500000006 HC RX 250 W HCPCS SELF ADMINISTERED DRUGS (ALT 637 FOR ALL PAYERS): Mod: MUE | Performed by: INTERNAL MEDICINE

## 2024-04-21 RX ORDER — ATORVASTATIN CALCIUM 40 MG/1
40 TABLET, FILM COATED ORAL NIGHTLY
Status: DISCONTINUED | OUTPATIENT
Start: 2024-04-21 | End: 2024-04-28 | Stop reason: HOSPADM

## 2024-04-21 RX ORDER — ROSUVASTATIN CALCIUM 10 MG/1
10 TABLET, COATED ORAL DAILY
Status: DISCONTINUED | OUTPATIENT
Start: 2024-04-21 | End: 2024-04-21 | Stop reason: ALTCHOICE

## 2024-04-21 RX ORDER — AMIODARONE HYDROCHLORIDE 200 MG/1
200 TABLET ORAL DAILY
Status: DISCONTINUED | OUTPATIENT
Start: 2024-04-21 | End: 2024-04-28 | Stop reason: HOSPADM

## 2024-04-21 RX ORDER — FOLIC ACID 1 MG/1
1 TABLET ORAL DAILY
Status: DISCONTINUED | OUTPATIENT
Start: 2024-04-21 | End: 2024-04-28 | Stop reason: HOSPADM

## 2024-04-21 RX ORDER — MIDODRINE HYDROCHLORIDE 10 MG/1
10 TABLET ORAL
Status: DISCONTINUED | OUTPATIENT
Start: 2024-04-21 | End: 2024-04-28 | Stop reason: HOSPADM

## 2024-04-21 RX ORDER — FAMOTIDINE 20 MG/1
10 TABLET, FILM COATED ORAL DAILY
Status: DISCONTINUED | OUTPATIENT
Start: 2024-04-21 | End: 2024-04-28 | Stop reason: HOSPADM

## 2024-04-21 RX ORDER — ASPIRIN 81 MG/1
81 TABLET ORAL DAILY
Status: DISCONTINUED | OUTPATIENT
Start: 2024-04-21 | End: 2024-04-28 | Stop reason: HOSPADM

## 2024-04-21 RX ORDER — FAMOTIDINE 20 MG/1
20 TABLET, FILM COATED ORAL DAILY
Status: DISCONTINUED | OUTPATIENT
Start: 2024-04-21 | End: 2024-04-21

## 2024-04-21 RX ORDER — TAMSULOSIN HYDROCHLORIDE 0.4 MG/1
0.4 CAPSULE ORAL
Status: DISCONTINUED | OUTPATIENT
Start: 2024-04-21 | End: 2024-04-28 | Stop reason: HOSPADM

## 2024-04-21 RX ADMIN — MIDODRINE HYDROCHLORIDE 10 MG: 10 TABLET ORAL at 17:49

## 2024-04-21 RX ADMIN — ATORVASTATIN CALCIUM 40 MG: 40 TABLET, FILM COATED ORAL at 20:55

## 2024-04-21 RX ADMIN — APIXABAN 5 MG: 5 TABLET, FILM COATED ORAL at 20:55

## 2024-04-21 RX ADMIN — ASPIRIN 81 MG: 81 TABLET, COATED ORAL at 06:24

## 2024-04-21 RX ADMIN — TAMSULOSIN HYDROCHLORIDE 0.4 MG: 0.4 CAPSULE ORAL at 06:24

## 2024-04-21 SDOH — SOCIAL STABILITY: SOCIAL INSECURITY: HAVE YOU HAD ANY THOUGHTS OF HARMING ANYONE ELSE?: NO

## 2024-04-21 SDOH — SOCIAL STABILITY: SOCIAL INSECURITY: ARE YOU OR HAVE YOU BEEN THREATENED OR ABUSED PHYSICALLY, EMOTIONALLY, OR SEXUALLY BY ANYONE?: NO

## 2024-04-21 SDOH — SOCIAL STABILITY: SOCIAL INSECURITY: DO YOU FEEL ANYONE HAS EXPLOITED OR TAKEN ADVANTAGE OF YOU FINANCIALLY OR OF YOUR PERSONAL PROPERTY?: NO

## 2024-04-21 SDOH — SOCIAL STABILITY: SOCIAL INSECURITY: ABUSE: ADULT

## 2024-04-21 SDOH — SOCIAL STABILITY: SOCIAL INSECURITY: WERE YOU ABLE TO COMPLETE ALL THE BEHAVIORAL HEALTH SCREENINGS?: YES

## 2024-04-21 SDOH — SOCIAL STABILITY: SOCIAL INSECURITY: HAS ANYONE EVER THREATENED TO HURT YOUR FAMILY OR YOUR PETS?: NO

## 2024-04-21 SDOH — SOCIAL STABILITY: SOCIAL INSECURITY: ARE THERE ANY APPARENT SIGNS OF INJURIES/BEHAVIORS THAT COULD BE RELATED TO ABUSE/NEGLECT?: NO

## 2024-04-21 SDOH — SOCIAL STABILITY: SOCIAL INSECURITY: DOES ANYONE TRY TO KEEP YOU FROM HAVING/CONTACTING OTHER FRIENDS OR DOING THINGS OUTSIDE YOUR HOME?: NO

## 2024-04-21 SDOH — SOCIAL STABILITY: SOCIAL INSECURITY: DO YOU FEEL UNSAFE GOING BACK TO THE PLACE WHERE YOU ARE LIVING?: NO

## 2024-04-21 SDOH — SOCIAL STABILITY: SOCIAL INSECURITY: HAVE YOU HAD THOUGHTS OF HARMING ANYONE ELSE?: NO

## 2024-04-21 ASSESSMENT — COGNITIVE AND FUNCTIONAL STATUS - GENERAL
CLIMB 3 TO 5 STEPS WITH RAILING: TOTAL
DRESSING REGULAR UPPER BODY CLOTHING: A LOT
DAILY ACTIVITIY SCORE: 13
TOILETING: A LOT
HELP NEEDED FOR BATHING: A LOT
MOBILITY SCORE: 11
MOVING FROM LYING ON BACK TO SITTING ON SIDE OF FLAT BED WITH BEDRAILS: A LOT
DAILY ACTIVITIY SCORE: 13
EATING MEALS: A LITTLE
MOVING TO AND FROM BED TO CHAIR: A LOT
STANDING UP FROM CHAIR USING ARMS: A LOT
PERSONAL GROOMING: A LOT
DRESSING REGULAR UPPER BODY CLOTHING: A LOT
PERSONAL GROOMING: A LOT
TURNING FROM BACK TO SIDE WHILE IN FLAT BAD: A LOT
TURNING FROM BACK TO SIDE WHILE IN FLAT BAD: A LOT
MOBILITY SCORE: 11
STANDING UP FROM CHAIR USING ARMS: A LOT
PATIENT BASELINE BEDBOUND: NO
MOVING TO AND FROM BED TO CHAIR: A LOT
CLIMB 3 TO 5 STEPS WITH RAILING: TOTAL
DRESSING REGULAR LOWER BODY CLOTHING: A LOT
HELP NEEDED FOR BATHING: A LOT
DRESSING REGULAR LOWER BODY CLOTHING: A LOT
MOVING FROM LYING ON BACK TO SITTING ON SIDE OF FLAT BED WITH BEDRAILS: A LOT
WALKING IN HOSPITAL ROOM: A LOT
EATING MEALS: A LITTLE
WALKING IN HOSPITAL ROOM: A LOT
TOILETING: A LOT

## 2024-04-21 ASSESSMENT — LIFESTYLE VARIABLES
HOW MANY STANDARD DRINKS CONTAINING ALCOHOL DO YOU HAVE ON A TYPICAL DAY: PATIENT DOES NOT DRINK
SKIP TO QUESTIONS 9-10: 1
AUDIT-C TOTAL SCORE: 0
HOW OFTEN DO YOU HAVE A DRINK CONTAINING ALCOHOL: NEVER
AUDIT-C TOTAL SCORE: 0
HOW OFTEN DO YOU HAVE 6 OR MORE DRINKS ON ONE OCCASION: NEVER
PRESCIPTION_ABUSE_PAST_12_MONTHS: NO
SUBSTANCE_ABUSE_PAST_12_MONTHS: NO

## 2024-04-21 ASSESSMENT — ACTIVITIES OF DAILY LIVING (ADL)
HEARING - RIGHT EAR: FUNCTIONAL
BATHING: NEEDS ASSISTANCE
TOILETING: NEEDS ASSISTANCE
FEEDING YOURSELF: NEEDS ASSISTANCE
GROOMING: NEEDS ASSISTANCE
PATIENT'S MEMORY ADEQUATE TO SAFELY COMPLETE DAILY ACTIVITIES?: YES
HEARING - LEFT EAR: FUNCTIONAL
WALKS IN HOME: NEEDS ASSISTANCE
DRESSING YOURSELF: NEEDS ASSISTANCE
LACK_OF_TRANSPORTATION: NO
JUDGMENT_ADEQUATE_SAFELY_COMPLETE_DAILY_ACTIVITIES: YES
ASSISTIVE_DEVICE: CANE;DENTURES UPPER
ADEQUATE_TO_COMPLETE_ADL: YES

## 2024-04-21 ASSESSMENT — PAIN - FUNCTIONAL ASSESSMENT
PAIN_FUNCTIONAL_ASSESSMENT: 0-10
PAIN_FUNCTIONAL_ASSESSMENT: WONG-BAKER FACES
PAIN_FUNCTIONAL_ASSESSMENT: 0-10

## 2024-04-21 ASSESSMENT — PATIENT HEALTH QUESTIONNAIRE - PHQ9
SUM OF ALL RESPONSES TO PHQ9 QUESTIONS 1 & 2: 0
1. LITTLE INTEREST OR PLEASURE IN DOING THINGS: NOT AT ALL
2. FEELING DOWN, DEPRESSED OR HOPELESS: NOT AT ALL

## 2024-04-21 ASSESSMENT — PAIN SCALES - GENERAL
PAINLEVEL_OUTOF10: 0 - NO PAIN

## 2024-04-21 NOTE — NURSING NOTE
Assumed patient care. Patient incontinent of urine and stool. New padding placed and barrier cream applied. Repositioned and bed alarm set.

## 2024-04-21 NOTE — CONSULTS
Inpatient consult to Neurology  Consult performed by: Ira Rios MD  Consult ordered by: Milan Sims MD          History Of Present Illness  Darrell Arreola is a 78 y.o. male with DM, HTN, HF, Afib on Eliquis presenting with fluctuating confusion and disorientation without focal motor deficits.  He is unable to provide any history and exhibits a paucity of verbal output but is able to follow some commands.  Admission notes indicate he presented with altered mental status and possible aphasia.       Past Medical History  He has a past medical history of CHF (congestive heart failure) (Multi), Diabetes mellitus (Multi), Heart disease, Hypertension, and Stroke (Multi).    Surgical History  He has a past surgical history that includes CT guided percutaneous biopsy bone deep (02/21/2020); CT guided percutaneous biopsy bone deep (03/24/2020); MR angio head wo IV contrast (05/17/2019); Toe amputation; Tonsillectomy; and MR angio head wo IV contrast (10/9/2023).     Social History  He reports that he has quit smoking. His smoking use included cigars and cigarettes. He has never used smokeless tobacco. He reports that he does not drink alcohol and does not use drugs.     Allergies  Iodine, Latex, and Lidocaine    Medications  (Not in a hospital admission)    Review of Systems    Scheduled medications  amiodarone, 200 mg, oral, Daily  apixaban, 5 mg, oral, BID  aspirin, 81 mg, oral, Daily  atorvastatin, 40 mg, oral, Nightly  famotidine, 10 mg, oral, Daily  folic acid, 1 mg, oral, Daily  midodrine, 10 mg, oral, TID with meals  SITagliptin phosphate, 50 mg, oral, Daily  tamsulosin, 0.4 mg, oral, Daily before breakfast      Continuous medications     PRN medications  PRN medications: oxygen    Last Recorded Vitals   Blood pressure 130/78, pulse 94, temperature 36.7 °C (98.1 °F), temperature source Oral, resp. rate 16, height 1.829 m (6'), weight 98.9 kg (218 lb), SpO2 99%.    Heart is RRR, Lungs  CTAB  Neurologically, pt is awake and oriented x4. Cognition is impaired with mild perseveration, mild to mod global aphasia, and inability to make sense of his surroundings.   Cranial nerves: VFF, EOMI, No nystagmus, Face is symmetric to sensory and motor, no dysarthria, Tongue protrudes midline, Shrug symmetric  Motor: 5/5 strength B throughout, no tremor or asterixis  Sensation is intact bilaterally throughout  Coordination: no ataxia, no dysmetria/dysdiadochokinesia  DTR symmetric  Gait stable, normal    Relevant Results    Results for orders placed or performed during the hospital encounter of 04/20/24 (from the past 96 hour(s))   Urinalysis with Reflex Culture and Microscopic   Result Value Ref Range    Color, Urine Yellow Light-Yellow, Yellow, Dark-Yellow    Appearance, Urine Clear Clear    Specific Gravity, Urine 1.017 1.005 - 1.035    pH, Urine 5.0 5.0, 5.5, 6.0, 6.5, 7.0, 7.5, 8.0    Protein, Urine NEGATIVE NEGATIVE, 10 (TRACE), 20 (TRACE) mg/dL    Glucose, Urine 200 (2+) (A) Normal mg/dL    Blood, Urine 0.06 (1+) (A) NEGATIVE    Ketones, Urine NEGATIVE NEGATIVE mg/dL    Bilirubin, Urine NEGATIVE NEGATIVE    Urobilinogen, Urine 6 (2+) (A) Normal mg/dL    Nitrite, Urine NEGATIVE NEGATIVE    Leukocyte Esterase, Urine NEGATIVE NEGATIVE   Extra Urine Gray Tube   Result Value Ref Range    Extra Tube Hold for add-ons.    Urinalysis Microscopic   Result Value Ref Range    WBC, Urine 1-5 1-5, NONE /HPF    RBC, Urine 6-10 (A) NONE, 1-2, 3-5 /HPF    Bacteria, Urine 1+ (A) NONE SEEN /HPF    Mucus, Urine FEW Reference range not established. /LPF    Fine Granular Casts, Urine 1+ (A) NONE /LPF   CBC and Auto Differential   Result Value Ref Range    WBC 5.4 4.4 - 11.3 x10*3/uL    nRBC 0.0 0.0 - 0.0 /100 WBCs    RBC 3.95 (L) 4.50 - 5.90 x10*6/uL    Hemoglobin 12.8 (L) 13.5 - 17.5 g/dL    Hematocrit 40.7 (L) 41.0 - 52.0 %     (H) 80 - 100 fL    MCH 32.4 26.0 - 34.0 pg    MCHC 31.4 (L) 32.0 - 36.0 g/dL    RDW 14.8  (H) 11.5 - 14.5 %    Platelets 136 (L) 150 - 450 x10*3/uL    Neutrophils % 55.5 40.0 - 80.0 %    Immature Granulocytes %, Automated 0.4 0.0 - 0.9 %    Lymphocytes % 33.0 13.0 - 44.0 %    Monocytes % 9.0 2.0 - 10.0 %    Eosinophils % 1.7 0.0 - 6.0 %    Basophils % 0.4 0.0 - 2.0 %    Neutrophils Absolute 3.01 1.60 - 5.50 x10*3/uL    Immature Granulocytes Absolute, Automated 0.02 0.00 - 0.50 x10*3/uL    Lymphocytes Absolute 1.79 0.80 - 3.00 x10*3/uL    Monocytes Absolute 0.49 0.05 - 0.80 x10*3/uL    Eosinophils Absolute 0.09 0.00 - 0.40 x10*3/uL    Basophils Absolute 0.02 0.00 - 0.10 x10*3/uL   Comprehensive Metabolic Panel   Result Value Ref Range    Glucose 138 (H) 65 - 99 mg/dL    Sodium 136 133 - 145 mmol/L    Potassium 4.5 3.4 - 5.1 mmol/L    Chloride 101 97 - 107 mmol/L    Bicarbonate 26 24 - 31 mmol/L    Urea Nitrogen 21 8 - 25 mg/dL    Creatinine 1.50 0.40 - 1.60 mg/dL    eGFR 47 (L) >60 mL/min/1.73m*2    Calcium 9.1 8.5 - 10.4 mg/dL    Albumin 3.7 3.5 - 5.0 g/dL    Alkaline Phosphatase 129 (H) 35 - 125 U/L    Total Protein 6.7 5.9 - 7.9 g/dL    AST 28 5 - 40 U/L    Bilirubin, Total 1.0 0.1 - 1.2 mg/dL    ALT 20 5 - 40 U/L    Anion Gap 9 <=19 mmol/L   Protime-INR   Result Value Ref Range    Protime 16.0 (H) 9.3 - 12.7 seconds    INR 1.6 (H) 0.9 - 1.2   APTT   Result Value Ref Range    aPTT 38.4 (H) 22.0 - 32.5 seconds   Serial Troponin, Initial ([facility])   Result Value Ref Range    Troponin T, High Sensitivity 31 (HH) <=14 ng/L   Serial Troponin, 2 Hour ([facility])   Result Value Ref Range    Troponin T, High Sensitivity 32 () <=14 ng/L   Ammonia   Result Value Ref Range    Ammonia 22 12 - 45 umol/L   Serial Troponin, 6 Hour ([facility])   Result Value Ref Range    Troponin T, High Sensitivity 31 () <=14 ng/L   Comprehensive metabolic panel   Result Value Ref Range    Glucose 165 (H) 65 - 99 mg/dL    Sodium 138 133 - 145 mmol/L    Potassium 4.1 3.4 - 5.1 mmol/L    Chloride 103 97 - 107 mmol/L    Bicarbonate 26 24 - 31  mmol/L    Urea Nitrogen 21 8 - 25 mg/dL    Creatinine 1.50 0.40 - 1.60 mg/dL    eGFR 47 (L) >60 mL/min/1.73m*2    Calcium 8.9 8.5 - 10.4 mg/dL    Albumin 3.5 3.5 - 5.0 g/dL    Alkaline Phosphatase 120 35 - 125 U/L    Total Protein 6.4 5.9 - 7.9 g/dL    AST 23 5 - 40 U/L    Bilirubin, Total 0.9 0.1 - 1.2 mg/dL    ALT 18 5 - 40 U/L    Anion Gap 9 <=19 mmol/L   Magnesium   Result Value Ref Range    Magnesium 2.00 1.60 - 3.10 mg/dL   Thyroid Stimulating Hormone   Result Value Ref Range    Thyroid Stimulating Hormone 4.23 (H) 0.27 - 4.20 mIU/L   Lipid Panel   Result Value Ref Range    Cholesterol 109 (L) 133 - 200 mg/dL    HDL-Cholesterol 46.0 >40.0 mg/dL    Cholesterol/HDL Ratio 2.4 SEE COMMENT    LDL Calculated 50 (L) 65 - 130 mg/dL    Triglycerides 63 40 - 150 mg/dL   Hemoglobin A1C   Result Value Ref Range    Hemoglobin A1C 6.7 (H) See below %    Estimated Average Glucose 146 Not Established mg/dL   CBC   Result Value Ref Range    WBC 6.3 4.4 - 11.3 x10*3/uL    nRBC 0.0 0.0 - 0.0 /100 WBCs    RBC 3.86 (L) 4.50 - 5.90 x10*6/uL    Hemoglobin 12.5 (L) 13.5 - 17.5 g/dL    Hematocrit 39.3 (L) 41.0 - 52.0 %     (H) 80 - 100 fL    MCH 32.4 26.0 - 34.0 pg    MCHC 31.8 (L) 32.0 - 36.0 g/dL    RDW 14.6 (H) 11.5 - 14.5 %    Platelets 129 (L) 150 - 450 x10*3/uL        MR brain wo IV contrast    Result Date: 4/21/2024  Interpreted By:  Benedict Sutton, STUDY: MR BRAIN WO IV CONTRAST;  4/21/2024 12:04 pm   INDICATION: Signs/Symptoms: TIA.   COMPARISON: Head CT and CTA, 04/20/2024 and brain MRI, 10/09/2023   ACCESSION NUMBER(S): [id number]   ORDERING CLINICIAN: JOSÉ LUIS FULTON   TECHNIQUE: Axial T2, FLAIR, DWI, gradient echo T2 and sagittal and coronal T1 weighted images of the brain were acquired.   FINDINGS: CSF Spaces: The ventricles, sulci and basal cisterns are appropriate for the degree of volume loss. No abnormal extra-axial collection.   Parenchyma: There is no restricted diffusion to suggest acute infarction.   Generalized parenchymal volume loss is unchanged. Nonspecific T2 hyperintense signal in the white matter, similar to previous and likely secondary to chronic small vessel ischemic disease. Unchanged small focus of encephalomalacia in the left parietal lobe, possibly a chronic infarct. No evidence of intracranial hemorrhage. There is no mass effect or midline shift.   Paranasal Sinuses and Mastoids: The paranasal sinuses are clear. Trace right mastoid effusion.   Orbits: Bilateral lens replacement.       No acute intracranial pathology.   MACRO: None   Signed by: Benedict Sutton 4/21/2024 12:11 PM Dictation workstation:   [id number]    CT angio head and neck w and wo IV contrast    Result Date: 4/20/2024  Interpreted By:  Benedict Sutton, STUDY: CT ANGIO HEAD AND NECK W AND WO IV CONTRAST;  4/20/2024 5:34 pm   INDICATION: Signs/Symptoms: Stroke-like symptoms.   COMPARISON: Head CT, same day   ACCESSION NUMBER(S): [id number]   ORDERING CLINICIAN: AUNDREA VEGA   TECHNIQUE: 75 mL Omnipaque 350 was administered intravenously and axial images of the head and neck were acquired. Coronal and sagittal MIPs and 3-D reconstructions were generated on an independent workstation and reviewed.   FINDINGS: CTA head:   Anterior circulation: Mural calcification and atherosclerotic irregularity of both intracranial internal carotid arteries, no significant luminal narrowing. The proximal anterior and middle cerebral arteries are unremarkable. No aneurysm.   Posterior circulation: Normal variant left dominant vertebral artery. There is partially calcified atherosclerotic plaque with at least mild narrowing of the left V4 segment, the distal V4 segment enhances normally. The hypoplastic right vertebral artery, basilar artery, and proximal posterior cerebral arteries are unremarkable. No aneurysm.   Limited evaluation of the dural venous sinuses is unremarkable.   CTA neck:   Right carotid vessels: The common carotid artery is  normal. Partially calcified atherosclerotic plaque in the bifurcation and proximal internal carotid artery without significant narrowing.   Left carotid vessels: The common carotid artery is normal. Partially calcified atherosclerotic plaque in the bifurcation and proximal internal carotid artery without significant narrowing.   Vertebral vessels:  Left dominant. The cervical vertebral arteries are unremarkable.   A punctate calcification is incidentally noted in the left lobe of the thyroid, the soft tissues of the neck otherwise unremarkable. Evaluation of the lungs is limited by respiratory motion, no focal pulmonary opacity. The patient is edentulous. Degenerative changes in the spine and temporomandibular joints.       No flow-limiting stenosis or occlusion in the head or neck.   MACRO: None   Signed by: Benedict Sutton 4/20/2024 6:52 PM Dictation workstation:   [id number]    XR chest 1 view    Result Date: 4/20/2024  Interpreted By:  Torres Mcneill, STUDY: XR CHEST 1 VIEW;  4/20/2024 2:55 pm   INDICATION: Signs/Symptoms:Strokelike symptoms.   COMPARISON: 03/18/2020   ACCESSION NUMBER(S): [id number]   ORDERING CLINICIAN: AUNDREA VEGA   FINDINGS: No definite focal infiltrate. No apparent pleural effusion. Small lung volumes. Pulmonary vascular congestion. Cardiomegaly.       Pulmonary vascular congestion.   MACRO: None   Signed by: Torres Mcneill 4/20/2024 3:15 PM Dictation workstation:   [id number]    CT head wo IV contrast    Result Date: 4/20/2024  Interpreted By:  Torres Mcneill, STUDY: CT HEAD WO IV CONTRAST  4/20/2024 2:37 pm   INDICATION: Signs/Symptoms:Strokelike symptoms   COMPARISON: None.   ACCESSION NUMBER(S): [id number]   ORDERING CLINICIAN: AUNDREA VEGA   TECHNIQUE: Contiguous axial CT images of the brain were obtained without IV contrast.   FINDINGS: The ventricles, cisterns and sulci are prominent, consistent with severe diffuse volume loss. Areas of white matter low attenuation are nonspecific  but likely related to chronic microvascular disease. There is intracranial atherosclerosis.   Gray-white differentiation is preserved. No acute intracranial hemorrhage or mass effect. No midline shift. Patent basal cisterns. No extraaxial fluid collections.   The calvaria is intact. The visualized paranasal sinuses and mastoid air cells are clear.       No acute intracranial pathology.     Signed by: Torres Mcneill 4/20/2024 2:48 PM Dictation workstation:   [id number]    XR chest 1 view    Result Date: 4/8/2024  * * *Final Report* * * DATE OF EXAM: Apr 8 2024  8:42AM   MYX   [id number]  -  XR CHEST 1V FRONTAL PORT  / ACCESSION #  [id number] PROCEDURE REASON: Shortness of breath      * * * * Physician Interpretation * * * *  EXAMINATION:  CHEST RADIOGRAPH (PORTABLE SINGLE VIEW AP) Exam Date/Time:  4/8/2024 8:42 AM CLINICAL HISTORY: Shortness of breath MQ:  XCPR_5 Comparison:  4/8/2024 RESULT: See impression    IMPRESSION: Lines, tubes, and devices:  None. Lungs and pleura:  No focal consolidation.  Vascular redistribution with interstitial prominence likely reflect a borderline edema.  No sizable effusion or pneumothorax. Cardiomediastinal silhouette:  Stable cardiomediastinal silhouette. Other:  . : PSCB   Transcribe Date/Time: Apr 8 2024  9:14A Dictated by : LETICIA GUZMAN MD This examination was interpreted and the report reviewed and electronically signed by: LETICIA GUZMAN MD on Apr 8 2024  9:15AM  EST    CT abdomen pelvis wo IV contrast    Result Date: 4/8/2024  * * *Final Report* * * DATE OF EXAM: Apr 8 2024  5:38AM   MYC   [id number]  -  CT ABD/PEL WO IVCON  / ACCESSION #  [id number] PROCEDURE REASON: Flank pain, kidney stone suspected      * * * * Physician Interpretation * * * *  EXAMINATION:  CT ABDOMEN AND PELVIS WITHOUT IV CONTRAST CLINICAL HISTORY: Flank pain, kidney stone suspected. TECHNIQUE: Non-IV contrast imaging of the abdomen and pelvis was performed using standard technique, scanning  from just above the dome of the diaphragm to the symphysis pubis.  Unenhanced imaging is limited for the evaluation of some intra-abdominal and pelvic pathology. MQ:  CTAPWO_3 Contrast: IV: None CT Radiation dose: Integrated Dose-length product (DLP) for this visit =   2007 mGy*cm. CT Dose Reduction Employed: Automated exposure control(AEC) and iterative recon COMPARISON: 7/26/2023 RESULT: Abdomen / Pelvis: Liver: Unremarkable. Biliary: Suspected cholelithiasis. Spleen: No splenomegaly. Pancreas: Atrophic, otherwise unremarkable. Adrenals: No mass. Kidneys: No calculus, hydronephrosis or finding to suggest a cyst or mass in the unenhanced kidney. GI Tract: No bowel dilation.  Moderate colonic stool burden. No acute inflammatory changes. Lymph Nodes: No lymphadenopathy. Mesentery/peritoneum: No ascites. Retroperitoneum: No mass. Vasculature: Arterial atherosclerotic disease without aneurysm. Pelvis: No mass or ascites.  Distended urinary bladder. Bones/Soft Tissues: No acute abnormality. Soft tissue stranding lateral to the right hip. Multilevel degenerative changes of the spine. Grade 1/2 anterolisthesis of L5 on S1 due to pars defects and degenerative disease. Unchanged severe compression deformity of T10. Lower thorax: No acute process.  (topogram) images: No additional findings.    IMPRESSION: *  No urinary calculi. No hydronephrosis. *  No acute abdominal or pelvic process identified. *  Multiple chronic and less pertinent findings as detailed above. : BENITO   Transcribe Date/Time: Apr 8 2024  5:41A Dictated by : AUDREY OLGUIN MD This examination was interpreted and the report reviewed and electronically signed by: AUDREY OLGUIN MD on Apr 8 2024  5:53AM  EST    XR chest 1 view    Result Date: 4/8/2024  * * *Final Report* * * DATE OF EXAM: Apr 8 2024  4:25AM   MYX   [id number]  -  XR CHEST 1V FRONTAL PORT  / ACCESSION #  [id number] PROCEDURE REASON: Fatigue and malaise      * * * * Physician  Interpretation * * * *  EXAMINATION:  CHEST RADIOGRAPH (PORTABLE SINGLE VIEW AP) Exam Date/Time:  4/8/2024 4:25 AM CLINICAL HISTORY: Fatigue and malaise MQ:  XCPR_5 Comparison:  April 2, 2024 RESULT: Lines, tubes, and devices:  None. Lungs and pleura:  Mild left diaphragmatic elevation again noted. There is borderline pulmonary vascular prominence. No confluent consolidation, pleural effusion or pneumothorax is seen. Cardiomediastinal silhouette:  Again mildly enlarged. Other:  .    IMPRESSION: Mildly enlarged cardiac silhouette, with borderline pulmonary vascular prominence. No consolidation otherwise seen. : PSCB   Transcribe Date/Time: Apr 8 2024  4:32A Dictated by : AMY FUENTES MD This examination was interpreted and the report reviewed and electronically signed by: AMY FUENTES MD on Apr 8 2024  4:34AM  EST    CT cervical spine wo IV contrast    Result Date: 4/2/2024  * * *Final Report* * * DATE OF EXAM: Apr 2 2024  1:45PM   MYC   [id number]  -  CT CERVICAL SPINE WO IVCON  / ACCESSION #  [id number] PROCEDURE REASON: Spine fracture, cervical, traumatic      * * * * Physician Interpretation * * * *  EXAMINATION:  CT CERVICAL SPINE WO IVCON CLINICAL HISTORY:  Spine fracture, cervical, traumatic TECHNIQUE: Spiral, high resolution axial images were obtained from the skull base to the cervicothoracic junction and from the thoracolumbar junction to sacrum with sagittal and coronal planar reconstructions. MQ: CTCLWO_1 Dose-Length Product (DLP): 1256 mGy*cm. CT Dose Reduction Employed: Automated exposure control (AEC) COMPARISON: 07/23/2023 RESULT: CERVICAL: Counting reference:  Craniocervical junction.   Anatomic Variants:  None. Alignment:    Alignment is anatomic. Craniocervical junction:    Craniocervical junction is normal. Bone marrow / fracture:    No evidence of a lytic or blastic process in the visualized spine.  No evidence of acute or chronic fracture. Cervical soft tissues:    The paraspinal  soft tissues planes are maintained. Degenerative changes: Narrowing at C6-7 interspace.  Prominent anterior spurring at C5-7.  Moderately severe bilateral foraminal narrowing at C3-4.  Mild foraminal narrowing at C4-5.  Moderate foraminal narrowing at C5-6 and severe foraminal narrowing on the left side at C6-7.  No central canal narrowing.  Bilateral facet spurring.    IMPRESSION: No acute changes.  Interspace narrowing primarily at 7.  Multilevel foraminal narrowing but no canal narrowing. Cervical Anatomic Variant:  None.  Assume 7 cervical vertebrae with counting from the craniocervical junction. : PSCMARILUZ   Transcribe Date/Time: Apr 2 2024  1:51P Dictated by : OSWALDO MIKE MD This examination was interpreted and the report reviewed and electronically signed by: OSWALDO MIKE MD on Apr 2 2024  1:55PM  EST    CT head wo IV contrast    Result Date: 4/2/2024  * * *Final Report* * * DATE OF EXAM: Apr 2 2024  1:45PM   MYC   [id number]  -  CT BRAIN WO IVCON   / ACCESSION #  [id number] PROCEDURE REASON: Head trauma, moderate-severe      * * * * Physician Interpretation * * * *  EXAMINATION: CT BRAIN WO IVCON CLINICAL HISTORY: Trauma TECHNIQUE:  Serial axial images without IV contrast were obtained from the vertex to the foramen magnum. MQ:  CTBWO_3 CT Radiation dose: Integrated Dose-Length Product (DLP) for this visit =   1256  mGy*cm CT Dose Reduction Employed: Automated exposure control (AEC) COMPARISON: None. RESULT: Post-operative change: None. Acute change: No evidence of an acute infarct or other acute parenchymal process. Hemorrhage: No evidence of acute intracranial hemorrhage. ECASS hemorrhagic transformation score: Not Applicable Mass Lesion / Mass Effect: There is no evidence of an intracranial mass or extraaxial fluid collection.  No significant mass effect. Chronic change: Scattered patchy foci of low attenuation are present within supratentorial white matter which is a nonspecific  finding but likely represents mild microvascular ischemia. Parenchyma: There is mild generalized volume loss.  The brain parenchyma is otherwise within normal limits for age. Ventricles: The ventricles are within normal limits of size and configuration for age. Paranasal sinuses and skull base: The visualized paranasal sinuses are grossly clear.   The skull base and imaged soft tissues are unremarkable.  (topogram) images: No additional findings.    IMPRESSION: No acute intracranial pathology Transcriptionist: [facility]   Transcribe Date/Time: Apr 2 2024  1:49P Dictated by : OSWALDO MIKE MD This examination was interpreted and the report reviewed and electronically signed by: OSWALDO MIKE MD on Apr 2 2024  1:51PM  EST    XR chest 1 view    Result Date: 4/2/2024  * * *Final Report* * * DATE OF EXAM: Apr 2 2024 11:58AM   MYX   [id number]  -  XR CHEST 1V FRONTAL PORT  / ACCESSION #  [id number] PROCEDURE REASON: Shortness of breath      * * * * Physician Interpretation * * * *  EXAMINATION:  CHEST RADIOGRAPH (PORTABLE SINGLE VIEW AP) Exam Date/Time:  4/2/2024 11:58 AM CLINICAL HISTORY: Shortness of breath MQ:  XCPR_5 Comparison:  07/27/2023 RESULT: Lines, tubes, and devices:  Cardiac monitoring lead wires overlies the chest. Lungs and pleura:  No evidence for congestive heart failure, pneumonia, pneumothorax or pleural effusion.  Mild elevation of left hemidiaphragm. Cardiomediastinal silhouette:  Mild cardiomegaly with decrease in transverse diameter of the heart currently measuring 19.7 cm previously measured 21.4 cm. Other:  Degenerative changes of thoracic spine.    IMPRESSION: Mild cardiomegaly but decrease in transverse diameter since prior study. Resolution of previous noted small bilateral pleural effusions No evidence for congestive heart failure. : [facility]   Transcribe Date/Time: Apr 2 2024 12:03P Dictated by : BOOM TOLLIVER MD This examination was interpreted and the report reviewed  and electronically signed by: BOOM TOLLIVER MD on Apr 2 2024 12:05PM  EST        Assessment/Plan   Principal Problem:    Altered mental status, unspecified altered mental status type  Active Problems:    Acute ischemic left ARIEL stroke (Multi)    77 yo M with multiple risk factors for stroke presents with aphasia without other focal motor deficits.  Will complete a stroke eval and follow up results tomorrow.      I personally spent 60 minutes today, exclusive of procedures, providing care for this patient, including preparation, face to face time, documentation and other services such as review of medical records, diagnostic result, patient education, counseling, coordination of care as specified in the encounter.

## 2024-04-21 NOTE — ED NOTES
"Pt is yelling \"get me out of here, get me out of this chair\" multiple times and is reoriented   "

## 2024-04-21 NOTE — ED NOTES
Pt is back in the room and sitting in the chair no needs at this time     Vannessa Neal, JOSE  04/21/24 [id number]

## 2024-04-21 NOTE — ED NOTES
Pt had bowel movement and this rn and lpn ayah changed the pts bed linen, changed gown, and placed the pt in the bed. No needs at this time      Vannessa Neal RN  04/21/24 [id number]

## 2024-04-21 NOTE — ED NOTES
"Pt got himself out of bed and was standing outside of his room. Pt did not want to return back to the room to his bed. He sat in a chair outside of his room. His stretcher bed was replaced with hospital bed for comfort. Walker was then provided for the pt to return back into the room, but the pt would not return back to room. He insisted on \"I want to salute her!\". So pt walked around the nursing/doctor station with the walker. Stood at the entrance to the ambulance bay staring out the doors. He then agreed to return back to the room. Total distance traveled was about 120 feet. Upon returning to bed, pt's sweat pants removed. His brief was wet, some dry stool. Pt cleaned. New gown applied. Bed alarm applied on bed. Pt assisted back into bed. He then asked for his shoes to be put back on stating \"I have to go home\". Pt confused about situation, date/time. Pt re-oriented to place/time. Call bell given and instructed to use it.     He does not like having his door closed. Quite possibly he feels claustrophobic in the room, if he continues to get out of bed, will have him in weller bed by the nurses station for safety.     Joseph Hough RN  04/21/24 [id number]    "

## 2024-04-21 NOTE — ED NOTES
This rn called pts emergency contact (live in [facility])to come and visit with the pt to see if family can calm him down. When family answered the phonr and talked to the pt, the pt did not talk to the family member and appeared worried to hear the family members voice. Pt is talking to staff here, but will not talk to family..     Vannessa Neal RN  04/21/24 [id number]

## 2024-04-21 NOTE — H&P
History Of Present Illness  Darrell Arreola is a 78 y.o. male presenting with with a complaint of difficulties speaking, dizziness 90 mental status.  Patient had recurrent episodes of this.  Patient becomes dizzy and has difficulty speaking and get confused.  No seizure activity.  Patient is able to follow commands.  Patient denies any headache, nausea, vomiting, diarrhea, dysuria, hematuria currently.  He does complain of confusion and generalized weakness.  But no focal weakness or numbness.  No cough or fluctuation.  No odynophagia, dysphagia or recent change in weight or appetite.  No polydipsia, polyuria, joint pain concurrent swelling or bruisability.  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    Surgical History  Past Surgical History:   Procedure Laterality Date    CT GUIDED PERCUTANEOUS BIOPSY BONE DEEP  02/21/2020    CT GUIDED PERCUTANEOUS BIOPSY BONE DEEP 2/21/2020 [facility]    CT GUIDED PERCUTANEOUS BIOPSY BONE DEEP  03/24/2020    CT GUIDED PERCUTANEOUS BIOPSY BONE DEEP 3/24/2020 [facility]    MR HEAD ANGIO WO IV CONTRAST  05/17/2019    MR HEAD ANGIO WO IV CONTRAST [facility]GENCY [facility]GAC[facility]    MR HEAD ANGIO WO IV CONTRAST  10/9/2023    MR HEAD ANGIO WO IV CONTRAST 10/9/2023 [facility]    TOE AMPUTATION      TONSILLECTOMY          Social History  He reports that he has quit smoking. His smoking use included cigars and cigarettes. He has never used  smokeless tobacco. He reports that he does not drink alcohol and does not use drugs.    Family History  Family History   Family history unknown: Yes        Allergies  Iodine, Latex, and Lidocaine    Review of Systems  I reviewed all systems reviewed as above otherwise is negative.  Physical Exam  HEENT:  Head externally atraumatic, no pallor, no icterus, extraocular movements intact, pupils reactive to light, oral mucosa moist and throat clear.  Neck:  Supple, no JVP, no palpable adenopathy or thyromegaly.  No carotid bruit.  Chest:  Clear to auscultation and resonant.  Heart:  Regular rate and rhythm, no murmur or gallop could be appreciated.  Abdomen:  Soft, nontender, bowel sounds present, normoactive, no palpable hepatosplenomegaly.  Extremities:  No edema, pulses present, no cyanosis or clubbing.  CNS:  Patient alert, oriented to time, place and person.  Power 5/5 all over and deep tendon reflexes symmetrical, cranial nerves 2-12 grossly intact.  Skin:  No active rash.  Musculoskeletal:  No joint swelling or erythema, range of movement normal.  Last Recorded Vitals  Heart Rate:  [89-99]   Temperature:  [36.7 °C (98.1 °F)]   Respirations:  [13-21]   BP: (101-133)/(66-99)   Height:  [182.9 cm (6')]   Weight:  [98.9 kg (218 lb)]   Pulse Ox:  [98 %-100 %]       Relevant Results        Results for orders placed or performed during the hospital encounter of 04/20/24 (from the past 24 hour(s))   Urinalysis with Reflex Culture and Microscopic   Result Value Ref Range    Color, Urine Yellow Light-Yellow, Yellow, Dark-Yellow    Appearance, Urine Clear Clear    Specific Gravity, Urine 1.017 1.005 - 1.035    pH, Urine 5.0 5.0, 5.5, 6.0, 6.5, 7.0, 7.5, 8.0    Protein, Urine NEGATIVE NEGATIVE, 10 (TRACE), 20 (TRACE) mg/dL    Glucose, Urine 200 (2+) (A) Normal mg/dL    Blood, Urine 0.06 (1+) (A) NEGATIVE    Ketones, Urine NEGATIVE NEGATIVE mg/dL    Bilirubin, Urine NEGATIVE NEGATIVE    Urobilinogen, Urine 6 (2+) (A) Normal  mg/dL    Nitrite, Urine NEGATIVE NEGATIVE    Leukocyte Esterase, Urine NEGATIVE NEGATIVE   Extra Urine Gray Tube   Result Value Ref Range    Extra Tube Hold for add-ons.    Urinalysis Microscopic   Result Value Ref Range    WBC, Urine 1-5 1-5, NONE /HPF    RBC, Urine 6-10 (A) NONE, 1-2, 3-5 /HPF    Bacteria, Urine 1+ (A) NONE SEEN /HPF    Mucus, Urine FEW Reference range not established. /LPF    Fine Granular Casts, Urine 1+ (A) NONE /LPF   CBC and Auto Differential   Result Value Ref Range    WBC 5.4 4.4 - 11.3 x10*3/uL    nRBC 0.0 0.0 - 0.0 /100 WBCs    RBC 3.95 (L) 4.50 - 5.90 x10*6/uL    Hemoglobin 12.8 (L) 13.5 - 17.5 g/dL    Hematocrit 40.7 (L) 41.0 - 52.0 %     (H) 80 - 100 fL    MCH 32.4 26.0 - 34.0 pg    MCHC 31.4 (L) 32.0 - 36.0 g/dL    RDW 14.8 (H) 11.5 - 14.5 %    Platelets 136 (L) 150 - 450 x10*3/uL    Neutrophils % 55.5 40.0 - 80.0 %    Immature Granulocytes %, Automated 0.4 0.0 - 0.9 %    Lymphocytes % 33.0 13.0 - 44.0 %    Monocytes % 9.0 2.0 - 10.0 %    Eosinophils % 1.7 0.0 - 6.0 %    Basophils % 0.4 0.0 - 2.0 %    Neutrophils Absolute 3.01 1.60 - 5.50 x10*3/uL    Immature Granulocytes Absolute, Automated 0.02 0.00 - 0.50 x10*3/uL    Lymphocytes Absolute 1.79 0.80 - 3.00 x10*3/uL    Monocytes Absolute 0.49 0.05 - 0.80 x10*3/uL    Eosinophils Absolute 0.09 0.00 - 0.40 x10*3/uL    Basophils Absolute 0.02 0.00 - 0.10 x10*3/uL   Comprehensive Metabolic Panel   Result Value Ref Range    Glucose 138 (H) 65 - 99 mg/dL    Sodium 136 133 - 145 mmol/L    Potassium 4.5 3.4 - 5.1 mmol/L    Chloride 101 97 - 107 mmol/L    Bicarbonate 26 24 - 31 mmol/L    Urea Nitrogen 21 8 - 25 mg/dL    Creatinine 1.50 0.40 - 1.60 mg/dL    eGFR 47 (L) >60 mL/min/1.73m*2    Calcium 9.1 8.5 - 10.4 mg/dL    Albumin 3.7 3.5 - 5.0 g/dL    Alkaline Phosphatase 129 (H) 35 - 125 U/L    Total Protein 6.7 5.9 - 7.9 g/dL    AST 28 5 - 40 U/L    Bilirubin, Total 1.0 0.1 - 1.2 mg/dL    ALT 20 5 - 40 U/L    Anion Gap 9 <=19 mmol/L    Protime-INR   Result Value Ref Range    Protime 16.0 (H) 9.3 - 12.7 seconds    INR 1.6 (H) 0.9 - 1.2   APTT   Result Value Ref Range    aPTT 38.4 (H) 22.0 - 32.5 seconds   Serial Troponin, Initial ([facility])   Result Value Ref Range    Troponin T, High Sensitivity 31 (HH) <=14 ng/L   Serial Troponin, 2 Hour ([facility])   Result Value Ref Range    Troponin T, High Sensitivity 32 (HH) <=14 ng/L   Ammonia   Result Value Ref Range    Ammonia 22 12 - 45 umol/L   Serial Troponin, 6 Hour ([facility])   Result Value Ref Range    Troponin T, High Sensitivity 31 (HH) <=14 ng/L   Comprehensive metabolic panel   Result Value Ref Range    Glucose 165 (H) 65 - 99 mg/dL    Sodium 138 133 - 145 mmol/L    Potassium 4.1 3.4 - 5.1 mmol/L    Chloride 103 97 - 107 mmol/L    Bicarbonate 26 24 - 31 mmol/L    Urea Nitrogen 21 8 - 25 mg/dL    Creatinine 1.50 0.40 - 1.60 mg/dL    eGFR 47 (L) >60 mL/min/1.73m*2    Calcium 8.9 8.5 - 10.4 mg/dL    Albumin 3.5 3.5 - 5.0 g/dL    Alkaline Phosphatase 120 35 - 125 U/L    Total Protein 6.4 5.9 - 7.9 g/dL    AST 23 5 - 40 U/L    Bilirubin, Total 0.9 0.1 - 1.2 mg/dL    ALT 18 5 - 40 U/L    Anion Gap 9 <=19 mmol/L   Magnesium   Result Value Ref Range    Magnesium 2.00 1.60 - 3.10 mg/dL   Thyroid Stimulating Hormone   Result Value Ref Range    Thyroid Stimulating Hormone 4.23 (H) 0.27 - 4.20 mIU/L   Lipid Panel   Result Value Ref Range    Cholesterol 109 (L) 133 - 200 mg/dL    HDL-Cholesterol 46.0 >40.0 mg/dL    Cholesterol/HDL Ratio 2.4 SEE COMMENT    LDL Calculated 50 (L) 65 - 130 mg/dL    Triglycerides 63 40 - 150 mg/dL   Hemoglobin A1C   Result Value Ref Range    Hemoglobin A1C 6.7 (H) See below %    Estimated Average Glucose 146 Not Established mg/dL   CBC   Result Value Ref Range    WBC 6.3 4.4 - 11.3 x10*3/uL    nRBC 0.0 0.0 - 0.0 /100 WBCs    RBC 3.86 (L) 4.50 - 5.90 x10*6/uL    Hemoglobin 12.5 (L) 13.5 - 17.5 g/dL    Hematocrit 39.3 (L) 41.0 - 52.0 %     (H) 80 - 100 fL    MCH 32.4 26.0 - 34.0 pg     MCHC 31.8 (L) 32.0 - 36.0 g/dL    RDW 14.6 (H) 11.5 - 14.5 %    Platelets 129 (L) 150 - 450 x10*3/uL     Prior to Admission medications    Medication Sig Start Date End Date Taking? Authorizing Provider   amiodarone (Pacerone) 200 mg tablet Take 1 tablet (200 mg) by mouth once daily. Do not start before November 2, 2023. 11/2/23   LIV Rodriguez   amiodarone (Pacerone) 400 mg tablet Take 1 tablet (400 mg) by mouth once daily for 7 doses. Do not start before October 26, 2023. 10/26/23 11/2/23  LIV Rodriguez   amiodarone (Pacerone) 400 mg tablet Take 1 tablet (400 mg) by mouth 2 times a day for 12 doses. 10/19/23 10/25/23  LIV Rodriguez   apixaban (Eliquis) 5 mg tablet Take 1 tablet (5 mg) by mouth 2 times a day.    Historical Provider, MD   famotidine (Pepcid) 20 mg tablet Take 1 tablet (20 mg) by mouth once daily.    Historical Provider, MD   folic acid (Folvite) 1 mg tablet Take by mouth once daily.    Historical Provider, MD   insulin lispro (HumaLOG) 100 unit/mL injection Inject 0-0.1 mL (0-10 Units) under the skin 3 times a day with meals. Take as directed per insulin instructions. 10/19/23   LIV Rodriguez   midodrine (Proamatine) 10 mg tablet Take 1 tablet (10 mg) by mouth 3 times a day with meals. 10/19/23   LIV Rodriguez   rosuvastatin (Crestor) 10 mg tablet Take 1 tablet (10 mg) by mouth once daily.    Historical Provider, MD   SITagliptin phosphate (Januvia) 50 mg tablet Take 1 tablet (50 mg) by mouth once daily.    Historical Provider, MD   tamsulosin (Flomax) 0.4 mg 24 hr capsule Take 1 capsule (0.4 mg) by mouth once daily in the morning. Take before meals.    Historical Provider, MD       Current Facility-Administered Medications:     amiodarone (Pacerone) tablet 200 mg, 200 mg, oral, Daily, Milan Sims MD    apixaban (Eliquis) tablet 5 mg, 5 mg, oral, BID, Milan Sims MD    aspirin EC tablet 81 mg, 81 mg, oral, Daily,  Milan Sims MD, 81 mg at 04/21/24 0624    atorvastatin (Lipitor) tablet 40 mg, 40 mg, oral, Nightly, Milan Sims MD    famotidine (Pepcid) tablet 10 mg, 10 mg, oral, Daily, Bill Mcneill PharmD    folic acid (Folvite) tablet 1 mg, 1 mg, oral, Daily, Milan Sims MD    midodrine (Proamatine) tablet 10 mg, 10 mg, oral, TID with meals, Milan Sims MD    oxygen (O2) therapy, , inhalation, Continuous PRN - O2/gases, Milan Sims MD    SITagliptin phosphate (Januvia) tablet 50 mg, 50 mg, oral, Daily, Milan Sims MD    tamsulosin (Flomax) 24 hr capsule 0.4 mg, 0.4 mg, oral, Daily before breakfast, Milan Sims MD, 0.4 mg at 04/21/24 0624    Current Outpatient Medications:     amiodarone (Pacerone) 200 mg tablet, Take 1 tablet (200 mg) by mouth once daily. Do not start before November 2, 2023., Disp: , Rfl: 0    amiodarone (Pacerone) 400 mg tablet, Take 1 tablet (400 mg) by mouth once daily for 7 doses. Do not start before October 26, 2023., Disp: , Rfl: 0    amiodarone (Pacerone) 400 mg tablet, Take 1 tablet (400 mg) by mouth 2 times a day for 12 doses., Disp: , Rfl: 0    apixaban (Eliquis) 5 mg tablet, Take 1 tablet (5 mg) by mouth 2 times a day., Disp: , Rfl:     famotidine (Pepcid) 20 mg tablet, Take 1 tablet (20 mg) by mouth once daily., Disp: , Rfl:     folic acid (Folvite) 1 mg tablet, Take by mouth once daily., Disp: , Rfl:     insulin lispro (HumaLOG) 100 unit/mL injection, Inject 0-0.1 mL (0-10 Units) under the skin 3 times a day with meals. Take as directed per insulin instructions., Disp: , Rfl: 0    midodrine (Proamatine) 10 mg tablet, Take 1 tablet (10 mg) by mouth 3 times a day with meals., Disp: , Rfl: 0    rosuvastatin (Crestor) 10 mg tablet, Take 1 tablet (10 mg) by mouth once daily., Disp: , Rfl:     SITagliptin phosphate (Januvia) 50 mg tablet, Take 1 tablet (50 mg) by mouth once daily., Disp: , Rfl:     tamsulosin (Flomax) 0.4 mg 24 hr capsule, Take  1 capsule (0.4 mg) by mouth once daily in the morning. Take before meals., Disp: , Rfl:   MR brain wo IV contrast    Result Date: 4/21/2024  Interpreted By:  Benedict Sutton, STUDY: MR BRAIN WO IV CONTRAST;  4/21/2024 12:04 pm   INDICATION: Signs/Symptoms: TIA.   COMPARISON: Head CT and CTA, 04/20/2024 and brain MRI, 10/09/2023   ACCESSION NUMBER(S): [id number]   ORDERING CLINICIAN: JOSÉ LUIS FULTON   TECHNIQUE: Axial T2, FLAIR, DWI, gradient echo T2 and sagittal and coronal T1 weighted images of the brain were acquired.   FINDINGS: CSF Spaces: The ventricles, sulci and basal cisterns are appropriate for the degree of volume loss. No abnormal extra-axial collection.   Parenchyma: There is no restricted diffusion to suggest acute infarction.  Generalized parenchymal volume loss is unchanged. Nonspecific T2 hyperintense signal in the white matter, similar to previous and likely secondary to chronic small vessel ischemic disease. Unchanged small focus of encephalomalacia in the left parietal lobe, possibly a chronic infarct. No evidence of intracranial hemorrhage. There is no mass effect or midline shift.   Paranasal Sinuses and Mastoids: The paranasal sinuses are clear. Trace right mastoid effusion.   Orbits: Bilateral lens replacement.       No acute intracranial pathology.   MACRO: None   Signed by: Benedict Sutton 4/21/2024 12:11 PM Dictation workstation:   [id number]    CT angio head and neck w and wo IV contrast    Result Date: 4/20/2024  Interpreted By:  Benedict Sutton, STUDY: CT ANGIO HEAD AND NECK W AND WO IV CONTRAST;  4/20/2024 5:34 pm   INDICATION: Signs/Symptoms: Stroke-like symptoms.   COMPARISON: Head CT, same day   ACCESSION NUMBER(S): [id number]   ORDERING CLINICIAN: AUNDREA VEGA   TECHNIQUE: 75 mL Omnipaque 350 was administered intravenously and axial images of the head and neck were acquired. Coronal and sagittal MIPs and 3-D reconstructions were generated on an independent workstation and  reviewed.   FINDINGS: CTA head:   Anterior circulation: Mural calcification and atherosclerotic irregularity of both intracranial internal carotid arteries, no significant luminal narrowing. The proximal anterior and middle cerebral arteries are unremarkable. No aneurysm.   Posterior circulation: Normal variant left dominant vertebral artery. There is partially calcified atherosclerotic plaque with at least mild narrowing of the left V4 segment, the distal V4 segment enhances normally. The hypoplastic right vertebral artery, basilar artery, and proximal posterior cerebral arteries are unremarkable. No aneurysm.   Limited evaluation of the dural venous sinuses is unremarkable.   CTA neck:   Right carotid vessels: The common carotid artery is normal. Partially calcified atherosclerotic plaque in the bifurcation and proximal internal carotid artery without significant narrowing.   Left carotid vessels: The common carotid artery is normal. Partially calcified atherosclerotic plaque in the bifurcation and proximal internal carotid artery without significant narrowing.   Vertebral vessels:  Left dominant. The cervical vertebral arteries are unremarkable.   A punctate calcification is incidentally noted in the left lobe of the thyroid, the soft tissues of the neck otherwise unremarkable. Evaluation of the lungs is limited by respiratory motion, no focal pulmonary opacity. The patient is edentulous. Degenerative changes in the spine and temporomandibular joints.       No flow-limiting stenosis or occlusion in the head or neck.   MACRO: None   Signed by: Benedict Sutton 4/20/2024 6:52 PM Dictation workstation:   [id number]    XR chest 1 view    Result Date: 4/20/2024  Interpreted By:  Torres Mcneill, STUDY: XR CHEST 1 VIEW;  4/20/2024 2:55 pm   INDICATION: Signs/Symptoms:Strokelike symptoms.   COMPARISON: 03/18/2020   ACCESSION NUMBER(S): [id number]   ORDERING CLINICIAN: AUNDREA VEGA   FINDINGS: No definite focal  infiltrate. No apparent pleural effusion. Small lung volumes. Pulmonary vascular congestion. Cardiomegaly.       Pulmonary vascular congestion.   MACRO: None   Signed by: Torres Mcneill 4/20/2024 3:15 PM Dictation workstation:   [id number]    CT head wo IV contrast    Result Date: 4/20/2024  Interpreted By:  Torres Mcneill, STUDY: CT HEAD WO IV CONTRAST  4/20/2024 2:37 pm   INDICATION: Signs/Symptoms:Strokelike symptoms   COMPARISON: None.   ACCESSION NUMBER(S): [id number]   ORDERING CLINICIAN: AUNDREA VEGA   TECHNIQUE: Contiguous axial CT images of the brain were obtained without IV contrast.   FINDINGS: The ventricles, cisterns and sulci are prominent, consistent with severe diffuse volume loss. Areas of white matter low attenuation are nonspecific but likely related to chronic microvascular disease. There is intracranial atherosclerosis.   Gray-white differentiation is preserved. No acute intracranial hemorrhage or mass effect. No midline shift. Patent basal cisterns. No extraaxial fluid collections.   The calvaria is intact. The visualized paranasal sinuses and mastoid air cells are clear.       No acute intracranial pathology.     Signed by: Torres Mcneill 4/20/2024 2:48 PM Dictation workstation:   [id number]    XR chest 1 view    Result Date: 4/8/2024  * * *Final Report* * * DATE OF EXAM: Apr 8 2024  8:42AM   MYX   [id number]  -  XR CHEST 1V FRONTAL PORT  / ACCESSION #  [id number] PROCEDURE REASON: Shortness of breath      * * * * Physician Interpretation * * * *  EXAMINATION:  CHEST RADIOGRAPH (PORTABLE SINGLE VIEW AP) Exam Date/Time:  4/8/2024 8:42 AM CLINICAL HISTORY: Shortness of breath MQ:  XCPR_5 Comparison:  4/8/2024 RESULT: See impression    IMPRESSION: Lines, tubes, and devices:  None. Lungs and pleura:  No focal consolidation.  Vascular redistribution with interstitial prominence likely reflect a borderline edema.  No sizable effusion or pneumothorax. Cardiomediastinal silhouette:  Stable cardiomediastinal  silhouette. Other:  . : BENITO   Transcribe Date/Time: Apr 8 2024  9:14A Dictated by : LETICIA GUZMAN MD This examination was interpreted and the report reviewed and electronically signed by: LETICIA GUZMAN MD on Apr 8 2024  9:15AM  EST    CT abdomen pelvis wo IV contrast    Result Date: 4/8/2024  * * *Final Report* * * DATE OF EXAM: Apr 8 2024  5:38AM   MYC   [id number]  -  CT ABD/PEL WO IVCON  / ACCESSION #  [id number] PROCEDURE REASON: Flank pain, kidney stone suspected      * * * * Physician Interpretation * * * *  EXAMINATION:  CT ABDOMEN AND PELVIS WITHOUT IV CONTRAST CLINICAL HISTORY: Flank pain, kidney stone suspected. TECHNIQUE: Non-IV contrast imaging of the abdomen and pelvis was performed using standard technique, scanning from just above the dome of the diaphragm to the symphysis pubis.  Unenhanced imaging is limited for the evaluation of some intra-abdominal and pelvic pathology. MQ:  CTAPWO_3 Contrast: IV: None CT Radiation dose: Integrated Dose-length product (DLP) for this visit =   2007 mGy*cm. CT Dose Reduction Employed: Automated exposure control(AEC) and iterative recon COMPARISON: 7/26/2023 RESULT: Abdomen / Pelvis: Liver: Unremarkable. Biliary: Suspected cholelithiasis. Spleen: No splenomegaly. Pancreas: Atrophic, otherwise unremarkable. Adrenals: No mass. Kidneys: No calculus, hydronephrosis or finding to suggest a cyst or mass in the unenhanced kidney. GI Tract: No bowel dilation.  Moderate colonic stool burden. No acute inflammatory changes. Lymph Nodes: No lymphadenopathy. Mesentery/peritoneum: No ascites. Retroperitoneum: No mass. Vasculature: Arterial atherosclerotic disease without aneurysm. Pelvis: No mass or ascites.  Distended urinary bladder. Bones/Soft Tissues: No acute abnormality. Soft tissue stranding lateral to the right hip. Multilevel degenerative changes of the spine. Grade 1/2 anterolisthesis of L5 on S1 due to pars defects and degenerative disease.  Unchanged severe compression deformity of T10. Lower thorax: No acute process.  (topogram) images: No additional findings.    IMPRESSION: *  No urinary calculi. No hydronephrosis. *  No acute abdominal or pelvic process identified. *  Multiple chronic and less pertinent findings as detailed above. : BENITO   Transcribe Date/Time: Apr 8 2024  5:41A Dictated by : AUDREY OLGUIN MD This examination was interpreted and the report reviewed and electronically signed by: AUDREY OLGUIN MD on Apr 8 2024  5:53AM  EST    XR chest 1 view    Result Date: 4/8/2024  * * *Final Report* * * DATE OF EXAM: Apr 8 2024  4:25AM   MYX   [id number]  -  XR CHEST 1V FRONTAL PORT  / ACCESSION #  [id number] PROCEDURE REASON: Fatigue and malaise      * * * * Physician Interpretation * * * *  EXAMINATION:  CHEST RADIOGRAPH (PORTABLE SINGLE VIEW AP) Exam Date/Time:  4/8/2024 4:25 AM CLINICAL HISTORY: Fatigue and malaise MQ:  XCPR_5 Comparison:  April 2, 2024 RESULT: Lines, tubes, and devices:  None. Lungs and pleura:  Mild left diaphragmatic elevation again noted. There is borderline pulmonary vascular prominence. No confluent consolidation, pleural effusion or pneumothorax is seen. Cardiomediastinal silhouette:  Again mildly enlarged. Other:  .    IMPRESSION: Mildly enlarged cardiac silhouette, with borderline pulmonary vascular prominence. No consolidation otherwise seen. : BENITO   Transcribe Date/Time: Apr 8 2024  4:32A Dictated by : AMY FUENTES MD This examination was interpreted and the report reviewed and electronically signed by: AMY FUENTES MD on Apr 8 2024  4:34AM  EST    CT cervical spine wo IV contrast    Result Date: 4/2/2024  * * *Final Report* * * DATE OF EXAM: Apr 2 2024  1:45PM   [facility]   [id number]  -  CT CERVICAL SPINE WO IVCON  / ACCESSION #  [id number] PROCEDURE REASON: Spine fracture, cervical, traumatic      * * * * Physician Interpretation * * * *  EXAMINATION:  CT CERVICAL SPINE WO IVCON  CLINICAL HISTORY:  Spine fracture, cervical, traumatic TECHNIQUE: Spiral, high resolution axial images were obtained from the skull base to the cervicothoracic junction and from the thoracolumbar junction to sacrum with sagittal and coronal planar reconstructions. MQ: CTCLWO_1 Dose-Length Product (DLP): 1256 mGy*cm. CT Dose Reduction Employed: Automated exposure control (AEC) COMPARISON: 07/23/2023 RESULT: CERVICAL: Counting reference:  Craniocervical junction.   Anatomic Variants:  None. Alignment:    Alignment is anatomic. Craniocervical junction:    Craniocervical junction is normal. Bone marrow / fracture:    No evidence of a lytic or blastic process in the visualized spine.  No evidence of acute or chronic fracture. Cervical soft tissues:    The paraspinal soft tissues planes are maintained. Degenerative changes: Narrowing at C6-7 interspace.  Prominent anterior spurring at C5-7.  Moderately severe bilateral foraminal narrowing at C3-4.  Mild foraminal narrowing at C4-5.  Moderate foraminal narrowing at C5-6 and severe foraminal narrowing on the left side at C6-7.  No central canal narrowing.  Bilateral facet spurring.    IMPRESSION: No acute changes.  Interspace narrowing primarily at 7.  Multilevel foraminal narrowing but no canal narrowing. Cervical Anatomic Variant:  None.  Assume 7 cervical vertebrae with counting from the craniocervical junction. : [facility]   Transcribe Date/Time: Apr 2 2024  1:51P Dictated by : OSWALDO MIKE MD This examination was interpreted and the report reviewed and electronically signed by: OSWALDO MIKE MD on Apr 2 2024  1:55PM  EST    CT head wo IV contrast    Result Date: 4/2/2024  * * *Final Report* * * DATE OF EXAM: Apr 2 2024  1:45PM   [facility]   [id number]  -  CT BRAIN WO IVCON   / ACCESSION #  [id number] PROCEDURE REASON: Head trauma, moderate-severe      * * * * Physician Interpretation * * * *  EXAMINATION: CT BRAIN WO IVCON CLINICAL HISTORY: Trauma  TECHNIQUE:  Serial axial images without IV contrast were obtained from the vertex to the foramen magnum. MQ:  CTBWO_3 CT Radiation dose: Integrated Dose-Length Product (DLP) for this visit =   1256  mGy*cm CT Dose Reduction Employed: Automated exposure control (AEC) COMPARISON: None. RESULT: Post-operative change: None. Acute change: No evidence of an acute infarct or other acute parenchymal process. Hemorrhage: No evidence of acute intracranial hemorrhage. ECASS hemorrhagic transformation score: Not Applicable Mass Lesion / Mass Effect: There is no evidence of an intracranial mass or extraaxial fluid collection.  No significant mass effect. Chronic change: Scattered patchy foci of low attenuation are present within supratentorial white matter which is a nonspecific finding but likely represents mild microvascular ischemia. Parenchyma: There is mild generalized volume loss.  The brain parenchyma is otherwise within normal limits for age. Ventricles: The ventricles are within normal limits of size and configuration for age. Paranasal sinuses and skull base: The visualized paranasal sinuses are grossly clear.   The skull base and imaged soft tissues are unremarkable.  (topogram) images: No additional findings.    IMPRESSION: No acute intracranial pathology Transcriptionist: PSCB   Transcribe Date/Time: Apr 2 2024  1:49P Dictated by : OSWALDO MIKE MD This examination was interpreted and the report reviewed and electronically signed by: OSWALDO MIKE MD on Apr 2 2024  1:51PM  EST    XR chest 1 view    Result Date: 4/2/2024  * * *Final Report* * * DATE OF EXAM: Apr 2 2024 11:58AM   MYX   [id number]  -  XR CHEST 1V FRONTAL PORT  / ACCESSION #  [id number] PROCEDURE REASON: Shortness of breath      * * * * Physician Interpretation * * * *  EXAMINATION:  CHEST RADIOGRAPH (PORTABLE SINGLE VIEW AP) Exam Date/Time:  4/2/2024 11:58 AM CLINICAL HISTORY: Shortness of breath MQ:  XCPR_5 Comparison:  07/27/2023 RESULT:  Lines, tubes, and devices:  Cardiac monitoring lead wires overlies the chest. Lungs and pleura:  No evidence for congestive heart failure, pneumonia, pneumothorax or pleural effusion.  Mild elevation of left hemidiaphragm. Cardiomediastinal silhouette:  Mild cardiomegaly with decrease in transverse diameter of the heart currently measuring 19.7 cm previously measured 21.4 cm. Other:  Degenerative changes of thoracic spine.    IMPRESSION: Mild cardiomegaly but decrease in transverse diameter since prior study. Resolution of previous noted small bilateral pleural effusions No evidence for congestive heart failure. : BENITO   Transcribe Date/Time: Apr 2 2024 12:03P Dictated by : BOOM TOLLIVER MD This examination was interpreted and the report reviewed and electronically signed by: BOOM TOLLIVER MD on Apr 2 2024 12:05PM  EST    No results found for the last 90 days.       Assessment/Plan   Principal Problem:    Altered mental status, unspecified altered mental status type  Active Problems:    Acute ischemic left ARIEL stroke (Multi)  Diabetes mellitus type 2  Chronic kidney disease stage III  Chronic systolic congestive heart failure ejection fraction 27%,  Anxiety and depression  Still tachycardic.  Plan  Primary hypertension  Peripheral vascular disease  History of nicotine use ordered  Thrombocytopenia  Anemia  Atrial fibrillation  Cardiomyopathy  Recurrent syncope and collapse.    Plan: Continue current medication get supportive care.  Patient has recurrent episodes with the patient become dizzy then aphasic and then passes out.  Patient symptoms appeared to be blood pressure dependent.  When the patient lies down for some time the blood pressure, blood patient's or not resolved.  Underlying intracranial stenosis could be a possibility.  Discussed with the ER physician.  Check CT angio of the brain.  Check MRI to rule out stroke.  Consult neurology.  Give physical therapy and Occupational Therapy  metathesis and BMP.  Give supportive care.  Monitor input output and daily weight.  Monitor pulse ox.  Monitor heart rate.  Consult cardiology.  Will take DVT, fall, aspiration, decubitus and DVT precautions.  This has been discussed with the patient and he is agreeable to it.      Milan Sims MD

## 2024-04-21 NOTE — CARE PLAN
The patient's goals for the shift include      The clinical goals for the shift include      Problem: Pain  Goal: My pain/discomfort is manageable  Outcome: Progressing     Problem: Safety  Goal: Patient will be injury free during hospitalization  Outcome: Progressing     Problem: Daily Care  Goal: Daily care needs are met  Outcome: Progressing     Problem: Psychosocial Needs  Goal: Demonstrates ability to cope with hospitalization/illness  Outcome: Progressing  Goal: Collaborate with me, my family, and caregiver to identify my specific goals  Outcome: Progressing     Problem: Discharge Barriers  Goal: My discharge needs are met  Outcome: Progressing

## 2024-04-21 NOTE — ED NOTES
"Pt is yelling that he wants to leave and keeps yelling \"get me out of this chair\" and is being verbally aggressive towards staff and refusing to answer questions regarding MRI safety sheet and refusing to take medications. This rn called . dr. Sims stated that he is going to put in an order for medications to help the pts anxiety.      Vannessa Neal RN  04/21/24 5020    "

## 2024-04-21 NOTE — ED NOTES
Pt off the floor to MRI with MRI tech. MRI tech informed of pts current mental status (alert and oriented to self) and pt is having a hard time following directions and to be patient and speak clearly with the pt. Pt got into wheelchair with no issues      Vannessa Neal, RN  04/21/24 [id number]

## 2024-04-22 LAB
ATRIAL RATE: 102 BPM
Q ONSET: 210 MS
QRS COUNT: 15 BEATS
QRS DURATION: 124 MS
QT INTERVAL: 416 MS
QTC CALCULATION(BAZETT): 517 MS
QTC FREDERICIA: 481 MS
R AXIS: 43 DEGREES
T AXIS: 161 DEGREES
T OFFSET: 418 MS
T4 FREE SERPL-MCNC: 1.5 NG/DL (ref 0.9–1.7)
VENTRICULAR RATE: 93 BPM

## 2024-04-22 PROCEDURE — 97162 PT EVAL MOD COMPLEX 30 MIN: CPT | Mod: GP

## 2024-04-22 PROCEDURE — 97166 OT EVAL MOD COMPLEX 45 MIN: CPT | Mod: GO

## 2024-04-22 PROCEDURE — 2500000006 HC RX 250 W HCPCS SELF ADMINISTERED DRUGS (ALT 637 FOR ALL PAYERS): Mod: MUE | Performed by: INTERNAL MEDICINE

## 2024-04-22 PROCEDURE — 2500000001 HC RX 250 WO HCPCS SELF ADMINISTERED DRUGS (ALT 637 FOR MEDICARE OP)

## 2024-04-22 PROCEDURE — G0378 HOSPITAL OBSERVATION PER HR: HCPCS

## 2024-04-22 PROCEDURE — 97530 THERAPEUTIC ACTIVITIES: CPT | Mod: GP

## 2024-04-22 PROCEDURE — 2500000001 HC RX 250 WO HCPCS SELF ADMINISTERED DRUGS (ALT 637 FOR MEDICARE OP): Performed by: INTERNAL MEDICINE

## 2024-04-22 RX ADMIN — SITAGLIPTIN 50 MG: 50 TABLET, FILM COATED ORAL at 08:55

## 2024-04-22 RX ADMIN — ATORVASTATIN CALCIUM 40 MG: 40 TABLET, FILM COATED ORAL at 20:29

## 2024-04-22 RX ADMIN — MIDODRINE HYDROCHLORIDE 10 MG: 10 TABLET ORAL at 08:56

## 2024-04-22 RX ADMIN — AMIODARONE HYDROCHLORIDE 200 MG: 200 TABLET ORAL at 08:55

## 2024-04-22 RX ADMIN — FOLIC ACID 1 MG: 1 TABLET ORAL at 08:55

## 2024-04-22 RX ADMIN — APIXABAN 5 MG: 5 TABLET, FILM COATED ORAL at 08:55

## 2024-04-22 RX ADMIN — TAMSULOSIN HYDROCHLORIDE 0.4 MG: 0.4 CAPSULE ORAL at 06:37

## 2024-04-22 RX ADMIN — MIDODRINE HYDROCHLORIDE 10 MG: 10 TABLET ORAL at 18:36

## 2024-04-22 RX ADMIN — ASPIRIN 81 MG: 81 TABLET, COATED ORAL at 08:56

## 2024-04-22 RX ADMIN — MIDODRINE HYDROCHLORIDE 10 MG: 10 TABLET ORAL at 12:25

## 2024-04-22 RX ADMIN — APIXABAN 5 MG: 5 TABLET, FILM COATED ORAL at 20:29

## 2024-04-22 RX ADMIN — FAMOTIDINE 10 MG: 20 TABLET, FILM COATED ORAL at 08:55

## 2024-04-22 SDOH — SOCIAL STABILITY: SOCIAL INSECURITY: WITHIN THE LAST YEAR, HAVE YOU BEEN AFRAID OF YOUR PARTNER OR EX-PARTNER?: NO

## 2024-04-22 SDOH — SOCIAL STABILITY: SOCIAL INSECURITY: WITHIN THE LAST YEAR, HAVE YOU BEEN HUMILIATED OR EMOTIONALLY ABUSED IN OTHER WAYS BY YOUR PARTNER OR EX-PARTNER?: NO

## 2024-04-22 SDOH — SOCIAL STABILITY: SOCIAL INSECURITY
WITHIN THE LAST YEAR, HAVE TO BEEN RAPED OR FORCED TO HAVE ANY KIND OF SEXUAL ACTIVITY BY YOUR PARTNER OR EX-PARTNER?: NO

## 2024-04-22 SDOH — SOCIAL STABILITY: SOCIAL NETWORK: HOW OFTEN DO YOU GET TOGETHER WITH FRIENDS OR RELATIVES?: MORE THAN THREE TIMES A WEEK

## 2024-04-22 SDOH — ECONOMIC STABILITY: INCOME INSECURITY: IN THE PAST 12 MONTHS, HAS THE ELECTRIC, GAS, OIL, OR WATER COMPANY THREATENED TO SHUT OFF SERVICE IN YOUR HOME?: NO

## 2024-04-22 SDOH — HEALTH STABILITY: PHYSICAL HEALTH: ON AVERAGE, HOW MANY MINUTES DO YOU ENGAGE IN EXERCISE AT THIS LEVEL?: 0 MIN

## 2024-04-22 SDOH — HEALTH STABILITY: MENTAL HEALTH: HOW OFTEN DO YOU HAVE A DRINK CONTAINING ALCOHOL?: NEVER

## 2024-04-22 SDOH — ECONOMIC STABILITY: INCOME INSECURITY: IN THE LAST 12 MONTHS, WAS THERE A TIME WHEN YOU WERE NOT ABLE TO PAY THE MORTGAGE OR RENT ON TIME?: NO

## 2024-04-22 SDOH — ECONOMIC STABILITY: TRANSPORTATION INSECURITY
IN THE PAST 12 MONTHS, HAS THE LACK OF TRANSPORTATION KEPT YOU FROM MEDICAL APPOINTMENTS OR FROM GETTING MEDICATIONS?: NO

## 2024-04-22 SDOH — SOCIAL STABILITY: SOCIAL INSECURITY
WITHIN THE LAST YEAR, HAVE YOU BEEN KICKED, HIT, SLAPPED, OR OTHERWISE PHYSICALLY HURT BY YOUR PARTNER OR EX-PARTNER?: NO

## 2024-04-22 SDOH — HEALTH STABILITY: MENTAL HEALTH: HOW OFTEN DO YOU HAVE 6 OR MORE DRINKS ON ONE OCCASION?: NEVER

## 2024-04-22 SDOH — HEALTH STABILITY: PHYSICAL HEALTH: ON AVERAGE, HOW MANY DAYS PER WEEK DO YOU ENGAGE IN MODERATE TO STRENUOUS EXERCISE (LIKE A BRISK WALK)?: 0 DAYS

## 2024-04-22 SDOH — HEALTH STABILITY: MENTAL HEALTH
HOW OFTEN DO YOU NEED TO HAVE SOMEONE HELP YOU WHEN YOU READ INSTRUCTIONS, PAMPHLETS, OR OTHER WRITTEN MATERIAL FROM YOUR DOCTOR OR PHARMACY?: NEVER

## 2024-04-22 SDOH — HEALTH STABILITY: MENTAL HEALTH: HOW MANY STANDARD DRINKS CONTAINING ALCOHOL DO YOU HAVE ON A TYPICAL DAY?: PATIENT DOES NOT DRINK

## 2024-04-22 SDOH — ECONOMIC STABILITY: FOOD INSECURITY: WITHIN THE PAST 12 MONTHS, THE FOOD YOU BOUGHT JUST DIDN'T LAST AND YOU DIDN'T HAVE MONEY TO GET MORE.: NEVER TRUE

## 2024-04-22 SDOH — ECONOMIC STABILITY: FOOD INSECURITY: WITHIN THE PAST 12 MONTHS, YOU WORRIED THAT YOUR FOOD WOULD RUN OUT BEFORE YOU GOT MONEY TO BUY MORE.: NEVER TRUE

## 2024-04-22 SDOH — ECONOMIC STABILITY: INCOME INSECURITY: HOW HARD IS IT FOR YOU TO PAY FOR THE VERY BASICS LIKE FOOD, HOUSING, MEDICAL CARE, AND HEATING?: NOT HARD AT ALL

## 2024-04-22 SDOH — SOCIAL STABILITY: SOCIAL NETWORK: ARE YOU MARRIED, WIDOWED, DIVORCED, SEPARATED, NEVER MARRIED, OR LIVING WITH A PARTNER?: WIDOWED

## 2024-04-22 SDOH — ECONOMIC STABILITY: HOUSING INSECURITY: IN THE LAST 12 MONTHS, HOW MANY PLACES HAVE YOU LIVED?: 1

## 2024-04-22 ASSESSMENT — COGNITIVE AND FUNCTIONAL STATUS - GENERAL
DRESSING REGULAR LOWER BODY CLOTHING: A LOT
TOILETING: A LOT
DAILY ACTIVITIY SCORE: 14
PERSONAL GROOMING: A LOT
HELP NEEDED FOR BATHING: A LOT
PERSONAL GROOMING: A LOT
MOVING FROM LYING ON BACK TO SITTING ON SIDE OF FLAT BED WITH BEDRAILS: A LOT
MOVING FROM LYING ON BACK TO SITTING ON SIDE OF FLAT BED WITH BEDRAILS: A LOT
MOBILITY SCORE: 12
WALKING IN HOSPITAL ROOM: A LOT
MOVING TO AND FROM BED TO CHAIR: A LOT
HELP NEEDED FOR BATHING: A LOT
MOBILITY SCORE: 12
EATING MEALS: A LITTLE
CLIMB 3 TO 5 STEPS WITH RAILING: A LOT
MOVING TO AND FROM BED TO CHAIR: A LOT
TOILETING: A LOT
TURNING FROM BACK TO SIDE WHILE IN FLAT BAD: A LOT
CLIMB 3 TO 5 STEPS WITH RAILING: TOTAL
DRESSING REGULAR LOWER BODY CLOTHING: A LOT
DRESSING REGULAR UPPER BODY CLOTHING: A LITTLE
DRESSING REGULAR UPPER BODY CLOTHING: A LOT
STANDING UP FROM CHAIR USING ARMS: A LITTLE
STANDING UP FROM CHAIR USING ARMS: A LOT
DAILY ACTIVITIY SCORE: 14
WALKING IN HOSPITAL ROOM: A LOT
TURNING FROM BACK TO SIDE WHILE IN FLAT BAD: A LOT

## 2024-04-22 ASSESSMENT — ACTIVITIES OF DAILY LIVING (ADL)
ADL_ASSISTANCE: INDEPENDENT
BATHING_ASSISTANCE: MODERATE
ADLS_ADDRESSED: NO
LACK_OF_TRANSPORTATION: NO
ADL_ASSISTANCE: INDEPENDENT

## 2024-04-22 ASSESSMENT — PAIN SCALES - GENERAL
PAINLEVEL_OUTOF10: 0 - NO PAIN

## 2024-04-22 ASSESSMENT — LIFESTYLE VARIABLES
AUDIT-C TOTAL SCORE: 0
SKIP TO QUESTIONS 9-10: 1

## 2024-04-22 ASSESSMENT — PAIN - FUNCTIONAL ASSESSMENT
PAIN_FUNCTIONAL_ASSESSMENT: 0-10

## 2024-04-22 NOTE — NURSING NOTE
Assumed care of patient.  Awake and alert to self.  Respirations even and unlabored on RA.  Offers no complaints of pain/discomfort.  Call light and commonly used items in reach.  Bed locked, low position, bed alarm engaged.

## 2024-04-22 NOTE — CARE PLAN
The patient's goals for the shift include  not be confused    The clinical goals for the shift include no neuro symptoms    Over the shift, the patient did not make progress toward the following goals. Barriers to progression include forgetful. Recommendations to address these barriers include reorient and bed alarm.

## 2024-04-22 NOTE — NURSING NOTE
Patient agitated with male external cath.  Removed at this time.  Urinal at bedside.  Patient awake and alert, speech clear.  Pleasant and cooperative, able to make needs known.  No change in neuro status since start of shift.  Call light in reach.

## 2024-04-22 NOTE — PROGRESS NOTES
Physical Therapy    Physical Therapy Evaluation & Treatment    Patient Name: Darrell Arreola  MRN: [id number]  Today's Date: 4/22/2024   Time Calculation  Start Time: 1330  Stop Time: 1349  Time Calculation (min): 19 min    Assessment/Plan   PT Assessment  PT Assessment Results: Decreased strength, Decreased endurance, Impaired balance, Decreased mobility, Decreased cognition, Impaired judgement, Decreased safety awareness  Rehab Prognosis: Good  Evaluation/Treatment Tolerance: Patient tolerated treatment well, Patient limited by fatigue  Medical Staff Made Aware: Yes  Strengths: Attitude of self, Premorbid level of function  Barriers to Participation: Comorbidities, Ability to acquire knowledge  End of Session Communication: Bedside nurse  Assessment Comment: Pt is a 78 y.o. male who presents with decreased strength, endurance, mobility, safety awareness, and cognition. Pt requires CGA to modAx1-2 for transfers and ambulation. Pt would benefit from continued skilled therapy services to address hios mobility deficits. Pt may benefit from 24 hour supervision upon discharge due to decreased safety awareness.  End of Session Patient Position: Bed, 3 rail up, Alarm on   IP OR SWING BED PT PLAN  Inpatient or Swing Bed: Inpatient  PT Plan  Treatment/Interventions: Bed mobility, Transfer training, Gait training, Balance training, Strengthening, Endurance training, Therapeutic exercise, Therapeutic activity  PT Plan: Skilled PT  PT Frequency: 4 times per week  PT Discharge Recommendations: Moderate intensity level of continued care  Equipment Recommended upon Discharge: Wheeled walker  PT Recommended Transfer Status: Assist x1, Assistive device  PT - OK to Discharge: Yes      Subjective     General Visit Information:  General  Reason for Referral: Impaired mobility, cognition, and gait training  Referred By: ELENITA Vilchis-CNP  Past Medical History Relevant to Rehab: Atrial fibrillation, CHF, DM, HTN, severe tricuspid  regurgitation, peripheral vascular disease, circulatory shock, pulmonary edema, acute renal failure, anemia, anaphylaxis, thrombocytopenia, obesity, severe mitral regurgitation,  cardiomyopathy, recurrent syncope and collapse.  Missed Visit: No  Family/Caregiver Present: No  Caregiver Feedback: n/a  Co-Treatment: OT  Co-Treatment Reason: co-treat with OT to optimize pt outcomes and maximize pt safety with functional transfers  Prior to Session Communication: Bedside nurse  Patient Position Received: Up in chair, Alarm off, not on at start of session  Preferred Learning Style: verbal, visual  General Comment: Pt ia a 78 y.o. male admitted from home with c/o difficulties speaking, dizziness, and altered mental status.  Home Living:  Home Living  Type of Home: House  Lives With: Alone  Home Adaptive Equipment: Other (Comment) (rollator)  Home Layout: Able to live on main level with bedroom/bathroom  Home Access: Ramped entrance  Bathroom Shower/Tub: Walk-in shower  Bathroom Equipment: Shower chair with back, Grab bars in shower  Home Living Comments: Pt is a questionable historian, unsure of accuracy of home situation  Prior Level of Function:  Prior Function Per Pt/Caregiver Report  Level of [place]: Independent with ADLs and functional transfers, Needs assistance with homemaking  Receives Help From: Family (pt reports family assists with shopping and driving)  ADL Assistance: Independent  Homemaking Assistance: Needs assistance  Driving/Transportation: Family/Friend  Ambulatory Assistance: Needs assistance (rollator)  Hand Dominance: Right  Prior Function Comments: Pt is a questionable historian, PLOF information unclear. Pt reports h/o falls  Precautions:  Precautions  Hearing/Visual Limitations: wears glasses, reports HA's  Medical Precautions: Fall precautions  Vital Signs:  Vital Signs  Heart Rate: (!) 124 (upon arrival; 152bpm highest recorded post ambulation with decrease upon seated break.)  Heart Rate  Source: Monitor    Objective   Pain:  Pain Assessment  Pain Assessment: 0-10  Pain Score: 0 - No pain  Cognition:  Cognition  Overall Cognitive Status: Impaired  Orientation Level: Disoriented to place, Disoriented to time, Disoriented to situation (pt able to state name and year of birth; unable to state month and date of birth.)  Following Commands: Follows one step commands with increased time  Safety Judgment: Decreased awareness of need for assistance  Cognition Comments: Pt is a poor historian; potentially has some expressive aphasia.  Safety/Judgement: Exceptions to WFL  Insight: Mild  Impulsive: Mildly  Task Initiation: Initiates with cues    General Assessments:  General Observation  General Observation: Pt pleasantly confused and agreeable to therapy. Mild ecchymosis to BLE's    Activity Tolerance  Endurance: Decreased tolerance for upright activites    Sensation  Light Touch: No apparent deficits  Sensation Comment: pt denies paresthesias    Strength  Strength Comments: Pt demonstrates grossly 3/5 BLE strength.  Strength  Strength Comments: Pt demonstrates grossly 3/5 BLE strength.    Perception  Inattention/Neglect: Appears intact    Coordination  Movements are Fluid and Coordinated: Yes  Coordination Comment: pt required increased time for completion.    Postural Control  Postural Control: Impaired  Posture Comment: forward head; rounded shoulders; increased thoracic kyphosis.    Static Sitting Balance  Static Sitting-Balance Support: Bilateral upper extremity supported, Feet supported  Static Sitting-Level of Assistance: Close supervision  Static Sitting-Comment/Number of Minutes: Pt sat in chair with arms with close supervision for safety for 2 mins. Pt demonstrated a slouched posture.    Static Standing Balance  Static Standing-Balance Support: Bilateral upper extremity supported (RW)  Static Standing-Level of Assistance: Contact guard  Static Standing-Comment/Number of Minutes: Pt stood with RW and  CGA for safety and balance for 2 mins.  Functional Assessments:  Bed Mobility  Bed Mobility: Yes  Bed Mobility 1  Bed Mobility 1: Sitting to supine  Level of Assistance 1: Moderate assistance  Bed Mobility Comments 1: Pt performed sit to supine with modA x1-2 for controlled descent of trunk and assist with BLEs onto bed.    Transfers  Transfer: Yes  Transfer 1  Transfer From 1: Sit to, Stand to  Transfer to 1: Stand, Sit  Technique 1: Sit to stand, Stand to sit  Transfer Device 1: Walker (RW)  Transfer Level of Assistance 1: Minimum assistance  Trials/Comments 1: Pt performed sit<>stand with minAx1 for forward lean and trunk descent control. VC's for proper hand placement. Pt performed slowly.    Ambulation/Gait Training  Ambulation/Gait Training Performed: Yes  Ambulation/Gait Training 1  Surface 1: Level tile  Device 1: Rolling walker  Assistance 1: Minimum assistance  Quality of Gait 1: Narrow base of support, Decreased step length, Diminished heel strike, Forward flexed posture (decreased mari)  Comments/Distance (ft) 1: Pt ambulated 10ftx2 with RW and MinAx1 for balance and safety. VC's for upright stance. Pt demonstrated signs of fatigue with full body tremors. Pt denied dizziness/lightheadedness.  Extremity/Trunk Assessments:  RLE   RLE :  (see strength comments)  LLE   LLE :  (see strength comments)  Treatments:  Bed Mobility  Bed Mobility: Yes  Bed Mobility 1  Bed Mobility 1: Sitting to supine  Level of Assistance 1: Moderate assistance  Bed Mobility Comments 1: Pt performed sit to supine with modA x1-2 for controlled descent of trunk and assist with BLEs onto bed.    Ambulation/Gait Training  Ambulation/Gait Training Performed: Yes  Ambulation/Gait Training 1  Surface 1: Level tile  Device 1: Rolling walker  Assistance 1: Minimum assistance  Quality of Gait 1: Narrow base of support, Decreased step length, Diminished heel strike, Forward flexed posture (decreased mari)  Comments/Distance (ft) 1: Pt  ambulated 10ftx2 with RW and MinAx1 for balance and safety. VC's for upright stance. Pt demonstrated signs of fatigue with full body tremors. Pt denied dizziness/lightheadedness.  Transfers  Transfer: Yes  Transfer 1  Transfer From 1: Sit to, Stand to  Transfer to 1: Stand, Sit  Technique 1: Sit to stand, Stand to sit  Transfer Device 1: Walker (RW)  Transfer Level of Assistance 1: Minimum assistance  Trials/Comments 1: Pt performed sit<>stand with minAx1 for forward lean and trunk descent control. VC's for proper hand placement. Pt performed slowly.  Outcome Measures:  [facility] Basic Mobility  Turning from your back to your side while in a flat bed without using bedrails: A lot  Moving from lying on your back to sitting on the side of a flat bed without using bedrails: A lot  Moving to and from bed to chair (including a wheelchair): A lot  Standing up from a chair using your arms (e.g. wheelchair or bedside chair): A little  To walk in hospital room: A lot  Climbing 3-5 steps with railing: Total  Basic Mobility - Total Score: 12    Encounter Problems       Encounter Problems (Active)       PT Problem       Mobility (Progressing)       Start:  04/22/24    Expected End:  05/22/24       Pt will perform all bed mobility modified independently to demonstrate improved functional mobility.             Transfers (Progressing)       Start:  04/22/24    Expected End:  05/22/24       Pt will perform sit<>stand transfer with LRAD modified independent for improved functional mobility.              Ambulation (Progressing)       Start:  04/22/24    Expected End:  05/22/24       Pt will ambulate 50ft with LRAD modified independent to demonstrate improved mobility.              Safety (Progressing)       Start:  04/22/24    Expected End:  05/22/24       Pt will demonstrate ability to utilize call light at all times when performing mobility to demonstrate improved safety awareness.                Education Documentation  Precautions,  taught by ROWAN Deleon at 4/22/2024  3:13 PM.  Learner: Patient  Readiness: Acceptance  Method: Explanation  Response: Verbalizes Understanding, Needs Reinforcement, Demonstrated Understanding    Body Mechanics, taught by ROWAN Deleon at 4/22/2024  3:13 PM.  Learner: Patient  Readiness: Acceptance  Method: Explanation  Response: Verbalizes Understanding, Needs Reinforcement, Demonstrated Understanding    Mobility Training, taught by ROWAN Deleon at 4/22/2024  3:13 PM.  Learner: Patient  Readiness: Acceptance  Method: Explanation  Response: Verbalizes Understanding, Needs Reinforcement, Demonstrated Understanding    Education Comments  No comments found.    ROWAN DELEON

## 2024-04-22 NOTE — PROGRESS NOTES
Darrell Arreola is a 78 y.o. male on day 0 of admission presenting with Altered mental status, unspecified altered mental status type.    Subjective   Patient seen and examined.  Resting in bed in no acute distress.  Awake alert oriented x 2.  States name and birth-date.  States he is in the hospital, [facility].  Complains of discomfort at pure-wick site.    Spoke with nursing, no new issues.    Objective     Physical Exam  Vitals and nursing note reviewed.   Constitutional:       General: He is not in acute distress.     Appearance: Normal appearance. He is normal weight. He is not ill-appearing, toxic-appearing or diaphoretic.   HENT:      Head: Normocephalic and atraumatic.      Right Ear: Tympanic membrane normal.      Left Ear: Tympanic membrane normal.      Nose: Nose normal.      Mouth/Throat:      Mouth: Mucous membranes are moist.      Pharynx: Oropharynx is clear.   Eyes:      Extraocular Movements: Extraocular movements intact.      Conjunctiva/sclera: Conjunctivae normal.      Pupils: Pupils are equal, round, and reactive to light.   Cardiovascular:      Rate and Rhythm: Regular rhythm. Tachycardia present.      Pulses: Normal pulses.      Heart sounds: Normal heart sounds. No murmur heard.  Pulmonary:      Effort: Pulmonary effort is normal. No respiratory distress.      Breath sounds: Normal breath sounds. No wheezing, rhonchi or rales.      Comments: Room air.  Abdominal:      General: Bowel sounds are normal. There is no distension.      Palpations: Abdomen is soft.      Tenderness: There is no abdominal tenderness.   Genitourinary:     Comments: External male pure-wick catheter in place with trace blood. Rectal examination deferred.  Musculoskeletal:         General: No swelling or tenderness. Normal range of motion.      Cervical back: Normal range of motion and neck supple.   Skin:     General: Skin is warm and dry.      Capillary Refill: Capillary refill takes less than 2 seconds.   Neurological:      " General: No focal deficit present.      Mental Status: He is alert. He is disoriented.   Psychiatric:         Mood and Affect: Mood is anxious.         Behavior: Behavior normal.       Last Recorded Vitals  Blood pressure 124/78, pulse 94, temperature 36.4 °C (97.5 °F), temperature source Temporal, resp. rate 16, height 1.892 m (6' 2.49\"), weight 93.4 kg (205 lb 14.4 oz), SpO2 97%.    Intake/Output last 3 Shifts:  I/O last 3 completed shifts:  In: 480 (5.1 mL/kg) [P.O.:480]  Out: 550 (5.9 mL/kg) [Urine:550 (0.2 mL/kg/hr)]  Weight: 93.4 kg     Telemetry sinus tachycardia rate 100's    Relevant Results  No results found for this or any previous visit (from the past 24 hour(s)).    No results found for the last 90 days.    ECG 12 lead    Result Date: 4/22/2024  Atrial fibrillation Possible Inferior infarct (cited on or before 08-OCT-2023) Cannot rule out Anterior infarct , age undetermined T wave abnormality, consider lateral ischemia Abnormal ECG When compared with ECG of 08-OCT-2023 13:07, Minimal criteria for Anterior infarct are now Present Questionable change in initial forces of Inferior leads Nonspecific T wave abnormality has replaced inverted T waves in Inferior leads    MR brain wo IV contrast    Result Date: 4/21/2024  Interpreted By:  Benedict Sutton, STUDY: MR BRAIN WO IV CONTRAST;  4/21/2024 12:04 pm   INDICATION: Signs/Symptoms: TIA.   COMPARISON: Head CT and CTA, 04/20/2024 and brain MRI, 10/09/2023   ACCESSION NUMBER(S): [id number]   ORDERING CLINICIAN: JOSÉ LUIS FULTON   TECHNIQUE: Axial T2, FLAIR, DWI, gradient echo T2 and sagittal and coronal T1 weighted images of the brain were acquired.   FINDINGS: CSF Spaces: The ventricles, sulci and basal cisterns are appropriate for the degree of volume loss. No abnormal extra-axial collection.   Parenchyma: There is no restricted diffusion to suggest acute infarction.  Generalized parenchymal volume loss is unchanged. Nonspecific T2 hyperintense signal in " the white matter, similar to previous and likely secondary to chronic small vessel ischemic disease. Unchanged small focus of encephalomalacia in the left parietal lobe, possibly a chronic infarct. No evidence of intracranial hemorrhage. There is no mass effect or midline shift.   Paranasal Sinuses and Mastoids: The paranasal sinuses are clear. Trace right mastoid effusion.   Orbits: Bilateral lens replacement.       No acute intracranial pathology.   MACRO: None   Signed by: Benedict Sutton 4/21/2024 12:11 PM Dictation workstation:   [id number]    CT angio head and neck w and wo IV contrast    Result Date: 4/20/2024  Interpreted By:  Benedict Sutton, STUDY: CT ANGIO HEAD AND NECK W AND WO IV CONTRAST;  4/20/2024 5:34 pm   INDICATION: Signs/Symptoms: Stroke-like symptoms.   COMPARISON: Head CT, same day   ACCESSION NUMBER(S): [id number]   ORDERING CLINICIAN: AUNDREA VEGA   TECHNIQUE: 75 mL Omnipaque 350 was administered intravenously and axial images of the head and neck were acquired. Coronal and sagittal MIPs and 3-D reconstructions were generated on an independent workstation and reviewed.   FINDINGS: CTA head:   Anterior circulation: Mural calcification and atherosclerotic irregularity of both intracranial internal carotid arteries, no significant luminal narrowing. The proximal anterior and middle cerebral arteries are unremarkable. No aneurysm.   Posterior circulation: Normal variant left dominant vertebral artery. There is partially calcified atherosclerotic plaque with at least mild narrowing of the left V4 segment, the distal V4 segment enhances normally. The hypoplastic right vertebral artery, basilar artery, and proximal posterior cerebral arteries are unremarkable. No aneurysm.   Limited evaluation of the dural venous sinuses is unremarkable.   CTA neck:   Right carotid vessels: The common carotid artery is normal. Partially calcified atherosclerotic plaque in the bifurcation and proximal internal  carotid artery without significant narrowing.   Left carotid vessels: The common carotid artery is normal. Partially calcified atherosclerotic plaque in the bifurcation and proximal internal carotid artery without significant narrowing.   Vertebral vessels:  Left dominant. The cervical vertebral arteries are unremarkable.   A punctate calcification is incidentally noted in the left lobe of the thyroid, the soft tissues of the neck otherwise unremarkable. Evaluation of the lungs is limited by respiratory motion, no focal pulmonary opacity. The patient is edentulous. Degenerative changes in the spine and temporomandibular joints.       No flow-limiting stenosis or occlusion in the head or neck.   MACRO: None   Signed by: Benedict Sutton 4/20/2024 6:52 PM Dictation workstation:   [id number]    XR chest 1 view    Result Date: 4/20/2024  Interpreted By:  Torres Mcneill, STUDY: XR CHEST 1 VIEW;  4/20/2024 2:55 pm   INDICATION: Signs/Symptoms:Strokelike symptoms.   COMPARISON: 03/18/2020   ACCESSION NUMBER(S): [id number]   ORDERING CLINICIAN: AUNDREA VEGA   FINDINGS: No definite focal infiltrate. No apparent pleural effusion. Small lung volumes. Pulmonary vascular congestion. Cardiomegaly.       Pulmonary vascular congestion.   MACRO: None   Signed by: Torres Mcneill 4/20/2024 3:15 PM Dictation workstation:   [id number]    CT head wo IV contrast    Result Date: 4/20/2024  Interpreted By:  Torres Mcneill, STUDY: CT HEAD WO IV CONTRAST  4/20/2024 2:37 pm   INDICATION: Signs/Symptoms:Strokelike symptoms   COMPARISON: None.   ACCESSION NUMBER(S): [id number]   ORDERING CLINICIAN: AUNDREA VEGA   TECHNIQUE: Contiguous axial CT images of the brain were obtained without IV contrast.   FINDINGS: The ventricles, cisterns and sulci are prominent, consistent with severe diffuse volume loss. Areas of white matter low attenuation are nonspecific but likely related to chronic microvascular disease. There is intracranial atherosclerosis.    Gray-white differentiation is preserved. No acute intracranial hemorrhage or mass effect. No midline shift. Patent basal cisterns. No extraaxial fluid collections.   The calvaria is intact. The visualized paranasal sinuses and mastoid air cells are clear.       No acute intracranial pathology.     Signed by: Torres Mcneill 4/20/2024 2:48 PM Dictation workstation:   [id number]     Scheduled medications  amiodarone, 200 mg, oral, Daily  apixaban, 5 mg, oral, BID  aspirin, 81 mg, oral, Daily  atorvastatin, 40 mg, oral, Nightly  famotidine, 10 mg, oral, Daily  folic acid, 1 mg, oral, Daily  midodrine, 10 mg, oral, TID with meals  SITagliptin phosphate, 50 mg, oral, Daily  tamsulosin, 0.4 mg, oral, Daily before breakfast      Continuous medications     PRN medications  PRN medications: oxygen      ASSESSMENT:  Altered mental status  Syncope  Generalized weakness.  Hypertension  Congestive heart failure  Hyperlipidemia  Atrial fibrillation  Sinus tachycardia  Oral anticoagulation  Type 2 diabetes mellitus    PLAN:  Patient is doing okay this morning.  No new issues.  Mentation stable.  Awake alert oriented x 2.  Generalized weakness.  No focal weakness.  No focal deficits on examination.  MRI Brain without contrast radiology report reviewed no acute findings.  Neurology following.  Input appreciated.  Follow-up.  Telemetry sinus tachycardia.  Patient is anxious at this time.  Remove external pure-wick and monitor.  Discussed with nursing.  Supportive care.  Monitor telemetry.  Monitor blood pressure.  PT/OT evaluation.  Fall precautions.  Up with assistance only.  Bed and chair alarm at all times.  DVT prophylaxis Eliquis.  Pressure ulcer prevention measures.  Turn and reposition every 2 hours and as needed.  Heels off bed.  Offloading.  Supportive care.  Patient reassured.  Case management following for discharge planning.  Patient presents from [place].  Anticipate discharge per therapy recommendations when  arrangements are made by case management.  Discussed with patient, nursing and Dr. Sims.      ELENITA Vilchis-CNP

## 2024-04-22 NOTE — NURSING NOTE
Resting quietly in bed, offers no complaints of pain/discomfort.  Incontinence care performed as needed.  EEG to be completed tomorrow per neuro.  No change in assessment from AM.  Patient pleasantly confused.

## 2024-04-22 NOTE — PROGRESS NOTES
"Darrell Arreola is a 78 y.o. male on day 0 of admission presenting with Altered mental status, unspecified altered mental status type.    Subjective    Patient was seen and examined.  Lying in the bed.  Comfortable.  Did not look in acute distress.  Patient denied any headache or dizziness.  No nausea or vomiting.  No diarrhea, dysuria, hematuria frequency.  Patient was very confused in the morning and has to be given Haldol.       Objective     Physical Exam  HEENT:  Head externally atraumatic,  extraocular movements intact, oral mucosa moist  Neck:  Supple, no JVP, no palpable adenopathy or thyromegaly.  No carotid bruit.  Chest:  Clear to auscultation and resonant.  Heart:  Regular rate and rhythm, no murmur or gallop could be appreciated.  Abdomen:  Soft, nontender, bowel sounds present, normoactive, no palpable hepatosplenomegaly.  Extremities:  No edema, pulses present, no cyanosis or clubbing.  CNS:  Patient alert, oriented to time, place and person.    No new deficit.  Cranial nerves 2-12 grossly intact  Skin:  No active rash.  Musculoskeletal:  No  apparent joint swelling or erythema, range of movement normal.  Last Recorded Vitals  Heart Rate:  [94-97]   Temp:  [36.2 °C (97.2 °F)-36.5 °C (97.7 °F)]   Resp:  [15-16]   BP: (120-139)/(70-98)   Height:  [189.2 cm (6' 2.49\")]   Weight:  [98.5 kg (217 lb 3.2 oz)]   SpO2:  [97 %-100 %]     Intake/Output last 3 Shifts:  I/O last 3 completed shifts:  In: - (0 mL/kg)   Out: 550 (5.6 mL/kg) [Urine:550 (0.2 mL/kg/hr)]  Weight: 98.5 kg     Relevant Results  No results found for the last 90 days.    Results for orders placed or performed during the hospital encounter of 04/20/24 (from the past 24 hour(s))   Comprehensive metabolic panel   Result Value Ref Range    Glucose 165 (H) 65 - 99 mg/dL    Sodium 138 133 - 145 mmol/L    Potassium 4.1 3.4 - 5.1 mmol/L    Chloride 103 97 - 107 mmol/L    Bicarbonate 26 24 - 31 mmol/L    Urea Nitrogen 21 8 - 25 mg/dL    Creatinine " 1.50 0.40 - 1.60 mg/dL    eGFR 47 (L) >60 mL/min/1.73m*2    Calcium 8.9 8.5 - 10.4 mg/dL    Albumin 3.5 3.5 - 5.0 g/dL    Alkaline Phosphatase 120 35 - 125 U/L    Total Protein 6.4 5.9 - 7.9 g/dL    AST 23 5 - 40 U/L    Bilirubin, Total 0.9 0.1 - 1.2 mg/dL    ALT 18 5 - 40 U/L    Anion Gap 9 <=19 mmol/L   Magnesium   Result Value Ref Range    Magnesium 2.00 1.60 - 3.10 mg/dL   Thyroid Stimulating Hormone   Result Value Ref Range    Thyroid Stimulating Hormone 4.23 (H) 0.27 - 4.20 mIU/L   Lipid Panel   Result Value Ref Range    Cholesterol 109 (L) 133 - 200 mg/dL    HDL-Cholesterol 46.0 >40.0 mg/dL    Cholesterol/HDL Ratio 2.4 SEE COMMENT    LDL Calculated 50 (L) 65 - 130 mg/dL    Triglycerides 63 40 - 150 mg/dL   Hemoglobin A1C   Result Value Ref Range    Hemoglobin A1C 6.7 (H) See below %    Estimated Average Glucose 146 Not Established mg/dL   CBC   Result Value Ref Range    WBC 6.3 4.4 - 11.3 x10*3/uL    nRBC 0.0 0.0 - 0.0 /100 WBCs    RBC 3.86 (L) 4.50 - 5.90 x10*6/uL    Hemoglobin 12.5 (L) 13.5 - 17.5 g/dL    Hematocrit 39.3 (L) 41.0 - 52.0 %     (H) 80 - 100 fL    MCH 32.4 26.0 - 34.0 pg    MCHC 31.8 (L) 32.0 - 36.0 g/dL    RDW 14.6 (H) 11.5 - 14.5 %    Platelets 129 (L) 150 - 450 x10*3/uL        Current Facility-Administered Medications:     amiodarone (Pacerone) tablet 200 mg, 200 mg, oral, Daily, Milan Sims MD    apixaban (Eliquis) tablet 5 mg, 5 mg, oral, BID, Milan Sims MD, 5 mg at 04/21/24 2055    aspirin EC tablet 81 mg, 81 mg, oral, Daily, Milan Sims MD, 81 mg at 04/21/24 0624    atorvastatin (Lipitor) tablet 40 mg, 40 mg, oral, Nightly, Milan Sims MD, 40 mg at 04/21/24 2055    famotidine (Pepcid) tablet 10 mg, 10 mg, oral, Daily, Bill Mcneill, PharmD    folic acid (Folvite) tablet 1 mg, 1 mg, oral, Daily, Milan Sims MD    midodrine (Proamatine) tablet 10 mg, 10 mg, oral, TID with meals, Milan Sims MD, 10 mg at 04/21/24 1749    oxygen (O2)  therapy, , inhalation, Continuous PRN - O2/gases, Milan Sims MD    SITagliptin phosphate (Januvia) tablet 50 mg, 50 mg, oral, Daily, Milan Sims MD    tamsulosin (Flomax) 24 hr capsule 0.4 mg, 0.4 mg, oral, Daily before breakfast, Milan Sims MD, 0.4 mg at 04/21/24 0624   Assessment/Plan   Hypertension   Chronic kidney disease   Congestive heart failure    Syncope   Hyperlipidemia  Diabetes mellitus type 2    Plan Continue current medication give supportive care, due to the brain.  Neurology input appreciated.  Monitor orthostatic blood pressure.  We will take deep vein thrombosis come fall, aspiration decubitus precaution this has been discussed with the patient agreeable.         Milan Sims MD

## 2024-04-22 NOTE — PROGRESS NOTES
Occupational Therapy    Evaluation  Patient Name: Darrell Arreola  MRN: [id number]  : 1945  Today's Date: 24  Time Calculation  Start Time: 132  Stop Time: 1349  Time Calculation (min): 20 min       Assessment:  OT Assessment: Pt would benefit from acute OT services  End of Session Communication: Bedside nurse  End of Session Patient Position: Bed, 3 rail up, Alarm on (all needs in reach)  OT Assessment Results: Decreased ADL status, Decreased upper extremity strength, Decreased cognition, Decreased endurance, Decreased functional mobility  Plan:  Treatment Interventions: ADL retraining, Functional transfer training, UE strengthening/ROM, Patient/family training  OT Frequency: 4 times per week  OT Discharge Recommendations: Moderate intensity level of continued care  OT - OK to Discharge: Yes  Treatment Interventions: ADL retraining, Functional transfer training, UE strengthening/ROM, Patient/family training    Subjective     General:   OT Received On: 24  General  Reason for Referral: Pt admitted from home with c/o difficulties speaking, dizziness, and altered mental status  Past Medical History Relevant to Rehab: Atrial fibrillation, CHF, DM, HTN, severe tricuspid regurgitation, peripheral vascular disease, circulatory shock, pulmonary edema, acute renal failure, anemia, anaphylaxis, thrombocytopenia, obesity, severe mitral regurgitation,  cardiomyopathy, recurrent syncope and collapse.  Family/Caregiver Present: No  Co-Treatment: PT  Prior to Session Communication: Bedside nurse  Patient Position Received: Up in chair, Alarm off, not on at start of session  General Comment: Cleared by nursing. Pt agreeable to therapy  Precautions:  Hearing/Visual Limitations: wears glasses  Medical Precautions: Fall precautions  Vital Signs:  Heart Rate:  (120s-152)  Pain:  Pain Assessment  Pain Assessment: 0-10  Pain Score: 0 - No pain    Objective   Cognition:  Overall Cognitive Status: Impaired  Orientation  Level: Disoriented to place, Disoriented to time, Disoriented to situation  Memory:  (impaired)  Safety/Judgement:  (impaired)  Insight:  (impaired)           Home Living:  Type of Home: House  Lives With: Alone  Home Adaptive Equipment:  (rollator)  Home Layout: Able to live on main level with bedroom/bathroom  Home Access: Ramped entrance  Bathroom Shower/Tub: Walk-in shower  Bathroom Equipment: Shower chair with back, Grab bars in shower  Home Living Comments: Pt is a questionable historian, unsure of accuracy of home situation  Prior Function:  Level of [place]: Independent with ADLs and functional transfers, Needs assistance with homemaking  Receives Help From: Family (pt reports family assists with shopping and driving)  ADL Assistance: Independent  Homemaking Assistance: Needs assistance  Driving/Transportation: Family/Friend  Ambulatory Assistance:  (mod I with use of rollator)  Hand Dominance: Right  Prior Function Comments: Pt is a questionable historian, PLOF information unclear. Pt reports h/o falls       ADL:  Eating Assistance:  (set up)  Grooming Assistance: Minimal  Bathing Assistance: Moderate  UE Dressing Assistance: Minimal  LE Dressing Assistance: Moderate  Toileting Assistance with Device: Moderate  Activity Tolerance:  Endurance: Decreased tolerance for upright activites  Functional Standing Tolerance:     Bed Mobility/Transfers: Bed Mobility  Bed Mobility:  (mod A x1-2 for controlled descent of trunk and assist with BLEs onto bed)    Transfers  Transfer:  (min A for balance and controlled descent sit<>stand bed/chair level, VCs for safe hand and leg placement)    Functional Mobility:  Functional Mobility  Functional Mobility Performed:  (min A for balance for functional mobility short household distance with use of RW, pt slightly unsteady this date)     Sensation Comment: pt denied numbness/paresthesia BUEs  Strength:  Strength Comments: WFL      Hand Function:  Hand Function  Gross  Grasp: Functional  Coordination: Functional  Extremities: RUE   RUE : Within Functional Limits and LUE   LUE: Within Functional Limits    Outcome Measures: [facility] Daily Activity  Putting on and taking off regular lower body clothing: A lot  Bathing (including washing, rinsing, drying): A lot  Putting on and taking off regular upper body clothing: A little  Toileting, which includes using toilet, bedpan or urinal: A lot  Taking care of personal grooming such as brushing teeth: A lot  Eating Meals: A little  Daily Activity - Total Score: 14      Education Documentation  ADL Training, taught by Danielle Busby OT at 4/22/2024  2:37 PM.  Learner: Patient  Readiness: Acceptance  Method: Explanation, Demonstration  Response: Needs Reinforcement    Education Comments  No comments found.      Goals:  Encounter Problems       Encounter Problems (Active)       ADLs       Pt will complete ADL tasks at mod I with use of AE prn  (Progressing)       Start:  04/22/24    Expected End:  05/13/24               Functional Mobility       Pt will perform functional mobility household distances at [facility] I with use of RW.    (Progressing)       Start:  04/22/24    Expected End:  05/13/24               OT Transfers       Pt will perform functional transfers at mod I (Progressing)       Start:  04/22/24    Expected End:  05/13/24               UE Exercise       Pt will perform BUE exercises to improve strength and independence with functional transfers/ADL tasks.   (Progressing)       Start:  04/22/24    Expected End:  05/13/24

## 2024-04-22 NOTE — PROGRESS NOTES
[facility] spoke with the pt over the phone. Pt confirms he admitted from [facility] where he was staying for skilled rehab, states he is ok with returning there. [facility] verified this with [facility], pt will require a precert to return to the facility, PT/OT have been ordered and once their notes are in precert can be started.      04/22/24 8990   Discharge Planning   Living Arrangements Other (Comment)   Support Systems Children   Type of Residence Private residence   Do you have animals or pets at home? No   Who is requesting discharge planning? Provider   Home or Post Acute Services Post acute facilities (Rehab/SNF/etc)   Type of Post Acute Facility Services Rehab  ([facility])   Patient expects to be discharged to: [facility] for skilled rehab - precert needed   Does the patient need discharge transport arranged? Yes   RoundTrip coordination needed? Yes   Has discharge transport been arranged? No   Financial Resource Strain   How hard is it for you to pay for the very basics like food, housing, medical care, and heating? Not hard   Housing Stability   In the last 12 months, was there a time when you were not able to pay the mortgage or rent on time? N   In the last 12 months, how many places have you lived? 1   In the last 12 months, was there a time when you did not have a steady place to sleep or slept in a shelter (including now)? N   Transportation Needs   In the past 12 months, has lack of transportation kept you from medical appointments or from getting medications? no   In the past 12 months, has lack of transportation kept you from meetings, work, or from getting things needed for daily living? No

## 2024-04-22 NOTE — PROGRESS NOTES
Music Therapy Note    Darrell Arreola was referred by     Therapy Session  Referral Type: New referral this admission  Visit Type: New visit  Session Start Time: 1445  Session End Time: 1454  Intervention Delivery: In-person  Conflict of Service: None  Number of family members present: 1  Family Present for Session: Other (Comment) ()  Family Participation: Supportive     Pre-assessment  Unable to Assess Reason: Cognitive limitation  Mood/Affect: Appropriate, Calm         Treatment/Interventions  Areas of Focus: Coping, Normalization  Music Therapy Interventions: Assessment, Live music listening  Interruption: No  Patient Fell Asleep at End of Session: No    Post-assessment  Unable to Assess Reason: Cognitive limitation  Mood/Affect: Calm, Anxious  Verbalized Emotional State: Enjoyment, Gratitude  Continue Visiting: Yes  Total Session Time (min): 9 minutes    Narrative  Assessment Detail: Pt found sitting in bed eating lunch with  present. Pt was welcoming to services and stated liking rock. Pt was unable to specify, but got excited at the mention of Lani Russo.  Plan: To improve cping and normalization through music intervention  Intervention: MT played live version of [place].  Evaluation: Pt maintained positive affect throughout session. Pt tapped toe along to song. Pt agreed for MT to return at a later time and expressed gratitude at end of session.  Follow-up: Will follow up throughout admission    Education Documentation  No documentation found.

## 2024-04-23 ENCOUNTER — APPOINTMENT (OUTPATIENT)
Dept: NEUROLOGY | Facility: HOSPITAL | Age: 79
DRG: 101 | End: 2024-04-23
Payer: MEDICARE

## 2024-04-23 PROCEDURE — 2500000006 HC RX 250 W HCPCS SELF ADMINISTERED DRUGS (ALT 637 FOR ALL PAYERS): Performed by: INTERNAL MEDICINE

## 2024-04-23 PROCEDURE — 1200000002 HC GENERAL ROOM WITH TELEMETRY DAILY

## 2024-04-23 PROCEDURE — 97535 SELF CARE MNGMENT TRAINING: CPT | Mod: GO,CO

## 2024-04-23 PROCEDURE — 97530 THERAPEUTIC ACTIVITIES: CPT | Mod: GP

## 2024-04-23 PROCEDURE — 2500000001 HC RX 250 WO HCPCS SELF ADMINISTERED DRUGS (ALT 637 FOR MEDICARE OP): Performed by: INTERNAL MEDICINE

## 2024-04-23 PROCEDURE — 2500000001 HC RX 250 WO HCPCS SELF ADMINISTERED DRUGS (ALT 637 FOR MEDICARE OP)

## 2024-04-23 PROCEDURE — 97530 THERAPEUTIC ACTIVITIES: CPT | Mod: GO,CO

## 2024-04-23 RX ORDER — ACETAMINOPHEN 325 MG/1
650 TABLET ORAL EVERY 6 HOURS PRN
Status: DISCONTINUED | OUTPATIENT
Start: 2024-04-23 | End: 2024-04-28 | Stop reason: HOSPADM

## 2024-04-23 RX ADMIN — SITAGLIPTIN 50 MG: 50 TABLET, FILM COATED ORAL at 09:03

## 2024-04-23 RX ADMIN — MIDODRINE HYDROCHLORIDE 10 MG: 10 TABLET ORAL at 09:03

## 2024-04-23 RX ADMIN — MIDODRINE HYDROCHLORIDE 10 MG: 10 TABLET ORAL at 13:17

## 2024-04-23 RX ADMIN — MIDODRINE HYDROCHLORIDE 10 MG: 10 TABLET ORAL at 17:47

## 2024-04-23 RX ADMIN — APIXABAN 5 MG: 5 TABLET, FILM COATED ORAL at 09:03

## 2024-04-23 RX ADMIN — ACETAMINOPHEN 650 MG: 325 TABLET ORAL at 21:50

## 2024-04-23 RX ADMIN — AMIODARONE HYDROCHLORIDE 200 MG: 200 TABLET ORAL at 09:03

## 2024-04-23 RX ADMIN — ATORVASTATIN CALCIUM 40 MG: 40 TABLET, FILM COATED ORAL at 21:50

## 2024-04-23 RX ADMIN — FOLIC ACID 1 MG: 1 TABLET ORAL at 09:03

## 2024-04-23 RX ADMIN — APIXABAN 5 MG: 5 TABLET, FILM COATED ORAL at 21:50

## 2024-04-23 RX ADMIN — FAMOTIDINE 10 MG: 20 TABLET, FILM COATED ORAL at 09:03

## 2024-04-23 RX ADMIN — TAMSULOSIN HYDROCHLORIDE 0.4 MG: 0.4 CAPSULE ORAL at 06:45

## 2024-04-23 RX ADMIN — ACETAMINOPHEN 650 MG: 325 TABLET ORAL at 10:44

## 2024-04-23 RX ADMIN — ASPIRIN 81 MG: 81 TABLET, COATED ORAL at 09:03

## 2024-04-23 ASSESSMENT — COGNITIVE AND FUNCTIONAL STATUS - GENERAL
MOBILITY SCORE: 16
WALKING IN HOSPITAL ROOM: A LOT
TOILETING: TOTAL
DRESSING REGULAR UPPER BODY CLOTHING: A LITTLE
TOILETING: A LOT
EATING MEALS: A LITTLE
CLIMB 3 TO 5 STEPS WITH RAILING: TOTAL
DRESSING REGULAR UPPER BODY CLOTHING: A LITTLE
DAILY ACTIVITIY SCORE: 14
DAILY ACTIVITIY SCORE: 14
TOILETING: TOTAL
DRESSING REGULAR LOWER BODY CLOTHING: A LOT
TURNING FROM BACK TO SIDE WHILE IN FLAT BAD: A LOT
CLIMB 3 TO 5 STEPS WITH RAILING: A LOT
MOBILITY SCORE: 11
MOBILITY SCORE: 13
MOVING FROM LYING ON BACK TO SITTING ON SIDE OF FLAT BED WITH BEDRAILS: A LOT
MOVING TO AND FROM BED TO CHAIR: A LITTLE
DRESSING REGULAR LOWER BODY CLOTHING: A LOT
MOVING FROM LYING ON BACK TO SITTING ON SIDE OF FLAT BED WITH BEDRAILS: A LITTLE
CLIMB 3 TO 5 STEPS WITH RAILING: TOTAL
DRESSING REGULAR LOWER BODY CLOTHING: A LOT
PERSONAL GROOMING: A LITTLE
STANDING UP FROM CHAIR USING ARMS: A LOT
HELP NEEDED FOR BATHING: A LOT
CLIMB 3 TO 5 STEPS WITH RAILING: TOTAL
MOVING TO AND FROM BED TO CHAIR: A LOT
WALKING IN HOSPITAL ROOM: A LOT
DRESSING REGULAR UPPER BODY CLOTHING: A LOT
WALKING IN HOSPITAL ROOM: A LOT
DRESSING REGULAR LOWER BODY CLOTHING: A LOT
HELP NEEDED FOR BATHING: A LOT
MOVING TO AND FROM BED TO CHAIR: A LOT
TURNING FROM BACK TO SIDE WHILE IN FLAT BAD: A LITTLE
TURNING FROM BACK TO SIDE WHILE IN FLAT BAD: A LITTLE
MOVING FROM LYING ON BACK TO SITTING ON SIDE OF FLAT BED WITH BEDRAILS: A LITTLE
TOILETING: A LOT
EATING MEALS: A LITTLE
WALKING IN HOSPITAL ROOM: A LOT
STANDING UP FROM CHAIR USING ARMS: A LOT
PERSONAL GROOMING: A LOT
DAILY ACTIVITIY SCORE: 13
EATING MEALS: A LITTLE
MOVING TO AND FROM BED TO CHAIR: A LOT
DRESSING REGULAR UPPER BODY CLOTHING: A LOT
EATING MEALS: A LITTLE
TURNING FROM BACK TO SIDE WHILE IN FLAT BAD: A LITTLE
STANDING UP FROM CHAIR USING ARMS: A LOT
PERSONAL GROOMING: A LITTLE
MOBILITY SCORE: 13
PERSONAL GROOMING: A LITTLE
MOVING FROM LYING ON BACK TO SITTING ON SIDE OF FLAT BED WITH BEDRAILS: A LITTLE
HELP NEEDED FOR BATHING: A LOT
STANDING UP FROM CHAIR USING ARMS: A LITTLE
HELP NEEDED FOR BATHING: A LOT
DAILY ACTIVITIY SCORE: 14

## 2024-04-23 ASSESSMENT — PAIN - FUNCTIONAL ASSESSMENT
PAIN_FUNCTIONAL_ASSESSMENT: 0-10

## 2024-04-23 ASSESSMENT — PAIN SCALES - PAIN ASSESSMENT IN ADVANCED DEMENTIA (PAINAD)
BREATHING: OCCASIONAL LABORED BREATHING, SHORT PERIOD OF HYPERVENTILATION
FACIALEXPRESSION: SAD, FRIGHTENED, FROWN
CONSOLABILITY: DISTRACTED OR REASSURED BY VOICE/TOUCH
TOTALSCORE: 5
BODYLANGUAGE: TENSE, DISTRESSED PACING, FIDGETING
NEGVOCALIZATION: OCCASIONAL MOAN/GROAN, LOW SPEECH, NEGATIVE/DISAPPROVING QUALITY

## 2024-04-23 ASSESSMENT — PAIN DESCRIPTION - LOCATION
LOCATION: HEAD
LOCATION: BACK

## 2024-04-23 ASSESSMENT — ACTIVITIES OF DAILY LIVING (ADL): HOME_MANAGEMENT_TIME_ENTRY: 12

## 2024-04-23 ASSESSMENT — PAIN SCALES - GENERAL
PAINLEVEL_OUTOF10: 0 - NO PAIN
PAINLEVEL_OUTOF10: 5 - MODERATE PAIN
PAINLEVEL_OUTOF10: 0 - NO PAIN

## 2024-04-23 ASSESSMENT — PAIN SCALES - WONG BAKER
WONGBAKER_NUMERICALRESPONSE: HURTS WHOLE LOT
WONGBAKER_NUMERICALRESPONSE: NO HURT

## 2024-04-23 ASSESSMENT — PAIN DESCRIPTION - ORIENTATION: ORIENTATION: LOWER

## 2024-04-23 NOTE — NURSING NOTE
Orthos have been completed.    Patient HR up to 136bpm with ambulation.  Milly Champion CNP made aware.

## 2024-04-23 NOTE — CARE PLAN
The patient's goals for the shift include      The clinical goals for the shift include rest and safety and stable VS    Over the shift, the patient did not make progress toward the following goals. Barriers to progression include confusion. Recommendations to address these barriers include rest.

## 2024-04-23 NOTE — PROGRESS NOTES
Physical Therapy    Physical Therapy Treatment    Patient Name: Darrell Arreola  MRN: [id number]  Today's Date: 4/23/2024  Time Calculation  Start Time: 1350  Stop Time: 1430  Time Calculation (min): 40 min       Assessment/Plan   PT Assessment  PT Assessment Results: Decreased strength, Decreased endurance, Impaired balance, Decreased mobility, Decreased cognition, Impaired judgement, Decreased safety awareness  Rehab Prognosis: Good  Evaluation/Treatment Tolerance: Patient tolerated treatment well, Patient limited by fatigue  Medical Staff Made Aware: Yes  Strengths: Attitude of self, Premorbid level of function  Barriers to Participation: Comorbidities, Ability to acquire knowledge  End of Session Communication: Bedside nurse  Assessment Comment: Pt is a 78 y.o. male who presents with decreased strength, endurance, mobility, safety awareness, and cognition. Pt requires CGA to modAx1-2 for transfers and ambulation. Pt would benefit from continued skilled therapy services to address his mobility deficits. Pt may benefit from 24 hour supervision upon discharge due to decreased safety awareness.  End of Session Patient Position: Up in chair, Alarm on  PT Plan  Inpatient/Swing Bed or Outpatient: Inpatient  PT Plan  Treatment/Interventions: Bed mobility, Transfer training, Gait training, Balance training, Neuromuscular re-education, Strengthening, Endurance training, Therapeutic exercise, Therapeutic activity  PT Plan: Skilled PT  PT Frequency: 4 times per week  PT Discharge Recommendations: Moderate intensity level of continued care  Equipment Recommended upon Discharge: Wheeled walker  PT Recommended Transfer Status: Assist x1, Assistive device (RW)  PT - OK to Discharge: Yes      General Visit Information:   PT  Visit  PT Received On: 04/23/24  Response to Previous Treatment: Patient unable to report, no changes reported from family or staff (Pt displaying difficulty with speech and understanding.)  General  Reason  "for Referral: Impaired mobility, cognition, and gait training  Referred By: LIV Vilchis  Past Medical History Relevant to Rehab: Atrial fibrillation, CHF, DM, HTN, severe tricuspid regurgitation, peripheral vascular disease, circulatory shock, pulmonary edema, acute renal failure, anemia, anaphylaxis, thrombocytopenia, obesity, severe mitral regurgitation,  cardiomyopathy, recurrent syncope and collapse.  Missed Visit: No  Family/Caregiver Present: No  Caregiver Feedback: n/a  Prior to Session Communication: Bedside nurse  Patient Position Received: Bed, 4 rail up, Alarm on  Preferred Learning Style: verbal, visual  General Comment: Cleared for therapy per RN. Pt supine in bed upon arrival, pleasantly confused and agreeable to treatment.    Subjective   Precautions:  Precautions  Medical Precautions: Fall precautions  Vital Signs:  Vital Signs  Heart Rate: 106 (lying upon arrival; sitting up in bed 123bpm; sitting EOB 107bpm; standing 136bpm; sitting before exit 105bpm)  Heart Rate Source: Monitor  BP: 92/54 (upon arrival; sitting in bed 118/78mmHg; sitting EOB 94/74mmHg; standing /60mmHg; sitting upon exit 97/69mmHg)  MAP (mmHg): 63 (upon arrival; sitting in bed 89mmHg; sitting EOB 78mmHg; standing EOB 67mmHg; sitting upon exit 76mmHg)  BP Location: Left arm  BP Method: Automatic  Patient Position: Lying    Objective   Pain:  Pain Assessment  Pain Assessment: 0-10  Pain Score: 0 - No pain  Cognition:  Cognition  Overall Cognitive Status: Impaired  Orientation Level: Unable to assess (unable to assess at this time due to pt  displaying inability to articulate words other than \"okay\" and \"what\".)  Following Commands: Follows one step commands with repetition  Safety Judgment: Decreased awareness of need for safety precautions  Cognition Comments: Pt displays expressive aphasia like behaviors. Pt able to follow simple one step commands with repetition and increased time.  Impulsive: Mildly  Task " Initiation: Initiates with cues  Processing Speed: Delayed  Postural Control:  Postural Control  Postural Control: Impaired  Posture Comment: forward head; rounded shoulders; increased thoracic kyphosis.  Static Sitting Balance  Static Sitting-Balance Support: Bilateral upper extremity supported, Feet supported  Static Sitting-Level of Assistance: Close supervision  Static Sitting-Comment/Number of Minutes: Pt sat EOB with close supervision for safety for 4 mins. Pt demonstrated a slouched posture.  Static Standing Balance  Static Standing-Balance Support: Bilateral upper extremity supported (RW)  Static Standing-Level of Assistance: Minimum assistance  Static Standing-Comment/Number of Minutes: Pt stood with RW and minAx1 for safety and balance for 2 mins.  Dynamic Standing Balance  Dynamic Standing-Balance Support: Bilateral upper extremity supported (RW)  Dynamic Standing-Balance:  (Polly hygiene)  Dynamic Standing-Comments: Pt stood with RW and minAx1 for safety, maintaining upright posture and balance for 10 mins.  Extremity/Trunk Assessments:  RLE   RLE :  (pt demonstrates grossly 3/5 BLE strength)  LLE   LLE :  (pt demonstrates grossly 3/5 BLE strength)  Activity Tolerance:  Activity Tolerance  Endurance: Decreased tolerance for upright activites  Treatments:  Therapeutic Activity  Therapeutic Activity Performed: Yes  Therapeutic Activity 1: Pt perfrormed bed mobility, sitting EOB, static/dynamic standing, and short ambulation to transfer to chair with hygiene to polly area performed as well as hand and LE hygiene.    Bed Mobility  Bed Mobility: Yes  Bed Mobility 1  Bed Mobility 1: Supine to sitting, Scooting  Level of Assistance 1: Moderate assistance  Bed Mobility Comments 1: Pt performed sit to supine with modA x1-2 for trunk elevation and assist with BLEs over bed. Drawsheet utilized for hips to EOB for BLE on floor.    Ambulation/Gait Training  Ambulation/Gait Training Performed: Yes  Ambulation/Gait  Training 1  Surface 1: Level tile  Device 1: Rolling walker  Assistance 1: Minimum assistance, Maximum verbal cues  Quality of Gait 1: Narrow base of support, Decreased step length, Diminished heel strike, Forward flexed posture  Comments/Distance (ft) 1: Pt ambulated 10 steps with RW and MinAx1 for balance and safety from EOB to chair with arms. VC's for upright stance, forward progression of RW and hand placement. Pt demonstrated signs of fatigue with full body tremors. Distance limited due to fatigue from standing for extended time for hygiene.  Transfers  Transfer: Yes  Transfer 1  Transfer From 1: Sit to, Stand to  Transfer to 1: Stand, Sit  Technique 1: Sit to stand, Stand to sit  Transfer Device 1: Walker  Transfer Level of Assistance 1: Moderate assistance  Trials/Comments 1: Pt performed sit<>stand with modAx1 for forward lean and trunk descent control. VC's for proper hand placement. Pt performed slowly.    Outcome Measures:  [facility] Basic Mobility  Turning from your back to your side while in a flat bed without using bedrails: A little  Moving from lying on your back to sitting on the side of a flat bed without using bedrails: A little  Moving to and from bed to chair (including a wheelchair): A lot  Standing up from a chair using your arms (e.g. wheelchair or bedside chair): A lot  To walk in hospital room: A lot  Climbing 3-5 steps with railing: Total  Basic Mobility - Total Score: 13    OP EDUCATION:       Encounter Problems       Encounter Problems (Active)       PT Problem       Mobility (Progressing)       Start:  04/22/24    Expected End:  05/22/24       Pt will perform all bed mobility modified independently to demonstrate improved functional mobility.             Transfers (Progressing)       Start:  04/22/24    Expected End:  05/22/24       Pt will perform sit<>stand transfer with LRAD modified independent for improved functional mobility.              Ambulation (Progressing)       Start:   04/22/24    Expected End:  05/22/24       Pt will ambulate 50ft with LRAD modified independent to demonstrate improved mobility.              Safety (Progressing)       Start:  04/22/24    Expected End:  05/22/24       Pt will demonstrate ability to utilize call light at all times when performing mobility to demonstrate improved safety awareness.              PRANEETH BEACH, S-PT

## 2024-04-23 NOTE — PROGRESS NOTES
Occupational Therapy    OT Treatment    Patient Name: Darrell Arreola  MRN: [id number]  Today's Date: 4/23/2024  Time Calculation  Start Time: 0850  Stop Time: 0913  Time Calculation (min): 23 min         Assessment:  OT Assessment: Tolerated session fairly, pleasantly confused throughout and following simple commands with increased time for all tasks. Pt would benefit from continued skilled OT services to improve strength, balance, and functional tolerance to increase independence with ADL tasks  End of Session Communication: Bedside nurse  End of Session Patient Position: Up in chair, Alarm on  OT Assessment Results: Decreased ADL status, Decreased upper extremity strength, Decreased cognition, Decreased endurance, Decreased functional mobility  Plan:  Treatment Interventions: ADL retraining, Functional transfer training, UE strengthening/ROM, Patient/family training  OT Frequency: 4 times per week  OT Discharge Recommendations: Moderate intensity level of continued care  OT - OK to Discharge: Yes  Treatment Interventions: ADL retraining, Functional transfer training, UE strengthening/ROM, Patient/family training    Subjective   Previous Visit Info:  OT Last Visit  OT Received On: 04/23/24  General:  General  Prior to Session Communication: Bedside nurse  Patient Position Received: Bed, 3 rail up, Alarm on  General Comment: Cleared for therapy per RN. Pt supine in bed upon arrival, pleasantly confused but agreeable to tx  Precautions:  Medical Precautions: Fall precautions    Pain:  Pain Assessment  Pain Assessment: 0-10  Pain Score: 0 - No pain    Objective    Cognition:  Cognition  Overall Cognitive Status: Impaired  Orientation Level: Disoriented to situation, Disoriented to place  Following Commands: Follows one step commands with increased time  Safety Judgment: Decreased awareness of need for assistance  Cognition Comments: expressive aphasia noted, increased time for processing  Insight: Mild  Impulsive:  Mildly  Processing Speed: Delayed    Activities of Daily Living: Feeding  Feeding Level of Assistance: Setup, Close supervision  Feeding Where Assessed: Chair  Feeding Comments: s/u for feeding task at end of session with cues for pacing to decrease potential of aspiration    Grooming  Grooming Level of Assistance: Setup, Close supervision  Grooming Where Assessed: Chair  Grooming Comments: face washing tas    UE Dressing  UE Dressing Level of Assistance: Minimum assistance  UE Dressing Where Assessed: Chair  UE Dressing Comments: don/doff gown    Toileting  Toileting Level of Assistance: Dependent  Toileting Comments: demonstrating urine incontinence upon arrival, requiring dependent polly hygiene in standing    Bed Mobility/Transfers: Bed Mobility  Bed Mobility: Yes  Bed Mobility 1  Bed Mobility 1: Supine to sitting, Scooting  Level of Assistance 1: Moderate assistance  Bed Mobility Comments 1: assist with trunk elevation with cues for BLE advancement with increased time and effort required to get to EOB    Transfers  Transfer: Yes  Transfer 1  Technique 1: Sit to stand  Transfer Device 1: Walker  Transfer Level of Assistance 1: Minimum assistance  Trials/Comments 1: assist for balance with cues for proper hand placement and forward weightshifting  Transfers 2  Technique 2: Stand to sit  Transfer Device 2: Walker  Transfer Level of Assistance 2: Moderate assistance  Trials/Comments 2: assist for eccentric lowering with cues for proper hand placement and alignment with chair with poor carryover of cues, requiring chair to be placed behind him.         Functional Mobility:  Functional Mobility  Functional Mobility Performed: Yes  Functional Mobility 1  Device 1: Rolling walker  Assistance 1: Minimum assistance  Comments 1: Tolerated taking ~8 steps at bedside at FWW with increased cues required for step/walker sequencing with pt demonstrating fair- balance  Sitting Balance:  Static Sitting Balance  Static  Sitting-Level of Assistance: Contact guard, Close supervision  Static Sitting-Comment/Number of Minutes: Demonstrating poor sitting balance once at EOB with cues for postural alignment, progressing to S  Standing Balance:  Static Standing Balance  Static Standing-Level of Assistance: Minimum assistance  Static Standing-Comment/Number of Minutes: fair- balance during transfer tasks    Outcome Measures:[facility] Daily Activity  Putting on and taking off regular lower body clothing: A lot  Bathing (including washing, rinsing, drying): A lot  Putting on and taking off regular upper body clothing: A little  Toileting, which includes using toilet, bedpan or urinal: Total  Taking care of personal grooming such as brushing teeth: A little  Eating Meals: A little  Daily Activity - Total Score: 14      Education Documentation  ADL Training, taught by JAYME Montiel at 4/23/2024  9:25 AM.  Learner: Patient  Readiness: Acceptance  Method: Explanation  Response: Verbalizes Understanding    Education Comments  No comments found.      Problem: ADLs  Goal: Pt will complete ADL tasks at mod I with use of AE prn   Outcome: Progressing     Problem: Functional Mobility  Goal: Pt will perform functional mobility household distances at mod I with use of RW.     Outcome: Progressing     Problem: OT Transfers  Goal: Pt will perform functional transfers at mod I  Outcome: Progressing     Problem: UE Exercise  Goal: Pt will perform BUE exercises to improve strength and independence with functional transfers/ADL tasks.    Outcome: Progressing

## 2024-04-23 NOTE — NURSING NOTE
VSS, pt denies any needs at this time, bed locked and low position. Belongings in reach. Call light in reach. Will Continue with plan of care.

## 2024-04-23 NOTE — CONSULTS
Consults  History Of Present Illness:    Darrell Arreola is a 78 y.o. male presenting with a complaint of difficulties speaking, dizziness and altered mental status. Patient had recurrent episodes of this. Patient becomes dizzy and has difficulty speaking and get confused. No seizure activity. Patient is able to follow commands. Patient denies any headache, nausea, vomiting, diarrhea, dysuria, hematuria currently. He does complain of confusion and generalized weakness. But no focal weakness or numbness. No cough or fluctuation. No odynophagia, dysphagia or recent change in weight or appetite. No polydipsia, polyuria, joint pain concurrent swelling or bruisability.   Patient was [place] twice in the last week of March and first week [place] [facility] [place]  He was noted to be having cardiomyopathy ejection fraction of 25 to 28% with symptoms of syncope and patient was initially on midodrine.  He was noted to have approximately fibrillation for which patient was started on loading dose of oral iron therapy and patient currently remains on maintenance dose of amiodarone.  On his home medications patient is not on any diuretics but it appears to us that the discharge medication list from [facility] states the patient was supposed to be on torsemide to be taken on as needed basis.  Cardiology was consulted this admission for evaluation of atrial fibrillation with rapid Ventricular response.  At the time of my evaluation patient remains in atrial fibrillation with ventricular rate of /min.  Patient has hx of syncopal episodes with frequent falls and patient is not candidate to start any new AV zeynep blocking agents as he is high risk for hypotension.  Hypotension was attributed to dehydration as there is evidence of acute on chronic kidney disease at one  [place] [facility] but currently creatinine is stable at 1.5 which is close to his baseline  Last Recorded  "Vitals:  Vitals:    04/23/24 1430 04/23/24 1440 04/23/24 1450 04/23/24 1500   BP: 92/54 94/74 112/60    BP Location:       Patient Position: Lying Sitting Standing    Pulse:    (!) 136   Resp:       Temp:       TempSrc:       SpO2:       Weight:       Height:           Last Labs:  CBC - 4/21/2024:  7:32 AM  6.3 12.5 129    39.3      CMP - 4/21/2024:  7:30 AM  8.9 6.4 23 --- 0.9   _ 3.5 18 120      PTT - 4/20/2024:  3:41 PM  1.6   16.0 38.4     Hemoglobin A1C   Date/Time Value Ref Range Status   04/21/2024 07:32 AM 6.7 (H) See below % Final   04/04/2024 11:13 AM 6.5 (H) 4.3 - 5.6 % Final     Comment:     American Diabetes Association guidelines indicate that patients with HgbA1c in the range 5.7-6.4% are at increased risk for development of diabetes, and intervention by lifestyle modification may be beneficial. HgbA1c greater or equal to 6.5% is considered diagnostic of diabetes.   10/10/2023 11:42 AM 5.3 See below % Final     LDL Calculated   Date/Time Value Ref Range Status   04/21/2024 07:30 AM 50 (L) 65 - 130 mg/dL Final   07/02/2023 04:38 AM 23 (L) 65 - 130 MG/DL Final   05/17/2019 06:01 AM 42 (L) 65 - 130 MG/DL Final      Last I/O:  I/O last 3 completed shifts:  In: 2060 (22.1 mL/kg) [P.O.:2060]  Out: 250 (2.7 mL/kg) [Urine:250 (0.1 mL/kg/hr)]  Weight: 93.4 kg     Past Cardiology Tests (Last 3 Years):  EKG:  ECG 12 lead 04/20/2024      Electrocardiogram, 12-lead PRN ACS symptoms 10/10/2023    Echo:  Transthoracic Echo (TTE) Complete 10/10/2023    Ejection Fractions:  No results found for: \"EF\"  Cath:  No results found for this or any previous visit from the past 1095 days.    Stress Test:  No results found for this or any previous visit from the past 1095 days.    Cardiac Imaging:  No results found for this or any previous visit from the past 1095 days.      Past Medical History:  He has a past medical history of CHF (congestive heart failure) (Multi), Diabetes mellitus (Multi), Heart disease, Hypertension, and " Stroke (Multi).    Past Surgical History:  He has a past surgical history that includes CT guided percutaneous biopsy bone deep (02/21/2020); CT guided percutaneous biopsy bone deep (03/24/2020); MR angio head wo IV contrast (05/17/2019); Toe amputation; Tonsillectomy; and MR angio head wo IV contrast (10/9/2023).      Social History:  He reports that he has quit smoking. His smoking use included cigars and cigarettes. He has never used smokeless tobacco. He reports that he does not drink alcohol and does not use drugs.    Family History:  Family History   Family history unknown: Yes        Allergies:  Iodine, Latex, and Lidocaine    Inpatient Medications:  Scheduled medications   Medication Dose Route Frequency    amiodarone  200 mg oral Daily    apixaban  5 mg oral BID    aspirin  81 mg oral Daily    atorvastatin  40 mg oral Nightly    famotidine  10 mg oral Daily    folic acid  1 mg oral Daily    midodrine  10 mg oral TID with meals    SITagliptin phosphate  50 mg oral Daily    tamsulosin  0.4 mg oral Daily before breakfast     PRN medications   Medication    acetaminophen    oxygen     Continuous Medications   Medication Dose Last Rate     Outpatient Medications:  Current Outpatient Medications   Medication Instructions    amiodarone (PACERONE) 400 mg, oral, Daily    amiodarone (PACERONE) 200 mg, oral, Daily    amiodarone (PACERONE) 400 mg, oral, 2 times daily    apixaban (ELIQUIS) 5 mg, oral, 2 times daily    famotidine (PEPCID) 20 mg, oral, Daily    folic acid (Folvite) 1 mg tablet oral, Daily    insulin lispro (HUMALOG) 0-10 Units, subcutaneous, 3 times daily with meals, Take as directed per insulin instructions.    midodrine (PROAMATINE) 10 mg, oral, 3 times daily with meals    rosuvastatin (CRESTOR) 10 mg, oral, Daily    SITagliptin phosphate (JANUVIA) 50 mg, oral, Daily    tamsulosin (FLOMAX) 0.4 mg, oral, Daily before breakfast       Physical Exam:  Patient is not oriented to time or place.  Neck is  supple lungs clear to auscultation bilaterally with good air entry heart S1-S2 present with systolic murmur best at mitral area abdomen is soft nontender extremity shows no evidence of pedal edema he does have chronic venous stasis changes in lower extremities.     Assessment/Plan   #1 atrial fibrillation with rapid ventricular response.  Patient remains on systemic oral anticoagulation for secondary prevention of CVA from underlying atrial fibrillation.  Patient did present with altered mental status changes and aphasia and the workup so far has been unremarkable for new CVA.  Patient has ventricular rate of around 99 202 bpm however is not a candidate to add any new AV zeynep blocking agents.  For now I will continue patient on oral iron therapy though I do not believe rhythm control management is not the best option for this patient as patient has severely depressed LV systolic function with evidence of moderate dilated left atrium.  Having said that for now we will continue patient on amiodarone maintenance dose even though we are continuing rate control strategy in this patient and if his heart rate remains elevated then I will consider adding a small dose of digoxin 0.125 mg daily given the therapeutic index of digoxin.  It is not ideal candidate to initiate guideline directed medical therapy i.e. initiation of Entresto given his soft blood pressure and symptoms of orthostatic hypotension requiring midodrine and frequent falls.  For now I would not initiate even hydralazine and isosorbide mononitrate in substitution of the Entresto I do believe patient would require maintenance dose of diuretics to keep him euvolemic.  Discussed with Dr. Parmer service  Site Assessment Clean;Intact;Dry 04/23/24 0900   Dressing Status Clean;Dry;Occlusive 04/23/24 0900   Number of days: 3       Code Status:  Full Code    I spent 45 minutes in the professional and overall care of this patient.        Simeon Lucas MD

## 2024-04-23 NOTE — PROGRESS NOTES
Request for precert to be started this morning to [facility].      04/23/24 0810   Discharge Planning   Patient expects to be discharged to: [facility] - precert pending   Does the patient need discharge transport arranged? Yes   [employer] coordination needed? Yes   Has discharge transport been arranged? No

## 2024-04-23 NOTE — PROGRESS NOTES
Darrell Arreola is a 78 y.o. male on day 0 of admission presenting with Altered mental status, unspecified altered mental status type.    Subjective   Seen and examined.  Resting in bed in no acute distress.  Awake alert and oriented x 2-3.  Less confused.  No complaints.  Headache earlier, resolved.  Review of systems is limited.  Spoke with nursing, no issues.      Status post EEG.    Objective     Physical Exam  Vitals and nursing note reviewed.   Constitutional:       General: He is not in acute distress.     Appearance: Normal appearance. He is normal weight. He is not ill-appearing, toxic-appearing or diaphoretic.   HENT:      Head: Normocephalic and atraumatic.      Right Ear: Tympanic membrane normal.      Left Ear: Tympanic membrane normal.      Nose: Nose normal.      Mouth/Throat:      Mouth: Mucous membranes are moist.      Pharynx: Oropharynx is clear.   Eyes:      Extraocular Movements: Extraocular movements intact.      Conjunctiva/sclera: Conjunctivae normal.      Pupils: Pupils are equal, round, and reactive to light.   Cardiovascular:      Rate and Rhythm: Rhythm irregular.      Pulses: Normal pulses.      Heart sounds: Normal heart sounds. No murmur heard.  Pulmonary:      Effort: Pulmonary effort is normal. No respiratory distress.      Breath sounds: Normal breath sounds. No wheezing, rhonchi or rales.      Comments: Room air.  Abdominal:      General: Bowel sounds are normal. There is no distension.      Palpations: Abdomen is soft.      Tenderness: There is no abdominal tenderness.   Genitourinary:     Comments: Deferred.  Musculoskeletal:         General: No swelling or tenderness. Normal range of motion.      Cervical back: Normal range of motion and neck supple.   Skin:     General: Skin is warm and dry.      Capillary Refill: Capillary refill takes less than 2 seconds.   Neurological:      General: No focal deficit present.      Mental Status: He is alert. He is disoriented.      Comments:  "Awake alert and oriented x 2-3.  Less confused.  Speech clear, coherent.  Follows all commands.  Generalized weakness.  No focal weakness.  Power equal 5-5 to bilateral upper and bilateral lower extremities.  Gait deferred.  Cranial nerves II through XII grossly intact.   Psychiatric:         Mood and Affect: Mood is anxious.         Behavior: Behavior normal.       Last Recorded Vitals  Blood pressure 114/90, pulse (!) 124, temperature 36.9 °C (98.4 °F), temperature source Temporal, resp. rate 18, height 1.892 m (6' 2.49\"), weight 93.4 kg (205 lb 14.4 oz), SpO2 96%.    Intake/Output last 3 Shifts:  I/O last 3 completed shifts:  In: 2060 (22.1 mL/kg) [P.O.:2060]  Out: 250 (2.7 mL/kg) [Urine:250 (0.1 mL/kg/hr)]  Weight: 93.4 kg     Telemetry atrial fibrillation rate 90's    Relevant Results  No results found for this or any previous visit (from the past 24 hour(s)).    No results found for the last 90 days.    No results found.    Scheduled medications  amiodarone, 200 mg, oral, Daily  apixaban, 5 mg, oral, BID  aspirin, 81 mg, oral, Daily  atorvastatin, 40 mg, oral, Nightly  famotidine, 10 mg, oral, Daily  folic acid, 1 mg, oral, Daily  midodrine, 10 mg, oral, TID with meals  SITagliptin phosphate, 50 mg, oral, Daily  tamsulosin, 0.4 mg, oral, Daily before breakfast      Continuous medications     PRN medications  PRN medications: acetaminophen, oxygen      ASSESSMENT:  Altered mental status  Syncope  Generalized weakness.  Hypertension  Congestive heart failure  Hyperlipidemia  Atrial fibrillation  Sinus tachycardia  Oral anticoagulation  Type 2 diabetes mellitus  Headache - resolved    PLAN:  Patient is doing well this morning.  No new issues.  Mentation improved.  Awake alert 90 x 3.  Forgetful.  Generalized weakness.  No focal weakness.  No focal deficits on examination.  Neurology following.  Input appreciated.  Status post EEG.  Follow-up.  Less anxious.  Telemetry reviewed.  Sinus tachycardia with activity.  " Telemetry atrial fibrillation rate controlled.  Blood pressures reviewed.  Mild hypotension.  Check orthostatic blood pressures today.  Check ambulating heart rate.  Follow-up.  Previous admission [facility] chart reviewed.  Consult Cardiology for evaluation for tachycardia, atrial fibrillation and syncope.  P.r.n analgesics, Tylenol.  Monitor.  PT/OT.  Fall precautions.  Up with assistance only.  Bed and chair alarm at all times.  DVT prophylaxis Eliquis.  Pressure ulcer prevention measures.  Turn and reposition every 2 hours and as needed.  Heels off bed.  Offloading.  Supportive care.  Patient reassured.  Case management following for discharge planning.  Patient presents from [place].  Anticipate discharge after cleared by Neurology and Cardiology when arrangements are made by case management.  Discussed with patient, nursing, Dr. Sims.    ADDENDUM:  Discussed case with Cardiology, Dr. Lucas.  Stable cardiac.  Orthostatic vital signs reviewed, negative.      Rosa Champion, ELENITA-CNP

## 2024-04-23 NOTE — NURSING NOTE
Assumed care of patient.  Resting quietly in bed.  Awake and alert to baseline mentation.  EEG to be done today.  Discussed plan of care with patient.  Offers no complaints of pain/discomfort.  Call light and commonly used items in reach.

## 2024-04-24 PROCEDURE — 97530 THERAPEUTIC ACTIVITIES: CPT | Mod: GO,CO

## 2024-04-24 PROCEDURE — 97110 THERAPEUTIC EXERCISES: CPT | Mod: GO,CO

## 2024-04-24 PROCEDURE — 97530 THERAPEUTIC ACTIVITIES: CPT | Mod: GP,CQ

## 2024-04-24 PROCEDURE — 2500000001 HC RX 250 WO HCPCS SELF ADMINISTERED DRUGS (ALT 637 FOR MEDICARE OP)

## 2024-04-24 PROCEDURE — 1200000002 HC GENERAL ROOM WITH TELEMETRY DAILY

## 2024-04-24 PROCEDURE — 2500000001 HC RX 250 WO HCPCS SELF ADMINISTERED DRUGS (ALT 637 FOR MEDICARE OP): Performed by: INTERNAL MEDICINE

## 2024-04-24 PROCEDURE — 2500000006 HC RX 250 W HCPCS SELF ADMINISTERED DRUGS (ALT 637 FOR ALL PAYERS): Performed by: INTERNAL MEDICINE

## 2024-04-24 RX ADMIN — APIXABAN 5 MG: 5 TABLET, FILM COATED ORAL at 08:29

## 2024-04-24 RX ADMIN — ATORVASTATIN CALCIUM 40 MG: 40 TABLET, FILM COATED ORAL at 21:27

## 2024-04-24 RX ADMIN — TAMSULOSIN HYDROCHLORIDE 0.4 MG: 0.4 CAPSULE ORAL at 06:15

## 2024-04-24 RX ADMIN — MIDODRINE HYDROCHLORIDE 10 MG: 10 TABLET ORAL at 18:04

## 2024-04-24 RX ADMIN — ASPIRIN 81 MG: 81 TABLET, COATED ORAL at 08:30

## 2024-04-24 RX ADMIN — SITAGLIPTIN 50 MG: 50 TABLET, FILM COATED ORAL at 08:30

## 2024-04-24 RX ADMIN — FAMOTIDINE 10 MG: 20 TABLET, FILM COATED ORAL at 08:30

## 2024-04-24 RX ADMIN — FOLIC ACID 1 MG: 1 TABLET ORAL at 08:30

## 2024-04-24 RX ADMIN — MIDODRINE HYDROCHLORIDE 10 MG: 10 TABLET ORAL at 08:30

## 2024-04-24 RX ADMIN — AMIODARONE HYDROCHLORIDE 200 MG: 200 TABLET ORAL at 08:29

## 2024-04-24 RX ADMIN — MIDODRINE HYDROCHLORIDE 10 MG: 10 TABLET ORAL at 13:15

## 2024-04-24 RX ADMIN — APIXABAN 5 MG: 5 TABLET, FILM COATED ORAL at 21:27

## 2024-04-24 ASSESSMENT — PAIN SCALES - GENERAL
PAINLEVEL_OUTOF10: 0 - NO PAIN

## 2024-04-24 ASSESSMENT — COGNITIVE AND FUNCTIONAL STATUS - GENERAL
MOBILITY SCORE: 13
HELP NEEDED FOR BATHING: A LOT
WALKING IN HOSPITAL ROOM: A LOT
TOILETING: TOTAL
MOVING TO AND FROM BED TO CHAIR: A LOT
DRESSING REGULAR UPPER BODY CLOTHING: A LITTLE
TOILETING: TOTAL
DRESSING REGULAR UPPER BODY CLOTHING: A LITTLE
EATING MEALS: A LITTLE
MOVING FROM LYING ON BACK TO SITTING ON SIDE OF FLAT BED WITH BEDRAILS: A LITTLE
MOVING TO AND FROM BED TO CHAIR: A LOT
HELP NEEDED FOR BATHING: A LOT
DRESSING REGULAR LOWER BODY CLOTHING: A LOT
CLIMB 3 TO 5 STEPS WITH RAILING: TOTAL
STANDING UP FROM CHAIR USING ARMS: A LOT
WALKING IN HOSPITAL ROOM: A LOT
TURNING FROM BACK TO SIDE WHILE IN FLAT BAD: A LITTLE
DAILY ACTIVITIY SCORE: 14
MOBILITY SCORE: 13
MOVING FROM LYING ON BACK TO SITTING ON SIDE OF FLAT BED WITH BEDRAILS: A LITTLE
TURNING FROM BACK TO SIDE WHILE IN FLAT BAD: A LITTLE
CLIMB 3 TO 5 STEPS WITH RAILING: TOTAL
STANDING UP FROM CHAIR USING ARMS: A LOT
PERSONAL GROOMING: A LITTLE
DRESSING REGULAR LOWER BODY CLOTHING: A LOT
EATING MEALS: A LITTLE
DAILY ACTIVITIY SCORE: 14
PERSONAL GROOMING: A LITTLE

## 2024-04-24 ASSESSMENT — PAIN - FUNCTIONAL ASSESSMENT
PAIN_FUNCTIONAL_ASSESSMENT: 0-10

## 2024-04-24 NOTE — PROGRESS NOTES
Physical Therapy    Physical Therapy Treatment    Patient Name: Darrell Arreola  MRN: [id number]  Today's Date: 4/24/2024  Time Calculation  Start Time: 1122  Stop Time: 1145  Time Calculation (min): 23 min       Assessment/Plan   PT Assessment  PT Assessment Results: Decreased strength, Decreased endurance, Impaired balance, Decreased mobility, Decreased cognition, Impaired judgement, Decreased safety awareness  Rehab Prognosis: Good  Evaluation/Treatment Tolerance: Patient tolerated treatment well  Medical Staff Made Aware: Yes  Strengths: Attitude of self, Premorbid level of function  Barriers to Participation: Comorbidities, Ability to acquire knowledge  End of Session Communication: Bedside nurse  Assessment Comment: Pt is a 78 y.o. male who presents with decreased strength, endurance, mobility, safety awareness, and cognition. Pt requires CGA to modAx1-2 for transfers and ambulation. Pt would benefit from continued skilled therapy services to address his mobility deficits. Pt may benefit from 24 hour supervision upon discharge due to decreased safety awareness.  End of Session Patient Position: Up in chair, Alarm on  PT Plan  Inpatient/Swing Bed or Outpatient: Inpatient  PT Plan  Treatment/Interventions: Bed mobility, Transfer training, Gait training, Balance training, Strengthening, Endurance training, Range of motion, Therapeutic activity, Therapeutic exercise  PT Plan: Skilled PT  PT Frequency: 4 times per week  PT Discharge Recommendations: Moderate intensity level of continued care  Equipment Recommended upon Discharge: Wheeled walker  PT Recommended Transfer Status: Assist x1, Assistive device  PT - OK to Discharge: Yes      General Visit Information:   PT  Visit  PT Received On: 04/24/24  Response to Previous Treatment: Patient with no complaints from previous session.  General  Reason for Referral: Impaired mobility, cognition, and gait training  Referred By: ELENITA Vilchis-CNP  Past Medical  History Relevant to Rehab: Atrial fibrillation, CHF, DM, HTN, severe tricuspid regurgitation, peripheral vascular disease, circulatory shock, pulmonary edema, acute renal failure, anemia, anaphylaxis, thrombocytopenia, obesity, severe mitral regurgitation,  cardiomyopathy, recurrent syncope and collapse.  Missed Visit: No  Family/Caregiver Present: No  Prior to Session Communication: Bedside nurse  Patient Position Received: Bed, 2 rail up, Alarm on  Preferred Learning Style: verbal, visual  General Comment: Cleared by RN, NAD, agreeable to PT    Subjective   Precautions:  Precautions  Hearing/Visual Limitations: wears glasses, reports HA's  Medical Precautions: Fall precautions  Vital Signs:     Objective   Pain:  Pain Assessment  Pain Score: 0 - No pain  Cognition:  Cognition  Overall Cognitive Status: Impaired  Orientation Level: Disoriented to place, Disoriented to time, Disoriented to situation  Following Commands: Follows one step commands with repetition  Safety Judgment: Decreased awareness of need for safety precautions  Cognition Comments: Pt displays expressive aphasia like behaviors. Pt able to follow simple one step commands with repetition and increased time.  Insight: Mild  Impulsive: Mildly  Task Initiation: Initiates with cues  Processing Speed: Delayed  Postural Control:  Postural Control  Postural Control: Impaired  Posture Comment: Forward head; rounded shoulders  Static Sitting Balance  Static Sitting-Balance Support: Bilateral upper extremity supported, Feet supported  Static Sitting-Level of Assistance: Close supervision  Static Sitting-Comment/Number of Minutes: EOB 5 min  Static Standing Balance  Static Standing-Balance Support: Bilateral upper extremity supported  Static Standing-Level of Assistance: Minimum assistance  Static Standing-Comment/Number of Minutes: +RW  Dynamic Standing Balance  Dynamic Standing-Balance Support: Bilateral upper extremity supported  Dynamic Standing-Balance:  Turning  Dynamic Standing-Comments: +RW: modA steps to chair.    Activity Tolerance:  Activity Tolerance  Endurance: Decreased tolerance for upright activites  Activity Tolerance Comments: Increased time, effort, encouragement to initiate and complete activities. Rest breaks for fatigue with good recovery.  Rate of Perceived Exertion (RPE):  (4/10)  Treatments:  Therapeutic Exercise  Therapeutic Exercise Performed: No    Therapeutic Activity  Therapeutic Activity Performed: Yes  Therapeutic Activity 1: Functional mobility, ambulation, activity tolerance.    Bed Mobility  Bed Mobility: Yes  Bed Mobility 1  Bed Mobility 1: Supine to sitting  Level of Assistance 1: Moderate assistance  Bed Mobility Comments 1: HOB elevated and side rail; assist for trunk and BLEs (verbal/visual cueing for technique and hand placement)    Ambulation/Gait Training  Ambulation/Gait Training Performed: Yes  Ambulation/Gait Training 1  Surface 1: Level tile  Device 1: Rolling walker  Assistance 1: Moderate assistance, Moderate verbal cues  Quality of Gait 1: Narrow base of support, Decreased step length, Shuffling gait  Comments/Distance (ft) 1: ~6 turning steps to chair.  Transfers  Transfer: Yes  Transfer 1  Transfer From 1: Bed to  Transfer to 1: Chair with arms  Technique 1: Sit to stand, Stand to sit, To right  Transfer Device 1: Walker  Transfer Level of Assistance 1: Moderate assistance, Moderate verbal cues, Moderate tactile cues  Trials/Comments 1: Fair eccentric control (Cueing for hand placement and safety)  Transfers 2  Transfer From 2: Chair with arms to  Transfer to 2: Chair with arms  Technique 2: Sit to stand, Stand to sit  Transfer Device 2:  (HHA x2)  Transfer Level of Assistance 2: Moderate assistance  Trials/Comments 2: Assist to complete stand and for eccentric control    Outcome Measures:  [facility] Basic Mobility  Turning from your back to your side while in a flat bed without using bedrails: A little  Moving from lying  on your back to sitting on the side of a flat bed without using bedrails: A little  Moving to and from bed to chair (including a wheelchair): A lot  Standing up from a chair using your arms (e.g. wheelchair or bedside chair): A lot  To walk in hospital room: A lot  Climbing 3-5 steps with railing: Total  Basic Mobility - Total Score: 13    OP EDUCATION:  Outpatient Education  Individual(s) Educated: Patient  Education Provided: Fall Risk, Posture  Patient/Caregiver Demonstrated Understanding: no  Education Comment: Needs reinforcement    Encounter Problems       Encounter Problems (Active)       PT Problem       Mobility (Progressing)       Start:  04/22/24    Expected End:  05/22/24       Pt will perform all bed mobility modified independently to demonstrate improved functional mobility.             Transfers (Progressing)       Start:  04/22/24    Expected End:  05/22/24       Pt will perform sit<>stand transfer with LRAD modified independent for improved functional mobility.              Ambulation (Progressing)       Start:  04/22/24    Expected End:  05/22/24       Pt will ambulate 50ft with LRAD modified independent to demonstrate improved mobility.              Safety (Progressing)       Start:  04/22/24    Expected End:  05/22/24       Pt will demonstrate ability to utilize call light at all times when performing mobility to demonstrate improved safety awareness.

## 2024-04-24 NOTE — PROGRESS NOTES
Pt with precert approval to [facility], attending CNP made aware. It appears as though clearance from cardiology and neurology needed.      04/24/24 1127   Discharge Planning   Patient expects to be discharged to: [facility] - precert approved   Does the patient need discharge transport arranged? Yes   RoundTrip coordination needed? Yes   Has discharge transport been arranged? No

## 2024-04-24 NOTE — NURSING NOTE
BSSR given to day shift RN. Pt resting comfortably in bed, no complaints, no signs of distress. Bed alarm engaged. Safety maintained.

## 2024-04-24 NOTE — PROGRESS NOTES
Darrell Arreola is a 78 y.o. male on day 1 of admission presenting with Altered mental status, unspecified altered mental status type.      Subjective   Patient seen and examined.  Resting in bed in no acute distress.  Nursing bedside.  Awake alert oriented x 2-3.  Confused.  No complaints.  Review of systems limited.      Spoke with nursing, EEG not completed yesterday due to poor cooperation.  Plan for EEG today.    Objective     Physical Exam  Vitals and nursing note reviewed.   Constitutional:       General: He is not in acute distress.     Appearance: Normal appearance. He is normal weight. He is not ill-appearing, toxic-appearing or diaphoretic.   HENT:      Head: Normocephalic and atraumatic.      Right Ear: Tympanic membrane normal.      Left Ear: Tympanic membrane normal.      Nose: Nose normal.      Mouth/Throat:      Mouth: Mucous membranes are moist.      Pharynx: Oropharynx is clear.   Eyes:      Extraocular Movements: Extraocular movements intact.      Conjunctiva/sclera: Conjunctivae normal.      Pupils: Pupils are equal, round, and reactive to light.   Cardiovascular:      Rate and Rhythm: Tachycardia present.      Pulses: Normal pulses.      Heart sounds: Normal heart sounds. No murmur heard.  Pulmonary:      Effort: Pulmonary effort is normal. No respiratory distress.      Breath sounds: Normal breath sounds. No wheezing, rhonchi or rales.      Comments: Room air.  Abdominal:      General: Bowel sounds are normal. There is no distension.      Palpations: Abdomen is soft.      Tenderness: There is no abdominal tenderness.   Genitourinary:     Comments: Deferred.  Musculoskeletal:         General: No swelling or tenderness. Normal range of motion.      Cervical back: Normal range of motion and neck supple.   Skin:     General: Skin is warm and dry.      Capillary Refill: Capillary refill takes less than 2 seconds.   Neurological:      General: No focal deficit present.      Mental Status: He is  "alert. He is disoriented.      Comments: Awake alert and oriented x 2-3.  Less confused.  Speech clear, coherent.  Intermittent expressive aphasia.  Follows all commands.  Generalized weakness.  No focal weakness.  Power equal 5-5 to bilateral upper and bilateral lower extremities.  Gait deferred.  Cranial nerves II through XII grossly intact.   Psychiatric:         Mood and Affect: Mood is anxious.         Behavior: Behavior normal.       Last Recorded Vitals  Blood pressure 109/74, pulse 109, temperature 36.9 °C (98.4 °F), temperature source Oral, resp. rate 18, height 1.892 m (6' 2.49\"), weight 102 kg (225 lb 5 oz), SpO2 94%.    Intake/Output last 3 Shifts:  I/O last 3 completed shifts:  In: 720 (7 mL/kg) [P.O.:720]  Out: - (0 mL/kg)   Weight: 102.2 kg     Relevant Results  No results found for this or any previous visit (from the past 24 hour(s)).    No results found for the last 90 days.    No results found.    Scheduled medications  amiodarone, 200 mg, oral, Daily  apixaban, 5 mg, oral, BID  aspirin, 81 mg, oral, Daily  atorvastatin, 40 mg, oral, Nightly  famotidine, 10 mg, oral, Daily  folic acid, 1 mg, oral, Daily  midodrine, 10 mg, oral, TID with meals  SITagliptin phosphate, 50 mg, oral, Daily  tamsulosin, 0.4 mg, oral, Daily before breakfast      Continuous medications     PRN medications  PRN medications: acetaminophen, oxygen      ASSESSMENT:  Altered mental status  Cognitive impairment  Syncope  Generalized weakness  Hypertension  Congestive heart failure, systolic  Hyperlipidemia  Atrial fibrillation  Sinus tachycardia  Oral anticoagulation  Type 2 diabetes mellitus  Headache - resolved    PLAN:  Patient is doing well this morning.  No new issues.  Mentation stable.  Awake alert oriented x 2-3.  Forgetful.  Intermittent expressive aphasia.  Generalized weakness.  No focal weakness.  Neurology following.  Input appreciated.  EEG today.  Follow-up.  Telemetry sinus tachycardia - atrial fibrillation.  " Rate fairly controlled.  Blood pressure fairly stable.  Cardiology input appreciated.  Stable cardiac.  Continue current medications and monitor.  Outpatient follow-up.  PT/OT.  Fall precautions.  Up with assistance only.  Bed and chair alarm at all times.  DVT prophylaxis Eliquis.  Pressure ulcer prevention measures.  Turn and reposition every 2 hours and as needed.  Heels off bed.  Offloading.  Supportive care.  Patient reassured.  PT/OT recommend moderate intensity rehabilitation.  Case management following for discharge planning.  Patient presents from [place].  Anticipate discharge after [facility] and cleared by Neurology, when arrangements are made by case management.  Discussed with patient, nursing, Dr. Sims.      Rosa Champion, ELENITA-CNP

## 2024-04-24 NOTE — CARE PLAN
Problem: Pain  Goal: My pain/discomfort is manageable  Outcome: Progressing     Problem: Safety  Goal: Patient will be injury free during hospitalization  Outcome: Progressing     Problem: Daily Care  Goal: Daily care needs are met  Outcome: Progressing     Problem: Psychosocial Needs  Goal: Demonstrates ability to cope with hospitalization/illness  Outcome: Progressing  Goal: Collaborate with me, my family, and caregiver to identify my specific goals  Outcome: Progressing     Problem: Discharge Barriers  Goal: My discharge needs are met  Outcome: Progressing   The patient's goals for the shift include      The clinical goals for the shift include Maintain pt safety/comfort

## 2024-04-24 NOTE — PROGRESS NOTES
"Occupational Therapy    OT Treatment    Patient Name: Darrell Arreola  MRN: [id number]  Today's Date: 4/24/2024  Time Calculation  Start Time: 1310  Stop Time: 1349  Time Calculation (min): 39 min         Assessment:  OT Assessment: Tolerated session fairly, demonstrating increased aphasia this date and becoming significantly emotional. Pt able to follow simple commands with increased time. Pt would benefit from continued skilled OT services to improve strength, balance, and functional tolerance to increase independence with ADL tasks  End of Session Communication: Bedside nurse  End of Session Patient Position: Up in chair, Alarm on  OT Assessment Results: Decreased ADL status, Decreased upper extremity strength, Decreased cognition, Decreased endurance, Decreased functional mobility  Plan:  Treatment Interventions: ADL retraining, Functional transfer training, UE strengthening/ROM, Patient/family training  OT Frequency: 4 times per week  OT Discharge Recommendations: Moderate intensity level of continued care  OT - OK to Discharge: Yes  Treatment Interventions: ADL retraining, Functional transfer training, UE strengthening/ROM, Patient/family training    Subjective   Previous Visit Info:  OT Last Visit  OT Received On: 04/24/24  General:  General  Prior to Session Communication: Bedside nurse  Patient Position Received: Bed, 3 rail up, Alarm off, not on at start of session  General Comment: Cleared for therapy per RN (present). Pt seated in chair upon arrival, demonstrating increased expressive aphasia this date  Precautions:  Hearing/Visual Limitations: wears glasses, reports HA's  Medical Precautions: Fall precautions  Precautions Comment: increased expressive aphasia this date with minimal tangible verbiage. With increased time and emotional effort pt stating \"they're here\" and when asked who, he responds \"heaven\". Pt becoming increasingly emotional throughout tx d/t aphasia with emotional support provided  Vital " Signs:  Vital Signs  Heart Rate: (!) 119 (increased to 134bpm seated in chair)  Pain:  Pain Assessment  Pain Assessment: 0-10  Pain Score: 0 - No pain    Objective    Cognition:  Cognition  Overall Cognitive Status: Impaired  Orientation Level: Disoriented X4  Following Commands: Follows one step commands with repetition  Cognition Comments: pt emotional throughout tx, requiring increased time/effort to speak minimal words  Insight: Moderate  Impulsive: Mildly  Task Initiation: Initiates with cues  Processing Speed: Delayed    Activities of Daily Living: Feeding  Feeding Comments: assist to order lunch    Therapy/Activity: Therapeutic Exercise  Therapeutic Exercise Performed: Yes  Therapeutic Exercise Activity 1: participated in AROM BUE exercises 4x10 in all planes to improve strength and functional tolerance to increase independence with transfer tasks with cues provided for proper muscle recruitement with rest breaks required b/n each exercises d/t elevated HR    Other Activity:  Other Activity Performed: Yes  Other Activity 1: increased tx time spent utilizing theraputic use of self to comfort pt and allowing increased time for pt to process thoughts and attempt to form words. pt becoming intermittently tearful, requiring redirection and reassurance that he is alright    Outcome Measures:[facility] Daily Activity  Putting on and taking off regular lower body clothing: A lot  Bathing (including washing, rinsing, drying): A lot  Putting on and taking off regular upper body clothing: A little  Toileting, which includes using toilet, bedpan or urinal: Total  Taking care of personal grooming such as brushing teeth: A little  Eating Meals: A little  Daily Activity - Total Score: 14      Education Documentation  ADL Training, taught by JAYME Montiel at 4/24/2024  2:30 PM.  Learner: Patient  Readiness: Acceptance  Method: Explanation  Response: Verbalizes Understanding    Education Comments  No comments  found.      Problem: ADLs  Goal: Pt will complete ADL tasks at mod I with use of AE prn   Outcome: Progressing     Problem: Functional Mobility  Goal: Pt will perform functional mobility household distances at mod I with use of RW.     Outcome: Progressing     Problem: OT Transfers  Goal: Pt will perform functional transfers at mod I  Outcome: Progressing     Problem: UE Exercise  Goal: Pt will perform BUE exercises to improve strength and independence with functional transfers/ADL tasks.    Outcome: Progressing

## 2024-04-24 NOTE — NURSING NOTE
Assumed care of this pt. Pt sitting up in bed, eating dinner, breathing even and unlabored on RA. Bed low, call light and commonly used items in reach. BSSR completed. Dr Cuenca in to see pt. Continuing to monitor

## 2024-04-24 NOTE — PROGRESS NOTES
[facility]  Neurology Follow-Up Visit    Date of Service: 4/24/2024-Hospital Day: 5     Impressions: Perhaps his larger degree of language (expressive>>receptive) is due to focal seizure activity. Altered mental status and acute ischemic left ARIEL stroke presenting with aphasia and lack of cooperation to rule out seizure was Dr. Rios's synthesis but the MRI was negative on DWI.  There may be some element of vascular dementia given the normal MRI.     Recommendations/Plans: An empirical trial of lacosamide 100 mg twice daily (start IV and convert to oral after 24 hours) while awaiting EEG to finish the neurological workup of this presentation. Speech Therapy consultation. Soonest the EEG can be done is Friday, 4/26/2024. He will need rehab after discharge on Eliquis and aspirin for secondary stroke prevention.  Follow-up with Ira Rios MD (Neurology) who can decide if he even needs an EEG for his care.     Hospital Synopsis: Darrell is a 78 y.o. man with D2, HTN, HF, Afib already on Eliquis anticoagulation presenting with fluctuating confusion and disorientation without focal motor deficits.  He is unable to provide any history and exhibits a paucity of verbal output but is able to follow some commands.  Admission notes indicate he presented with altered mental status and possible aphasia.  Dr. Rios found him awake and oriented x 4 although his cognition was impaired with mild perseveration mild to moderate global aphasia and inability to make sense of his surroundings.  She found no strength asymmetry nor any sensory neglect.  Noncontrast CT brain attack (4/20/2024) showed no acute intracranial pathology, but evidence of deep white matter vascular changes. CT angio of the head and neck with and without contrast (4/20/2024) showed no flow-limiting stenoses or occlusions in the head or neck vessels.  Noncontrast MRI brain (4/21/2024) showed no acute intracranial pathology and  unchanged small focus of encephalomalacia in the left parietal lobe.      Interval History: Still uncooperative to the EEG tech to apply electrodes (3 days in a row). He's otherwise behaviorally from mariaelena to time frustrated.       Current Facility-Administered Medications:     acetaminophen (Tylenol) tablet 650 mg, 650 mg, oral, q6h PRN, Rosa Champion, APRN-CNP, 650 mg at 04/23/24 2150    amiodarone (Pacerone) tablet 200 mg, 200 mg, oral, Daily, Milan Sims MD, 200 mg at 04/24/24 0829    apixaban (Eliquis) tablet 5 mg, 5 mg, oral, BID, Milan Sims MD, 5 mg at 04/24/24 0829    aspirin EC tablet 81 mg, 81 mg, oral, Daily, Milan Sims MD, 81 mg at 04/24/24 0830    atorvastatin (Lipitor) tablet 40 mg, 40 mg, oral, Nightly, Milan Sims MD, 40 mg at 04/23/24 2150    famotidine (Pepcid) tablet 10 mg, 10 mg, oral, Daily, Joya CalhounD, 10 mg at 04/24/24 0830    folic acid (Folvite) tablet 1 mg, 1 mg, oral, Daily, Milan Sims MD, 1 mg at 04/24/24 0830    midodrine (Proamatine) tablet 10 mg, 10 mg, oral, TID with meals, Milan Sims MD, 10 mg at 04/24/24 1804    oxygen (O2) therapy, , inhalation, Continuous PRN - O2/gases, Milan Sims MD    SITagliptin phosphate (Januvia) tablet 50 mg, 50 mg, oral, Daily, Milan Sims MD, 50 mg at 04/24/24 0830    tamsulosin (Flomax) 24 hr capsule 0.4 mg, 0.4 mg, oral, Daily before breakfast, Milan Sims MD, 0.4 mg at 04/24/24 0615     Examination:   Vitals:    04/24/24 1617   BP: 103/68   Pulse: 90   Resp: 16   Temp: 37.2 °C (99 °F)   SpO2: 97%      Neurological examination: He was alert, but as the interaction progressed requiring verbal and nonverbal responses to my questioning, his expressive language production and his receptive language capabilities rapidly degraded to useless. He otherwise had no other signs of facial or limb weakness.     Medical Decision Making:   MR brain wo IV contrast    Result Date:  4/21/2024  Interpreted By:  Benedict Sutton, STUDY: MR BRAIN WO IV CONTRAST;  4/21/2024 12:04 pm   INDICATION: Signs/Symptoms: TIA.   COMPARISON: Head CT and CTA, 04/20/2024 and brain MRI, 10/09/2023   ACCESSION NUMBER(S): [id number]   ORDERING CLINICIAN: JOSÉ LUIS UFLTON   TECHNIQUE: Axial T2, FLAIR, DWI, gradient echo T2 and sagittal and coronal T1 weighted images of the brain were acquired.   FINDINGS: CSF Spaces: The ventricles, sulci and basal cisterns are appropriate for the degree of volume loss. No abnormal extra-axial collection.   Parenchyma: There is no restricted diffusion to suggest acute infarction.  Generalized parenchymal volume loss is unchanged. Nonspecific T2 hyperintense signal in the white matter, similar to previous and likely secondary to chronic small vessel ischemic disease. Unchanged small focus of encephalomalacia in the left parietal lobe, possibly a chronic infarct. No evidence of intracranial hemorrhage. There is no mass effect or midline shift.   Paranasal Sinuses and Mastoids: The paranasal sinuses are clear. Trace right mastoid effusion.   Orbits: Bilateral lens replacement.       No acute intracranial pathology.   MACRO: None   Signed by: Benedict Sutton 4/21/2024 12:11 PM Dictation workstation:   [id number]    MR brain wo IV contrast    Result Date: 10/9/2023  Interpreted By:  Earl Barnhart, STUDY: MR BRAIN WO IV CONTRAST; 10/9/2023 12:09 pm   INDICATION: Signs/Symptoms:CVA. Aphasia. Unable to respond. Confusion.   COMPARISON: None. CT head 10/08/2023   ACCESSION NUMBER(S): [id number]   ORDERING CLINICIAN: COREY MENA   TECHNIQUE: Multiplanar T1, T2, FLAIR and diffusion-weighted images were obtained.  Gradient axial images were also obtained.   FINDINGS: There is no evidence for acute infarction.  There is no evidence for intracranial hemorrhage or mass effect.  There are patchy white matter hyperintensities bilaterally probably secondary to age related changes and/or  white matter ischemic disease.  Age-related atrophy is present. Paranasal sinuses are clear. Normal flow-voids indicating vessel patency are identified within the carotid siphons and basilar artery.       No acute intracranial pathologic findings are identified. Age-related findings are present.   MACRO: none   Signed by: Earl Barnhart 10/9/2023 12:41 PM Dictation workstation:   [id number]  I personally reviewed these images and concur with the radiological interpretation.      CT head wo IV contrast (4/2/2024): No acute intracranial pathology. I personally reviewed these images and concur with the radiological interpretation.     Results for orders placed or performed during the hospital encounter of 04/20/24 (from the past 96 hour(s))   Basic Metabolic Panel   Result Value Ref Range    Glucose 106 (H) 65 - 99 mg/dL    Sodium 140 133 - 145 mmol/L    Potassium 3.8 3.4 - 5.1 mmol/L    Chloride 103 97 - 107 mmol/L    Bicarbonate 24 24 - 31 mmol/L    Urea Nitrogen 31 (H) 8 - 25 mg/dL    Creatinine 1.60 0.40 - 1.60 mg/dL    eGFR 44 (L) >60 mL/min/1.73m*2    Calcium 8.7 8.5 - 10.4 mg/dL    Anion Gap 13 <=19 mmol/L   CBC   Result Value Ref Range    WBC 5.6 4.4 - 11.3 x10*3/uL    nRBC 0.0 0.0 - 0.0 /100 WBCs    RBC 3.98 (L) 4.50 - 5.90 x10*6/uL    Hemoglobin 13.0 (L) 13.5 - 17.5 g/dL    Hematocrit 39.5 (L) 41.0 - 52.0 %    MCV 99 80 - 100 fL    MCH 32.7 26.0 - 34.0 pg    MCHC 32.9 32.0 - 36.0 g/dL    RDW 14.6 (H) 11.5 - 14.5 %    Platelets 135 (L) 150 - 450 x10*3/uL        I personally spent 19 minutes (pre-visit review: 6:49 PM to 7:00 PM, in-person: 7:12 PM to 7:20 PM) today (exclusive of procedures) providing care for this patient, including preparation, verbal huddle with nursing, my direct face-to-face time with him, including chart documentation and other services, including review of his past medical records, imaging and other diagnostic results, and coordination of care as specified in the encounter.     Jack CABALLERO  MD Bang  [facility] Neurohospitalist  (contact by secure message preferred)

## 2024-04-25 LAB
ANION GAP SERPL CALC-SCNC: 13 MMOL/L
BUN SERPL-MCNC: 31 MG/DL (ref 8–25)
CALCIUM SERPL-MCNC: 8.7 MG/DL (ref 8.5–10.4)
CHLORIDE SERPL-SCNC: 103 MMOL/L (ref 97–107)
CO2 SERPL-SCNC: 24 MMOL/L (ref 24–31)
CREAT SERPL-MCNC: 1.6 MG/DL (ref 0.4–1.6)
EGFRCR SERPLBLD CKD-EPI 2021: 44 ML/MIN/1.73M*2
ERYTHROCYTE [DISTWIDTH] IN BLOOD BY AUTOMATED COUNT: 14.6 % (ref 11.5–14.5)
GLUCOSE SERPL-MCNC: 106 MG/DL (ref 65–99)
HCT VFR BLD AUTO: 39.5 % (ref 41–52)
HGB BLD-MCNC: 13 G/DL (ref 13.5–17.5)
MCH RBC QN AUTO: 32.7 PG (ref 26–34)
MCHC RBC AUTO-ENTMCNC: 32.9 G/DL (ref 32–36)
MCV RBC AUTO: 99 FL (ref 80–100)
NRBC BLD-RTO: 0 /100 WBCS (ref 0–0)
PLATELET # BLD AUTO: 135 X10*3/UL (ref 150–450)
POTASSIUM SERPL-SCNC: 3.8 MMOL/L (ref 3.4–5.1)
RBC # BLD AUTO: 3.98 X10*6/UL (ref 4.5–5.9)
SODIUM SERPL-SCNC: 140 MMOL/L (ref 133–145)
WBC # BLD AUTO: 5.6 X10*3/UL (ref 4.4–11.3)

## 2024-04-25 PROCEDURE — 1200000002 HC GENERAL ROOM WITH TELEMETRY DAILY

## 2024-04-25 PROCEDURE — 2500000006 HC RX 250 W HCPCS SELF ADMINISTERED DRUGS (ALT 637 FOR ALL PAYERS): Performed by: INTERNAL MEDICINE

## 2024-04-25 PROCEDURE — 92523 SPEECH SOUND LANG COMPREHEN: CPT | Mod: GN

## 2024-04-25 PROCEDURE — 80048 BASIC METABOLIC PNL TOTAL CA: CPT | Performed by: NURSE PRACTITIONER

## 2024-04-25 PROCEDURE — 85027 COMPLETE CBC AUTOMATED: CPT | Performed by: NURSE PRACTITIONER

## 2024-04-25 PROCEDURE — 97110 THERAPEUTIC EXERCISES: CPT | Mod: GO,CO

## 2024-04-25 PROCEDURE — 2500000001 HC RX 250 WO HCPCS SELF ADMINISTERED DRUGS (ALT 637 FOR MEDICARE OP): Performed by: INTERNAL MEDICINE

## 2024-04-25 PROCEDURE — 2500000001 HC RX 250 WO HCPCS SELF ADMINISTERED DRUGS (ALT 637 FOR MEDICARE OP): Performed by: PSYCHIATRY & NEUROLOGY

## 2024-04-25 PROCEDURE — 2500000004 HC RX 250 GENERAL PHARMACY W/ HCPCS (ALT 636 FOR OP/ED): Performed by: PSYCHIATRY & NEUROLOGY

## 2024-04-25 PROCEDURE — 2500000005 HC RX 250 GENERAL PHARMACY W/O HCPCS: Performed by: PSYCHIATRY & NEUROLOGY

## 2024-04-25 PROCEDURE — 36415 COLL VENOUS BLD VENIPUNCTURE: CPT | Performed by: NURSE PRACTITIONER

## 2024-04-25 PROCEDURE — 2500000001 HC RX 250 WO HCPCS SELF ADMINISTERED DRUGS (ALT 637 FOR MEDICARE OP)

## 2024-04-25 RX ORDER — LACOSAMIDE 100 MG/1
100 TABLET ORAL 2 TIMES DAILY
Status: DISCONTINUED | OUTPATIENT
Start: 2024-04-25 | End: 2024-04-28 | Stop reason: HOSPADM

## 2024-04-25 RX ADMIN — MIDODRINE HYDROCHLORIDE 10 MG: 10 TABLET ORAL at 12:51

## 2024-04-25 RX ADMIN — TAMSULOSIN HYDROCHLORIDE 0.4 MG: 0.4 CAPSULE ORAL at 06:23

## 2024-04-25 RX ADMIN — APIXABAN 5 MG: 5 TABLET, FILM COATED ORAL at 21:20

## 2024-04-25 RX ADMIN — FAMOTIDINE 10 MG: 20 TABLET, FILM COATED ORAL at 08:38

## 2024-04-25 RX ADMIN — APIXABAN 5 MG: 5 TABLET, FILM COATED ORAL at 08:38

## 2024-04-25 RX ADMIN — SITAGLIPTIN 50 MG: 50 TABLET, FILM COATED ORAL at 08:38

## 2024-04-25 RX ADMIN — LACOSAMIDE 100 MG: 100 TABLET, FILM COATED ORAL at 21:20

## 2024-04-25 RX ADMIN — ATORVASTATIN CALCIUM 40 MG: 40 TABLET, FILM COATED ORAL at 21:20

## 2024-04-25 RX ADMIN — LACOSAMIDE 100 MG: 10 INJECTION INTRAVENOUS at 11:59

## 2024-04-25 RX ADMIN — MIDODRINE HYDROCHLORIDE 10 MG: 10 TABLET ORAL at 08:38

## 2024-04-25 RX ADMIN — MIDODRINE HYDROCHLORIDE 10 MG: 10 TABLET ORAL at 17:31

## 2024-04-25 RX ADMIN — FOLIC ACID 1 MG: 1 TABLET ORAL at 08:38

## 2024-04-25 RX ADMIN — AMIODARONE HYDROCHLORIDE 200 MG: 200 TABLET ORAL at 08:38

## 2024-04-25 RX ADMIN — ASPIRIN 81 MG: 81 TABLET, COATED ORAL at 08:38

## 2024-04-25 ASSESSMENT — COGNITIVE AND FUNCTIONAL STATUS - GENERAL
DAILY ACTIVITIY SCORE: 15
TURNING FROM BACK TO SIDE WHILE IN FLAT BAD: A LITTLE
STANDING UP FROM CHAIR USING ARMS: A LITTLE
CLIMB 3 TO 5 STEPS WITH RAILING: A LOT
DAILY ACTIVITIY SCORE: 14
WALKING IN HOSPITAL ROOM: A LOT
TOILETING: A LOT
DRESSING REGULAR UPPER BODY CLOTHING: A LITTLE
TURNING FROM BACK TO SIDE WHILE IN FLAT BAD: A LITTLE
STANDING UP FROM CHAIR USING ARMS: A LITTLE
EATING MEALS: A LITTLE
TOILETING: A LITTLE
HELP NEEDED FOR BATHING: A LOT
MOVING FROM LYING ON BACK TO SITTING ON SIDE OF FLAT BED WITH BEDRAILS: A LITTLE
PERSONAL GROOMING: A LITTLE
MOVING TO AND FROM BED TO CHAIR: A LITTLE
DRESSING REGULAR UPPER BODY CLOTHING: A LITTLE
HELP NEEDED FOR BATHING: A LOT
MOBILITY SCORE: 17
PERSONAL GROOMING: A LITTLE
MOBILITY SCORE: 17
HELP NEEDED FOR BATHING: A LOT
MOVING TO AND FROM BED TO CHAIR: A LITTLE
DRESSING REGULAR LOWER BODY CLOTHING: A LITTLE
EATING MEALS: A LITTLE
EATING MEALS: A LITTLE
CLIMB 3 TO 5 STEPS WITH RAILING: A LOT
TOILETING: A LOT
DRESSING REGULAR LOWER BODY CLOTHING: A LOT
DRESSING REGULAR UPPER BODY CLOTHING: A LOT
PERSONAL GROOMING: A LOT
DAILY ACTIVITIY SCORE: 16
WALKING IN HOSPITAL ROOM: A LITTLE
DRESSING REGULAR LOWER BODY CLOTHING: A LOT

## 2024-04-25 ASSESSMENT — PAIN - FUNCTIONAL ASSESSMENT
PAIN_FUNCTIONAL_ASSESSMENT: 0-10

## 2024-04-25 ASSESSMENT — PAIN SCALES - GENERAL
PAINLEVEL_OUTOF10: 0 - NO PAIN

## 2024-04-25 NOTE — CARE PLAN
The patient's goals for the shift include      The clinical goals for the shift include neuro changes    Over the shift, the patient did not make progress toward the following goals. Barriers to progression include pt has had no neurological changes this shift. pt does continue to have expressive aphasia. Recommendations to address these barriers include continuing to monitor.

## 2024-04-25 NOTE — NURSING NOTE
Patient refusing waffle mattress.  Nurse has attempted education.  Patient wants waffle removed.

## 2024-04-25 NOTE — CARE PLAN
The patient's goals for the shift include      The clinical goals for the shift include complete EEG    Over the shift, the patient did not make progress toward the following goals. Barriers to progression include test not completed. Recommendations to address these barriers include determine if patient can have inpatient or outpatient.     2 = assistive person 0 = independent

## 2024-04-25 NOTE — PROGRESS NOTES
"  Speech-Language Pathology    SLP ADULT Inpatient Speech-Language Cognition  Patient Name: Darrell Arreola  MRN: [id number]  : 1945  Today's Date: 24  Time Calculation  Start Time: 1055  Stop Time: 1115  Time Calculation (min): 20 min      SLP Assessment:  Pt presenting with expressive aphasia characterized by anomias and phonemic paraphasias at the phrase and conversation level. Mild motor speech impairment consistent with apraxia and dysarthria negatively impacting pt's intelligibly.  Pt reports some short term recall deficits may be present at baseline.       SLP Plan:  Skilled SLP warranted for expressive language deficits and motor speech impairments to improve pt's ability to clearly communicate basic wants and needs at this level of care of improve ability to return to baseline level of functioning.    Frequency: 2x per week  Duration: current admission.      General Visit Information:  -Current Admission History: Pt admitted on 24 c/o AMS, dizziness, aphasia.   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       Pain:  Scale: 0-10   Pt rating: No pain reported.     Cognition:  Assessed using SLUM. Pt achieved 10/30 (normal range= 27-30). Deficits include orienting to time concepts, short term recall, working memory, problem solving. Benefited from redirection to task. Reduced safety awareness observed with \"cookie theft image\" but improves given min-mod verbal cues. Large clock drawing resulted in all numbers  in approximately correct spacing, incorrect placement of hands. Of note, pt was missing his reading glasses " "which may have negatively impacted performance.     Motor Speech Production:  Slow slurred speech consistent with dysarthria. False starts and motor planning deficits consistent with apraxia. Both result in mildly reduced intelligibly of speech.     Auditory Comprehension:   Intact for basic commands. Multistep/high level commands followed given additional time to process.     Expressive language:   Confrontation and responsive naming intact. Difficulty with generative naming. Anomia and phonemic paraphasias evident in conversation. Improves with wait time and giving choices. Given \"cookie theft\" image, pt generated incomplete phrases in response to SLP probe questions (e.g., what's she looking at?).     Inpatient Education:  Learner: Patient        Barriers to Learning:    Acuity of illness   Method:  demonstration; verbal  Topic: Pt educated on: goals/plan of care, communication strategies, recommendations for ST/follow up.    Outcome: Patient gave verbal understanding, will be reinforced at subsequent visits as appropriate.       Goals: Established 4/25/24  Pt will complete articulatory precision tasks with 90% acc given min cues.  Pt will utilize clear speech strategies to improve intelligibility of speech with 90% acc given min cues.   Pt will complete word finding tasks of increasing complexity with 90% acc given min cues.      "

## 2024-04-25 NOTE — PROGRESS NOTES
Music Therapy Note    Darrell Arreola was referred by     Therapy Session  Referral Type: New referral this admission  Visit Type: New visit  Session Start Time: 1000  Session End Time: 1008  Intervention Delivery: In-person  Conflict of Service: None  Family Present for Session: None     Pre-assessment  Unable to Assess Reason: Outcomes not applicable  Mood/Affect: Calm, Appropriate         Treatment/Interventions  Areas of Focus: Coping, Normalization, Relaxation  Music Therapy Interventions: Live music listening  Interruption: No  Patient Fell Asleep at End of Session: Yes    Post-assessment  Unable to Assess Reason: Patient sleeping  Mood/Affect: Calm, Appropriate  Continue Visiting: Yes  Total Session Time (min): 8 minutes    Narrative  Assessment Detail: Pt found laying in bed beginning to doze off. Pt agreed to services.  Plan: To improve normalization and coping through music intervention  Intervention: MT played live and slow versions of Take It Easy and Sweet Child O Mine  Evaluation: Pt maintained calm affect through session. Pt fell asleep quickly after the music started. MT left room quietly to not awaken pt.  Follow-up: Will follow up throughout admission    Education Documentation  No documentation found.

## 2024-04-25 NOTE — NURSING NOTE
Assumed care of patient.    Resting quietly in bed.    Respirations even and unlabored on RA.    Call light and commonly used items in reach.  Bed locked and in low position.   Bed alarm engaged.

## 2024-04-25 NOTE — CONSULTS
Wound Care Consult     Visit Date: 4/25/2024      Patient Name: Darrell Arreola         MRN: [id number]           YOB: 1945     Reason for Consult: Left shin wound        Wound History: Present on admission. Patient with chronic left shin stasis ulcer and vascular changes to LLE.   A 78 y.o. year old male admitted for Principal Problem:    Altered mental status, unspecified altered mental status type      Past Medical History:   Diagnosis Date    CHF (congestive heart failure) (Multi)     Diabetes mellitus (Multi)     Heart disease     Hypertension     Stroke (Multi)       Past Surgical History:   Procedure Laterality Date    CT GUIDED PERCUTANEOUS BIOPSY BONE DEEP  02/21/2020    CT GUIDED PERCUTANEOUS BIOPSY BONE DEEP 2/21/2020 [facility] INPATIENT LEGACY    CT GUIDED PERCUTANEOUS BIOPSY BONE DEEP  03/24/2020    CT GUIDED PERCUTANEOUS BIOPSY BONE DEEP 3/24/2020 [facility] INPATIENT LEGACY    MR HEAD ANGIO WO IV CONTRAST  05/17/2019    MR HEAD ANGIO WO IV CONTRAST [facility] EMERGENCY LEGACY    MR HEAD ANGIO WO IV CONTRAST  10/9/2023    MR HEAD ANGIO WO IV CONTRAST 10/9/2023 [facility]    TOE AMPUTATION      TONSILLECTOMY         Scheduled medications  amiodarone, 200 mg, oral, Daily  apixaban, 5 mg, oral, BID  aspirin, 81 mg, oral, Daily  atorvastatin, 40 mg, oral, Nightly  famotidine, 10 mg, oral, Daily  folic acid, 1 mg, oral, Daily  lacosamide, 100 mg, intravenous, q12h  midodrine, 10 mg, oral, TID with meals  SITagliptin phosphate, 50 mg, oral, Daily  tamsulosin, 0.4 mg, oral, Daily before breakfast      Continuous medications     PRN medications  PRN medications: acetaminophen, oxygen    Allergies   Allergen Reactions    Iodine Rash    Latex Rash    Lidocaine Rash          Pertinent Labs:   Albumin   Date Value Ref Range Status   04/21/2024 3.5 3.5 - 5.0 g/dL Final   02/19/2020 3.8 3.4 - 5.0 g/dL Final     Albumin (Data Conversion)   Date Value Ref Range Status   02/24/2020 1.2 mg/dL Final       Wound Assessment:  Wound  10/14/23 Other (comment) Pretibial Left;Proximal (Active)   Wound Image   04/23/24 0820   Site Assessment Dry;Red;Brown 04/25/24 1400   Kirstin-Wound Assessment Hyperpigmented;Dry 04/25/24 1400   Non-staged Wound Description Partial thickness 04/25/24 1400   Drainage Description None 04/25/24 1400   Drainage Amount None 04/25/24 1400   Dressing Other (Comment) 04/25/24 1400   Dressing Status Clean;Dry 04/25/24 1400       Wound Team Summary Assessment:   Exam conducted on day 5 of stay with knowledge of Floor Nurse. Introductions made to patient, alert and oriented. On exam patient sitting semifowlers in bed, smelling of urine, linens soiled, Purewick in place. Bandaid to Left shin, clean, dry, intact, LLE vascular discoloration with dry, flaking plaque. Heels clean, dry, intact. With assist of Aide patient stood with walker and transferred to chair. Soiled brief and linens removed from bed. Patient cleansed with soap and water. Heel borders applied. Patient deferring to have New Purewick applied at this time. Sacrum and buttock, clean, dry, intact. Patient on a [employer] bedframe with Waffle mattress overlay. Ni Ochoa RN updated, to continue pressure injury prevention interventions, and nursing to continue to follow providers orders. Reconsult Wound RN PRN. Priscila RODRIGUEZN RN        Wound Team Plan:  LT Rodriguez- cleanse with soap and water, paint with betadine, cover with DCD daily and prn. Lotion to BLE daily.      Priscila Houston, JOSE  4/25/2024  2:06 PM

## 2024-04-25 NOTE — NURSING NOTE
First dose of vimpat being administered.    Resting quietly in bed.  Purewick in place, voiding dark yellow urine.  Fresh fluids at bedside.  No complaints of pain/discomfort.  Call light in reach, bed alarm engaged.

## 2024-04-25 NOTE — PROGRESS NOTES
Occupational Therapy    OT Treatment    Patient Name: Darrell Arreola  MRN: [id number]  Today's Date: 4/25/2024  Time Calculation  Start Time: 1431  Stop Time: 1443  Time Calculation (min): 12 min         Assessment:  OT Assessment: Gradual progress made towards OT goals. Continue with current OT POC to increase strength, balance and functional tolerance to maximize safety and independence during ADLs.  Evaluation/Treatment Tolerance: Patient limited by fatigue  End of Session Communication: Bedside nurse  End of Session Patient Position: Up in chair, Alarm on (all needs in reach)  OT Assessment Results: Decreased ADL status, Decreased upper extremity strength, Decreased cognition, Decreased endurance, Decreased functional mobility  Evaluation/Treatment Tolerance: Patient limited by fatigue  Plan:  Treatment Interventions: ADL retraining, Functional transfer training, UE strengthening/ROM, Patient/family training  OT Frequency: 4 times per week  OT Discharge Recommendations: Moderate intensity level of continued care, 24 hr supervision due to cognition  OT - OK to Discharge: Yes  Treatment Interventions: ADL retraining, Functional transfer training, UE strengthening/ROM, Patient/family training    Subjective   Previous Visit Info:  OT Last Visit  OT Received On: 04/25/24  General:  General  Reason for Referral: impaired ADLs s/p AMS and aphasia  Past Medical History Relevant to Rehab: Atrial fibrillation, CHF, DM, HTN, severe tricuspid regurgitation, peripheral vascular disease, circulatory shock, pulmonary edema, acute renal failure, anemia, anaphylaxis, thrombocytopenia, obesity, severe mitral regurgitation,  cardiomyopathy, recurrent syncope and collapse.  Prior to Session Communication: Bedside nurse  Patient Position Received: Up in chair, Alarm on  General Comment: Pt cleared for OT session per nursing, limited session completed this date d/t pt participation.  Precautions:  Hearing/Visual Limitations: wears  "glasses, reports hearing aides  Medical Precautions: Fall precautions  Precautions Comment: intermittent expressive aphasia observed, telemetry  Vital Signs:     Pain:  Pain Assessment  Pain Assessment: 0-10  Pain Score: 0 - No pain  Clinical Progression: Not changed    Objective    Cognition:  Cognition  Overall Cognitive Status: Impaired  Orientation Level: Disoriented to time, Disoriented to situation  Safety Judgment: Decreased awareness of need for assistance  Safety/Judgement: Exceptions to WFL  Insight: Moderate  Impulsive: Mildly  Task Initiation: Initiates with cues  Processing Speed: Delayed  Coordination:  Movements are Fluid and Coordinated: No  Upper Body Coordination: slower rate of movement  Activities of Daily Living: Grooming  Grooming Comments: pt declined participation in ADLs despite education and encouragement d/t reports of task completion prior to OT session.  Functional Standing Tolerance:     Bed Mobility/Transfers: Bed Mobility  Bed Mobility: No    Transfers  Transfer: No (pt declined participation in functional transfers d/t reports of \"not feeling up to it right now\". Education and encouragement provided however pt continued to decline.)    Therapy/Activity: Therapeutic Exercise  Therapeutic Exercise Performed: Yes  Therapeutic Exercise Activity 1: BUE exercises completed 1x15 in all planes against gravity to increase strength and functional tolerance for safety and ease of ADL/ADL transfer completion. Verbal instruction and demonstration provided to ensure proper muscle recruitment.      Outcome Measures:[facility] Daily Activity  Putting on and taking off regular lower body clothing: A lot  Bathing (including washing, rinsing, drying): A lot  Putting on and taking off regular upper body clothing: A lot  Toileting, which includes using toilet, bedpan or urinal: A lot  Taking care of personal grooming such as brushing teeth: A little  Eating Meals: A little  Daily Activity - Total Score: " 14        Education Documentation  ADL Training, taught by JAYME Gaxiola at 4/25/2024  5:47 PM.  Learner: Patient  Readiness: Acceptance  Method: Explanation, Demonstration  Response: Needs Reinforcement    Education Comments  Education provided on role of OT/POC, safety awareness throughout functional tasks/transfers, importance of activity/ rest routine, EC/WS techniques, and use of call light for assistance. Questions, comments and concerns addressed regarding OT.      Goals:  Encounter Problems       Encounter Problems (Active)       ADLs       Pt will complete ADL tasks at mod I with use of AE prn  (Progressing)       Start:  04/22/24    Expected End:  04/25/24               Functional Mobility       Pt will perform functional mobility household distances at mod I with use of RW.    (Progressing)       Start:  04/22/24    Expected End:  05/13/24               OT Transfers       Pt will perform functional transfers at mod I (Progressing)       Start:  04/22/24    Expected End:  05/13/24               UE Exercise       Pt will perform BUE exercises to improve strength and independence with functional transfers/ADL tasks.   (Progressing)       Start:  04/22/24    Expected End:  05/13/24

## 2024-04-25 NOTE — PROGRESS NOTES
Darrell Arreola is a 78 y.o. male on day 2 of admission presenting with Altered mental status, unspecified altered mental status type.    Subjective   Patient seen and examined.  Resting in bed in no acute distress.  Awake alert oriented x 3.  Forgetful.  No complaints.  Review of systems is limited.     Objective     Physical Exam  Vitals and nursing note reviewed.   Constitutional:       General: He is not in acute distress.     Appearance: Normal appearance. He is normal weight. He is not ill-appearing, toxic-appearing or diaphoretic.   HENT:      Head: Normocephalic and atraumatic.      Right Ear: Tympanic membrane normal.      Left Ear: Tympanic membrane normal.      Nose: Nose normal.      Mouth/Throat:      Mouth: Mucous membranes are moist.      Pharynx: Oropharynx is clear.   Eyes:      Extraocular Movements: Extraocular movements intact.      Conjunctiva/sclera: Conjunctivae normal.      Pupils: Pupils are equal, round, and reactive to light.   Cardiovascular:      Rate and Rhythm: Tachycardia present.      Pulses: Normal pulses.      Heart sounds: Normal heart sounds. No murmur heard.  Pulmonary:      Effort: Pulmonary effort is normal. No respiratory distress.      Breath sounds: Normal breath sounds. No wheezing, rhonchi or rales.      Comments: Room air.  Abdominal:      General: Bowel sounds are normal. There is no distension.      Palpations: Abdomen is soft.      Tenderness: There is no abdominal tenderness.   Genitourinary:     Comments: Deferred.  Musculoskeletal:         General: No swelling or tenderness. Normal range of motion.      Cervical back: Normal range of motion and neck supple.   Skin:     General: Skin is warm and dry.      Capillary Refill: Capillary refill takes less than 2 seconds.   Neurological:      General: No focal deficit present.      Mental Status: He is alert. He is disoriented.      Comments: Awake alert and oriented x 2-3.  Less confused.  Speech clear, coherent.   "Intermittent expressive aphasia.  Follows all commands.  Generalized weakness.  No focal weakness.  Power equal 5-5 to bilateral upper and bilateral lower extremities.  Gait deferred.  Cranial nerves II through XII grossly intact.   Psychiatric:         Mood and Affect: Mood is anxious.         Behavior: Behavior normal.       Last Recorded Vitals  Blood pressure 101/67, pulse 90, temperature 36.2 °C (97.2 °F), temperature source Temporal, resp. rate 18, height 1.892 m (6' 2.49\"), weight 102 kg (225 lb 5 oz), SpO2 99%.    Intake/Output last 3 Shifts:  I/O last 3 completed shifts:  In: - (0 mL/kg)   Out: 1400 (13.7 mL/kg) [Urine:1400 (0.4 mL/kg/hr)]  Weight: 102.2 kg     Telemetry atrial fibrillation rate 100's    Relevant Results  Results for orders placed or performed during the hospital encounter of 04/20/24 (from the past 24 hour(s))   Basic Metabolic Panel   Result Value Ref Range    Glucose 106 (H) 65 - 99 mg/dL    Sodium 140 133 - 145 mmol/L    Potassium 3.8 3.4 - 5.1 mmol/L    Chloride 103 97 - 107 mmol/L    Bicarbonate 24 24 - 31 mmol/L    Urea Nitrogen 31 (H) 8 - 25 mg/dL    Creatinine 1.60 0.40 - 1.60 mg/dL    eGFR 44 (L) >60 mL/min/1.73m*2    Calcium 8.7 8.5 - 10.4 mg/dL    Anion Gap 13 <=19 mmol/L   CBC   Result Value Ref Range    WBC 5.6 4.4 - 11.3 x10*3/uL    nRBC 0.0 0.0 - 0.0 /100 WBCs    RBC 3.98 (L) 4.50 - 5.90 x10*6/uL    Hemoglobin 13.0 (L) 13.5 - 17.5 g/dL    Hematocrit 39.5 (L) 41.0 - 52.0 %    MCV 99 80 - 100 fL    MCH 32.7 26.0 - 34.0 pg    MCHC 32.9 32.0 - 36.0 g/dL    RDW 14.6 (H) 11.5 - 14.5 %    Platelets 135 (L) 150 - 450 x10*3/uL     No results found for the last 90 days.    No results found.    Scheduled medications  amiodarone, 200 mg, oral, Daily  apixaban, 5 mg, oral, BID  aspirin, 81 mg, oral, Daily  atorvastatin, 40 mg, oral, Nightly  famotidine, 10 mg, oral, Daily  folic acid, 1 mg, oral, Daily  lacosamide, 100 mg, intravenous, q12h  midodrine, 10 mg, oral, TID with " meals  SITagliptin phosphate, 50 mg, oral, Daily  tamsulosin, 0.4 mg, oral, Daily before breakfast      Continuous medications     PRN medications  PRN medications: acetaminophen, oxygen      ASSESSMENT:  Altered mental status  Cognitive impairment  Syncope  Generalized weakness  Hypertension  Congestive heart failure, systolic  Hyperlipidemia  Atrial fibrillation  Sinus tachycardia  Oral anticoagulation  Type 2 diabetes mellitus  Headache - resolved    PLAN:  Patient is doing well this morning.  No new issues.  Mentation stable.  Awake alert oriented x 3.  Forgetful.  Intermittent expressive aphasia - improved today.  Follows all commands.  Generalized weakness.  No focal weakness.  Neurology following.  Input appreciated.  IV Vimpat.  Plan for EEG tomorrow.  Telemetry atrial fibrillation rate controlled.  Blood pressure stable.  Stable cardiac.  PT/OT.  Fall precautions.  Up with assistance only.  Bed interlaminal times per DVT prophylaxis Eliquis.  Pressure ulcer prevention measures.  Turn and reposition every 2 hours and as needed.  Heels off bed.  Offloading.  Supportive care.  Patient reassured.  PT/OT recommend moderate intensity level of continued care.  Case management following for discharge planning.  Discharge plan [facility] facility.  Anticipate discharge after EEG, when arrangements made by case management.  Discussed with patient, case management.  Neurology, Dr. Sims.      Rosa Champion, APRN-CNP

## 2024-04-26 LAB
ANION GAP SERPL CALC-SCNC: 9 MMOL/L
APPEARANCE UR: ABNORMAL
BACTERIA #/AREA URNS AUTO: ABNORMAL /HPF
BILIRUB UR STRIP.AUTO-MCNC: NEGATIVE MG/DL
BUN SERPL-MCNC: 35 MG/DL (ref 8–25)
CALCIUM SERPL-MCNC: 8.9 MG/DL (ref 8.5–10.4)
CHLORIDE SERPL-SCNC: 102 MMOL/L (ref 97–107)
CO2 SERPL-SCNC: 26 MMOL/L (ref 24–31)
COLOR UR: YELLOW
CREAT SERPL-MCNC: 1.6 MG/DL (ref 0.4–1.6)
EGFRCR SERPLBLD CKD-EPI 2021: 44 ML/MIN/1.73M*2
GLUCOSE BLD MANUAL STRIP-MCNC: 146 MG/DL (ref 74–99)
GLUCOSE BLD MANUAL STRIP-MCNC: 154 MG/DL (ref 74–99)
GLUCOSE BLD MANUAL STRIP-MCNC: 166 MG/DL (ref 74–99)
GLUCOSE SERPL-MCNC: 116 MG/DL (ref 65–99)
GLUCOSE UR STRIP.AUTO-MCNC: NORMAL MG/DL
HOLD SPECIMEN: NORMAL
KETONES UR STRIP.AUTO-MCNC: NEGATIVE MG/DL
LEUKOCYTE ESTERASE UR QL STRIP.AUTO: ABNORMAL
NITRITE UR QL STRIP.AUTO: NEGATIVE
PH UR STRIP.AUTO: 8.5 [PH]
POTASSIUM SERPL-SCNC: 3.9 MMOL/L (ref 3.4–5.1)
PROT UR STRIP.AUTO-MCNC: ABNORMAL MG/DL
RBC # UR STRIP.AUTO: ABNORMAL /UL
RBC #/AREA URNS AUTO: >20 /HPF
SODIUM SERPL-SCNC: 137 MMOL/L (ref 133–145)
SP GR UR STRIP.AUTO: 1.02
TRI-PHOS CRY #/AREA UR COMP ASSIST: ABNORMAL /HPF
UROBILINOGEN UR STRIP.AUTO-MCNC: ABNORMAL MG/DL
WBC #/AREA URNS AUTO: >50 /HPF

## 2024-04-26 PROCEDURE — 80048 BASIC METABOLIC PNL TOTAL CA: CPT | Performed by: NURSE PRACTITIONER

## 2024-04-26 PROCEDURE — 2500000004 HC RX 250 GENERAL PHARMACY W/ HCPCS (ALT 636 FOR OP/ED): Performed by: NURSE PRACTITIONER

## 2024-04-26 PROCEDURE — 2500000001 HC RX 250 WO HCPCS SELF ADMINISTERED DRUGS (ALT 637 FOR MEDICARE OP): Performed by: INTERNAL MEDICINE

## 2024-04-26 PROCEDURE — 2500000001 HC RX 250 WO HCPCS SELF ADMINISTERED DRUGS (ALT 637 FOR MEDICARE OP): Performed by: PSYCHIATRY & NEUROLOGY

## 2024-04-26 PROCEDURE — 82947 ASSAY GLUCOSE BLOOD QUANT: CPT

## 2024-04-26 PROCEDURE — 81001 URINALYSIS AUTO W/SCOPE: CPT | Performed by: INTERNAL MEDICINE

## 2024-04-26 PROCEDURE — 36415 COLL VENOUS BLD VENIPUNCTURE: CPT | Performed by: NURSE PRACTITIONER

## 2024-04-26 PROCEDURE — 97110 THERAPEUTIC EXERCISES: CPT | Mod: GP

## 2024-04-26 PROCEDURE — 97530 THERAPEUTIC ACTIVITIES: CPT | Mod: GP

## 2024-04-26 PROCEDURE — 2500000001 HC RX 250 WO HCPCS SELF ADMINISTERED DRUGS (ALT 637 FOR MEDICARE OP): Performed by: NURSE PRACTITIONER

## 2024-04-26 PROCEDURE — 1200000002 HC GENERAL ROOM WITH TELEMETRY DAILY

## 2024-04-26 PROCEDURE — 97535 SELF CARE MNGMENT TRAINING: CPT | Mod: GO,CO

## 2024-04-26 PROCEDURE — 2500000001 HC RX 250 WO HCPCS SELF ADMINISTERED DRUGS (ALT 637 FOR MEDICARE OP)

## 2024-04-26 PROCEDURE — 87086 URINE CULTURE/COLONY COUNT: CPT | Mod: WESLAB | Performed by: INTERNAL MEDICINE

## 2024-04-26 PROCEDURE — 2500000006 HC RX 250 W HCPCS SELF ADMINISTERED DRUGS (ALT 637 FOR ALL PAYERS): Performed by: INTERNAL MEDICINE

## 2024-04-26 RX ORDER — NYSTATIN 100000 [USP'U]/G
1 POWDER TOPICAL 2 TIMES DAILY
Status: DISCONTINUED | OUTPATIENT
Start: 2024-04-26 | End: 2024-04-28 | Stop reason: HOSPADM

## 2024-04-26 RX ORDER — CEFTRIAXONE 1 G/50ML
1 INJECTION, SOLUTION INTRAVENOUS EVERY 24 HOURS
Status: DISCONTINUED | OUTPATIENT
Start: 2024-04-26 | End: 2024-04-28 | Stop reason: HOSPADM

## 2024-04-26 RX ADMIN — NYSTATIN 1 APPLICATION: 100000 POWDER TOPICAL at 18:24

## 2024-04-26 RX ADMIN — NYSTATIN 1 APPLICATION: 100000 POWDER TOPICAL at 21:22

## 2024-04-26 RX ADMIN — MIDODRINE HYDROCHLORIDE 10 MG: 10 TABLET ORAL at 12:37

## 2024-04-26 RX ADMIN — APIXABAN 5 MG: 5 TABLET, FILM COATED ORAL at 21:22

## 2024-04-26 RX ADMIN — SITAGLIPTIN 50 MG: 50 TABLET, FILM COATED ORAL at 08:47

## 2024-04-26 RX ADMIN — ATORVASTATIN CALCIUM 40 MG: 40 TABLET, FILM COATED ORAL at 21:22

## 2024-04-26 RX ADMIN — ASPIRIN 81 MG: 81 TABLET, COATED ORAL at 08:47

## 2024-04-26 RX ADMIN — TAMSULOSIN HYDROCHLORIDE 0.4 MG: 0.4 CAPSULE ORAL at 08:47

## 2024-04-26 RX ADMIN — APIXABAN 5 MG: 5 TABLET, FILM COATED ORAL at 08:47

## 2024-04-26 RX ADMIN — CEFTRIAXONE SODIUM 1 G: 1 INJECTION, SOLUTION INTRAVENOUS at 11:24

## 2024-04-26 RX ADMIN — LACOSAMIDE 100 MG: 100 TABLET, FILM COATED ORAL at 11:01

## 2024-04-26 RX ADMIN — MIDODRINE HYDROCHLORIDE 10 MG: 10 TABLET ORAL at 18:24

## 2024-04-26 RX ADMIN — FOLIC ACID 1 MG: 1 TABLET ORAL at 09:07

## 2024-04-26 RX ADMIN — AMIODARONE HYDROCHLORIDE 200 MG: 200 TABLET ORAL at 09:07

## 2024-04-26 RX ADMIN — MIDODRINE HYDROCHLORIDE 10 MG: 10 TABLET ORAL at 08:47

## 2024-04-26 RX ADMIN — LACOSAMIDE 100 MG: 100 TABLET, FILM COATED ORAL at 21:22

## 2024-04-26 RX ADMIN — FAMOTIDINE 10 MG: 20 TABLET, FILM COATED ORAL at 08:47

## 2024-04-26 ASSESSMENT — COGNITIVE AND FUNCTIONAL STATUS - GENERAL
HELP NEEDED FOR BATHING: A LOT
WALKING IN HOSPITAL ROOM: A LITTLE
STANDING UP FROM CHAIR USING ARMS: A LITTLE
DAILY ACTIVITIY SCORE: 14
WALKING IN HOSPITAL ROOM: A LITTLE
CLIMB 3 TO 5 STEPS WITH RAILING: TOTAL
STANDING UP FROM CHAIR USING ARMS: A LITTLE
CLIMB 3 TO 5 STEPS WITH RAILING: TOTAL
MOBILITY SCORE: 17
TOILETING: A LOT
MOVING TO AND FROM BED TO CHAIR: A LITTLE
TOILETING: A LOT
EATING MEALS: A LITTLE
DAILY ACTIVITIY SCORE: 14
DRESSING REGULAR LOWER BODY CLOTHING: A LOT
MOVING TO AND FROM BED TO CHAIR: A LITTLE
DRESSING REGULAR UPPER BODY CLOTHING: A LOT
TURNING FROM BACK TO SIDE WHILE IN FLAT BAD: A LITTLE
DRESSING REGULAR UPPER BODY CLOTHING: A LOT
PERSONAL GROOMING: A LITTLE
HELP NEEDED FOR BATHING: A LOT
DRESSING REGULAR LOWER BODY CLOTHING: A LOT
EATING MEALS: A LITTLE
PERSONAL GROOMING: A LITTLE
MOBILITY SCORE: 17
TURNING FROM BACK TO SIDE WHILE IN FLAT BAD: A LITTLE

## 2024-04-26 ASSESSMENT — PAIN SCALES - GENERAL
PAINLEVEL_OUTOF10: 0 - NO PAIN
PAINLEVEL_OUTOF10: 5 - MODERATE PAIN
PAINLEVEL_OUTOF10: 0 - NO PAIN

## 2024-04-26 ASSESSMENT — ACTIVITIES OF DAILY LIVING (ADL): HOME_MANAGEMENT_TIME_ENTRY: 27

## 2024-04-26 ASSESSMENT — PAIN - FUNCTIONAL ASSESSMENT
PAIN_FUNCTIONAL_ASSESSMENT: 0-10

## 2024-04-26 NOTE — PROGRESS NOTES
Occupational Therapy    OT Treatment    Patient Name: Darrell Arreola  MRN: [id number]  Today's Date: 4/26/2024  Time Calculation  Start Time: 1418  Stop Time: 1445  Time Calculation (min): 27 min         Assessment:  OT Assessment: Gradual progress made towards OT goals. Continue with current OT POC to increase strength, balance and functional tolerance to maximize safety and independence during ADLs.  Evaluation/Treatment Tolerance: Patient limited by fatigue  End of Session Communication: Bedside nurse  End of Session Patient Position: Up in chair, Alarm on (all needs in reach)  OT Assessment Results: Decreased ADL status, Decreased upper extremity strength, Decreased cognition, Decreased endurance, Decreased functional mobility  Evaluation/Treatment Tolerance: Patient limited by fatigue  Plan:  Treatment Interventions: ADL retraining, Functional transfer training, UE strengthening/ROM, Patient/family training  OT Frequency: 4 times per week  OT Discharge Recommendations: Moderate intensity level of continued care, 24 hr supervision due to cognition  OT - OK to Discharge: Yes  Treatment Interventions: ADL retraining, Functional transfer training, UE strengthening/ROM, Patient/family training    Subjective   Previous Visit Info:  OT Last Visit  OT Received On: 04/26/24  General:  General  Reason for Referral: impaired ADLs, AMS with aphasia  Past Medical History Relevant to Rehab: Atrial fibrillation, CHF, DM, HTN, severe tricuspid regurgitation, peripheral vascular disease, circulatory shock, pulmonary edema, acute renal failure, anemia, anaphylaxis, thrombocytopenia, obesity, severe mitral regurgitation,  cardiomyopathy, recurrent syncope and collapse.  Prior to Session Communication: Bedside nurse  Patient Position Received: Up in chair, Alarm on  General Comment: Pt cleared for OT session per nursing, cooperative this date.  Precautions:  Hearing/Visual Limitations: wears glasses, reports hearing aides  Medical  Precautions: Fall precautions  Precautions Comment: intermittent expressive aphasia observed, telemetry  Vital Signs:     Pain:  Pain Assessment  Pain Assessment: 0-10  Pain Score: 0 - No pain  Clinical Progression: Not changed    Objective    Cognition:  Cognition  Overall Cognitive Status: Impaired  Orientation Level: Disoriented to time, Disoriented to place  Following Commands: Follows one step commands with repetition  Safety Judgment: Decreased awareness of need for assistance  Safety/Judgement: Exceptions to WFL  Insight: Mild  Impulsive: Mildly  Task Initiation: Initiates with cues  Processing Speed: Delayed  Coordination:  Movements are Fluid and Coordinated: No  Upper Body Coordination: slower rate of movement  Lower Body Coordination: slower rate of movement  Activities of Daily Living: Grooming  Grooming Comments: oral hygiene completed seated in bedside chair with set-up and close supervision. Leonel provided for shaving tasks this date.    UE Bathing  UE Bathing Comments: UB bathing tasks completed seated in bedside chair with Leonel and verbal cues for sequencing. Pt intermittently forgetful completing tasks x2 throughout session.    LE Bathing  LE Bathing Comments: maxA required to complete BLE bathing from seated position however pt able to complete perineal hygiene with close supervision and verbal cues for sequencing to complete tasks from seated position    UE Dressing  UE Dressing Comments: Leonel to don/doff hospital gow for line management  Functional Standing Tolerance:     Bed Mobility/Transfers: Bed Mobility  Bed Mobility: No    Transfers  Transfer: Yes  Transfer 1  Trials/Comments 1: partial sit<>stands completed x3 with Leonel for repositioning in chair. Pt declined functional mobility or full transfers this date d/t reports of fatigue.      Outcome Measures:[facility] Daily Activity  Putting on and taking off regular lower body clothing: A lot  Bathing (including washing, rinsing, drying): A  lot  Putting on and taking off regular upper body clothing: A lot  Toileting, which includes using toilet, bedpan or urinal: A lot  Taking care of personal grooming such as brushing teeth: A little  Eating Meals: A little  Daily Activity - Total Score: 14        Education Documentation  ADL Training, taught by JAYME Gaxiola at 4/26/2024  4:31 PM.  Learner: Patient  Readiness: Acceptance  Method: Explanation, Demonstration  Response: Needs Reinforcement    ADL Training, taught by JAYME Gaxiola at 4/25/2024  5:47 PM.  Learner: Patient  Readiness: Acceptance  Method: Explanation, Demonstration  Response: Needs Reinforcement    Education Comments  Education provided on role of OT/POC, safety awareness throughout functional tasks/transfers, importance of activity/ rest routine, EC/WS techniques, and use of call light for assistance. Questions, comments and concerns addressed regarding OT.      Goals:  Encounter Problems       Encounter Problems (Active)       ADLs       Pt will complete ADL tasks at mod I with use of AE prn  (Progressing)       Start:  04/22/24    Expected End:  04/25/24               Functional Mobility       Pt will perform functional mobility household distances at mod I with use of RW.    (Progressing)       Start:  04/22/24    Expected End:  05/13/24               OT Transfers       Pt will perform functional transfers at mod I (Progressing)       Start:  04/22/24    Expected End:  05/13/24               UE Exercise       Pt will perform BUE exercises to improve strength and independence with functional transfers/ADL tasks.   (Progressing)       Start:  04/22/24    Expected End:  05/13/24

## 2024-04-26 NOTE — NURSING NOTE
Assumed pt care, pt was pleasant upon meeting. Pt scheduled for EEG today. Continuing to monitor neuro status

## 2024-04-26 NOTE — CARE PLAN
The patient's goals for the shift include      The clinical goals for the shift include safety, monitor neuro status    Over the shift, the patient did not make progress toward the following goals. Barriers to progression include NA - no neurological changes this shift to date. Recommendations to address these barriers include medications as ordered, continue to monitor, EEG today.

## 2024-04-26 NOTE — NURSING NOTE
Assumed care of this pt. Pt sitting in chair, breathing even and unlabored on RA. NAD. BSSR. Bed low, call light and belongings in reach. Continuing to monitor

## 2024-04-26 NOTE — PROGRESS NOTES
Physical Therapy    Physical Therapy Treatment    Patient Name: Darrell Arreola  MRN: [id number]  Today's Date: 4/26/2024  Time Calculation  Start Time: 1344  Stop Time: 1408  Time Calculation (min): 24 min       Assessment/Plan   PT Assessment  PT Assessment Results: Decreased strength, Decreased endurance, Impaired balance, Decreased mobility, Decreased cognition, Impaired judgement, Decreased safety awareness  Rehab Prognosis: Good  Evaluation/Treatment Tolerance: Patient tolerated treatment well, Patient limited by fatigue  Medical Staff Made Aware: Yes  Strengths: Attitude of self, Premorbid level of function  Barriers to Participation: Comorbidities, Ability to acquire knowledge  End of Session Communication: Bedside nurse  Assessment Comment: Pt is a 78 y.o. male who presents with decreased strength, endurance, mobility, safety awareness, and cognition. Pt requires CGA to minAx1 for transfers and ambulation. Pt would benefit from continued skilled therapy services to address his mobility deficits. Pt may benefit from 24 hour supervision upon discharge due to decreased safety awareness.  End of Session Patient Position: Up in chair, Alarm on  PT Plan  Inpatient/Swing Bed or Outpatient: Inpatient  PT Plan  Treatment/Interventions: Bed mobility, Transfer training, Gait training, Balance training, Strengthening, Endurance training, Therapeutic exercise, Therapeutic activity  PT Plan: Skilled PT  PT Frequency: 4 times per week  PT Discharge Recommendations: Moderate intensity level of continued care  Equipment Recommended upon Discharge: Wheeled walker  PT Recommended Transfer Status: Assist x1, Assistive device (RW)  PT - OK to Discharge: Yes      General Visit Information:   PT  Visit  PT Received On: 04/26/24  Response to Previous Treatment: Patient with no complaints from previous session.  General  Reason for Referral: impaired mobility, gait training, impaired cognition  Referred By: Rosa Champion,  "APRN-CNP  Past Medical History Relevant to Rehab: Atrial fibrillation, CHF, DM, HTN, severe tricuspid regurgitation, peripheral vascular disease, circulatory shock, pulmonary edema, acute renal failure, anemia, anaphylaxis, thrombocytopenia, obesity, severe mitral regurgitation,  cardiomyopathy, recurrent syncope and collapse.  Missed Visit: No  Family/Caregiver Present: No  Caregiver Feedback: n/a  Prior to Session Communication: Bedside nurse  Patient Position Received: Up in chair, Alarm on  Preferred Learning Style: verbal, visual  General Comment: Pt cleared for PT session per nursing. Pt agreeable to therapy.    Subjective   Precautions:  Precautions  Hearing/Visual Limitations: wears glasses, reports hearing aides  Medical Precautions: Fall precautions    Objective   Pain:  Pain Assessment  Pain Assessment: 0-10  Pain Score: 0 - No pain  Cognition:  Cognition  Overall Cognitive Status: Impaired  Orientation Level: Disoriented to time, Disoriented to place  Following Commands: Follows one step commands with repetition  Safety Judgment: Decreased awareness of need for assistance  Cognition Comments: When asked about his distraction during session pt stated \"the voices won't be quiet\". Pt with expressive aphasia and flat affect.  Impulsive: Mildly  Task Initiation: Initiates with cues  Postural Control:  Postural Control  Postural Control: Impaired  Posture Comment: Forward head; rounded shoulders  Static Sitting Balance  Static Sitting-Balance Support: Bilateral upper extremity supported, Feet supported  Static Sitting-Level of Assistance: Close supervision  Static Sitting-Comment/Number of Minutes: Pt sat in chair with arms with close supervision for safety for 2 mins. Pt demonstrated a slouched posture after 2 mins and required VC to sit upright.  Static Standing Balance  Static Standing-Balance Support: Bilateral upper extremity supported  Static Standing-Level of Assistance: Contact guard  Static " Standing-Comment/Number of Minutes: Pt stood with RW and CGA for safety and balance for 2 mins.  Extremity/Trunk Assessments:  RLE   RLE :  (pt demonstrates grossly 3/5 BLE strength)  LLE   LLE :  (pt demonstrates grossly 3/5 BLE strength)  Activity Tolerance:  Activity Tolerance  Endurance: Decreased tolerance for upright activites  Treatments:  Therapeutic Exercise  Therapeutic Exercise Performed: Yes  Therapeutic Exercise Activity 1: B seated toe raises x10  Therapeutic Exercise Activity 2: B seated heel raises x10  Therapeutic Exercise Activity 3: B seated LAQ x10  Therapeutic Exercise Activity 4: B seated hip flexion x10  Therapeutic Exercise Activity 5: B seated hip abduction/adduction x10    Ambulation/Gait Training  Ambulation/Gait Training Performed: Yes  Ambulation/Gait Training 1  Surface 1: Level tile  Device 1: Rolling walker  Assistance 1: Minimum assistance  Quality of Gait 1: Narrow base of support, Decreased step length, Shuffling gait  Comments/Distance (ft) 1: Pt ambulated 10ftx2 with RW and MinAx1 for balance and safety. VC's for upright stance and turning. Pt denied dizziness/lightheadedness.  Transfers  Transfer: Yes  Transfer 1  Transfer From 1: Sit to, Stand to  Transfer to 1: Stand, Sit  Technique 1: Sit to stand, Stand to sit  Transfer Device 1: Walker  Transfer Level of Assistance 1: Minimum assistance, Moderate verbal cues  Trials/Comments 1: Pt performed sit<>stand with minAx1 for forward lean and trunk descent control. VC's for proper hand placement and task initiation. Pt performed slowly and with effort.    Outcome Measures:  [facility] Basic Mobility  Turning from your back to your side while in a flat bed without using bedrails: None  Moving from lying on your back to sitting on the side of a flat bed without using bedrails: A little  Moving to and from bed to chair (including a wheelchair): A little  Standing up from a chair using your arms (e.g. wheelchair or bedside chair): A  little  To walk in hospital room: A little  Climbing 3-5 steps with railing: Total  Basic Mobility - Total Score: 17    Education Documentation  Mobility Training, taught by BRUCE DeleonPT at 4/26/2024  3:39 PM.  Learner: Patient  Readiness: Acceptance  Method: Explanation  Response: Verbalizes Understanding    Education Comments  No comments found.      OP EDUCATION:  Outpatient Education  Individual(s) Educated: Patient  Education Provided: Fall Risk, Posture    Encounter Problems       Encounter Problems (Active)       PT Problem       Mobility (Progressing)       Start:  04/22/24    Expected End:  05/22/24       Pt will perform all bed mobility modified independently to demonstrate improved functional mobility.             Transfers (Progressing)       Start:  04/22/24    Expected End:  05/22/24       Pt will perform sit<>stand transfer with LRAD modified independent for improved functional mobility.              Ambulation (Progressing)       Start:  04/22/24    Expected End:  05/22/24       Pt will ambulate 50ft with LRAD modified independent to demonstrate improved mobility.              Safety (Progressing)       Start:  04/22/24    Expected End:  05/22/24       Pt will demonstrate ability to utilize call light at all times when performing mobility to demonstrate improved safety awareness.              BRUCE DELEONPT

## 2024-04-26 NOTE — PROGRESS NOTES
[facility] informed by attending CNP that pt is refusing to go back to [facility] now, he states he will not go because they won't let him smoke. [facility] met with the pt, attempted to convince him that by changing facilities he will need to stay in the hospital longer where he also cannot smoke to wait on new approval with his insurance, pt is admant about going to a facility where he can smoke, gave ok for referral to [facility], await response on if they can. Will request cancellation of precert to [facility] and start new one with [facility]. [facility] asked PT to see the pt for updated eval.     04/26/24 1342   Discharge Planning   Patient expects to be discharged to: Referral to [facility] - new precert needed

## 2024-04-26 NOTE — PROGRESS NOTES
Darrell Arreola is a 78 y.o. male on day 3 of admission presenting with Altered mental status, unspecified altered mental status type.    Chart reviewed.  Urinalysis noted.     Subjective   Patient seen and examined.  Awake alert oriented x 3.  Chart and nursing notes reviewed.  Urinalysis noted.  On questioning, patient reports dysuria.  No hematuria.  Voiding.  No fevers, chills or sweats.  No other complaints.      Discussed discharge plan of care.  Patient states that he does not wish to discharge to [facility] as he cannot smoke cigarettes there.    Spoke with nursing, anticipate EEG today.  Mentation reportedly improved with Vimpat.  Discussed urinalysis.  No new issues.    Objective     Physical Exam  Vitals and nursing note reviewed.   Constitutional:       General: He is not in acute distress.     Appearance: Normal appearance. He is normal weight. He is not ill-appearing, toxic-appearing or diaphoretic.   HENT:      Head: Normocephalic and atraumatic.      Right Ear: Tympanic membrane normal.      Left Ear: Tympanic membrane normal.      Nose: Nose normal.      Mouth/Throat:      Mouth: Mucous membranes are moist.      Pharynx: Oropharynx is clear.   Eyes:      Extraocular Movements: Extraocular movements intact.      Conjunctiva/sclera: Conjunctivae normal.      Pupils: Pupils are equal, round, and reactive to light.   Cardiovascular:      Rate and Rhythm: Tachycardia present. Rhythm irregular.      Pulses: Normal pulses.      Heart sounds: Normal heart sounds. No murmur heard.  Pulmonary:      Effort: Pulmonary effort is normal. No respiratory distress.      Breath sounds: Normal breath sounds. No wheezing, rhonchi or rales.      Comments: Room air.  Abdominal:      General: Bowel sounds are normal. There is no distension.      Palpations: Abdomen is soft.      Tenderness: There is no abdominal tenderness.   Genitourinary:     Comments: Deferred.  Musculoskeletal:  "        General: No swelling or tenderness. Normal range of motion.      Cervical back: Normal range of motion and neck supple.   Skin:     General: Skin is warm and dry.      Capillary Refill: Capillary refill takes less than 2 seconds.   Neurological:      General: No focal deficit present.      Mental Status: He is alert. He is disoriented.      Comments: Awake alert and oriented 3.  Less confused.  Speech clear, coherent.  Intermittent expressive aphasia.  Follows all commands.  Generalized weakness.  No focal weakness.  Power equal 5-5 to bilateral upper and bilateral lower extremities.  Gait deferred.  Cranial nerves II through XII grossly intact.   Psychiatric:         Mood and Affect: Mood is anxious.         Behavior: Behavior normal.       Last Recorded Vitals  Blood pressure 103/50, pulse 90, temperature 36.2 °C (97.2 °F), temperature source Temporal, resp. rate 18, height 1.892 m (6' 2.49\"), weight 98.5 kg (217 lb 2.5 oz), SpO2 95%.    Intake/Output last 3 Shifts:  I/O last 3 completed shifts:  In: 110 (1.1 mL/kg) [IV Piggyback:110]  Out: 2300 (23.4 mL/kg) [Urine:2300 (0.6 mL/kg/hr)]  Weight: 98.5 kg     Telemetry atrial fibrillation rate 100's    Relevant Results  Results for orders placed or performed during the hospital encounter of 04/20/24 (from the past 24 hour(s))   Urinalysis with Reflex Microscopic   Result Value Ref Range    Color, Urine Yellow Light-Yellow, Yellow, Dark-Yellow    Appearance, Urine Ex.Turbid (N) Clear    Specific Gravity, Urine 1.019 1.005 - 1.035    pH, Urine 8.5 (N) 5.0, 5.5, 6.0, 6.5, 7.0, 7.5, 8.0    Protein, Urine 100 (2+) (A) NEGATIVE, 10 (TRACE), 20 (TRACE) mg/dL    Glucose, Urine Normal Normal mg/dL    Blood, Urine 0.5 (2+) (A) NEGATIVE    Ketones, Urine NEGATIVE NEGATIVE mg/dL    Bilirubin, Urine NEGATIVE NEGATIVE    Urobilinogen, Urine 4 (2+) (A) Normal mg/dL    Nitrite, Urine NEGATIVE NEGATIVE    Leukocyte Esterase, Urine 500 Jakob/µL (A) NEGATIVE   Microscopic Only, " Urine   Result Value Ref Range    WBC, Urine >50 (A) 1-5, NONE /HPF    RBC, Urine >20 (A) NONE, 1-2, 3-5 /HPF    Bacteria, Urine 4+ (A) NONE SEEN /HPF    Triple Phosphate Crystals, Urine 1+ NONE, 1+ /HPF   Basic Metabolic Panel   Result Value Ref Range    Glucose 116 (H) 65 - 99 mg/dL    Sodium 137 133 - 145 mmol/L    Potassium 3.9 3.4 - 5.1 mmol/L    Chloride 102 97 - 107 mmol/L    Bicarbonate 26 24 - 31 mmol/L    Urea Nitrogen 35 (H) 8 - 25 mg/dL    Creatinine 1.60 0.40 - 1.60 mg/dL    eGFR 44 (L) >60 mL/min/1.73m*2    Calcium 8.9 8.5 - 10.4 mg/dL    Anion Gap 9 <=19 mmol/L   Lavender Top   Result Value Ref Range    Extra Tube Hold for add-ons.    POCT GLUCOSE   Result Value Ref Range    POCT Glucose 166 (H) 74 - 99 mg/dL     No results found for the last 90 days.    No results found.    Scheduled medications  amiodarone, 200 mg, oral, Daily  apixaban, 5 mg, oral, BID  aspirin, 81 mg, oral, Daily  atorvastatin, 40 mg, oral, Nightly  cefTRIAXone, 1 g, intravenous, q24h  famotidine, 10 mg, oral, Daily  folic acid, 1 mg, oral, Daily  lacosamide, 100 mg, oral, BID  midodrine, 10 mg, oral, TID with meals  nystatin, 1 Application, Topical, BID  SITagliptin phosphate, 50 mg, oral, Daily  tamsulosin, 0.4 mg, oral, Daily before breakfast      Continuous medications     PRN medications  PRN medications: acetaminophen, oxygen      ASSESSMENT:  Altered mental status  Cognitive impairment  Syncope  Generalized weakness  Hypertension  Congestive heart failure, systolic  Hyperlipidemia  Atrial fibrillation  Sinus tachycardia  Oral anticoagulation  Type 2 diabetes mellitus  Headache - resolved    PLAN:  Patient doing well this morning.  Urinalysis noted.  Symptomatic, dysuria.  Afebrile.  Nontoxic-appearing.  Start IV Ceftriaxone.  Follow-up urine culture results.  Discharge on p.o antibiotics.  Mentation is stable.  Awake alert oriented x 3.  Forgetful.  No aphasia.  Follows all commands.  Generalized weakness.  No focal  weakness.  Neurology following.  Appreciate.  Continue Vimpat.  EEG today.  Telemetry atrial fibrillation rate controlled.  Blood pressure stable.  Stable cardiac.  Continue current medications.  Outpatient follow-up with Cardiology.  PT/OT.  Fall precautions.  Up with assistance only.  Bed and chair alarm at all times.  DVT prophylaxis Eliquis.  Pressure ulcer prevention measures.  Turn and reposition every 2 hours and as needed.  Heels off bed.  Offloading.  Supportive care.  Patient reassured.  PT OT recommend monitor intensity level of continued care.  Case management following for discharge planning.  Discussed patient's wishes with case management.  Follow-up.  Anticipate discharge after EEG, when arrangements are made by case management.  Discussed with patient, nursing case management, Dr. Sims.      Rosa Champion, ELENITA-CNP

## 2024-04-27 LAB
ANION GAP SERPL CALC-SCNC: 10 MMOL/L
BUN SERPL-MCNC: 33 MG/DL (ref 8–25)
CALCIUM SERPL-MCNC: 9.1 MG/DL (ref 8.5–10.4)
CHLORIDE SERPL-SCNC: 107 MMOL/L (ref 97–107)
CO2 SERPL-SCNC: 25 MMOL/L (ref 24–31)
CREAT SERPL-MCNC: 1.4 MG/DL (ref 0.4–1.6)
EGFRCR SERPLBLD CKD-EPI 2021: 51 ML/MIN/1.73M*2
GLUCOSE BLD MANUAL STRIP-MCNC: 167 MG/DL (ref 74–99)
GLUCOSE BLD MANUAL STRIP-MCNC: 186 MG/DL (ref 74–99)
GLUCOSE SERPL-MCNC: 113 MG/DL (ref 65–99)
POTASSIUM SERPL-SCNC: 3.8 MMOL/L (ref 3.4–5.1)
SODIUM SERPL-SCNC: 142 MMOL/L (ref 133–145)

## 2024-04-27 PROCEDURE — 2500000006 HC RX 250 W HCPCS SELF ADMINISTERED DRUGS (ALT 637 FOR ALL PAYERS): Performed by: INTERNAL MEDICINE

## 2024-04-27 PROCEDURE — 82947 ASSAY GLUCOSE BLOOD QUANT: CPT

## 2024-04-27 PROCEDURE — 1200000002 HC GENERAL ROOM WITH TELEMETRY DAILY

## 2024-04-27 PROCEDURE — 2500000001 HC RX 250 WO HCPCS SELF ADMINISTERED DRUGS (ALT 637 FOR MEDICARE OP): Performed by: NURSE PRACTITIONER

## 2024-04-27 PROCEDURE — 2500000004 HC RX 250 GENERAL PHARMACY W/ HCPCS (ALT 636 FOR OP/ED): Performed by: NURSE PRACTITIONER

## 2024-04-27 PROCEDURE — 2500000001 HC RX 250 WO HCPCS SELF ADMINISTERED DRUGS (ALT 637 FOR MEDICARE OP)

## 2024-04-27 PROCEDURE — 36415 COLL VENOUS BLD VENIPUNCTURE: CPT | Performed by: NURSE PRACTITIONER

## 2024-04-27 PROCEDURE — 2500000001 HC RX 250 WO HCPCS SELF ADMINISTERED DRUGS (ALT 637 FOR MEDICARE OP): Performed by: INTERNAL MEDICINE

## 2024-04-27 PROCEDURE — 80048 BASIC METABOLIC PNL TOTAL CA: CPT | Performed by: NURSE PRACTITIONER

## 2024-04-27 PROCEDURE — 2500000001 HC RX 250 WO HCPCS SELF ADMINISTERED DRUGS (ALT 637 FOR MEDICARE OP): Performed by: PSYCHIATRY & NEUROLOGY

## 2024-04-27 RX ORDER — LACOSAMIDE 100 MG/1
100 TABLET ORAL 2 TIMES DAILY
Qty: 60 TABLET | Refills: 0 | Status: SHIPPED | OUTPATIENT
Start: 2024-04-27 | End: 2024-05-27

## 2024-04-27 RX ORDER — FAMOTIDINE 10 MG/1
10 TABLET ORAL DAILY
Start: 2024-04-28

## 2024-04-27 RX ORDER — NYSTATIN 100000 [USP'U]/G
1 POWDER TOPICAL 2 TIMES DAILY
Start: 2024-04-27

## 2024-04-27 RX ORDER — FOLIC ACID 1 MG/1
1 TABLET ORAL DAILY
Start: 2024-04-28

## 2024-04-27 RX ORDER — TAMSULOSIN HYDROCHLORIDE 0.4 MG/1
0.4 CAPSULE ORAL
Start: 2024-04-28

## 2024-04-27 RX ADMIN — TAMSULOSIN HYDROCHLORIDE 0.4 MG: 0.4 CAPSULE ORAL at 06:34

## 2024-04-27 RX ADMIN — ASPIRIN 81 MG: 81 TABLET, COATED ORAL at 09:30

## 2024-04-27 RX ADMIN — LACOSAMIDE 100 MG: 100 TABLET, FILM COATED ORAL at 09:30

## 2024-04-27 RX ADMIN — MIDODRINE HYDROCHLORIDE 10 MG: 10 TABLET ORAL at 12:33

## 2024-04-27 RX ADMIN — CEFTRIAXONE SODIUM 1 G: 1 INJECTION, SOLUTION INTRAVENOUS at 12:11

## 2024-04-27 RX ADMIN — NYSTATIN 1 APPLICATION: 100000 POWDER TOPICAL at 21:25

## 2024-04-27 RX ADMIN — SITAGLIPTIN 50 MG: 50 TABLET, FILM COATED ORAL at 09:30

## 2024-04-27 RX ADMIN — MIDODRINE HYDROCHLORIDE 10 MG: 10 TABLET ORAL at 09:30

## 2024-04-27 RX ADMIN — AMIODARONE HYDROCHLORIDE 200 MG: 200 TABLET ORAL at 09:30

## 2024-04-27 RX ADMIN — FOLIC ACID 1 MG: 1 TABLET ORAL at 09:30

## 2024-04-27 RX ADMIN — APIXABAN 5 MG: 5 TABLET, FILM COATED ORAL at 21:25

## 2024-04-27 RX ADMIN — MIDODRINE HYDROCHLORIDE 10 MG: 10 TABLET ORAL at 17:34

## 2024-04-27 RX ADMIN — NYSTATIN 1 APPLICATION: 100000 POWDER TOPICAL at 11:53

## 2024-04-27 RX ADMIN — FAMOTIDINE 10 MG: 20 TABLET, FILM COATED ORAL at 09:30

## 2024-04-27 RX ADMIN — ATORVASTATIN CALCIUM 40 MG: 40 TABLET, FILM COATED ORAL at 21:24

## 2024-04-27 RX ADMIN — LACOSAMIDE 100 MG: 100 TABLET, FILM COATED ORAL at 21:24

## 2024-04-27 RX ADMIN — APIXABAN 5 MG: 5 TABLET, FILM COATED ORAL at 09:30

## 2024-04-27 ASSESSMENT — COGNITIVE AND FUNCTIONAL STATUS - GENERAL
HELP NEEDED FOR BATHING: A LOT
WALKING IN HOSPITAL ROOM: A LITTLE
HELP NEEDED FOR BATHING: A LITTLE
MOBILITY SCORE: 19
CLIMB 3 TO 5 STEPS WITH RAILING: TOTAL
CLIMB 3 TO 5 STEPS WITH RAILING: TOTAL
STANDING UP FROM CHAIR USING ARMS: A LITTLE
EATING MEALS: A LITTLE
MOVING TO AND FROM BED TO CHAIR: A LITTLE
DAILY ACTIVITIY SCORE: 18
WALKING IN HOSPITAL ROOM: A LITTLE
MOVING TO AND FROM BED TO CHAIR: A LITTLE
DRESSING REGULAR LOWER BODY CLOTHING: A LITTLE
DRESSING REGULAR UPPER BODY CLOTHING: A LITTLE
MOBILITY SCORE: 18
TOILETING: A LITTLE
PERSONAL GROOMING: A LITTLE

## 2024-04-27 ASSESSMENT — PAIN - FUNCTIONAL ASSESSMENT
PAIN_FUNCTIONAL_ASSESSMENT: 0-10
PAIN_FUNCTIONAL_ASSESSMENT: 0-10

## 2024-04-27 ASSESSMENT — PAIN SCALES - GENERAL
PAINLEVEL_OUTOF10: 0 - NO PAIN

## 2024-04-27 ASSESSMENT — PAIN SCALES - WONG BAKER: WONGBAKER_NUMERICALRESPONSE: NO HURT

## 2024-04-27 ASSESSMENT — PAIN SCALES - PAIN ASSESSMENT IN ADVANCED DEMENTIA (PAINAD): BREATHING: NORMAL

## 2024-04-27 NOTE — NURSING NOTE
Assumed pt care ~0700. Pt was awake and responsive, wanted to order breakfast. Student nurse at bedside to help.

## 2024-04-27 NOTE — PROGRESS NOTES
Darrell Arreola is a 78 y.o. male on day 4 of admission presenting with Altered mental status, unspecified altered mental status type.    Subjective   Patient was seen and examined.  Lying in the bed.  Comfortable.  Not in acute distress.  Patient denied any headache, dizziness, nausea or vomiting.       Objective     Physical Exam  HEENT:  Head externally atraumatic,  extraocular movements intact, oral mucosa moist  Neck:  Supple, no JVP, no palpable adenopathy or thyromegaly.  No carotid bruit.  Chest:  Clear to auscultation and resonant.  Heart:  Regular rate and rhythm, no murmur or gallop could be appreciated.  Abdomen:  Soft, nontender, bowel sounds present, normoactive, no palpable hepatosplenomegaly.  Extremities:  No edema, pulses present, no cyanosis or clubbing.  CNS:  Patient alert, oriented to time, place and person.    No new deficit.  Cranial nerves 2-12 grossly intact  Skin:  No active rash.  Musculoskeletal:  No  apparent joint swelling or erythema, range of movement normal.  Last Recorded Vitals  Heart Rate:  []   Temp:  [36.2 °C (97.2 °F)-37.3 °C (99.1 °F)]   Resp:  [18]   BP: ()/(56-86)   Weight:  [97.5 kg (214 lb 15.2 oz)]   SpO2:  [96 %-100 %]     Intake/Output last 3 Shifts:  I/O last 3 completed shifts:  In: 942 (9.7 mL/kg) [P.O.:942]  Out: 830 (8.5 mL/kg) [Urine:830 (0.2 mL/kg/hr)]  Weight: 97.5 kg     Relevant Results  Susceptibility data from last 90 days.  Collected Specimen Info Organism   04/26/24 Urine from Clean Catch/Voided Gram Negative Bacilli     Results for orders placed or performed during the hospital encounter of 04/20/24 (from the past 24 hour(s))   POCT GLUCOSE   Result Value Ref Range    POCT Glucose 154 (H) 74 - 99 mg/dL   Basic Metabolic Panel   Result Value Ref Range    Glucose 113 (H) 65 - 99 mg/dL    Sodium 142 133 - 145 mmol/L    Potassium 3.8 3.4 - 5.1 mmol/L    Chloride 107 97 - 107 mmol/L    Bicarbonate 25 24 - 31 mmol/L    Urea Nitrogen 33 (H) 8 - 25  mg/dL    Creatinine 1.40 0.40 - 1.60 mg/dL    eGFR 51 (L) >60 mL/min/1.73m*2    Calcium 9.1 8.5 - 10.4 mg/dL    Anion Gap 10 <=19 mmol/L   POCT GLUCOSE   Result Value Ref Range    POCT Glucose 186 (H) 74 - 99 mg/dL        Current Facility-Administered Medications:     acetaminophen (Tylenol) tablet 650 mg, 650 mg, oral, q6h PRN, LIV Vilchis, 650 mg at 04/23/24 2150    amiodarone (Pacerone) tablet 200 mg, 200 mg, oral, Daily, Milan Sims MD, 200 mg at 04/27/24 0930    apixaban (Eliquis) tablet 5 mg, 5 mg, oral, BID, Milan Sims MD, 5 mg at 04/27/24 0930    aspirin EC tablet 81 mg, 81 mg, oral, Daily, Milan Sims MD, 81 mg at 04/27/24 0930    atorvastatin (Lipitor) tablet 40 mg, 40 mg, oral, Nightly, Milan Sims MD, 40 mg at 04/26/24 2122    cefTRIAXone (Rocephin) IVPB 1 g, 1 g, intravenous, q24h, LIV Vilchis, Stopped at 04/27/24 1241    famotidine (Pepcid) tablet 10 mg, 10 mg, oral, Daily, Bill Mcneill, PharmD, 10 mg at 04/27/24 0930    folic acid (Folvite) tablet 1 mg, 1 mg, oral, Daily, Milan Sims MD, 1 mg at 04/27/24 0930    lacosamide (Vimpat) tablet 100 mg, 100 mg, oral, BID, Jack Cuenca MD, 100 mg at 04/27/24 0930    midodrine (Proamatine) tablet 10 mg, 10 mg, oral, TID with meals, Milan Sims MD, 10 mg at 04/27/24 1734    nystatin (Mycostatin) 100,000 unit/gram powder 1 Application, 1 Application, Topical, BID, LIV Vilchis, 1 Application at 04/27/24 1153    oxygen (O2) therapy, , inhalation, Continuous PRN - O2/gases, Milan Sims MD    SITagliptin phosphate (Januvia) tablet 50 mg, 50 mg, oral, Daily, Milan Sims MD, 50 mg at 04/27/24 0930    tamsulosin (Flomax) 24 hr capsule 0.4 mg, 0.4 mg, oral, Daily before breakfast, Milan Sims MD, 0.4 mg at 04/27/24 0634   Assessment/Plan   Principal Problem:    Altered mental status, unspecified altered mental status type  Cognitive  impairment  Syncope  Generalized weakness  Hypertension  Congestive heart failure systolic dysfunction  Hyperlipidemia  Atrial fibrillation  Sinus tachycardia  Oral anticoagulation dactylitis type II  Headache  Chronic kidney disease stage IIIb  Tobacco abuse  Plan: Continue current function get supportive care.  Patient is doing fine at this time.  Patient medically stable.  More seizures.  Patient medically stable for discharge.  She does not want to the facility where he came from because he is not allowed to smoke..  Patient medically stable for discharge management made.         Milan Sims MD

## 2024-04-27 NOTE — PROGRESS NOTES
[facility] can accept pending precert approval, precert initiated by  direct submit team. Precert pending.     04/26/24 2038   Discharge Planning   Patient expects to be discharged to: [facility] - precert pending

## 2024-04-27 NOTE — NURSING NOTE
Assumed patient care. Patient relaxing in bed. No needs at this time call light in reach bed alarm set.

## 2024-04-27 NOTE — PROGRESS NOTES
[facility]  Inpatient Neurology Follow-Up Visit    Date of Service: 4/27/2024-Hospital Day: 8     Impressions: Remarkable recovery after lacosamide IV, likely a focal seizure presenting as aphasia as a late complication prior stroke.    Recommendations/Plans: Transition to oral lacosamide 100 mg twice daily.  After his EEG on 4/26/2024 he should be neurologically stable for return status which he came.  Outpatient follow-up with Ira Rios MD (Neurology).      Hospital Synopsis: Darrell is a 78 year-ole right-handed man with D2, HTN, HF, Afib already on Eliquis anticoagulation presenting with fluctuating confusion and disorientation without focal motor deficits.  He is unable to provide any history and exhibits a paucity of verbal output but is able to follow some commands.  Admission notes indicate he presented with altered mental status and possible aphasia.  Dr. Rios found him awake and oriented x 4 although his cognition was impaired with mild perseveration mild to moderate global aphasia and inability to make sense of his surroundings.  She found no strength asymmetry nor any sensory neglect.  Noncontrast CT brain attack (4/20/2024) showed no acute intracranial pathology, but evidence of deep white matter vascular changes. CT angio of the head and neck with and without contrast (4/20/2024) showed no flow-limiting stenoses or occlusions in the head or neck vessels.  Noncontrast MRI brain (4/21/2024) showed no acute intracranial pathology and unchanged small focus of encephalomalacia in the left parietal lobe.  We tried each day on 4/22/24, 4/23/24, and 4/24/24 to get him to do an EEG and refused saying he was too sick.    Interval History:  This morning we started lacosamide 100 mg IV and as the day has progressed he has become more and more verbal. EEG is anticipated on 4/26/24.       Current Facility-Administered Medications:     acetaminophen (Tylenol) tablet 650 mg, 650  mg, oral, q6h PRN, LIV Vilchis, 650 mg at 04/23/24 2150    amiodarone (Pacerone) tablet 200 mg, 200 mg, oral, Daily, Milan Sims MD, 200 mg at 04/26/24 0907    apixaban (Eliquis) tablet 5 mg, 5 mg, oral, BID, Milan Sims MD, 5 mg at 04/26/24 2122    aspirin EC tablet 81 mg, 81 mg, oral, Daily, Milan Sims MD, 81 mg at 04/26/24 0847    atorvastatin (Lipitor) tablet 40 mg, 40 mg, oral, Nightly, Milan Sism MD, 40 mg at 04/26/24 2122    cefTRIAXone (Rocephin) IVPB 1 g, 1 g, intravenous, q24h, LIV Vilchis, Stopped at 04/26/24 1154    famotidine (Pepcid) tablet 10 mg, 10 mg, oral, Daily, Bill Mcneill, PharmD, 10 mg at 04/26/24 0847    folic acid (Folvite) tablet 1 mg, 1 mg, oral, Daily, Milan Sims MD, 1 mg at 04/26/24 0907    lacosamide (Vimpat) tablet 100 mg, 100 mg, oral, BID, Jack Cuenca MD, 100 mg at 04/26/24 2122    midodrine (Proamatine) tablet 10 mg, 10 mg, oral, TID with meals, Milan Sims MD, 10 mg at 04/26/24 1824    nystatin (Mycostatin) 100,000 unit/gram powder 1 Application, 1 Application, Topical, BID, LIV Vilchis, 1 Application at 04/26/24 2122    oxygen (O2) therapy, , inhalation, Continuous PRN - O2/gases, Milan Sims MD    SITagliptin phosphate (Januvia) tablet 50 mg, 50 mg, oral, Daily, Milan Sims MD, 50 mg at 04/26/24 0847    tamsulosin (Flomax) 24 hr capsule 0.4 mg, 0.4 mg, oral, Daily before breakfast, Milan Sims MD, 0.4 mg at 04/27/24 0634     Examination:   Vitals:    04/27/24 0700   BP: 142/86   Pulse: 100   Resp: 18   Temp: 36.2 °C (97.2 °F)   SpO2: 96%      Neurological Examination: He was alert oriented to person place but not time nor what has happened over the last few days unable to to talk with others; he had a few paraphasias and word substitutions but was quite fluent he was 100% receptive to command.  He was nonfocal on his motor and reflex examinations.    Medical  Decision Making:   MR brain wo IV contrast    Result Date: 4/21/2024  Interpreted By:  Benedict Sutton, STUDY: MR BRAIN WO IV CONTRAST;  4/21/2024 12:04 pm   INDICATION: Signs/Symptoms: TIA.   COMPARISON: Head CT and CTA, 04/20/2024 and brain MRI, 10/09/2023   ACCESSION NUMBER(S): [id number]   ORDERING CLINICIAN: JOSÉ LUIS FULTON   TECHNIQUE: Axial T2, FLAIR, DWI, gradient echo T2 and sagittal and coronal T1 weighted images of the brain were acquired.   FINDINGS: CSF Spaces: The ventricles, sulci and basal cisterns are appropriate for the degree of volume loss. No abnormal extra-axial collection.   Parenchyma: There is no restricted diffusion to suggest acute infarction.  Generalized parenchymal volume loss is unchanged. Nonspecific T2 hyperintense signal in the white matter, similar to previous and likely secondary to chronic small vessel ischemic disease. Unchanged small focus of encephalomalacia in the left parietal lobe, possibly a chronic infarct. No evidence of intracranial hemorrhage. There is no mass effect or midline shift.   Paranasal Sinuses and Mastoids: The paranasal sinuses are clear. Trace right mastoid effusion.   Orbits: Bilateral lens replacement.       No acute intracranial pathology.   MACRO: None   Signed by: Benedict Sutton 4/21/2024 12:11 PM Dictation workstation:   [id number]    MR brain wo IV contrast    Result Date: 10/9/2023  Interpreted By:  Earl Barnhart, STUDY: MR BRAIN WO IV CONTRAST; 10/9/2023 12:09 pm   INDICATION: Signs/Symptoms:CVA. Aphasia. Unable to respond. Confusion.   COMPARISON: None. CT head 10/08/2023   ACCESSION NUMBER(S): [id number]   ORDERING CLINICIAN: COREY MENA   TECHNIQUE: Multiplanar T1, T2, FLAIR and diffusion-weighted images were obtained.  Gradient axial images were also obtained.   FINDINGS: There is no evidence for acute infarction.  There is no evidence for intracranial hemorrhage or mass effect.  There are patchy white matter hyperintensities  bilaterally probably secondary to age related changes and/or white matter ischemic disease.  Age-related atrophy is present. Paranasal sinuses are clear. Normal flow-voids indicating vessel patency are identified within the carotid siphons and basilar artery.       No acute intracranial pathologic findings are identified. Age-related findings are present.   MACRO: none   Signed by: Earl Barnhart 10/9/2023 12:41 PM Dictation workstation:   [id number]      No EEG results found for the past 12 months     Results for orders placed or performed during the hospital encounter of 04/20/24 (from the past 24 hour(s))   POCT GLUCOSE   Result Value Ref Range    POCT Glucose 166 (H) 74 - 99 mg/dL   POCT GLUCOSE   Result Value Ref Range    POCT Glucose 146 (H) 74 - 99 mg/dL   POCT GLUCOSE   Result Value Ref Range    POCT Glucose 154 (H) 74 - 99 mg/dL   Basic Metabolic Panel   Result Value Ref Range    Glucose 113 (H) 65 - 99 mg/dL    Sodium 142 133 - 145 mmol/L    Potassium 3.8 3.4 - 5.1 mmol/L    Chloride 107 97 - 107 mmol/L    Bicarbonate 25 24 - 31 mmol/L    Urea Nitrogen 33 (H) 8 - 25 mg/dL    Creatinine 1.40 0.40 - 1.60 mg/dL    eGFR 51 (L) >60 mL/min/1.73m*2    Calcium 9.1 8.5 - 10.4 mg/dL    Anion Gap 10 <=19 mmol/L      I personally spent 20 minutes today (exclusive of procedures) providing care for this patient, including preparation, verbal huddle with nursing, my direct face-to-face time with him, including education and counseling regarding his neurological condition and management plan, including chart documentation and other services, including review of his past medical records, imaging and other diagnostic results, and coordination of care as specified in the encounter.     Jack Cuenca MD  [facility] Neurohospitalist  (contact by secure message preferred)

## 2024-04-27 NOTE — DISCHARGE SUMMARY
Discharge Diagnosis  Altered mental status, unspecified altered mental status type    Issues Requiring Follow-Up  Altered mental status  Seizure disorder  Orthostatic hypotension  Chronic congestive heart failure systolic dysfunction  Supraventricular tachycardia  Tenia inguinalis  Atrial fibrillation  BPH  Tobacco abuse    Discharge Meds     Your medication list        START taking these medications        Instructions Last Dose Given Next Dose Due   lacosamide 100 mg tablet  Commonly known as: Vimpat      Take 1 tablet (100 mg) by mouth 2 times a day.       nystatin 100,000 unit/gram powder  Commonly known as: Mycostatin      Apply 1 Application topically 2 times a day.              CHANGE how you take these medications        Instructions Last Dose Given Next Dose Due   amiodarone 200 mg tablet  Commonly known as: Pacerone  What changed: Another medication with the same name was removed. Continue taking this medication, and follow the directions you see here.      Take 1 tablet (200 mg) by mouth once daily. Do not start before November 2, 2023.       famotidine 10 mg tablet  Commonly known as: Pepcid  Start taking on: April 28, 2024  What changed:   medication strength  how much to take      Take 1 tablet (10 mg) by mouth once daily.       folic acid 1 mg tablet  Commonly known as: Folvite  Start taking on: April 28, 2024  What changed: how much to take      Take 1 tablet (1 mg) by mouth once daily.              CONTINUE taking these medications        Instructions Last Dose Given Next Dose Due   Eliquis 5 mg tablet  Generic drug: apixaban           insulin lispro 100 unit/mL injection  Commonly known as: HumaLOG      Inject 0-0.1 mL (0-10 Units) under the skin 3 times a day with meals. Take as directed per insulin instructions.       midodrine 10 mg tablet  Commonly known as: Proamatine      Take 1 tablet (10 mg) by mouth 3 times a day with meals.       rosuvastatin 10 mg tablet  Commonly known as: Crestor            SITagliptin phosphate 50 mg tablet  Commonly known as: Januvia           tamsulosin 0.4 mg 24 hr capsule  Commonly known as: Flomax  Start taking on: April 28, 2024      Take 1 capsule (0.4 mg) by mouth once daily in the morning. Take before meals.                 Where to Get Your Medications        You can get these medications from any pharmacy    Bring a paper prescription for each of these medications  lacosamide 100 mg tablet       Information about where to get these medications is not yet available    Ask your nurse or doctor about these medications  famotidine 10 mg tablet  folic acid 1 mg tablet  nystatin 100,000 unit/gram powder  tamsulosin 0.4 mg 24 hr capsule         Test Results Pending At Discharge  Pending Labs       Order Current Status    Urine culture Preliminary result            Hospital Course   Patient was admitted with a complaint of recurrent change in mental status and syncopal episode.  Patient  Source: Hospital.  Patient had a seizure.  Patient was followed by palpation.  Resolved.  Patient is doing better now.  Patient is being discharged in a stable condition.  Patient repeatedly advised to increase to the hospital to quit smoking.    Pertinent Physical Exam At Time of Discharge  Physical Exam    Outpatient Follow-Up  No future appointments.      Milan Sims MD

## 2024-04-28 VITALS
TEMPERATURE: 97.7 F | HEIGHT: 74 IN | SYSTOLIC BLOOD PRESSURE: 145 MMHG | DIASTOLIC BLOOD PRESSURE: 66 MMHG | BODY MASS INDEX: 27.25 KG/M2 | RESPIRATION RATE: 18 BRPM | OXYGEN SATURATION: 96 % | HEART RATE: 90 BPM | WEIGHT: 212.3 LBS

## 2024-04-28 LAB — BACTERIA UR CULT: ABNORMAL

## 2024-04-28 PROCEDURE — 2500000001 HC RX 250 WO HCPCS SELF ADMINISTERED DRUGS (ALT 637 FOR MEDICARE OP): Performed by: NURSE PRACTITIONER

## 2024-04-28 PROCEDURE — 2500000001 HC RX 250 WO HCPCS SELF ADMINISTERED DRUGS (ALT 637 FOR MEDICARE OP)

## 2024-04-28 PROCEDURE — 2500000004 HC RX 250 GENERAL PHARMACY W/ HCPCS (ALT 636 FOR OP/ED): Performed by: NURSE PRACTITIONER

## 2024-04-28 PROCEDURE — 2500000001 HC RX 250 WO HCPCS SELF ADMINISTERED DRUGS (ALT 637 FOR MEDICARE OP): Performed by: PSYCHIATRY & NEUROLOGY

## 2024-04-28 PROCEDURE — 2500000006 HC RX 250 W HCPCS SELF ADMINISTERED DRUGS (ALT 637 FOR ALL PAYERS): Performed by: INTERNAL MEDICINE

## 2024-04-28 PROCEDURE — 2500000001 HC RX 250 WO HCPCS SELF ADMINISTERED DRUGS (ALT 637 FOR MEDICARE OP): Performed by: INTERNAL MEDICINE

## 2024-04-28 RX ADMIN — NYSTATIN 1 APPLICATION: 100000 POWDER TOPICAL at 08:34

## 2024-04-28 RX ADMIN — FAMOTIDINE 10 MG: 20 TABLET, FILM COATED ORAL at 08:34

## 2024-04-28 RX ADMIN — MIDODRINE HYDROCHLORIDE 10 MG: 10 TABLET ORAL at 08:35

## 2024-04-28 RX ADMIN — APIXABAN 5 MG: 5 TABLET, FILM COATED ORAL at 08:34

## 2024-04-28 RX ADMIN — MIDODRINE HYDROCHLORIDE 10 MG: 10 TABLET ORAL at 11:17

## 2024-04-28 RX ADMIN — ASPIRIN 81 MG: 81 TABLET, COATED ORAL at 08:35

## 2024-04-28 RX ADMIN — FOLIC ACID 1 MG: 1 TABLET ORAL at 08:34

## 2024-04-28 RX ADMIN — AMIODARONE HYDROCHLORIDE 200 MG: 200 TABLET ORAL at 08:34

## 2024-04-28 RX ADMIN — LACOSAMIDE 100 MG: 100 TABLET, FILM COATED ORAL at 08:34

## 2024-04-28 RX ADMIN — CEFTRIAXONE SODIUM 1 G: 1 INJECTION, SOLUTION INTRAVENOUS at 11:17

## 2024-04-28 RX ADMIN — TAMSULOSIN HYDROCHLORIDE 0.4 MG: 0.4 CAPSULE ORAL at 06:22

## 2024-04-28 RX ADMIN — SITAGLIPTIN 50 MG: 50 TABLET, FILM COATED ORAL at 08:34

## 2024-04-28 ASSESSMENT — PAIN SCALES - PAIN ASSESSMENT IN ADVANCED DEMENTIA (PAINAD)
TOTALSCORE: 0
BODYLANGUAGE: RELAXED
CONSOLABILITY: NO NEED TO CONSOLE
BREATHING: NORMAL
FACIALEXPRESSION: SMILING OR INEXPRESSIVE

## 2024-04-28 ASSESSMENT — COGNITIVE AND FUNCTIONAL STATUS - GENERAL
DRESSING REGULAR UPPER BODY CLOTHING: A LITTLE
EATING MEALS: A LITTLE
TOILETING: A LITTLE
STANDING UP FROM CHAIR USING ARMS: A LITTLE
PERSONAL GROOMING: A LITTLE
DAILY ACTIVITIY SCORE: 18
MOBILITY SCORE: 18
HELP NEEDED FOR BATHING: A LITTLE
DRESSING REGULAR LOWER BODY CLOTHING: A LITTLE
MOVING TO AND FROM BED TO CHAIR: A LITTLE
CLIMB 3 TO 5 STEPS WITH RAILING: TOTAL
WALKING IN HOSPITAL ROOM: A LITTLE

## 2024-04-28 ASSESSMENT — PAIN SCALES - GENERAL: PAINLEVEL_OUTOF10: 0 - NO PAIN

## 2024-04-28 ASSESSMENT — PAIN - FUNCTIONAL ASSESSMENT: PAIN_FUNCTIONAL_ASSESSMENT: 0-10

## 2024-04-28 NOTE — NURSING NOTE
Pt states he still has burning sensation when he urinates.  Nursing explained that he was started on IV antbx and they needed time to work

## 2024-04-28 NOTE — NURSING NOTE
Assisted pt ordering dinner, states the burning he feels when he urinates is starting to get better.

## 2024-04-28 NOTE — CARE PLAN
Problem: Pain  Goal: My pain/discomfort is manageable  Outcome: Progressing     Problem: Safety  Goal: Patient will be injury free during hospitalization  Outcome: Progressing     Problem: Daily Care  Goal: Daily care needs are met  Outcome: Progressing     Problem: Psychosocial Needs  Goal: Demonstrates ability to cope with hospitalization/illness  Outcome: Progressing  Goal: Collaborate with me, my family, and caregiver to identify my specific goals  Outcome: Progressing     Problem: Discharge Barriers  Goal: My discharge needs are met  Outcome: Progressing     Problem: Skin  Goal: Promote skin healing  Outcome: Progressing     Problem: ADLs  Goal: Pt will complete ADL tasks at mod I with use of AE prn   Outcome: Progressing     Problem: Fall/Injury  Goal: Not fall by end of shift  Outcome: Progressing  Goal: Be free from injury by end of the shift  Outcome: Progressing  Goal: Verbalize understanding of personal risk factors for fall in the hospital  Outcome: Progressing  Goal: Verbalize understanding of risk factor reduction measures to prevent injury from fall in the home  Outcome: Progressing  Goal: Use assistive devices by end of the shift  Outcome: Progressing  Goal: Pace activities to prevent fatigue by end of the shift  Outcome: Progressing   The patient's goals for the shift include      The clinical goals for the shift include patient will remain free from falls/injuries    Over the shift, the patient did make progress toward the following goals.

## 2024-04-28 NOTE — PROGRESS NOTES
He was discharged before I could see him.    Jack Cuenca MD  [facility] Neurohospitalist  Secure message preferred

## 2024-04-28 NOTE — NURSING NOTE
Report called to [facility], Report given to receiving nurse Fatimah GARCIA @ the facility @ 1515pm. Patient to be transported @ 1530pm

## 2024-04-28 NOTE — CARE PLAN
We have precert for this pt; the auth number is [id number]; it is good from 4/27-5/1  [facility] can [facility] today  7000 completed in HENS.  Need to set up discharge time and notify pts nurse and family.  11:53 just received text from Liaison for [facility]; she is trying to confirm the precert; when she can confirm then the pt may be discharged.    DISCHARGE PATIENT TODAY TO [facility]-after [facility] confirms the precert.

## 2024-04-28 NOTE — CARE PLAN
Problem: Skin  Goal: Promote skin healing  Flowsheets (Taken 4/28/2024 0556)  Promote skin healing:   Assess skin/pad under line(s)/device(s)   Ensure correct size (line/device) and apply per  instructions   The patient's goals for the shift include      The clinical goals for the shift include safety

## 2024-04-28 NOTE — CARE PLAN
Per Lilibeth from [facility], the precert was confirmed.  7000 completed.  Discharge time is 3:30 pm today  Facility and nurse Milly is aware of the discharge time.  Phoned pts nephew Abdoulaye Arreola [phone] and informed him that [facility]      [facility]

## 2024-04-28 NOTE — CARE PLAN
Problem: Pain  Goal: My pain/discomfort is manageable  4/28/2024 1041 by Rosa Cesar RN  Outcome: Progressing  4/28/2024 1040 by Rosa Cesar RN  Outcome: Progressing     Problem: Safety  Goal: Patient will be injury free during hospitalization  4/28/2024 1041 by Rosa Cesar RN  Outcome: Progressing  4/28/2024 1040 by Rosa Cesar RN  Outcome: Progressing     Problem: Daily Care  Goal: Daily care needs are met  4/28/2024 1041 by Rosa Cesar RN  Outcome: Progressing  4/28/2024 1040 by Rosa Cesar RN  Outcome: Progressing     Problem: Psychosocial Needs  Goal: Demonstrates ability to cope with hospitalization/illness  4/28/2024 1041 by Rosa Cesar RN  Outcome: Progressing  4/28/2024 1040 by Rosa Cesar RN  Outcome: Progressing  Goal: Collaborate with me, my family, and caregiver to identify my specific goals  4/28/2024 1041 by Rosa Cesar RN  Outcome: Progressing  4/28/2024 1040 by Rosa Cesar RN  Outcome: Progressing     Problem: Discharge Barriers  Goal: My discharge needs are met  4/28/2024 1041 by Rosa Cesar RN  Outcome: Progressing  4/28/2024 1040 by Rosa Cesar RN  Outcome: Progressing     Problem: Skin  Goal: Promote skin healing  4/28/2024 1041 by Rosa Cesar RN  Outcome: Progressing  Flowsheets (Taken 4/28/2024 1041)  Promote skin healing:   Protective dressings over bony prominences   Ensure correct size (line/device) and apply per  instructions   Turn/reposition every 2 hours/use positioning/transfer devices  4/28/2024 1040 by oRsa Cesar RN  Outcome: Progressing     Problem: ADLs  Goal: Pt will complete ADL tasks at mod I with use of AE prn   4/28/2024 1041 by Rosa Cesar RN  Outcome: Progressing  4/28/2024 1040 by Rosa Cesar RN  Outcome: Progressing     Problem: Fall/Injury  Goal: Not fall by end of shift  4/28/2024 1041 by Rosa Cesar  RN  Outcome: Progressing  4/28/2024 1040 by Rosa Cesar RN  Outcome: Progressing  Goal: Be free from injury by end of the shift  4/28/2024 1041 by Rosa Cesar RN  Outcome: Progressing  4/28/2024 1040 by Rosa Cesar RN  Outcome: Progressing  Goal: Verbalize understanding of personal risk factors for fall in the hospital  4/28/2024 1041 by Rosa Cesar RN  Outcome: Progressing  4/28/2024 1040 by Rosa Cesar RN  Outcome: Progressing  Goal: Verbalize understanding of risk factor reduction measures to prevent injury from fall in the home  4/28/2024 1041 by Rosa Cesar RN  Outcome: Progressing  4/28/2024 1040 by Rosa Cesar RN  Outcome: Progressing  Goal: Use assistive devices by end of the shift  4/28/2024 1041 by Rosa Cesar RN  Outcome: Progressing  4/28/2024 1040 by Rosa Cesar RN  Outcome: Progressing  Goal: Pace activities to prevent fatigue by end of the shift  4/28/2024 1041 by Rosa Cesar RN  Outcome: Progressing  4/28/2024 1040 by Rosa Cesar RN  Outcome: Progressing   The patient's goals for the shift include      The clinical goals for the shift include patient will remain free from falls/injuries    Over the shift, the patient did make progress toward the following goals.

## 2024-04-28 NOTE — CARE PLAN
Pt has a discharge order in the computer; Waiting for precert for [facility]. Sent a message to Lashell Yost via [place] asking her to check on the precert  09:11 the 7000 was started in [employer]; sent the discharge order and [place] to [facility].  Waiting for precert      DISCHARGE PLAN: DO NOT DISCHARGE PATIENT BEFORE SPEAKING WITH CARE COORDINATION; NEED PRECERT FOR [facility]. NEED TO COMPLETE THE 7000 IN [employer] PRIOR TO DISCHARGE.

## 2024-04-28 NOTE — CARE PLAN
We have precert for [facility]; the auth number is [id number]; it is good for 4/27-5/1  Daily Gamble sent info to [facility] via [place].  This Care Coordinator sent message to Supervisor [place] SWEET Informing her of the precert and asking if there is a weekend /number for [facility].  7000 STARTED IN HENS--NEEDS TO BE COMPLETED.    DISCHARGE PLAN: [facility] --DO NOT DISCHARGE PT UNTIL [facility] HAS BEEN CONTACTED AND CONFIRMED THAT THEY ARE READY TO TAKE THIS PT  7000 MUST BE FINISHED PRIOR TO DISCHARGE.

## 2024-04-28 NOTE — NURSING NOTE
Discharge order noted.  Pt at [facility] prior to admission and he did not want to go back to this facility.  Nursing staff called care coordination on call, spoke to [place].  Per [place], a precert was stopped for [place] and a new precert was started for [facility]

## 2024-05-14 NOTE — DOCUMENTATION CLARIFICATION NOTE
"    PATIENT:               CASA TUCKER  ACCT #:                  [id number]  MRN:                       [id number]  :                       1945  ADMIT DATE:       2024 1:47 PM  DISCH DATE:        2024 3:57 PM  RESPONDING PROVIDER #:        [id number]          PROVIDER RESPONSE TEXT:    Focal seizure, late complication from prior stroke superimposed on vascular dementia and UTI    CDI QUERY TEXT:    Clarification    Instruction:    Based on your assessment of the patient and the clinical information, please provide the requested documentation by clicking on the appropriate radio button and enter any additional information if prompted.    Question: Please further clarify the most likely etiology of the altered mental status as    When answering this query, please exercise your independent professional judgment. The fact that a question is being asked, does not imply that any particular answer is desired or expected.    The patient's clinical indicators include:  Clinical Information: 78 Y/M becomes dizzy, has difficulty speaking with fluctuating confusion    Clinical Indicators:     CTA head and neck: \" No flow-limiting stenosis or occlusion in the head or neck.\"     MRI brain: \" No acute intracranial pathology.\"    : HP: \"78 y.o. male on day 0 of admission presenting with Altered mental status, unspecified altered mental status type.\"    : Progress note PCP: \"Patient was very confused in the morning and has to be given Haldol.\"     Neurology Progress Note: \"Hospital Synopsis: Casa is a 78 y.o. man with D2, HTN, HF, Afib already on Eliquis anticoagulation presenting with fluctuating confusion and disorientation without focal motor deficits.\"  \"There may be some element of vascular dementia given the normal MRI.\"     Neurology Progress Note: \" Remarkable recovery after lacosamide IV, likely a focal seizure presenting as aphasia as a late complication prior stroke...We tried " "each day on 4/22/24, 4/23/24, and 4/24/24 to get him to do an EEG and refused saying he was too sick.\"    4/26 Urine culture: greater than 100,000 Proteus Mirabilis    Treatment: CT, CTA, MRI, neurology consult, PT, OT, Vimpat 100mg IV followed by 100mg oral BID , Rocephin 1g IV q24H    Risk Factors: Multiple strokes, UTI  Options provided:  -- Focal seizure, late complication from prior stroke  -- Focal seizure, late complication from prior stroke superimposed on vascular dementia and UTI  -- Metabolic encephalopathy 2/2 UTI superimposed on dementia  -- Worsening vascular dementia  -- Other - I will add my own diagnosis  -- Refer to Clinical Documentation Reviewer    Query created by: Antonio Trejo on 5/8/2024 11:23 AM      Electronically signed by:  MICHAEL PECK 5/13/2024 9:26 PM          "

## 2024-05-17 ENCOUNTER — APPOINTMENT (OUTPATIENT)
Dept: RADIOLOGY | Facility: HOSPITAL | Age: 79
DRG: 208 | End: 2024-05-17
Payer: MEDICARE

## 2024-05-17 ENCOUNTER — APPOINTMENT (OUTPATIENT)
Dept: CARDIOLOGY | Facility: HOSPITAL | Age: 79
DRG: 208 | End: 2024-05-17
Payer: MEDICARE

## 2024-05-17 ENCOUNTER — HOSPITAL ENCOUNTER (INPATIENT)
Facility: HOSPITAL | Age: 79
LOS: 11 days | Discharge: NURSING, REHAB OR LONG-TERM CARE | DRG: 208 | End: 2024-05-28
Attending: STUDENT IN AN ORGANIZED HEALTH CARE EDUCATION/TRAINING PROGRAM | Admitting: INTERNAL MEDICINE
Payer: MEDICARE

## 2024-05-17 DIAGNOSIS — M79.18 MUSCULOSKELETAL PAIN: ICD-10-CM

## 2024-05-17 DIAGNOSIS — I50.23 ACUTE ON CHRONIC SYSTOLIC (CONGESTIVE) HEART FAILURE (MULTI): ICD-10-CM

## 2024-05-17 DIAGNOSIS — R62.7 FAILURE TO THRIVE IN ADULT: ICD-10-CM

## 2024-05-17 DIAGNOSIS — R55 SYNCOPE AND COLLAPSE: ICD-10-CM

## 2024-05-17 DIAGNOSIS — D64.9 ANEMIA, NORMOCYTIC NORMOCHROMIC: ICD-10-CM

## 2024-05-17 DIAGNOSIS — T78.2XXA ANAPHYLAXIS, INITIAL ENCOUNTER: ICD-10-CM

## 2024-05-17 DIAGNOSIS — K59.00 CONSTIPATION, UNSPECIFIED CONSTIPATION TYPE: ICD-10-CM

## 2024-05-17 DIAGNOSIS — S09.90XA CLOSED HEAD INJURY, INITIAL ENCOUNTER: Primary | ICD-10-CM

## 2024-05-17 DIAGNOSIS — J96.01 ACUTE HYPOXIC RESPIRATORY FAILURE (MULTI): ICD-10-CM

## 2024-05-17 DIAGNOSIS — E11.65 TYPE 2 DIABETES MELLITUS WITH HYPERGLYCEMIA, WITHOUT LONG-TERM CURRENT USE OF INSULIN (MULTI): ICD-10-CM

## 2024-05-17 DIAGNOSIS — N17.9 ACUTE KIDNEY INJURY (CMS-HCC): ICD-10-CM

## 2024-05-17 DIAGNOSIS — R05.9 COUGH, UNSPECIFIED TYPE: ICD-10-CM

## 2024-05-17 LAB
ALBUMIN SERPL-MCNC: 3.7 G/DL (ref 3.5–5)
ALP BLD-CCNC: 121 U/L (ref 35–125)
ALT SERPL-CCNC: 16 U/L (ref 5–40)
ANION GAP BLDA CALCULATED.4IONS-SCNC: 13 MMO/L (ref 10–25)
ANION GAP BLDA CALCULATED.4IONS-SCNC: 16 MMO/L (ref 10–25)
ANION GAP SERPL CALC-SCNC: 17 MMOL/L
APPARATUS: ABNORMAL
APPEARANCE UR: CLEAR
ARTERIAL PATENCY WRIST A: POSITIVE
ARTERIAL PATENCY WRIST A: POSITIVE
AST SERPL-CCNC: 22 U/L (ref 5–40)
BASE EXCESS BLDA CALC-SCNC: -6.2 MMOL/L (ref -2–3)
BASE EXCESS BLDA CALC-SCNC: -9.8 MMOL/L (ref -2–3)
BASOPHILS # BLD AUTO: 0.01 X10*3/UL (ref 0–0.1)
BASOPHILS NFR BLD AUTO: 0.2 %
BILIRUB SERPL-MCNC: 1.6 MG/DL (ref 0.1–1.2)
BILIRUB UR STRIP.AUTO-MCNC: NEGATIVE MG/DL
BODY TEMPERATURE: 37 DEGREES CELSIUS
BODY TEMPERATURE: 37 DEGREES CELSIUS
BUN SERPL-MCNC: 34 MG/DL (ref 8–25)
CA-I BLDA-SCNC: 1.07 MMOL/L (ref 1.1–1.33)
CA-I BLDA-SCNC: 1.1 MMOL/L (ref 1.1–1.33)
CALCIUM SERPL-MCNC: 8.9 MG/DL (ref 8.5–10.4)
CHLORIDE BLDA-SCNC: 103 MMOL/L (ref 98–107)
CHLORIDE BLDA-SCNC: 105 MMOL/L (ref 98–107)
CHLORIDE SERPL-SCNC: 97 MMOL/L (ref 97–107)
CK SERPL-CCNC: 68 U/L (ref 24–195)
CO2 SERPL-SCNC: 22 MMOL/L (ref 24–31)
COLOR UR: YELLOW
CREAT SERPL-MCNC: 2.4 MG/DL (ref 0.4–1.6)
EGFRCR SERPLBLD CKD-EPI 2021: 27 ML/MIN/1.73M*2
EOSINOPHIL # BLD AUTO: 0.04 X10*3/UL (ref 0–0.4)
EOSINOPHIL NFR BLD AUTO: 0.8 %
ERYTHROCYTE [DISTWIDTH] IN BLOOD BY AUTOMATED COUNT: 16.4 % (ref 11.5–14.5)
GLUCOSE BLD MANUAL STRIP-MCNC: 167 MG/DL (ref 74–99)
GLUCOSE BLDA-MCNC: 184 MG/DL (ref 74–99)
GLUCOSE BLDA-MCNC: 185 MG/DL (ref 74–99)
GLUCOSE SERPL-MCNC: 109 MG/DL (ref 65–99)
GLUCOSE UR STRIP.AUTO-MCNC: NORMAL MG/DL
HCO3 BLDA-SCNC: 18 MMOL/L (ref 22–26)
HCO3 BLDA-SCNC: 19.6 MMOL/L (ref 22–26)
HCT VFR BLD AUTO: 42.3 % (ref 41–52)
HCT VFR BLD EST: 42 % (ref 41–52)
HCT VFR BLD EST: 47 % (ref 41–52)
HGB BLD-MCNC: 13.6 G/DL (ref 13.5–17.5)
HGB BLDA-MCNC: 14.1 G/DL (ref 13.5–17.5)
HGB BLDA-MCNC: 15.5 G/DL (ref 13.5–17.5)
HOLD SPECIMEN: NORMAL
IMM GRANULOCYTES # BLD AUTO: 0.01 X10*3/UL (ref 0–0.5)
IMM GRANULOCYTES NFR BLD AUTO: 0.2 % (ref 0–0.9)
INHALED O2 CONCENTRATION: 60 %
INHALED O2 CONCENTRATION: 60 %
KETONES UR STRIP.AUTO-MCNC: NEGATIVE MG/DL
LACTATE BLDA-SCNC: 1.4 MMOL/L (ref 0.4–2)
LACTATE BLDA-SCNC: 1.9 MMOL/L (ref 0.4–2)
LACTATE BLDV-SCNC: 1.7 MMOL/L (ref 0.4–2)
LEUKOCYTE ESTERASE UR QL STRIP.AUTO: NEGATIVE
LYMPHOCYTES # BLD AUTO: 1.85 X10*3/UL (ref 0.8–3)
LYMPHOCYTES NFR BLD AUTO: 35 %
MAGNESIUM SERPL-MCNC: 2.1 MG/DL (ref 1.6–3.1)
MCH RBC QN AUTO: 32.5 PG (ref 26–34)
MCHC RBC AUTO-ENTMCNC: 32.2 G/DL (ref 32–36)
MCV RBC AUTO: 101 FL (ref 80–100)
MONOCYTES # BLD AUTO: 0.53 X10*3/UL (ref 0.05–0.8)
MONOCYTES NFR BLD AUTO: 10 %
NEUTROPHILS # BLD AUTO: 2.85 X10*3/UL (ref 1.6–5.5)
NEUTROPHILS NFR BLD AUTO: 53.8 %
NITRITE UR QL STRIP.AUTO: NEGATIVE
NRBC BLD-RTO: 0 /100 WBCS (ref 0–0)
OXYHGB MFR BLDA: 92.4 % (ref 94–98)
OXYHGB MFR BLDA: 96.5 % (ref 94–98)
PCO2 BLDA: 39 MM HG (ref 38–42)
PCO2 BLDA: 45 MM HG (ref 38–42)
PEEP CMH2O: 8 CM H2O
PEEP CMH2O: 8 CM H2O
PH BLDA: 7.21 PH (ref 7.38–7.42)
PH BLDA: 7.31 PH (ref 7.38–7.42)
PH UR STRIP.AUTO: 5 [PH]
PLATELET # BLD AUTO: 165 X10*3/UL (ref 150–450)
PO2 BLDA: 137 MM HG (ref 85–95)
PO2 BLDA: 88 MM HG (ref 85–95)
POTASSIUM BLDA-SCNC: 4 MMOL/L (ref 3.5–5.3)
POTASSIUM BLDA-SCNC: 4.2 MMOL/L (ref 3.5–5.3)
POTASSIUM SERPL-SCNC: 4.5 MMOL/L (ref 3.4–5.1)
PROT SERPL-MCNC: 7.1 G/DL (ref 5.9–7.9)
PROT UR STRIP.AUTO-MCNC: NEGATIVE MG/DL
Q ONSET: 206 MS
Q ONSET: 208 MS
QRS COUNT: 15 BEATS
QRS COUNT: 17 BEATS
QRS DURATION: 126 MS
QRS DURATION: 132 MS
QT INTERVAL: 416 MS
QT INTERVAL: 426 MS
QTC CALCULATION(BAZETT): 522 MS
QTC CALCULATION(BAZETT): 568 MS
QTC FREDERICIA: 484 MS
QTC FREDERICIA: 516 MS
R AXIS: 30 DEGREES
R AXIS: 35 DEGREES
RBC # BLD AUTO: 4.18 X10*6/UL (ref 4.5–5.9)
RBC # UR STRIP.AUTO: NEGATIVE /UL
SAO2 % BLDA: 97 % (ref 94–100)
SAO2 % BLDA: 99 % (ref 94–100)
SARS-COV-2 RNA RESP QL NAA+PROBE: DETECTED
SODIUM BLDA-SCNC: 133 MMOL/L (ref 136–145)
SODIUM BLDA-SCNC: 133 MMOL/L (ref 136–145)
SODIUM SERPL-SCNC: 136 MMOL/L (ref 133–145)
SP GR UR STRIP.AUTO: 1.01
SPECIMEN DRAWN FROM PATIENT: ABNORMAL
SPECIMEN DRAWN FROM PATIENT: ABNORMAL
T AXIS: 197 DEGREES
T AXIS: 213 DEGREES
T OFFSET: 416 MS
T OFFSET: 419 MS
TIDAL VOLUME: 500 ML
TIDAL VOLUME: 500 ML
TROPONIN T SERPL-MCNC: 45 NG/L
TROPONIN T SERPL-MCNC: 46 NG/L
TROPONIN T SERPL-MCNC: 49 NG/L
UROBILINOGEN UR STRIP.AUTO-MCNC: ABNORMAL MG/DL
VENTILATOR MODE: ABNORMAL
VENTILATOR MODE: ABNORMAL
VENTILATOR RATE: 16 BPM
VENTILATOR RATE: 16 BPM
VENTRICULAR RATE: 107 BPM
VENTRICULAR RATE: 95 BPM
WBC # BLD AUTO: 5.3 X10*3/UL (ref 4.4–11.3)

## 2024-05-17 PROCEDURE — 85025 COMPLETE CBC W/AUTO DIFF WBC: CPT

## 2024-05-17 PROCEDURE — 70450 CT HEAD/BRAIN W/O DYE: CPT

## 2024-05-17 PROCEDURE — 94002 VENT MGMT INPAT INIT DAY: CPT

## 2024-05-17 PROCEDURE — 2500000005 HC RX 250 GENERAL PHARMACY W/O HCPCS: Performed by: INTERNAL MEDICINE

## 2024-05-17 PROCEDURE — 2500000004 HC RX 250 GENERAL PHARMACY W/ HCPCS (ALT 636 FOR OP/ED): Performed by: STUDENT IN AN ORGANIZED HEALTH CARE EDUCATION/TRAINING PROGRAM

## 2024-05-17 PROCEDURE — 94799 UNLISTED PULMONARY SVC/PX: CPT

## 2024-05-17 PROCEDURE — 2500000006 HC RX 250 W HCPCS SELF ADMINISTERED DRUGS (ALT 637 FOR ALL PAYERS): Mod: MUE | Performed by: INTERNAL MEDICINE

## 2024-05-17 PROCEDURE — 99291 CRITICAL CARE FIRST HOUR: CPT | Performed by: INTERNAL MEDICINE

## 2024-05-17 PROCEDURE — 82947 ASSAY GLUCOSE BLOOD QUANT: CPT | Mod: 91

## 2024-05-17 PROCEDURE — 93005 ELECTROCARDIOGRAM TRACING: CPT

## 2024-05-17 PROCEDURE — 71045 X-RAY EXAM CHEST 1 VIEW: CPT | Performed by: RADIOLOGY

## 2024-05-17 PROCEDURE — 2500000004 HC RX 250 GENERAL PHARMACY W/ HCPCS (ALT 636 FOR OP/ED): Performed by: INTERNAL MEDICINE

## 2024-05-17 PROCEDURE — 0BH17EZ INSERTION OF ENDOTRACHEAL AIRWAY INTO TRACHEA, VIA NATURAL OR ARTIFICIAL OPENING: ICD-10-PCS | Performed by: STUDENT IN AN ORGANIZED HEALTH CARE EDUCATION/TRAINING PROGRAM

## 2024-05-17 PROCEDURE — 99291 CRITICAL CARE FIRST HOUR: CPT

## 2024-05-17 PROCEDURE — 72125 CT NECK SPINE W/O DYE: CPT | Performed by: RADIOLOGY

## 2024-05-17 PROCEDURE — 2500000004 HC RX 250 GENERAL PHARMACY W/ HCPCS (ALT 636 FOR OP/ED)

## 2024-05-17 PROCEDURE — 87635 SARS-COV-2 COVID-19 AMP PRB: CPT

## 2024-05-17 PROCEDURE — 71045 X-RAY EXAM CHEST 1 VIEW: CPT

## 2024-05-17 PROCEDURE — 84132 ASSAY OF SERUM POTASSIUM: CPT | Mod: 91 | Performed by: STUDENT IN AN ORGANIZED HEALTH CARE EDUCATION/TRAINING PROGRAM

## 2024-05-17 PROCEDURE — 84132 ASSAY OF SERUM POTASSIUM: CPT | Mod: 91 | Performed by: INTERNAL MEDICINE

## 2024-05-17 PROCEDURE — 82550 ASSAY OF CK (CPK): CPT | Performed by: STUDENT IN AN ORGANIZED HEALTH CARE EDUCATION/TRAINING PROGRAM

## 2024-05-17 PROCEDURE — 82947 ASSAY GLUCOSE BLOOD QUANT: CPT

## 2024-05-17 PROCEDURE — 96374 THER/PROPH/DIAG INJ IV PUSH: CPT | Mod: 59

## 2024-05-17 PROCEDURE — 36415 COLL VENOUS BLD VENIPUNCTURE: CPT

## 2024-05-17 PROCEDURE — 83735 ASSAY OF MAGNESIUM: CPT

## 2024-05-17 PROCEDURE — 31500 INSERT EMERGENCY AIRWAY: CPT | Performed by: STUDENT IN AN ORGANIZED HEALTH CARE EDUCATION/TRAINING PROGRAM

## 2024-05-17 PROCEDURE — 5A1945Z RESPIRATORY VENTILATION, 24-96 CONSECUTIVE HOURS: ICD-10-PCS | Performed by: STUDENT IN AN ORGANIZED HEALTH CARE EDUCATION/TRAINING PROGRAM

## 2024-05-17 PROCEDURE — 2020000001 HC ICU ROOM DAILY

## 2024-05-17 PROCEDURE — 31500 INSERT EMERGENCY AIRWAY: CPT

## 2024-05-17 PROCEDURE — 80053 COMPREHEN METABOLIC PANEL: CPT

## 2024-05-17 PROCEDURE — 2500000005 HC RX 250 GENERAL PHARMACY W/O HCPCS: Performed by: STUDENT IN AN ORGANIZED HEALTH CARE EDUCATION/TRAINING PROGRAM

## 2024-05-17 PROCEDURE — 36600 WITHDRAWAL OF ARTERIAL BLOOD: CPT

## 2024-05-17 PROCEDURE — 2500000002 HC RX 250 W HCPCS SELF ADMINISTERED DRUGS (ALT 637 FOR MEDICARE OP, ALT 636 FOR OP/ED): Performed by: INTERNAL MEDICINE

## 2024-05-17 PROCEDURE — XW033E5 INTRODUCTION OF REMDESIVIR ANTI-INFECTIVE INTO PERIPHERAL VEIN, PERCUTANEOUS APPROACH, NEW TECHNOLOGY GROUP 5: ICD-10-PCS | Performed by: STUDENT IN AN ORGANIZED HEALTH CARE EDUCATION/TRAINING PROGRAM

## 2024-05-17 PROCEDURE — 2500000005 HC RX 250 GENERAL PHARMACY W/O HCPCS

## 2024-05-17 PROCEDURE — 5A12012 PERFORMANCE OF CARDIAC OUTPUT, SINGLE, MANUAL: ICD-10-PCS | Performed by: STUDENT IN AN ORGANIZED HEALTH CARE EDUCATION/TRAINING PROGRAM

## 2024-05-17 PROCEDURE — 72125 CT NECK SPINE W/O DYE: CPT

## 2024-05-17 PROCEDURE — 2500000005 HC RX 250 GENERAL PHARMACY W/O HCPCS: Mod: JZ | Performed by: STUDENT IN AN ORGANIZED HEALTH CARE EDUCATION/TRAINING PROGRAM

## 2024-05-17 PROCEDURE — 84484 ASSAY OF TROPONIN QUANT: CPT

## 2024-05-17 PROCEDURE — 36556 INSERT NON-TUNNEL CV CATH: CPT | Performed by: STUDENT IN AN ORGANIZED HEALTH CARE EDUCATION/TRAINING PROGRAM

## 2024-05-17 PROCEDURE — 2500000001 HC RX 250 WO HCPCS SELF ADMINISTERED DRUGS (ALT 637 FOR MEDICARE OP): Performed by: INTERNAL MEDICINE

## 2024-05-17 PROCEDURE — 02HV33Z INSERTION OF INFUSION DEVICE INTO SUPERIOR VENA CAVA, PERCUTANEOUS APPROACH: ICD-10-PCS | Performed by: STUDENT IN AN ORGANIZED HEALTH CARE EDUCATION/TRAINING PROGRAM

## 2024-05-17 PROCEDURE — 94003 VENT MGMT INPAT SUBQ DAY: CPT

## 2024-05-17 PROCEDURE — 70450 CT HEAD/BRAIN W/O DYE: CPT | Performed by: RADIOLOGY

## 2024-05-17 PROCEDURE — 83605 ASSAY OF LACTIC ACID: CPT

## 2024-05-17 PROCEDURE — 81003 URINALYSIS AUTO W/O SCOPE: CPT

## 2024-05-17 PROCEDURE — 92950 HEART/LUNG RESUSCITATION CPR: CPT | Performed by: STUDENT IN AN ORGANIZED HEALTH CARE EDUCATION/TRAINING PROGRAM

## 2024-05-17 RX ORDER — ROCURONIUM BROMIDE 10 MG/ML
50 INJECTION, SOLUTION INTRAVENOUS ONCE
Status: COMPLETED | OUTPATIENT
Start: 2024-05-17 | End: 2024-05-17

## 2024-05-17 RX ORDER — NOREPINEPHRINE BITARTRATE/D5W 8 MG/250ML
.01-.5 PLASTIC BAG, INJECTION (ML) INTRAVENOUS CONTINUOUS
Status: DISCONTINUED | OUTPATIENT
Start: 2024-05-17 | End: 2024-05-20

## 2024-05-17 RX ORDER — DEXTROSE 50 % IN WATER (D50W) INTRAVENOUS SYRINGE
12.5
Status: DISCONTINUED | OUTPATIENT
Start: 2024-05-17 | End: 2024-05-28 | Stop reason: HOSPADM

## 2024-05-17 RX ORDER — FOLIC ACID 1 MG/1
1 TABLET ORAL DAILY
Status: DISCONTINUED | OUTPATIENT
Start: 2024-05-17 | End: 2024-05-28 | Stop reason: HOSPADM

## 2024-05-17 RX ORDER — TAMSULOSIN HYDROCHLORIDE 0.4 MG/1
0.4 CAPSULE ORAL
Status: DISCONTINUED | OUTPATIENT
Start: 2024-05-18 | End: 2024-05-28 | Stop reason: HOSPADM

## 2024-05-17 RX ORDER — DIPHENHYDRAMINE HYDROCHLORIDE 50 MG/ML
50 INJECTION INTRAMUSCULAR; INTRAVENOUS ONCE
Status: COMPLETED | OUTPATIENT
Start: 2024-05-17 | End: 2024-05-17

## 2024-05-17 RX ORDER — MIDODRINE HYDROCHLORIDE 10 MG/1
10 TABLET ORAL
Status: DISCONTINUED | OUTPATIENT
Start: 2024-05-17 | End: 2024-05-28 | Stop reason: HOSPADM

## 2024-05-17 RX ORDER — NYSTATIN 100000 [USP'U]/G
1 POWDER TOPICAL 2 TIMES DAILY
Status: DISCONTINUED | OUTPATIENT
Start: 2024-05-17 | End: 2024-05-28 | Stop reason: HOSPADM

## 2024-05-17 RX ORDER — SODIUM CHLORIDE, SODIUM LACTATE, POTASSIUM CHLORIDE, CALCIUM CHLORIDE 600; 310; 30; 20 MG/100ML; MG/100ML; MG/100ML; MG/100ML
75 INJECTION, SOLUTION INTRAVENOUS CONTINUOUS
Status: DISCONTINUED | OUTPATIENT
Start: 2024-05-17 | End: 2024-05-20

## 2024-05-17 RX ORDER — ROSUVASTATIN CALCIUM 10 MG/1
10 TABLET, COATED ORAL NIGHTLY
Status: DISCONTINUED | OUTPATIENT
Start: 2024-05-17 | End: 2024-05-28 | Stop reason: HOSPADM

## 2024-05-17 RX ORDER — ETOMIDATE 2 MG/ML
20 INJECTION INTRAVENOUS ONCE
Status: COMPLETED | OUTPATIENT
Start: 2024-05-17 | End: 2024-05-17

## 2024-05-17 RX ORDER — LACOSAMIDE 100 MG/1
100 TABLET ORAL 2 TIMES DAILY
Status: DISCONTINUED | OUTPATIENT
Start: 2024-05-17 | End: 2024-05-28 | Stop reason: HOSPADM

## 2024-05-17 RX ORDER — DEXTROSE 50 % IN WATER (D50W) INTRAVENOUS SYRINGE
25
Status: DISCONTINUED | OUTPATIENT
Start: 2024-05-17 | End: 2024-05-28 | Stop reason: HOSPADM

## 2024-05-17 RX ORDER — PANTOPRAZOLE SODIUM 40 MG/1
40 TABLET, DELAYED RELEASE ORAL
Status: DISCONTINUED | OUTPATIENT
Start: 2024-05-18 | End: 2024-05-28 | Stop reason: HOSPADM

## 2024-05-17 RX ORDER — FAMOTIDINE 20 MG/1
10 TABLET, FILM COATED ORAL DAILY
Status: DISCONTINUED | OUTPATIENT
Start: 2024-05-17 | End: 2024-05-20

## 2024-05-17 RX ORDER — PANTOPRAZOLE SODIUM 40 MG/10ML
40 INJECTION, POWDER, LYOPHILIZED, FOR SOLUTION INTRAVENOUS
Status: DISCONTINUED | OUTPATIENT
Start: 2024-05-18 | End: 2024-05-28 | Stop reason: HOSPADM

## 2024-05-17 RX ORDER — DIPHENHYDRAMINE HYDROCHLORIDE 50 MG/ML
INJECTION INTRAMUSCULAR; INTRAVENOUS
Status: COMPLETED
Start: 2024-05-17 | End: 2024-05-17

## 2024-05-17 RX ORDER — AMIODARONE HYDROCHLORIDE 200 MG/1
200 TABLET ORAL DAILY
Status: DISCONTINUED | OUTPATIENT
Start: 2024-05-17 | End: 2024-05-28 | Stop reason: HOSPADM

## 2024-05-17 RX ORDER — FENTANYL CITRATE-0.9 % NACL/PF 10 MCG/ML
0-300 PLASTIC BAG, INJECTION (ML) INTRAVENOUS CONTINUOUS
Status: DISCONTINUED | OUTPATIENT
Start: 2024-05-17 | End: 2024-05-19

## 2024-05-17 RX ORDER — DIPHENHYDRAMINE HYDROCHLORIDE 50 MG/ML
25 INJECTION INTRAMUSCULAR; INTRAVENOUS EVERY 8 HOURS
Qty: 2 ML | Refills: 0 | Status: COMPLETED | OUTPATIENT
Start: 2024-05-17 | End: 2024-05-18

## 2024-05-17 RX ORDER — PROPOFOL 10 MG/ML
5-50 INJECTION, EMULSION INTRAVENOUS CONTINUOUS
Status: DISCONTINUED | OUTPATIENT
Start: 2024-05-17 | End: 2024-05-19

## 2024-05-17 RX ORDER — EPINEPHRINE 0.3 MG/.3ML
0.3 INJECTION SUBCUTANEOUS ONCE
Status: COMPLETED | OUTPATIENT
Start: 2024-05-17 | End: 2024-05-17

## 2024-05-17 RX ORDER — INSULIN LISPRO 100 [IU]/ML
0-5 INJECTION, SOLUTION INTRAVENOUS; SUBCUTANEOUS EVERY 4 HOURS
Status: DISCONTINUED | OUTPATIENT
Start: 2024-05-17 | End: 2024-05-20

## 2024-05-17 RX ORDER — PROPOFOL 10 MG/ML
5-20 INJECTION, EMULSION INTRAVENOUS CONTINUOUS
Status: DISCONTINUED | OUTPATIENT
Start: 2024-05-17 | End: 2024-05-17

## 2024-05-17 RX ORDER — HEPARIN SODIUM 5000 [USP'U]/ML
5000 INJECTION, SOLUTION INTRAVENOUS; SUBCUTANEOUS EVERY 8 HOURS
Status: DISCONTINUED | OUTPATIENT
Start: 2024-05-17 | End: 2024-05-17

## 2024-05-17 RX ORDER — NOREPINEPHRINE BITARTRATE/D5W 8 MG/250ML
PLASTIC BAG, INJECTION (ML) INTRAVENOUS
Status: COMPLETED
Start: 2024-05-17 | End: 2024-05-17

## 2024-05-17 RX ORDER — EPINEPHRINE 0.1 MG/ML
INJECTION INTRACARDIAC; INTRAVENOUS CODE/TRAUMA/SEDATION MEDICATION
Status: COMPLETED | OUTPATIENT
Start: 2024-05-17 | End: 2024-05-17

## 2024-05-17 RX ADMIN — Medication 0.03 MCG/KG/MIN: at 12:01

## 2024-05-17 RX ADMIN — SODIUM CHLORIDE, SODIUM LACTATE, POTASSIUM CHLORIDE, AND CALCIUM CHLORIDE 75 ML/HR: 600; 310; 30; 20 INJECTION, SOLUTION INTRAVENOUS at 13:10

## 2024-05-17 RX ADMIN — FOLIC ACID 1 MG: 1 TABLET ORAL at 18:43

## 2024-05-17 RX ADMIN — Medication 25 MCG/HR: at 18:03

## 2024-05-17 RX ADMIN — DIPHENHYDRAMINE HYDROCHLORIDE 50 MG: 50 INJECTION, SOLUTION INTRAMUSCULAR; INTRAVENOUS at 11:59

## 2024-05-17 RX ADMIN — NOREPINEPHRINE BITARTRATE 0.03 MCG/KG/MIN: 8 INJECTION, SOLUTION INTRAVENOUS at 12:01

## 2024-05-17 RX ADMIN — EPINEPHRINE 1 MG: 0.1 INJECTION INTRACARDIAC; INTRAVENOUS at 11:51

## 2024-05-17 RX ADMIN — Medication 60 PERCENT: at 12:50

## 2024-05-17 RX ADMIN — LACOSAMIDE 100 MG: 100 TABLET, FILM COATED ORAL at 21:04

## 2024-05-17 RX ADMIN — APIXABAN 5 MG: 5 TABLET, FILM COATED ORAL at 21:04

## 2024-05-17 RX ADMIN — METHYLPREDNISOLONE SODIUM SUCCINATE 125 MG: 125 INJECTION, POWDER, FOR SOLUTION INTRAMUSCULAR; INTRAVENOUS at 11:59

## 2024-05-17 RX ADMIN — EPINEPHRINE 0.3 MG: 0.3 INJECTION SUBCUTANEOUS at 12:25

## 2024-05-17 RX ADMIN — MIDODRINE HYDROCHLORIDE 10 MG: 10 TABLET ORAL at 18:43

## 2024-05-17 RX ADMIN — PROPOFOL 5 MCG/KG/MIN: 10 INJECTION, EMULSION INTRAVENOUS at 12:45

## 2024-05-17 RX ADMIN — PROPOFOL 30 MCG/KG/MIN: 10 INJECTION, EMULSION INTRAVENOUS at 17:21

## 2024-05-17 RX ADMIN — ROSUVASTATIN CALCIUM 10 MG: 10 TABLET, COATED ORAL at 21:04

## 2024-05-17 RX ADMIN — DIPHENHYDRAMINE HYDROCHLORIDE 25 MG: 50 INJECTION, SOLUTION INTRAMUSCULAR; INTRAVENOUS at 21:04

## 2024-05-17 RX ADMIN — REMDESIVIR 200 MG: 100 INJECTION, POWDER, LYOPHILIZED, FOR SOLUTION INTRAVENOUS at 17:19

## 2024-05-17 RX ADMIN — HEPARIN SODIUM 5000 UNITS: 5000 INJECTION, SOLUTION INTRAVENOUS; SUBCUTANEOUS at 14:58

## 2024-05-17 RX ADMIN — DIPHENHYDRAMINE HYDROCHLORIDE 50 MG: 50 INJECTION INTRAMUSCULAR; INTRAVENOUS at 11:59

## 2024-05-17 RX ADMIN — Medication 60 PERCENT: at 20:00

## 2024-05-17 RX ADMIN — METHYLPREDNISOLONE SODIUM SUCCINATE 40 MG: 40 INJECTION, POWDER, FOR SOLUTION INTRAMUSCULAR; INTRAVENOUS at 21:05

## 2024-05-17 RX ADMIN — SODIUM CHLORIDE, SODIUM LACTATE, POTASSIUM CHLORIDE, AND CALCIUM CHLORIDE 1000 ML: 600; 310; 30; 20 INJECTION, SOLUTION INTRAVENOUS at 17:03

## 2024-05-17 RX ADMIN — ROCURONIUM BROMIDE 50 MG: 10 INJECTION, SOLUTION INTRAVENOUS at 11:46

## 2024-05-17 RX ADMIN — Medication 60 PERCENT: at 16:35

## 2024-05-17 RX ADMIN — SODIUM CHLORIDE 1000 ML: 900 INJECTION, SOLUTION INTRAVENOUS at 10:28

## 2024-05-17 RX ADMIN — ETOMIDATE 20 MG: 2 INJECTION INTRAVENOUS at 11:46

## 2024-05-17 RX ADMIN — NYSTATIN 1 APPLICATION: 100000 POWDER TOPICAL at 21:04

## 2024-05-17 RX ADMIN — INSULIN LISPRO 1 UNITS: 100 INJECTION, SOLUTION INTRAVENOUS; SUBCUTANEOUS at 21:12

## 2024-05-17 RX ADMIN — SODIUM CHLORIDE 1000 ML: 900 INJECTION, SOLUTION INTRAVENOUS at 08:32

## 2024-05-17 SDOH — SOCIAL STABILITY: SOCIAL INSECURITY: HAS ANYONE EVER THREATENED TO HURT YOUR FAMILY OR YOUR PETS?: UNABLE TO ASSESS

## 2024-05-17 SDOH — SOCIAL STABILITY: SOCIAL INSECURITY: ABUSE: ADULT

## 2024-05-17 SDOH — SOCIAL STABILITY: SOCIAL INSECURITY: HAVE YOU HAD THOUGHTS OF HARMING ANYONE ELSE?: NO

## 2024-05-17 SDOH — SOCIAL STABILITY: SOCIAL INSECURITY: DO YOU FEEL ANYONE HAS EXPLOITED OR TAKEN ADVANTAGE OF YOU FINANCIALLY OR OF YOUR PERSONAL PROPERTY?: UNABLE TO ASSESS

## 2024-05-17 SDOH — SOCIAL STABILITY: SOCIAL INSECURITY: DO YOU FEEL UNSAFE GOING BACK TO THE PLACE WHERE YOU ARE LIVING?: UNABLE TO ASSESS

## 2024-05-17 SDOH — SOCIAL STABILITY: SOCIAL INSECURITY: ARE YOU OR HAVE YOU BEEN THREATENED OR ABUSED PHYSICALLY, EMOTIONALLY, OR SEXUALLY BY ANYONE?: UNABLE TO ASSESS

## 2024-05-17 SDOH — SOCIAL STABILITY: SOCIAL INSECURITY: HAVE YOU HAD ANY THOUGHTS OF HARMING ANYONE ELSE?: UNABLE TO ASSESS

## 2024-05-17 SDOH — SOCIAL STABILITY: SOCIAL INSECURITY: DOES ANYONE TRY TO KEEP YOU FROM HAVING/CONTACTING OTHER FRIENDS OR DOING THINGS OUTSIDE YOUR HOME?: UNABLE TO ASSESS

## 2024-05-17 SDOH — SOCIAL STABILITY: SOCIAL INSECURITY: ARE THERE ANY APPARENT SIGNS OF INJURIES/BEHAVIORS THAT COULD BE RELATED TO ABUSE/NEGLECT?: UNABLE TO ASSESS

## 2024-05-17 ASSESSMENT — ACTIVITIES OF DAILY LIVING (ADL)
DRESSING YOURSELF: UNABLE TO ASSESS
TOILETING: UNABLE TO ASSESS
HEARING - LEFT EAR: UNABLE TO ASSESS
PATIENT'S MEMORY ADEQUATE TO SAFELY COMPLETE DAILY ACTIVITIES?: UNABLE TO ASSESS
JUDGMENT_ADEQUATE_SAFELY_COMPLETE_DAILY_ACTIVITIES: UNABLE TO ASSESS
WALKS IN HOME: UNABLE TO ASSESS
ADEQUATE_TO_COMPLETE_ADL: UNABLE TO ASSESS
LACK_OF_TRANSPORTATION: NO
HEARING - RIGHT EAR: UNABLE TO ASSESS
ASSISTIVE_DEVICE: DENTURES UPPER
FEEDING YOURSELF: UNABLE TO ASSESS
GROOMING: UNABLE TO ASSESS
BATHING: UNABLE TO ASSESS

## 2024-05-17 ASSESSMENT — COGNITIVE AND FUNCTIONAL STATUS - GENERAL
CLIMB 3 TO 5 STEPS WITH RAILING: A LOT
WALKING IN HOSPITAL ROOM: A LOT
STANDING UP FROM CHAIR USING ARMS: A LOT
TOILETING: TOTAL
PATIENT BASELINE BEDBOUND: NO
TURNING FROM BACK TO SIDE WHILE IN FLAT BAD: A LOT
STANDING UP FROM CHAIR USING ARMS: TOTAL
CLIMB 3 TO 5 STEPS WITH RAILING: TOTAL
PERSONAL GROOMING: A LOT
MOVING TO AND FROM BED TO CHAIR: TOTAL
DRESSING REGULAR UPPER BODY CLOTHING: TOTAL
DRESSING REGULAR LOWER BODY CLOTHING: A LOT
DAILY ACTIVITIY SCORE: 12
HELP NEEDED FOR BATHING: TOTAL
MOVING TO AND FROM BED TO CHAIR: A LOT
TURNING FROM BACK TO SIDE WHILE IN FLAT BAD: TOTAL
MOVING FROM LYING ON BACK TO SITTING ON SIDE OF FLAT BED WITH BEDRAILS: A LOT
PERSONAL GROOMING: TOTAL
DRESSING REGULAR UPPER BODY CLOTHING: A LOT
MOBILITY SCORE: 6
MOVING FROM LYING ON BACK TO SITTING ON SIDE OF FLAT BED WITH BEDRAILS: TOTAL
DRESSING REGULAR LOWER BODY CLOTHING: TOTAL
TOILETING: A LOT
WALKING IN HOSPITAL ROOM: TOTAL
HELP NEEDED FOR BATHING: A LOT
DAILY ACTIVITIY SCORE: 6
EATING MEALS: A LOT
MOBILITY SCORE: 12
EATING MEALS: TOTAL

## 2024-05-17 ASSESSMENT — LIFESTYLE VARIABLES
TOTAL SCORE: 0
HOW OFTEN DO YOU HAVE 6 OR MORE DRINKS ON ONE OCCASION: NEVER
EVER HAD A DRINK FIRST THING IN THE MORNING TO STEADY YOUR NERVES TO GET RID OF A HANGOVER: NO
HAVE PEOPLE ANNOYED YOU BY CRITICIZING YOUR DRINKING: NO
SKIP TO QUESTIONS 9-10: 1
AUDIT-C TOTAL SCORE: 0
HOW MANY STANDARD DRINKS CONTAINING ALCOHOL DO YOU HAVE ON A TYPICAL DAY: PATIENT DOES NOT DRINK
EVER FELT BAD OR GUILTY ABOUT YOUR DRINKING: NO
AUDIT-C TOTAL SCORE: 0
HOW OFTEN DO YOU HAVE A DRINK CONTAINING ALCOHOL: NEVER
HAVE YOU EVER FELT YOU SHOULD CUT DOWN ON YOUR DRINKING: NO

## 2024-05-17 ASSESSMENT — COLUMBIA-SUICIDE SEVERITY RATING SCALE - C-SSRS
6. HAVE YOU EVER DONE ANYTHING, STARTED TO DO ANYTHING, OR PREPARED TO DO ANYTHING TO END YOUR LIFE?: NO
2. HAVE YOU ACTUALLY HAD ANY THOUGHTS OF KILLING YOURSELF?: NO
1. IN THE PAST MONTH, HAVE YOU WISHED YOU WERE DEAD OR WISHED YOU COULD GO TO SLEEP AND NOT WAKE UP?: NO

## 2024-05-17 ASSESSMENT — PAIN SCALES - GENERAL
PAINLEVEL_OUTOF10: 0 - NO PAIN

## 2024-05-17 ASSESSMENT — PAIN - FUNCTIONAL ASSESSMENT
PAIN_FUNCTIONAL_ASSESSMENT: 0-10
PAIN_FUNCTIONAL_ASSESSMENT: CPOT (CRITICAL CARE PAIN OBSERVATION TOOL)
PAIN_FUNCTIONAL_ASSESSMENT: CPOT (CRITICAL CARE PAIN OBSERVATION TOOL)

## 2024-05-17 NOTE — CONSULTS
Inpatient consult to Infectious Diseases  Consult performed by: Marsha Rasmussen DO  Consult ordered by: Frederic Pina MD        Referred by Dr Pina    Primary MD: Milan Sims MD    Reason For Consult  COVID, resp failure    History Of Present Illness  Darrell Arreola is a 78 y.o. male presenting with evaluation after a fall vs syncopal episode    He is currently intubated, thus history was obtained via chart review    Per ER note, he presented due to worsening fatigue and weakness over the past few days. +fall at home. No chest pain or SOB    CT head was ordered. He did have a hx of COVID infection (not sure when +) and found to have LORI on admission    CXR was negative and CT brain without acute changes    Seems that central line was placed and patient became hypotensive/resp distress    He was ultimately intubated     Past Medical History  He has a past medical history of CHF (congestive heart failure) (Multi), Diabetes mellitus (Multi), Heart disease, Hypertension, and Stroke (Multi).    Surgical History  He has a past surgical history that includes CT guided percutaneous biopsy bone deep (02/21/2020); CT guided percutaneous biopsy bone deep (03/24/2020); MR angio head wo IV contrast (05/17/2019); Toe amputation; Tonsillectomy; and MR angio head wo IV contrast (10/9/2023).     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   [employer]       Comments:        Family History  Family History   Family history unknown: Yes     Allergies  Cefazolin, Latex, Lidocaine, Quinine, and Iodine     Immunization History   Administered Date(s) Administered    Pfizer  "COVID-19 vaccine, Fall 2023, 12 years and older, (30mcg/0.3mL) 11/02/2023     Medications  Home medications:  (Not in a hospital admission)    Current medications:  Scheduled medications  heparin (porcine), 5,000 Units, subcutaneous, q8h  insulin lispro, 0-5 Units, subcutaneous, q4h  oxygen, , inhalation, Continuous - Inhalation  [START ON 5/18/2024] pantoprazole, 40 mg, oral, Daily before breakfast   Or  [START ON 5/18/2024] pantoprazole, 40 mg, intravenous, Daily before breakfast      Continuous medications  lactated Ringer's, 75 mL/hr, Last Rate: 75 mL/hr (05/17/24 1310)  norepinephrine, 0.01-0.5 mcg/kg/min, Last Rate: 0.07 mcg/kg/min (05/17/24 1322)  propofol, 5-20 mcg/kg/min, Last Rate: 15 mcg/kg/min (05/17/24 1315)      PRN medications  PRN medications: dextrose, dextrose, glucagon, glucagon    Review of Systems   Unable to obtain, intubated  Objective  Range of Vitals (last 24 hours)  Heart Rate:  []   Temperature:  [36.5 °C (97.7 °F)]   Respirations:  [13-58]   BP: ()/()   Height:  [185.4 cm (6' 1\")]   Weight:  [96.3 kg (212 lb 4.9 oz)]   Pulse Ox:  [37 %-100 %]   Daily Weight  05/17/24 : 96.3 kg (212 lb 4.9 oz)    Body mass index is 28.01 kg/m².     Physical Exam   Gen: intubated, sedated  HEENT: no oral thrush or lesions, +ET tube  CV: regular  Lungs: CTA, intubated on ventilator  Abd: soft  Ext: no edema  Neuro: sedated  Skin: few bruises    Labs  Results from last 72 hours   Lab Units 05/17/24  0829   WBC AUTO x10*3/uL 5.3   HEMOGLOBIN g/dL 13.6   HEMATOCRIT % 42.3   PLATELETS AUTO x10*3/uL 165   NEUTROS PCT AUTO % 53.8   LYMPHS PCT AUTO % 35.0   MONOS PCT AUTO % 10.0   EOS PCT AUTO % 0.8     Results from last 72 hours   Lab Units 05/17/24  0829   SODIUM mmol/L 136   POTASSIUM mmol/L 4.5   CHLORIDE mmol/L 97   CO2 mmol/L 22*   BUN mg/dL 34*   CREATININE mg/dL 2.40*   GLUCOSE mg/dL 109*   CALCIUM mg/dL 8.9   ANION GAP mmol/L 17   EGFR mL/min/1.73m*2 27*     Results from last 72 hours " "  Lab Units 05/17/24  0829   ALK PHOS U/L 121   BILIRUBIN TOTAL mg/dL 1.6*   PROTEIN TOTAL g/dL 7.1   ALT U/L 16   AST U/L 22   ALBUMIN g/dL 3.7     Estimated Creatinine Clearance: 31 mL/min (A) (by C-G formula based on SCr of 2.4 mg/dL (H)).  CRP   Date Value Ref Range Status   02/19/2020 0.31 mg/dL Final     Comment:     REF VALUE  < 1.00       Sedimentation Rate   Date Value Ref Range Status   02/19/2020 15 0 - 20 mm/h Final     No results found for: \"HIV1X2\", \"HIVCONF\", \"RWFNFS6GJ\"  No results found for: \"HEPCABINIT\", \"HEPCAB\", \"HCVPCRQUANT\"  Microbiology  Susceptibility data from last 90 days.  Collected Specimen Info Organism Ampicillin Cefazolin Cefazolin (uncomplicated UTIs only) Ceftriaxone Ciprofloxacin Gentamicin Nitrofurantoin Piperacillin/Tazobactam Tetracycline Trimethoprim/Sulfamethoxazole   04/26/24 Urine from Clean Catch/Voided Proteus mirabilis S R S S S S R S R S     COVID + 5/17/24    Imaging:  CXR  IMPRESSION:  Post endotracheal tube and central line placement.  Otherwise no active disease in the chest.    Assessment:  Respiratory failure due to syncope vs COVID infection vs allergic reaction  fall    Plan:  Since unclear when COVID +, will opt to start IV remdesivir x3-5 day course  Already receiving steroids for ?allergic reaction  If we find a + COVID test > 7 days ago, will stop the remdesivir  No need for IV abx at this time  Daily CMP while on remdesivir to monitor renal and hepatic function  Will follow peripherally over the weekend    Marsha Rasmussen DO  [facility]  #[phone]  "

## 2024-05-17 NOTE — Clinical Note
ED  Recommendation: ED- Rec. IP (05/17/24 1021 : Cherie Pitts RN)   Preparing for Your Surgery      Name:  Tiffanie Summers   MRN:  [id number]   :  11/3/1933   Today's Date:  2024       Arriving for surgery:  Surgery date:  24  Arrival time:  9:55 am  Surgery time: 11:55 am    Please come to:     Please come to:       [facility] [place] [facility]    [address] [place]  [address]     The [facility] [place] Patient/Visitor Ramp is at [address] [facility] [address] [facility] [address]. Patients and visitors who self-park will receive the reduced hospital parking rate. If the Patient /Visitor Ramp is full, please follow the signs to the [employer] car park located at the main hospital entrance.       parking is available (24 hours/ 7 days a week)      Discounted parking pass options are available for patients and visitors. They can be purchased at the [employer] desk at the main hospital entrance.     -    Stop at the security desk and they will direct surgery patients to the Surgery Check in and Family [facility]. [phone]        - If you need directions, a wheelchair or an escort please stop at the Information/security desk in the lobby.     What can I eat or drink?  -  You may eat and drink normally up to 8 hours prior to arrival time. (Until 1:55 am)  -  You may have clear liquids until 2 hours prior to arrival time. (Until 7:55 am)    Examples of clear liquids:  Water  Clear broth  Juices (apple, white grape, white cranberry  and cider) without pulp  Noncarbonated, powder based beverages  (lemonade and Elias-Aid)  Sodas (Sprite, 7-Up, ginger ale and seltzer)  Coffee or tea (without milk or cream)  Gatorade    -  No Alcohol or cannabis products for at least 24 hours before surgery.     Which medicines can I take?    Hold Aspirin for 7 days before surgery.   Hold Multivitamins for 7 days before surgery.  Hold Supplements for 7 days before surgery.  Hold Ibuprofen (Advil, Motrin) for 1 day(s) before  surgery--unless otherwise directed by surgeon.  Hold Naproxen (Aleve) for 4 days before surgery.    -  PLEASE TAKE these medications per your usual routine:  Acetaminophen (Tylenol)  Hydrocodone-acetaminophen (Norco) if needed  Levothyroxine (Synthroid)  Simvastatin (Zocor)    How do I prepare myself?  - Please take 2 showers (one the night prior to surgery and one the morning of surgery) using Scrubcare or Hibiclens soap.    Use this soap only from the neck to your toes.     Leave the soap on your skin for one minute--then rinse thoroughly.      You may use your own shampoo and conditioner. No other hair products.   - Please remove all jewelry and body piercings.  - No lotions, deodorants or fragrance.  - No makeup or fingernail polish.   - Bring your ID and insurance card.    -If you use a CPAP machine, please bring the CPAP machine, tubing, and mask to hospital.    -If you have a Deep Brain Stimulator, Spinal Cord Stimulator, or any Neuro Stimulator device---you must bring the remote control to the hospital.      ALL PATIENTS GOING HOME THE SAME DAY OF SURGERY ARE REQUIRED TO HAVE A RESPONSIBLE ADULT TO DRIVE AND BE IN ATTENDANCE WITH THEM FOR 24 HOURS FOLLOWING SURGERY.    Covid testing policy as of 12/06/2022  Your surgeon will notify and schedule you for a COVID test if one is needed before surgery--please direct any questions or COVID symptoms to your surgeon      Questions or Concerns:    - For any questions regarding the day of surgery or your hospital stay, please contact the Pre Admission Nursing Office at [phone].       - If you have health changes between today and your surgery, please call your surgeon.       - For questions after surgery, please call your surgeons office.           Current Visitor Guidelines    You may have 2 visitors in the pre op area.    Visiting hours: 8 a.m. to 8:30 p.m.    Patients confirmed or suspected to have symptoms of COVID 19 or flu:     No visitors allowed for adult  patients.   Children (under age 18) can have 1 named visitor.     People who are sick or showing symptoms of COVID 19 or flu:    Are not allowed to visit patients--we can only make exceptions in special situations.       Please follow these guidelines for your visit:          Please maintain social distance          Masking is optional--however at times you may be asked to wear a mask for the safety of yourself and others     Clean your hands with alcohol hand . Do this when you arrive at and leave the building and patient room,    And again after you touch your mask or anything in the room.     Go directly to and from the room you are visiting.     Stay in the patient s room during your visit. Limit going to other places in the hospital as much as possible     Leave bags and jackets at home or in the car.     For everyone s health, please don t come and go during your visit. That includes for smoking   during your visit.

## 2024-05-17 NOTE — ED PROCEDURE NOTE
Procedure  Central Line    Performed by: Jeferson Light DO  Authorized by: Jeferson Light DO    Consent:     Consent obtained:  Written    Consent given by:  Patient    Risks, benefits, and alternatives were discussed: yes      Risks discussed:  Arterial puncture, incorrect placement, nerve damage, infection, pneumothorax and bleeding    Alternatives discussed:  No treatment and delayed treatment  Universal protocol:     Procedure explained and questions answered to patient or proxy's satisfaction: yes      Test results available: yes      Imaging studies available: yes      Immediately prior to procedure, a time out was called: yes      Patient identity confirmed:  Verbally with patient  Pre-procedure details:     Indication(s): central venous access      Hand hygiene: Hand hygiene performed prior to insertion      Sterile barrier technique: All elements of maximal sterile technique followed      Skin preparation:  Chlorhexidine    Skin preparation agent: Skin preparation agent completely dried prior to procedure    Sedation:     Sedation type:  None  Anesthesia:     Anesthesia method:  Local infiltration    Local anesthetic:  Lidocaine 1% w/o epi  Procedure details:     Location:  R internal jugular    Patient position:  Supine    Procedural supplies:  Triple lumen    Catheter size:  7 Fr    Landmarks identified: yes      Ultrasound guidance: yes      Ultrasound guidance timing: real time      Sterile ultrasound techniques: Sterile gel and sterile probe covers were used      Number of attempts:  1    Successful placement: yes    Post-procedure details:     Post-procedure:  Dressing applied and line sutured    Assessment:  Blood return through all ports and free fluid flow    Procedure completion:  Tolerated well, no immediate complications    Complications:  Allergic reaction to lidocaine that was given               Jeferson Light DO  05/17/24 9766

## 2024-05-17 NOTE — PROGRESS NOTES
Face-to-face for reevaluation 1 hour post placement of restraints    Time restraints placed: 1336    Reason for restraints: Nonviolent, sedated/intubated, protect patient from pulling at lines/tubes    Time reevaluated:1436    Reaction to restraints: Patient adequately sedated at this time.  He does have good capillary refill in the hands bilaterally

## 2024-05-17 NOTE — ED PROCEDURE NOTE
Procedure  Intubation    Performed by: Jeferson Light DO  Authorized by: Jeferson Light DO    Consent:     Consent obtained:  Emergent situation  Universal protocol:     Test results available: yes      Imaging studies available: yes    Pre-procedure details:     Indications: altered consciousness and respiratory failure      Patient status:  Unresponsive    Look externally: no concerns      Obstruction: none      Neck mobility: reduced      Pharmacologic strategy: RSI      Induction agents:  Etomidate    Paralytics:  Rocuronium  Procedure details:     Preoxygenation:  Bag valve mask    CPR in progress: no      Number of attempts:  1  Successful intubation attempt details:     Intubation method:  Oral    Intubation technique: video assisted      Laryngoscope blade:  Mac 4    Bougie used: no      Grade view: I      Tube size (mm):  8.0    Tube type:  Cuffed    Tube visualized through cords: yes    Placement assessment:     ETT at teeth/gumline (cm):  23    Tube secured with:  Adhesive tape and ETT meredith    Breath sounds:  Equal and absent over the epigastrium    Placement verification: chest rise, colorimetric ETCO2, CXR verification, equal breath sounds and tube exhalation      CXR findings:  Appropriate position  Post-procedure details:     Complications: cardiac arrest    Comments:      Patient did briefly go into cardiac arrest however return of spontaneous circulation occurred after 1 round of CPR and dose of epinephrine.               Jeferson Light DO  05/17/24 [id number]

## 2024-05-17 NOTE — CARE PLAN
Pt went into full arrest in the ED; intubated  Phoned pts contact Abdoulaye Arreola at [phone]; no answer, message left. Waiting for a call back

## 2024-05-17 NOTE — H&P
History Of Present Illness  Darrell Arreola is a 78 y.o. male with h/o CHF, HTN, DM, who presented after sustaining a fall vs syncopal episode. Per notes has had a gradual onset, progressive fatigue and weakness for the last few days that lead to him falling. Symptoms also included generalized body aches. In the ED concern for urinary incontinence due to seizures. CT head and spine negative. Further work up revealed LORI with Cr 2.4 (BL 1.4), +ve COVID-19 test, Trop 46, and hypotension. Received multiple boluses of IVF, however remained hypotensive. It was decided to insert a central line and place on pressors. After CVC placement, developed anaphylactic reaction (likely to Lidocaine) and went to [facility]. ROSC achieved after 2 min of BLS and one round of Epinephrine. Post ROSC was intubated and received Benadryl, Solumedrol, started on Levophed and now is being admitted to ICU for further management.   The patient is currently intubated and unresponsive and cannot provide history.    Past Medical History  Past Medical History:   Diagnosis Date    CHF (congestive heart failure) (Multi)     Diabetes mellitus (Multi)     Heart disease     Hypertension     Stroke (Multi)    Surgical History  Past Surgical History:   Procedure Laterality Date    CT GUIDED PERCUTANEOUS BIOPSY BONE DEEP  02/21/2020    CT GUIDED PERCUTANEOUS BIOPSY BONE DEEP 2/21/2020 [facility] INPATIENT LEGACY    CT GUIDED PERCUTANEOUS BIOPSY BONE DEEP  03/24/2020    CT GUIDED PERCUTANEOUS BIOPSY BONE DEEP 3/24/2020 [facility] INPATIENT LEGACY    MR HEAD ANGIO WO IV CONTRAST  05/17/2019    MR HEAD ANGIO WO IV CONTRAST [facility] EMERGENCY LEGACY    MR HEAD ANGIO WO IV CONTRAST  10/9/2023    MR HEAD ANGIO WO IV CONTRAST 10/9/2023 [facility] MRI    TOE AMPUTATION      TONSILLECTOMY     Social History  He reports that he has quit smoking. His smoking use included cigars and cigarettes. He has never used smokeless tobacco. He reports that he does not drink alcohol and does not use  drugs.  Family History  Family History   Family history unknown: Yes     Current Outpatient Medications   Medication Instructions    amiodarone (PACERONE) 200 mg, oral, Daily    apixaban (ELIQUIS) 5 mg, oral, 2 times daily    famotidine (PEPCID) 10 mg, oral, Daily    folic acid (FOLVITE) 1 mg, oral, Daily    lacosamide (VIMPAT) 100 mg, oral, 2 times daily    midodrine (PROAMATINE) 10 mg, oral, 3 times daily (morning, midday, late afternoon)    nystatin (Mycostatin) 100,000 unit/gram powder 1 Application, Topical, 2 times daily    rosuvastatin (CRESTOR) 10 mg, oral, Daily    SITagliptin phosphate (JANUVIA) 50 mg, oral, Daily    tamsulosin (FLOMAX) 0.4 mg, oral, Daily before breakfast   Allergies  Cefazolin, Latex, Lidocaine, Quinine, and Iodine  Review of Systems   Unable to perform ROS: Intubated   Scheduled medications  amiodarone, 200 mg, oral, Daily  apixaban, 5 mg, oral, BID  [Held by provider] famotidine, 10 mg, oral, Daily  folic acid, 1 mg, oral, Daily  insulin lispro, 0-5 Units, subcutaneous, q4h  lacosamide, 100 mg, oral, BID  midodrine, 10 mg, oral, TID  nystatin, 1 Application, Topical, BID  oxygen, , inhalation, Continuous - Inhalation  [START ON 5/18/2024] pantoprazole, 40 mg, oral, Daily before breakfast   Or  [START ON 5/18/2024] pantoprazole, 40 mg, intravenous, Daily before breakfast  [START ON 5/18/2024] remdesivir, 100 mg, intravenous, q24h  rosuvastatin, 10 mg, oral, Daily  [Held by provider] SITagliptin phosphate, 50 mg, oral, Daily  [Held by provider] tamsulosin, 0.4 mg, oral, Daily before breakfast    Continuous medications  fentaNYL, 0-300 mcg/hr, Last Rate: 25 mcg/hr (05/17/24 1803)  lactated Ringer's, 75 mL/hr, Last Rate: 75 mL/hr (05/17/24 1524)  norepinephrine, 0.01-0.5 mcg/kg/min, Last Rate: 0.07 mcg/kg/min (05/17/24 1523)  propofol, 5-50 mcg/kg/min, Last Rate: 30 mcg/kg/min (05/17/24 7363)    PRN medications  PRN medications: dextrose, dextrose, fentaNYL, glucagon, glucagon   Physical  Exam  Constitutional:       General: He is not in acute distress.     Appearance: He is normal weight. He is ill-appearing. He is not toxic-appearing.   HENT:      Mouth/Throat:      Mouth: Mucous membranes are moist.      Comments: ET and G tube in place.   Eyes:      General: No scleral icterus.     Pupils: Pupils are equal, round, and reactive to light.      Comments: S/p R eye cataract surgery.    Cardiovascular:      Rate and Rhythm: Normal rate. Rhythm irregular.      Heart sounds: No murmur heard.     No friction rub. No gallop.      Comments: Distant heart sounds. Lower extremity pulses could not be palpated, but could be dopplered.   Pulmonary:      Effort: No respiratory distress.      Breath sounds: No wheezing or rales.      Comments: Clear lung fields b/l with fair/poor air entry on MV. No wheezing or crackles.   Abdominal:      General: There is no distension.      Palpations: Abdomen is soft.      Tenderness: There is no abdominal tenderness.   Musculoskeletal:         General: No swelling.      Cervical back: No tenderness.      Right lower leg: No edema.      Left lower leg: No edema.      Comments: B/l cold lower extremities   Lymphadenopathy:      Cervical: No cervical adenopathy.   Skin:     General: Skin is dry.      Comments: Discoloration b/l LE   Neurological:      Comments: Cannot fully assess, currently is intubated and sedated.    Psychiatric:      Comments: Cannot fully assess, currently is intubated and sedated.      Last Recorded Vitals  ECG 12 lead    Result Date: 5/17/2024  Atrial fibrillation with rapid ventricular response Nonspecific intraventricular block T wave abnormality, consider lateral ischemia Abnormal ECG No previous ECGs available Confirmed by Edward Dang ([id number]) on 5/17/2024 3:10:56 PM    ECG 12 lead    Result Date: 5/17/2024  Atrial fibrillation Nonspecific intraventricular block Inferior infarct (cited on or before 08-OCT-2023) Abnormal ECG When compared with ECG of  17-MAY-2024 08:19, (unconfirmed) No significant change was found Confirmed by Edward Dang ([id number]) on 5/17/2024 3:09:55 PM    XR chest 1 view    Result Date: 5/17/2024  Interpreted By:  Torres Mcneill, STUDY: XR CHEST 1 VIEW;  5/17/2024 11:58 am   INDICATION: Signs/Symptoms:central line placement.   COMPARISON: 05/17/2024 at 8:53 a.m.   ACCESSION NUMBER(S): [id number]   ORDERING CLINICIAN: JONATAN FRY   FINDINGS: Endotracheal tube terminates about 3 cm above the mohan. Right IJ central line terminates in the lower SVC. No definite focal infiltrate. Cephalization of the pulmonary vessels likely related to supine positioning. Cardiomediastinal silhouette unchanged. Pleural effusions not excluded.       Post endotracheal tube and central line placement. Otherwise no active disease in the chest.   MACRO: None   Signed by: Torres Mcneill 5/17/2024 12:21 PM Dictation workstation:   [id number]    Electrocardiogram, 12-lead    Result Date: 5/17/2024  Atrial fibrillation with rapid ventricular response with premature ventricular or aberrantly conducted complexes Nonspecific intraventricular block Possible Inferior infarct (cited on or before 08-OCT-2023) Abnormal ECG When compared with ECG of 20-APR-2024 13:54, No significant change was found    CT cervical spine wo IV contrast    Result Date: 5/17/2024  Interpreted By:  Florencio Morrell, STUDY: CT CERVICAL SPINE WO IV CONTRAST;  5/17/2024 9:55 am   INDICATION: Signs/Symptoms:fall, on Eliquis.   COMPARISON: Previous exam is from 02/20/2020   ACCESSION NUMBER(S): [id number]   ORDERING CLINICIAN: JONATAN FRY   TECHNIQUE: Thin section axial images were obtained from the skull base down through the thoracic inlet. Sagittal and coronal reconstruction images were generated. Soft tissue, lung, and bone windows were reviewed.   FINDINGS: VERTEBRAL BODIES AND POSTERIOR ELEMENTS: There is moderate endplate osteophytosis from C5-6 through C7-T1. Mild-to-moderate disc space  narrowing at C5-6 and C7-T1 with moderate disc space narrowing at C6-7. Multilevel inter facet hypertrophy with spur formation. Uncovertebral hypertrophy with spur formation on the right at C5-6 and on the left at C6-7. No cervical spine compression fracture. No posterior element fracture. No destructive bone lesion. No listhesis.   SPINAL CANAL: No gross disc herniation.   NECK SOFT TISSUES: Within normal limits.   LUNG APICES: Imaged portion of the lung apices are within normal limits.   SKULL BASE: Within normal limits.       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 No CT evidence of cervical spine fracture in this exam.   MACRO: None   Signed by: Florencio Morrell 5/17/2024 10:15 AM Dictation workstation:   [id number]    CT head wo IV contrast    Result Date: 5/17/2024  Interpreted By:  Florencio Morrell, STUDY: CT HEAD WO IV CONTRAST;  5/17/2024 9:55 am   INDICATION: Signs/Symptoms:fall, on Eliquis.   COMPARISON: Comparison study is from 04/20/2024..   ACCESSION NUMBER(S): [id number]   ORDERING CLINICIAN: JONATAN FRY   TECHNIQUE: Routine axial images were obtained from the skull base through the vertex.  Sagittal and coronal reconstruction images were generated. Brain, subdural, and bone windows were reviewed. N/A Unavailable   FINDINGS: INTRACRANIAL: Mild prominence of ventricles and sulci. There is mild patchy hypodensity throughout the deep periventricular white matter. No acute intracranial bleed, midline shift, or focal mass effect. No destructive bone lesion. No depressed skull fracture. Skullbase arterial calcifications in the carotid siphons and vertebral arteries.   EXTRACRANIAL: Visualized paranasal sinuses were clear. Visualized mastoid air cells were clear.       No depressed skull fracture. No acute intracranial bleed or focal mass effect.   Mild volume loss.   Mild chronic white matter ischemic disease in the deep periventricular regions.   MACRO: None   Signed by: Florencio Morrell 5/17/2024 10:08 AM  Dictation workstation:   [id number]    XR chest 1 view    Result Date: 5/17/2024  Interpreted By:  Sandee Carrero, STUDY: XR CHEST 1 VIEW;  5/17/2024 8:56 am   INDICATION: Signs/Symptoms:Chest Pain.   COMPARISON: 04/20/2024   ACCESSION NUMBER(S): [id number]   ORDERING CLINICIAN: JONATAN FRY   FINDINGS:         CARDIOMEDIASTINAL SILHOUETTE: Cardiomediastinal silhouette is normal in size and configuration.   LUNGS: Lungs are clear.   ABDOMEN: No remarkable upper abdominal findings.   BONES: No acute osseous changes.       1.  No evidence of acute cardiopulmonary process.       MACRO: None   Signed by: Sandee Carrero 5/17/2024 9:01 AM Dictation workstation:   [id number]    Results for orders placed or performed during the hospital encounter of 05/17/24 (from the past 24 hour(s))   ECG 12 lead   Result Value Ref Range    Ventricular Rate 107 BPM    QRS Duration 126 ms    QT Interval 426 ms    QTC Calculation(Bazett) 568 ms    R Axis 30 degrees    T Axis 197 degrees    QRS Count 17 beats    Q Onset 206 ms    T Offset 419 ms    QTC Fredericia 516 ms   CBC and Auto Differential   Result Value Ref Range    WBC 5.3 4.4 - 11.3 x10*3/uL    nRBC 0.0 0.0 - 0.0 /100 WBCs    RBC 4.18 (L) 4.50 - 5.90 x10*6/uL    Hemoglobin 13.6 13.5 - 17.5 g/dL    Hematocrit 42.3 41.0 - 52.0 %     (H) 80 - 100 fL    MCH 32.5 26.0 - 34.0 pg    MCHC 32.2 32.0 - 36.0 g/dL    RDW 16.4 (H) 11.5 - 14.5 %    Platelets 165 150 - 450 x10*3/uL    Neutrophils % 53.8 40.0 - 80.0 %    Immature Granulocytes %, Automated 0.2 0.0 - 0.9 %    Lymphocytes % 35.0 13.0 - 44.0 %    Monocytes % 10.0 2.0 - 10.0 %    Eosinophils % 0.8 0.0 - 6.0 %    Basophils % 0.2 0.0 - 2.0 %    Neutrophils Absolute 2.85 1.60 - 5.50 x10*3/uL    Immature Granulocytes Absolute, Automated 0.01 0.00 - 0.50 x10*3/uL    Lymphocytes Absolute 1.85 0.80 - 3.00 x10*3/uL    Monocytes Absolute 0.53 0.05 - 0.80 x10*3/uL    Eosinophils Absolute 0.04 0.00 - 0.40 x10*3/uL    Basophils  Absolute 0.01 0.00 - 0.10 x10*3/uL   Comprehensive Metabolic Panel   Result Value Ref Range    Glucose 109 (H) 65 - 99 mg/dL    Sodium 136 133 - 145 mmol/L    Potassium 4.5 3.4 - 5.1 mmol/L    Chloride 97 97 - 107 mmol/L    Bicarbonate 22 (L) 24 - 31 mmol/L    Urea Nitrogen 34 (H) 8 - 25 mg/dL    Creatinine 2.40 (H) 0.40 - 1.60 mg/dL    eGFR 27 (L) >60 mL/min/1.73m*2    Calcium 8.9 8.5 - 10.4 mg/dL    Albumin 3.7 3.5 - 5.0 g/dL    Alkaline Phosphatase 121 35 - 125 U/L    Total Protein 7.1 5.9 - 7.9 g/dL    AST 22 5 - 40 U/L    Bilirubin, Total 1.6 (H) 0.1 - 1.2 mg/dL    ALT 16 5 - 40 U/L    Anion Gap 17 <=19 mmol/L   Magnesium   Result Value Ref Range    Magnesium 2.10 1.60 - 3.10 mg/dL   BLOOD GAS LACTIC ACID, VENOUS   Result Value Ref Range    POCT Lactate, Venous 1.7 0.4 - 2.0 mmol/L   Serial Troponin, Initial ([facility])   Result Value Ref Range    Troponin T, High Sensitivity 46 (HH) <=14 ng/L   Creatine Kinase   Result Value Ref Range    Creatine Kinase 68 24 - 195 U/L   Sars-CoV-2 PCR   Result Value Ref Range    Coronavirus 2019, PCR Detected (A) Not Detected   Urinalysis with Reflex Culture and Microscopic   Result Value Ref Range    Color, Urine Yellow Light-Yellow, Yellow, Dark-Yellow    Appearance, Urine Clear Clear    Specific Gravity, Urine 1.012 1.005 - 1.035    pH, Urine 5.0 5.0, 5.5, 6.0, 6.5, 7.0, 7.5, 8.0    Protein, Urine NEGATIVE NEGATIVE, 10 (TRACE), 20 (TRACE) mg/dL    Glucose, Urine Normal Normal mg/dL    Blood, Urine NEGATIVE NEGATIVE    Ketones, Urine NEGATIVE NEGATIVE mg/dL    Bilirubin, Urine NEGATIVE NEGATIVE    Urobilinogen, Urine 4 (2+) (A) Normal mg/dL    Nitrite, Urine NEGATIVE NEGATIVE    Leukocyte Esterase, Urine NEGATIVE NEGATIVE   ECG 12 lead   Result Value Ref Range    Ventricular Rate 95 BPM    QRS Duration 132 ms    QT Interval 416 ms    QTC Calculation(Bazett) 522 ms    R Axis 35 degrees    T Axis 213 degrees    QRS Count 15 beats    Q Onset 208 ms    T Offset 416 ms    QTC  Vinny 484 ms   Serial Troponin, 2 Hour ([facility])   Result Value Ref Range    Troponin T, High Sensitivity 45 (HH) <=14 ng/L   Serial Troponin, 6 Hour ([facility])   Result Value Ref Range    Troponin T, High Sensitivity 49 (HH) <=14 ng/L   Blood Gas Arterial Full Panel   Result Value Ref Range    POCT pH, Arterial 7.21 (LL) 7.38 - 7.42 pH    POCT pCO2, Arterial 45 (H) 38 - 42 mm Hg    POCT pO2, Arterial 88 85 - 95 mm Hg    POCT SO2, Arterial 97 94 - 100 %    POCT Oxy Hemoglobin, Arterial 92.4 (L) 94.0 - 98.0 %    POCT Hematocrit Calculated, Arterial 47.0 41.0 - 52.0 %    POCT Sodium, Arterial 133 (L) 136 - 145 mmol/L    POCT Potassium, Arterial 4.0 3.5 - 5.3 mmol/L    POCT Chloride, Arterial 103 98 - 107 mmol/L    POCT Ionized Calcium, Arterial 1.07 (L) 1.10 - 1.33 mmol/L    POCT Glucose, Arterial 185 (H) 74 - 99 mg/dL    POCT Lactate, Arterial 1.9 0.4 - 2.0 mmol/L    POCT Base Excess, Arterial -9.8 (L) -2.0 - 3.0 mmol/L    POCT HCO3 Calculated, Arterial 18.0 (L) 22.0 - 26.0 mmol/L    POCT Hemoglobin, Arterial 15.5 13.5 - 17.5 g/dL    POCT Anion Gap, Arterial 16 10 - 25 mmo/L    Patient Temperature 37.0 degrees Celsius    FiO2 60 %    Ventilator Mode Other     Ventilator Rate 16 bpm    Tidal Volume 500 mL    Peep CHM2O 8.0 cm H2O    Site of Arterial Puncture Radial Right     Nikunj's Test Positive    Assessment/Plan   Principal Problem:    Closed head injury, initial encounter  78 YOM with h/o CHF, HTN, DM, who presented after sustaining a fall vs syncopal episode. Per notes has had a gradual onset, progressive fatigue and weakness for the last few days that lead to him falling. Symptoms also included generalized body aches. In the ED concern for urinary incontinence due to seizures. CT head and spine negative. Further work up revealed LORI with Cr 2.4 (BL 1.4), +ve COVID-19 test, Trop 46, and hypotension. Received multiple boluses of IVF, however remained hypotensive. It was decided to insert a central line and place on  pressors. After CVC placement, developed anaphylactic reaction (likely to Lidocaine) and went to [facility]A. ROSC achieved after 2 min of BLS and one round of Epinephrine. Post ROSC was intubated and received Benadryl, Solumedrol, started on Levophed and now is being admitted to ICU for further management.     Neuro: post arrest encephalopathy, currently is sedated      Continue sedation with Propofol, Fentanyl      Continue home Lacosamide      Daily SBT      Supportive measures      Might need repeat head CT in 48 hours after the arrest    CV: H/o CHF (EF 25%), HTN, now s/p arrest due to anaphylactic reaction and shock hemorrhagic vs distributive (sepsis/anaphylaxis). Mildly elevated troponin likely demand ischemia.       Continue amiodarone, Crestor, midodrine and Apixaban      Continue Levophed, wean off as tolerates      Steroids, Benadryl for anaphylaxis      Continue to trend troponin    Pulmonary: intubated post arrest. Baseline does not seem to need O2      Continue MV with current settin/18/8/60%, try to wean off as tolerates      Repeat ABG given PH 7.21 and PCO 45      Daily SBT      No pulmonary involvement from COVID, will hold off dexamethasone    : LORI (Cr 2.4 up from 1.4), otherwise no major electrolyte abnormalities.       IVF LR @ 75 with bolus as needed      Is/Os      Daily RFP      Treat electrolyte abnormalities as indicated    GI: no acute issues      NPO      GI prophylaxis with PPI      Will hold home Pepcid    Endo: T2DM on Januvia       Will hold Januvia       SSI       PCOT every 4 hours       Hypoglycemic protocol    Hem/Onc: no acute issues       Daily CBC    ID: patient with COVID infection. No lung involvement        Will monitor for S&S infection    This was a critical care visit for respiratory failure and shock. Total time 60 min.   Frederic Pina MD

## 2024-05-17 NOTE — CONSULTS
" Nutrition Assessement Note    Nutrition Assessment    Reason for Assessment: Tube feeding recommendations  Admitted with head injury after fall/syncopal event. Intubated/sedated for airway protection. Covid19+. Patient still in ED at time of assessment. Spoke with attending MD. Will provide tube feed recommendations. Renal formula preferred by MD at this time d/t LORI.    Reason for Hospital Admission:  Darrell Arreola is a 78 y.o. male who is admitted for fall    Past Medical History:   Diagnosis Date    CHF (congestive heart failure) (Multi)     Diabetes mellitus (Multi)     Heart disease     Hypertension     Stroke (Multi)       Past Surgical History:   Procedure Laterality Date    CT GUIDED PERCUTANEOUS BIOPSY BONE DEEP  02/21/2020    CT GUIDED PERCUTANEOUS BIOPSY BONE DEEP [place] [facility] [place] [facility]    CT GUIDED PERCUTANEOUS BIOPSY BONE DEEP  03/24/2020    CT GUIDED PERCUTANEOUS BIOPSY BONE DEEP [place]24/[place] [facility] [place]    MR HEAD ANGIO WO IV CONTRAST  05/17/2019    MR HEAD ANGIO WO IV CONTRAST [facility]    MR HEAD ANGIO WO IV CONTRAST  10/9/2023    MR HEAD ANGIO WO IV CONTRAST 10/9/2023 [facility]    TOE AMPUTATION      TONSILLECTOMY         Nutrition History:  Food and Nutrient History: NPO/Vent     Food Allergies/Intolerances:  None  GI Symptoms: None  Oral Problems: None    Anthropometrics:  Ht: 185.4 cm (6' 1\"), Wt: 96.3 kg (212 lb 4.9 oz), BMI: 28.02  IBW/kg (Dietitian Calculated): 83.64 kg  Weight Change:  Daily Weight  05/17/24 : 96.3 kg (212 lb 4.9 oz)  04/28/24 : 96.3 kg (212 lb 4.9 oz)  10/20/23 : 99.5 kg (219 lb 5.7 oz)     Nutrition Focused Physical Exam Findings: defer: Patient unavailable     Nutrition Significant Labs:  Lab Results   Component Value Date    WBC 5.3 05/17/2024    HGB 13.6 05/17/2024    HCT 42.3 05/17/2024     05/17/2024    CHOL 109 (L) 04/21/2024    TRIG 63 04/21/2024    HDL 46.0 04/21/2024    LDLDIRECT 71 10/10/2023    ALT 16 05/17/2024    AST 22 " 05/17/2024     05/17/2024    K 4.5 05/17/2024    CL 97 05/17/2024    CREATININE 2.40 (H) 05/17/2024    BUN 34 (H) 05/17/2024    CO2 22 (L) 05/17/2024    TSH 4.23 (H) 04/21/2024    INR 1.6 (H) 04/20/2024    HGBA1C 6.7 (H) 04/21/2024     Nutrition Specific Medications:  heparin (porcine), 5,000 Units, subcutaneous, q8h  insulin lispro, 0-5 Units, subcutaneous, q4h  oxygen, , inhalation, Continuous - Inhalation  [START ON 5/18/2024] pantoprazole, 40 mg, oral, Daily before breakfast   Or  [START ON 5/18/2024] pantoprazole, 40 mg, intravenous, Daily before breakfast  remdesivir, 200 mg, intravenous, Once   Followed by  [START ON 5/18/2024] remdesivir, 100 mg, intravenous, q24h      lactated Ringer's, 75 mL/hr, Last Rate: 75 mL/hr (05/17/24 1310)  norepinephrine, 0.01-0.5 mcg/kg/min, Last Rate: 0.07 mcg/kg/min (05/17/24 1322)  propofol, 5-20 mcg/kg/min, Last Rate: 15 mcg/kg/min (05/17/24 1315)      Propofol @ 8.67 ml/hr provides: 229 kcals/day    Dietary Orders (From admission, onward)       Start     Ordered    05/17/24 1241  NPO Diet; Effective now  Diet effective now         05/17/24 1247                   Estimated Needs:   Estimated Energy Needs  Total Energy Estimated Needs (kCal):  (5574-0195)  Total Estimated Energy Need per Day (kCal/kg):  (22-25)  Method for Estimating Needs: IBW    Estimated Protein Needs  Total Protein Estimated Needs (g):  (101-168)  Total Protein Estimated Needs (g/kg):  (1.2-2.0)  Method for Estimating Needs: IBW    Estimated Fluid Needs  Method for Estimating Needs: <2000 mL/d      Nutrition Diagnosis   Nutrition Diagnosis:     Nutrition Diagnosis  Patient has Nutrition Diagnosis: Yes  Diagnosis Status (1): New  Nutrition Diagnosis 1: Inadequate fat intake  Related to (1): decreased ability to consume sufficient energy  As Evidenced by (1): NPO/Mechanical Ventilation     Nutrition Interventions/Recommendations   Nutrition Interventions and Recommendations:    Nutrition  Prescription:  Individualized Nutrition Prescription Provided for : 9413-0257 calories, 101-168 gm protein to be provided via enteral nutrition    Nutrition Interventions:   Food and/or Nutrient Delivery Interventions  Interventions: Enteral intake  Enteral Intake: Modify composition of enteral nutrition, Modify rate of enteral nutrition  Goal: If tube feed is initiated, recommend: Nepro goal rate @40 mL/Hr to provide 1728 calories (1957 kcals w/current rate of sedation), 78 gm protein, 698 mL free water. Suggest start @10 mL/Hr and increase by 10 mL Q4H until goal, as tolerated.    Education Documentation  No documentation found.         Nutrition Monitoring and Evaluation   Monitoring/Evaluation:   Food/Nutrient Related History Monitoring  Monitoring and Evaluation Plan: Enteral and parenteral nutrition intake  Enteral and Parenteral Nutrition Intake: Enteral nutrition intake  Criteria: monitoring for tube feed initiation       Time Spent/Follow-up:   Follow Up  Time Spent (min): 25 minutes  Last Date of Nutrition Visit: 05/17/24  Nutrition Follow-Up Needed?: 3-5 days  Follow up Comment: 5/20/24

## 2024-05-17 NOTE — ED PROVIDER NOTES
Patient was seen by both myself and advanced practitioner.  I personally saw the patient and made/approved the management plan and take responsibility for the patient management     Briefly, patient is a 78-year-old male that presents emergency department for evaluation after a fall versus syncopal episode.  He notes that for the last several days he has been having increasing weakness and fatigue.  Nothing seems to make it better, made worse with exertion.  Today he did fall however he is unsure whether he tripped or syncopized.  He states that he is uncomfortable and has some generalized bodyaches.  He denies chest pain, abdominal pain, nausea or vomiting.    On exam patient uncomfortable appearing but in no obvious distress.  He is notably wet on the left side and it is felt patient likely urinated himself.  He has generalized weakness no focal motor deficit and has an NIH stroke score of 0.  Given patient's fall as well as the fact patient is on Eliquis CT scan of the head and cervical spine was ordered.  Patient did test positive for COVID-19 and also has an acute kidney injury with a creatinine of 2.4 which is elevated from patient's normal baseline creatinine of 1.4.  Electrolytes are stable at this time.  Troponin is elevated at 46 however this is likely secondary to the acute kidney injury.  Remainder blood work unremarkable.  Given patient's failure to thrive, acute kidney injury and weakness patient will be admitted for further workup and treatment.  Patient did receive multiple liters of IV fluid however blood pressure remained low.  Central line was placed as described in procedure note.  Placement of the central line initially was uncomplicated however shortly after placement patient began having a rash and went unresponsive.  It was found that patient is allergic to lidocaine and prior to epinephrine being given patient did have an episode of pulseless electrical activity.  CPR was initiated for total  of 2 minutes and patient did receive IV epinephrine at that time with return of spontaneous circulation.  Patient was intubated for airway protection and did receive IV Benadryl, IV Solu-Medrol.  He was started on Levophed for pressure support as he was hypotensive persistently probable prior to the allergic reaction episode as well as afterwards.  I discussed case with the critical care medicine physician and he excepted patient for admission to ICU and further treatment.    I independently interpreted the following study(s) EKG, CT scan of the head, CT scan cervical spine, chest x-ray which show EKG shows atrial fibrillation with RVR with a rate of 101, normal axis, QTc 536, no evidence of STEMI.  Chest x-ray shows no evidence of pneumonia, pneumothorax, wide mediastinum.  CT scan of the brain showed chronic degenerative changes but no evidence of intracranial hemorrhage.  CT scan cervical spine shows no evidence of fracture.  Repeat chest x-ray does show adequate placement of right IJ central line as well as confirm placement of ET tube     Jeferson Light, DO  05/17/24 7993

## 2024-05-17 NOTE — ED PROVIDER NOTES
HPI   Chief Complaint   Patient presents with    Fall       Patient is a 78-year-old male with past medical history of CHF, diabetes, HTN, A-fib on Eliquis presenting via EMS status post fall versus syncope.  This is a patient had fallen this morning so he called EMS.  They state he does endorse feeling generally weak for the last couple of days.  He states that is more of a generalized weakness rather than focal.  He states he has been ambulatory at home.  Was recently admitted to the hospital in April and sent to rehab for syncope.  Patient is noticeably wet on his left side.  He does state that he did urinate himself.  Patient denies fevers, chills, cough, sore throat, runny nose, chest pain, shortness of breath, abdominal pain, nausea, vomiting, diarrhea or urinary complaints.                          Baker Coma Scale Score: 10         NIH Stroke Scale: 0             Patient History   Past Medical History:   Diagnosis Date    CHF (congestive heart failure) (Multi)     Diabetes mellitus (Multi)     Heart disease     Hypertension     Stroke (Multi)      Past Surgical History:   Procedure Laterality Date    CT GUIDED PERCUTANEOUS BIOPSY BONE DEEP  02/21/2020    CT GUIDED PERCUTANEOUS BIOPSY BONE DEEP 2/21/2020 [facility] INPATIENT LEGACY    CT GUIDED PERCUTANEOUS BIOPSY BONE DEEP  03/24/2020    CT GUIDED PERCUTANEOUS BIOPSY BONE DEEP 3/24/2020 [facility] INPATIENT LEGACY    MR HEAD ANGIO WO IV CONTRAST  05/17/2019    MR HEAD ANGIO WO IV CONTRAST [facility] EMERGENCY LEGACY    MR HEAD ANGIO WO IV CONTRAST  10/9/2023    MR HEAD ANGIO WO IV CONTRAST 10/9/2023 [facility]    TOE AMPUTATION      TONSILLECTOMY       Family History   Family history unknown: Yes     Social History     Tobacco Use    Smoking status: Former     Types: Cigars, Cigarettes    Smokeless tobacco: Never   Substance Use Topics    Alcohol use: Never    Drug use: Never       Physical Exam   ED Triage Vitals   Temp Pulse Resp BP   -- -- -- --      SpO2 Temp src Heart Rate  Source Patient Position   -- -- -- --      BP Location FiO2 (%)     -- --       Physical Exam  Constitutional:       Comments: Awake, laying in examination bed   HENT:      Head: Normocephalic and atraumatic.      Nose: Nose normal.      Mouth/Throat:      Mouth: Mucous membranes are moist.      Pharynx: Oropharynx is clear.   Eyes:      Extraocular Movements: Extraocular movements intact.      Pupils: Pupils are equal, round, and reactive to light.   Cardiovascular:      Rate and Rhythm: Normal rate and regular rhythm.      Pulses: Normal pulses.      Heart sounds: Normal heart sounds.   Pulmonary:      Effort: Pulmonary effort is normal.      Breath sounds: Normal breath sounds.   Abdominal:      General: Abdomen is flat.      Palpations: Abdomen is soft.   Musculoskeletal:         General: Normal range of motion.      Cervical back: Normal range of motion and neck supple.      Comments: Left side is noticeably wet, from urine   Skin:     General: Skin is warm and dry.      Capillary Refill: Capillary refill takes less than 2 seconds.   Neurological:      General: No focal deficit present.      Mental Status: He is oriented to person, place, and time.   Psychiatric:         Mood and Affect: Mood normal.         Behavior: Behavior normal.         ED Course & MDM   ED Course as of 05/17/24 1851   Fri May 17, 2024   1015 EKG Time:1014  EKG Interpretation time:1015  EKG Interpretation: EKG shows atrial fibrillation rate controlled at a rate of 95 bpm, normal axis, QTc 522, no evidence of STEMI.    EKG was interpreted by myself independently [JL]      ED Course User Index  [JL] Jeferson Light DO         Diagnoses as of 05/17/24 1851   Closed head injury, initial encounter   Syncope and collapse   Acute kidney injury (CMS-HCC)   Failure to thrive in adult   Anaphylaxis, initial encounter       Medical Decision Making  Patient is a 78-year-old male with past medical history of HF, diabetes, hypertension, A-fib on  Eliquis presenting via EMS status post fall versus syncope.  CBC, CMP, mag, lactate, troponin, EKG, chest x-ray, head and neck CT ordered.  IV fluids ordered due to patient's blood pressure being 81/57.  Conditions considered include but are not limited to: Syncope versus mechanical fall versus seizure, intracranial hemorrhage, contusion, fever.  NIH of 0.    I saw this patient conjunction with Dr. Light.  CBC is without leukocytosis or anemia.  Lactate within normal limits.  CMP shows elements of an LORI.  IV fluids are running.  Troponin is elevated at 48.  COVID is positive.  Chest x-ray is without acute cardiopulmonary process.  Magnesium WNL.  CT head without acute intracranial pathology.  CT C-spine without acute findings.    His pressure initially did improve with IV fluids briefly.  Patient's blood pressure did drop and required central line placement.  Attending physician performed the procedure, myself not at bedside during central line placement.  Attending states that while he was performing the procedure, patient endorsed feeling itchy.  Early after the procedure was complete, patient stated he did not feel well and appeared to have either seized or syncopized, coming unresponsive.  It was found that the patient has an allergy to lidocaine and prior to epinephrine given, patient did have an episode of pulseless electrical activity.  CPR was initiated for and was ongoing for roughly 2 minutes.  IV epinephrine was administered and ROSC was achieved.  Patient intubated for airway protection, with respiratory therapy at bedside.  Myself at bedside as well during intubation.  IV Solu-Medrol, IV Benadryl ordered.  Levophed was started for pressure support.    Chest x-ray was ordered to ensure placement of central line and ET tube, which was confirmed.    Attending physician did discuss this case with intensivist team.  They thoroughly discussed the patient's history, physical, laboratory and imaging findings  as well as ED course.  After discussion, it was recommended to have this patient admitted to the intensive care unit due to intubation for respiratory support and central line for sugar control.  As I deemed necessary from the patient's history, physical, laboratory and imaging findings as well as ED course, I considered the above listed diagnoses.    Upon my evaluation, this patient had a high probability of imminent or life threatening deterioration due to hypotension and respiratory distress, which required my direct attention, intervention, and personal management.  Patient required central line placement and intubation for stabilization.     I personally provided 34 minutes to nonconcurrent critical care time exclusive of time spent on separately billable procedures.  Time includes review of laboratory data, radiology results, discussion with consultants, and monitoring for potential decompensation.  Interventions were performed as documented above.    Portions of this note made with Dragon software, please be mindful of potential grammatical errors.        Medications   norepinephrine (Levophed) 8 mg in dextrose 5% 250 mL (0.032 mg/mL) infusion (premix) (0.05 mcg/kg/min × 96.3 kg intravenous Continue to Inpatient Floor 5/17/24 1700)   oxygen (O2) therapy (60 percent inhalation Start 5/17/24 1635)   pantoprazole (ProtoNix) EC tablet 40 mg (has no administration in time range)     Or   pantoprazole (ProtoNix) injection 40 mg (has no administration in time range)   lactated Ringer's infusion (75 mL/hr intravenous Rate/Dose Verify 5/17/24 1524)   glucagon (Glucagen) injection 1 mg (has no administration in time range)   dextrose 50 % injection 25 g (has no administration in time range)   glucagon (Glucagen) injection 1 mg (has no administration in time range)   dextrose 50 % injection 12.5 g (has no administration in time range)   insulin lispro (HumaLOG) injection 0-5 Units ( subcutaneous Not Given 5/17/24 1600)    remdesivir (Veklury) 200 mg in sodium chloride 0.9% 250 mL IV (0 mg intravenous Stopped 5/17/24 1749)     Followed by   remdesivir (Veklury) 100 mg in sodium chloride 0.9% 250 mL IV (has no administration in time range)   fentaNYL bolus from bag 25 mcg (has no administration in time range)   fentanyl (Sublimaze) 1000 mcg in sodium chloride 0.9% 100 mL (10 mcg/mL) infusion (premix) (25 mcg/hr intravenous New Bag 5/17/24 1803)   propofol (Diprivan) infusion (30 mcg/kg/min × 96.3 kg intravenous New Bag 5/17/24 1721)   amiodarone (Pacerone) tablet 200 mg (200 mg oral Not Given 5/17/24 1830)   apixaban (Eliquis) tablet 5 mg (has no administration in time range)   famotidine (Pepcid) tablet 10 mg ( oral Dose Auto Held 5/21/24 0900)   folic acid (Folvite) tablet 1 mg (1 mg oral Given 5/17/24 1843)   lacosamide (Vimpat) tablet 100 mg (has no administration in time range)   midodrine (Proamatine) tablet 10 mg (10 mg oral Given 5/17/24 1843)   nystatin (Mycostatin) 100,000 unit/gram powder 1 Application (has no administration in time range)   rosuvastatin (Crestor) tablet 10 mg (has no administration in time range)   SITagliptin phosphate (Januvia) tablet 50 mg ( oral Dose Auto Held 5/21/24 0900)   tamsulosin (Flomax) 24 hr capsule 0.4 mg ( oral Dose Auto Held 5/22/24 0700)   methylPREDNISolone sod succinate (SOLU-Medrol) 40 mg/mL injection 40 mg (has no administration in time range)   diphenhydrAMINE (BENADryl) injection 25 mg (has no administration in time range)   sodium chloride 0.9 % bolus 1,000 mL (0 mL intravenous Stopped 5/17/24 0932)   sodium chloride 0.9 % bolus 1,000 mL (0 mL intravenous Stopped 5/17/24 1058)   EPINEPHrine (Adrenalin) 1 mg/10mL injection (1 mg intravenous Given 5/17/24 1151)   methylPREDNISolone sod succinate (SOLU-Medrol) injection 125 mg (125 mg intravenous Given 5/17/24 1159)   diphenhydrAMINE (BENADryl) injection 50 mg (50 mg intravenous Given 5/17/24 1159)   EPINEPHrine (Epipen) injection  syringe 0.3 mg (0.3 mg intramuscular Given 5/17/24 1225)   etomidate (Amidate) injection 20 mg (20 mg intravenous Given 5/17/24 1146)   rocuronium (ZeMuron) injection 50 mg (50 mg intravenous Given 5/17/24 1146)   lactated Ringer's bolus 1,000 mL (0 mL intravenous Stopped 5/17/24 1803)         Labs Reviewed   CBC WITH AUTO DIFFERENTIAL - Abnormal       Result Value    WBC 5.3      nRBC 0.0      RBC 4.18 (*)     Hemoglobin 13.6      Hematocrit 42.3       (*)     MCH 32.5      MCHC 32.2      RDW 16.4 (*)     Platelets 165      Neutrophils % 53.8      Immature Granulocytes %, Automated 0.2      Lymphocytes % 35.0      Monocytes % 10.0      Eosinophils % 0.8      Basophils % 0.2      Neutrophils Absolute 2.85      Immature Granulocytes Absolute, Automated 0.01      Lymphocytes Absolute 1.85      Monocytes Absolute 0.53      Eosinophils Absolute 0.04      Basophils Absolute 0.01     COMPREHENSIVE METABOLIC PANEL - Abnormal    Glucose 109 (*)     Sodium 136      Potassium 4.5      Chloride 97      Bicarbonate 22 (*)     Urea Nitrogen 34 (*)     Creatinine 2.40 (*)     eGFR 27 (*)     Calcium 8.9      Albumin 3.7      Alkaline Phosphatase 121      Total Protein 7.1      AST 22      Bilirubin, Total 1.6 (*)     ALT 16      Anion Gap 17     SARS-COV-2 PCR - Abnormal    Coronavirus 2019, PCR Detected (*)     Narrative:     This assay has received FDA Emergency Use Authorization (EUA) and is only authorized for the duration of time that circumstances exist to justify the authorization of the emergency use of in vitro diagnostic tests for the detection of SARS-CoV-2 virus and/or diagnosis of COVID-19 infection under section 564(b)(1) of the Act, 21 U.S.C. 360bbb-3(b)(1). This assay is an in vitro diagnostic nucleic acid amplification test for the qualitative detection of SARS-CoV-2 from nasopharyngeal specimens and has been validated for use at [facility]. Negative results do not preclude COVID-19  infections and should not be used as the sole basis for diagnosis, treatment, or other management decisions.     SERIAL TROPONIN, INITIAL ([facility]) - Abnormal    Troponin T, High Sensitivity 46 (*)    URINALYSIS WITH REFLEX CULTURE AND MICROSCOPIC - Abnormal    Color, Urine Yellow      Appearance, Urine Clear      Specific Gravity, Urine 1.012      pH, Urine 5.0      Protein, Urine NEGATIVE      Glucose, Urine Normal      Blood, Urine NEGATIVE      Ketones, Urine NEGATIVE      Bilirubin, Urine NEGATIVE      Urobilinogen, Urine 4 (2+) (*)     Nitrite, Urine NEGATIVE      Leukocyte Esterase, Urine NEGATIVE     SERIAL TROPONIN,  2 HOUR ([facility]) - Abnormal    Troponin T, High Sensitivity 45 (*)    SERIAL TROPONIN, 6 HOUR ([facility]) - Abnormal    Troponin T, High Sensitivity 49 (*)    BLOOD GAS ARTERIAL FULL PANEL - Abnormal    POCT pH, Arterial 7.21 (*)     POCT pCO2, Arterial 45 (*)     POCT pO2, Arterial 88      POCT SO2, Arterial 97      POCT Oxy Hemoglobin, Arterial 92.4 (*)     POCT Hematocrit Calculated, Arterial 47.0      POCT Sodium, Arterial 133 (*)     POCT Potassium, Arterial 4.0      POCT Chloride, Arterial 103      POCT Ionized Calcium, Arterial 1.07 (*)     POCT Glucose, Arterial 185 (*)     POCT Lactate, Arterial 1.9      POCT Base Excess, Arterial -9.8 (*)     POCT HCO3 Calculated, Arterial 18.0 (*)     POCT Hemoglobin, Arterial 15.5      POCT Anion Gap, Arterial 16      Patient Temperature 37.0      FiO2 60      Ventilator Mode Other      Ventilator Rate 16      Tidal Volume 500      Peep CHM2O 8.0      Site of Arterial Puncture Radial Right      Nikunj's Test Positive     POCT GLUCOSE - Abnormal    POCT Glucose 167 (*)    MAGNESIUM - Normal    Magnesium 2.10     BLOOD GAS LACTIC ACID, VENOUS - Normal    POCT Lactate, Venous 1.7     CREATINE KINASE - Normal    Creatine Kinase 68     TROPONIN T SERIES, HIGH SENSITIVITY (0, 2 HR, 6 HR)    Narrative:     The following orders were created for panel order Troponin T  Series, High Sensitivity (0, 2HR, 6HR).  Procedure                               Abnormality         Status                     ---------                               -----------         ------                     Serial Troponin, Initial...[[id number]]  Abnormal            Final result               Serial Troponin, 2 Hour ...[[id number]]  Abnormal            Final result               Serial Troponin, 6 Hour ...[[id number]]  Abnormal            Final result                 Please view results for these tests on the individual orders.   URINALYSIS WITH REFLEX CULTURE AND MICROSCOPIC    Narrative:     The following orders were created for panel order Urinalysis with Reflex Culture and Microscopic.  Procedure                               Abnormality         Status                     ---------                               -----------         ------                     Urinalysis with Reflex C...[[id number]]  Abnormal            Final result               Extra Urine Gray Tube[[id number]]                            In process                   Please view results for these tests on the individual orders.   EXTRA URINE GRAY TUBE   BLOOD GAS ARTERIAL FULL PANEL   POCT GLUCOSE         XR chest 1 view   Final Result   Post endotracheal tube and central line placement.   Otherwise no active disease in the chest.        MACRO:   None        Signed by: Torres Mcneill 5/17/2024 12:21 PM   Dictation workstation:   [id number]      CT head wo IV contrast   Final Result   No depressed skull fracture. No acute intracranial bleed or focal   mass effect.        Mild volume loss.        Mild chronic white matter ischemic disease in the deep   periventricular regions.        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 No CT evidence of cervical   spine fracture in this exam.         MACRO:   None        Signed by: Florencio Morrell 5/17/2024 10:15 AM   Dictation workstation:   [id number]      XR chest 1 view   Final Result   1.  No evidence of acute cardiopulmonary process.                  MACRO:   None        Signed by: Sandee Carrero 5/17/2024 9:01 AM   Dictation workstation:   [id number]            Procedure  Procedures     Fransisco Montez PA-C  05/17/24 1907

## 2024-05-17 NOTE — SIGNIFICANT EVENT
Rt called to ED. Upon arrival pt being ventilated with BVM and was unresponsive. Pt intubated by MD for airway protection and placed on vent.

## 2024-05-18 LAB
ALBUMIN SERPL-MCNC: 3 G/DL (ref 3.5–5)
ALP BLD-CCNC: 93 U/L (ref 35–125)
ALT SERPL-CCNC: 17 U/L (ref 5–40)
ANION GAP SERPL CALC-SCNC: 17 MMOL/L
AST SERPL-CCNC: 20 U/L (ref 5–40)
BASOPHILS # BLD AUTO: 0 X10*3/UL (ref 0–0.1)
BASOPHILS NFR BLD AUTO: 0 %
BILIRUB SERPL-MCNC: 1.3 MG/DL (ref 0.1–1.2)
BUN SERPL-MCNC: 40 MG/DL (ref 8–25)
CALCIUM SERPL-MCNC: 7.9 MG/DL (ref 8.5–10.4)
CHLORIDE SERPL-SCNC: 102 MMOL/L (ref 97–107)
CO2 SERPL-SCNC: 20 MMOL/L (ref 24–31)
CREAT SERPL-MCNC: 2.1 MG/DL (ref 0.4–1.6)
EGFRCR SERPLBLD CKD-EPI 2021: 32 ML/MIN/1.73M*2
EOSINOPHIL # BLD AUTO: 0 X10*3/UL (ref 0–0.4)
EOSINOPHIL NFR BLD AUTO: 0 %
ERYTHROCYTE [DISTWIDTH] IN BLOOD BY AUTOMATED COUNT: 16.2 % (ref 11.5–14.5)
GLUCOSE BLD MANUAL STRIP-MCNC: 147 MG/DL (ref 74–99)
GLUCOSE BLD MANUAL STRIP-MCNC: 154 MG/DL (ref 74–99)
GLUCOSE BLD MANUAL STRIP-MCNC: 161 MG/DL (ref 74–99)
GLUCOSE BLD MANUAL STRIP-MCNC: 161 MG/DL (ref 74–99)
GLUCOSE BLD MANUAL STRIP-MCNC: 166 MG/DL (ref 74–99)
GLUCOSE BLD MANUAL STRIP-MCNC: 169 MG/DL (ref 74–99)
GLUCOSE BLD MANUAL STRIP-MCNC: 173 MG/DL (ref 74–99)
GLUCOSE BLD MANUAL STRIP-MCNC: 182 MG/DL (ref 74–99)
GLUCOSE SERPL-MCNC: 175 MG/DL (ref 65–99)
HCT VFR BLD AUTO: 40.2 % (ref 41–52)
HGB BLD-MCNC: 13 G/DL (ref 13.5–17.5)
IMM GRANULOCYTES # BLD AUTO: 0.04 X10*3/UL (ref 0–0.5)
IMM GRANULOCYTES NFR BLD AUTO: 0.5 % (ref 0–0.9)
LYMPHOCYTES # BLD AUTO: 0.54 X10*3/UL (ref 0.8–3)
LYMPHOCYTES NFR BLD AUTO: 7.4 %
MAGNESIUM SERPL-MCNC: 1.8 MG/DL (ref 1.6–3.1)
MCH RBC QN AUTO: 32.6 PG (ref 26–34)
MCHC RBC AUTO-ENTMCNC: 32.3 G/DL (ref 32–36)
MCV RBC AUTO: 101 FL (ref 80–100)
MONOCYTES # BLD AUTO: 0.12 X10*3/UL (ref 0.05–0.8)
MONOCYTES NFR BLD AUTO: 1.6 %
NEUTROPHILS # BLD AUTO: 6.62 X10*3/UL (ref 1.6–5.5)
NEUTROPHILS NFR BLD AUTO: 90.5 %
NRBC BLD-RTO: 0 /100 WBCS (ref 0–0)
PHOSPHATE SERPL-MCNC: 5.2 MG/DL (ref 2.5–4.5)
PLATELET # BLD AUTO: 162 X10*3/UL (ref 150–450)
POTASSIUM SERPL-SCNC: 4.4 MMOL/L (ref 3.4–5.1)
PROT SERPL-MCNC: 5.5 G/DL (ref 5.9–7.9)
RBC # BLD AUTO: 3.99 X10*6/UL (ref 4.5–5.9)
SODIUM SERPL-SCNC: 139 MMOL/L (ref 133–145)
WBC # BLD AUTO: 7.3 X10*3/UL (ref 4.4–11.3)

## 2024-05-18 PROCEDURE — 2500000005 HC RX 250 GENERAL PHARMACY W/O HCPCS: Mod: JZ | Performed by: STUDENT IN AN ORGANIZED HEALTH CARE EDUCATION/TRAINING PROGRAM

## 2024-05-18 PROCEDURE — 2500000004 HC RX 250 GENERAL PHARMACY W/ HCPCS (ALT 636 FOR OP/ED): Performed by: INTERNAL MEDICINE

## 2024-05-18 PROCEDURE — 2500000004 HC RX 250 GENERAL PHARMACY W/ HCPCS (ALT 636 FOR OP/ED)

## 2024-05-18 PROCEDURE — 83735 ASSAY OF MAGNESIUM: CPT

## 2024-05-18 PROCEDURE — 2500000005 HC RX 250 GENERAL PHARMACY W/O HCPCS: Performed by: INTERNAL MEDICINE

## 2024-05-18 PROCEDURE — 99291 CRITICAL CARE FIRST HOUR: CPT | Performed by: INTERNAL MEDICINE

## 2024-05-18 PROCEDURE — 37799 UNLISTED PX VASCULAR SURGERY: CPT

## 2024-05-18 PROCEDURE — 84075 ASSAY ALKALINE PHOSPHATASE: CPT

## 2024-05-18 PROCEDURE — 2500000006 HC RX 250 W HCPCS SELF ADMINISTERED DRUGS (ALT 637 FOR ALL PAYERS): Performed by: INTERNAL MEDICINE

## 2024-05-18 PROCEDURE — 31720 CLEARANCE OF AIRWAYS: CPT

## 2024-05-18 PROCEDURE — 2500000004 HC RX 250 GENERAL PHARMACY W/ HCPCS (ALT 636 FOR OP/ED): Performed by: STUDENT IN AN ORGANIZED HEALTH CARE EDUCATION/TRAINING PROGRAM

## 2024-05-18 PROCEDURE — 85025 COMPLETE CBC W/AUTO DIFF WBC: CPT

## 2024-05-18 PROCEDURE — 2020000001 HC ICU ROOM DAILY

## 2024-05-18 PROCEDURE — C9113 INJ PANTOPRAZOLE SODIUM, VIA: HCPCS | Performed by: INTERNAL MEDICINE

## 2024-05-18 PROCEDURE — 2500000001 HC RX 250 WO HCPCS SELF ADMINISTERED DRUGS (ALT 637 FOR MEDICARE OP): Performed by: INTERNAL MEDICINE

## 2024-05-18 PROCEDURE — 82947 ASSAY GLUCOSE BLOOD QUANT: CPT | Mod: 91

## 2024-05-18 PROCEDURE — 94003 VENT MGMT INPAT SUBQ DAY: CPT

## 2024-05-18 PROCEDURE — 84100 ASSAY OF PHOSPHORUS: CPT

## 2024-05-18 PROCEDURE — 82947 ASSAY GLUCOSE BLOOD QUANT: CPT

## 2024-05-18 RX ORDER — MAGNESIUM SULFATE HEPTAHYDRATE 40 MG/ML
2 INJECTION, SOLUTION INTRAVENOUS ONCE
Status: COMPLETED | OUTPATIENT
Start: 2024-05-18 | End: 2024-05-18

## 2024-05-18 RX ADMIN — LACOSAMIDE 100 MG: 100 TABLET, FILM COATED ORAL at 20:22

## 2024-05-18 RX ADMIN — MAGNESIUM SULFATE HEPTAHYDRATE 2 G: 40 INJECTION, SOLUTION INTRAVENOUS at 08:37

## 2024-05-18 RX ADMIN — DIPHENHYDRAMINE HYDROCHLORIDE 25 MG: 50 INJECTION, SOLUTION INTRAMUSCULAR; INTRAVENOUS at 20:22

## 2024-05-18 RX ADMIN — METHYLPREDNISOLONE SODIUM SUCCINATE 40 MG: 40 INJECTION, POWDER, FOR SOLUTION INTRAMUSCULAR; INTRAVENOUS at 12:20

## 2024-05-18 RX ADMIN — INSULIN LISPRO 1 UNITS: 100 INJECTION, SOLUTION INTRAVENOUS; SUBCUTANEOUS at 15:17

## 2024-05-18 RX ADMIN — INSULIN LISPRO 1 UNITS: 100 INJECTION, SOLUTION INTRAVENOUS; SUBCUTANEOUS at 23:25

## 2024-05-18 RX ADMIN — DIPHENHYDRAMINE HYDROCHLORIDE 25 MG: 50 INJECTION, SOLUTION INTRAMUSCULAR; INTRAVENOUS at 12:19

## 2024-05-18 RX ADMIN — FOLIC ACID 1 MG: 1 TABLET ORAL at 08:37

## 2024-05-18 RX ADMIN — AMIODARONE HYDROCHLORIDE 200 MG: 200 TABLET ORAL at 08:37

## 2024-05-18 RX ADMIN — APIXABAN 5 MG: 5 TABLET, FILM COATED ORAL at 08:37

## 2024-05-18 RX ADMIN — INSULIN LISPRO 1 UNITS: 100 INJECTION, SOLUTION INTRAVENOUS; SUBCUTANEOUS at 08:37

## 2024-05-18 RX ADMIN — REMDESIVIR 100 MG: 100 INJECTION, POWDER, LYOPHILIZED, FOR SOLUTION INTRAVENOUS at 18:27

## 2024-05-18 RX ADMIN — NYSTATIN 1 APPLICATION: 100000 POWDER TOPICAL at 08:49

## 2024-05-18 RX ADMIN — NYSTATIN 1 APPLICATION: 100000 POWDER TOPICAL at 20:25

## 2024-05-18 RX ADMIN — MIDODRINE HYDROCHLORIDE 10 MG: 10 TABLET ORAL at 12:20

## 2024-05-18 RX ADMIN — INSULIN LISPRO 1 UNITS: 100 INJECTION, SOLUTION INTRAVENOUS; SUBCUTANEOUS at 20:22

## 2024-05-18 RX ADMIN — Medication 40 PERCENT: at 07:43

## 2024-05-18 RX ADMIN — SODIUM CHLORIDE, SODIUM LACTATE, POTASSIUM CHLORIDE, AND CALCIUM CHLORIDE 75 ML/HR: 600; 310; 30; 20 INJECTION, SOLUTION INTRAVENOUS at 03:24

## 2024-05-18 RX ADMIN — Medication 40 PERCENT: at 20:00

## 2024-05-18 RX ADMIN — METHYLPREDNISOLONE SODIUM SUCCINATE 40 MG: 40 INJECTION, POWDER, FOR SOLUTION INTRAMUSCULAR; INTRAVENOUS at 20:22

## 2024-05-18 RX ADMIN — PROPOFOL 15 MCG/KG/MIN: 10 INJECTION, EMULSION INTRAVENOUS at 15:16

## 2024-05-18 RX ADMIN — PROPOFOL 15 MCG/KG/MIN: 10 INJECTION, EMULSION INTRAVENOUS at 06:54

## 2024-05-18 RX ADMIN — DIPHENHYDRAMINE HYDROCHLORIDE 25 MG: 50 INJECTION, SOLUTION INTRAMUSCULAR; INTRAVENOUS at 04:29

## 2024-05-18 RX ADMIN — METHYLPREDNISOLONE SODIUM SUCCINATE 40 MG: 40 INJECTION, POWDER, FOR SOLUTION INTRAMUSCULAR; INTRAVENOUS at 04:29

## 2024-05-18 RX ADMIN — MIDODRINE HYDROCHLORIDE 10 MG: 10 TABLET ORAL at 08:37

## 2024-05-18 RX ADMIN — PROPOFOL 30 MCG/KG/MIN: 10 INJECTION, EMULSION INTRAVENOUS at 00:46

## 2024-05-18 RX ADMIN — PANTOPRAZOLE SODIUM 40 MG: 40 INJECTION, POWDER, FOR SOLUTION INTRAVENOUS at 15:16

## 2024-05-18 RX ADMIN — LACOSAMIDE 100 MG: 100 TABLET, FILM COATED ORAL at 08:37

## 2024-05-18 RX ADMIN — ROSUVASTATIN CALCIUM 10 MG: 10 TABLET, COATED ORAL at 20:22

## 2024-05-18 RX ADMIN — MIDODRINE HYDROCHLORIDE 10 MG: 10 TABLET ORAL at 18:27

## 2024-05-18 RX ADMIN — INSULIN LISPRO 1 UNITS: 100 INJECTION, SOLUTION INTRAVENOUS; SUBCUTANEOUS at 04:29

## 2024-05-18 RX ADMIN — SODIUM CHLORIDE, SODIUM LACTATE, POTASSIUM CHLORIDE, AND CALCIUM CHLORIDE 75 ML/HR: 600; 310; 30; 20 INJECTION, SOLUTION INTRAVENOUS at 20:25

## 2024-05-18 RX ADMIN — APIXABAN 5 MG: 5 TABLET, FILM COATED ORAL at 20:22

## 2024-05-18 ASSESSMENT — PAIN - FUNCTIONAL ASSESSMENT
PAIN_FUNCTIONAL_ASSESSMENT: CPOT (CRITICAL CARE PAIN OBSERVATION TOOL)

## 2024-05-18 ASSESSMENT — PAIN SCALES - GENERAL
PAINLEVEL_OUTOF10: 0 - NO PAIN

## 2024-05-18 NOTE — CARE PLAN
Problem: Skin  Goal: Decreased wound size/increased tissue granulation at next dressing change  Outcome: Progressing  Flowsheets (Taken 5/17/2024 2230)  Decreased wound size/increased tissue granulation at next dressing change:   Promote sleep for wound healing   Protective dressings over bony prominences  Goal: Prevent/manage excess moisture  Outcome: Progressing  Flowsheets (Taken 5/17/2024 2230)  Prevent/manage excess moisture:   Moisturize dry skin   Cleanse incontinence/protect with barrier cream   Being repositioned Q2 hours. No new skin injuries noted. Hourly checks continued.

## 2024-05-18 NOTE — PROGRESS NOTES
Darrell Arreola is a 78 y.o. male on day 1 of admission presenting with Closed head injury, initial encounter.  78 YOM with h/o CHF, HTN, DM, who presented after sustaining a fall vs syncopal episode. Per notes has had a gradual onset, progressive fatigue and weakness for the last few days that lead to him falling. Symptoms also included generalized body aches. In the ED concern for urinary incontinence due to seizures. CT head and spine negative. Further work up revealed LORI with Cr 2.4 (BL 1.4), +ve COVID-19 test, Trop 46, and hypotension. Received multiple boluses of IVF, however remained hypotensive. It was decided to insert a central line and place on pressors. After CVC placement, developed anaphylactic reaction (likely to Lidocaine) and went to [facility]. ROSC achieved after 2 min of BLS and one round of Epinephrine. Post ROSC was intubated and received Benadryl, Solumedrol, started on Levophed and now is being admitted to ICU for further management.   Subjective   No overnight events. Remains hemodynamically stable, taken off Levophed at 9 am this morning.     Intubated and sedated and cannot provide history.   Objective   Scheduled medications  amiodarone, 200 mg, oral, Daily  apixaban, 5 mg, oral, BID  diphenhydrAMINE, 25 mg, intravenous, q8h  [Held by provider] famotidine, 10 mg, oral, Daily  folic acid, 1 mg, oral, Daily  insulin lispro, 0-5 Units, subcutaneous, q4h  lacosamide, 100 mg, oral, BID  methylPREDNISolone sodium succinate (PF), 40 mg, intravenous, q8h  midodrine, 10 mg, oral, TID  nystatin, 1 Application, Topical, BID  oxygen, , inhalation, Continuous - Inhalation  pantoprazole, 40 mg, oral, Daily before breakfast   Or  pantoprazole, 40 mg, intravenous, Daily before breakfast  remdesivir, 100 mg, intravenous, q24h  rosuvastatin, 10 mg, oral, Nightly  [Held by provider] SITagliptin phosphate, 50 mg, oral, Daily  [Held by provider] tamsulosin, 0.4 mg, oral, Daily before breakfast    Continuous  medications  fentaNYL, 0-300 mcg/hr, Last Rate: 25 mcg/hr (05/18/24 1310)  lactated Ringer's, 75 mL/hr, Last Rate: 75 mL/hr (05/18/24 1310)  norepinephrine, 0.01-0.5 mcg/kg/min, Last Rate: Stopped (05/18/24 0930)  propofol, 5-50 mcg/kg/min, Last Rate: 15 mcg/kg/min (05/18/24 1516)    PRN medications  PRN medications: dextrose, dextrose, fentaNYL, glucagon, glucagon   Physical Exam  Constitutional:       General: He is not in acute distress.     Appearance: He is normal weight. He is ill-appearing. He is not toxic-appearing.   HENT:      Mouth/Throat:      Mouth: Mucous membranes are moist.      Comments: ET and G tube in place.   Eyes:      General: No scleral icterus.     Pupils: Pupils are equal, round, and reactive to light.      Comments: S/p R eye cataract surgery.    Cardiovascular:      Rate and Rhythm: Normal rate and regular rhythm.      Heart sounds: No murmur heard.     No friction rub. No gallop.      Comments: Distant heart sounds. Lower extremity pulses could not be palpated, but could be dopplered.   Pulmonary:      Effort: Pulmonary effort is normal. No respiratory distress.      Breath sounds: No wheezing or rales.      Comments: Clear lung fields b/l with fair/poor air entry on MV. No wheezing or crackles.     Abdominal:      General: There is no distension.      Palpations: Abdomen is soft.      Tenderness: There is no abdominal tenderness.   Musculoskeletal:         General: No tenderness.      Cervical back: Neck supple. No rigidity or tenderness.      Right lower leg: No edema.      Left lower leg: No edema.      Comments: B/l cold lower extremities    Lymphadenopathy:      Cervical: No cervical adenopathy.   Skin:     Coloration: Skin is not jaundiced.      Comments: Discoloration b/l LE    Neurological:      Comments: Cannot fully assess, currently is intubated and sedated.       Psychiatric:      Comments: Cannot fully assess, currently is intubated and sedated.         Last Recorded  "Vitals  Blood pressure 114/70, pulse 86, temperature 35.1 °C (95.2 °F), resp. rate 18, height 1.854 m (6' 1\"), weight 99.2 kg (218 lb 11.1 oz), SpO2 91%.  Intake/Output last 3 Shifts:  I/O last 3 completed shifts:  In: 2793 (28.2 mL/kg) [I.V.:1459 (14.7 mL/kg); NG/GT:85; IV Piggyback:1249]  Out: 570 (5.7 mL/kg) [Urine:570 (0.2 mL/kg/hr)]  Weight: 99.2 kg     Relevant Results  XR chest 1 view    Result Date: 5/17/2024  Interpreted By:  Gabriel Dickson, STUDY: Chest dated  5/17/2024.   INDICATION: Signs/Symptoms:OG placement confirmation   COMPARISON: Chest dated 05/17/2024.   ACCESSION NUMBER(S): [id number]   ORDERING CLINICIAN: GERONIMO GELLER   TECHNIQUE: One view of the chest.   FINDINGS: There is an enteric tube with the tip off of the inferior level of the exam. Side port projects over the left upper quadrant. There is likely an endotracheal tube although not well visualized. Its tip as seen on this exam is a proximally 1.7 cm from the mohan. There are bibasilar appearing opacities although this may be projectional. There is a right IJ line with the tip projecting over the lower SVC. Cardiomediastinal silhouette is enlarged but similar to the prior exam. Degenerative changes seen of the spine and shoulders.       1. As seen on this exam the endotracheal tube tip appears to be a proximally 1.7 cm from the mohan. A repeat chest radiograph is recommended to confirm positioning as this may be projectional. 2. Enteric tube with side port over the left hemiabdomen and tip off the inferior level of the exam. 3. Possible bibasilar atelectasis.   MACRO: None   Signed by: Gabriel Dickson 5/17/2024 9:01 PM Dictation workstation:   [id number]    ECG 12 lead    Result Date: 5/17/2024  Atrial fibrillation with rapid ventricular response Nonspecific intraventricular block T wave abnormality, consider lateral ischemia Abnormal ECG No previous ECGs available Confirmed by Edward Dang ([id number]) on 5/17/2024 3:10:56 PM    ECG " 12 lead    Result Date: 5/17/2024  Atrial fibrillation Nonspecific intraventricular block Inferior infarct (cited on or before 08-OCT-2023) Abnormal ECG When compared with ECG of 17-MAY-2024 08:19, (unconfirmed) No significant change was found Confirmed by Edward Dang ([id number]) on 5/17/2024 3:09:55 PM    XR chest 1 view    Result Date: 5/17/2024  Interpreted By:  Torres Mcneill, STUDY: XR CHEST 1 VIEW;  5/17/2024 11:58 am   INDICATION: Signs/Symptoms:central line placement.   COMPARISON: 05/17/2024 at 8:53 a.m.   ACCESSION NUMBER(S): [id number]   ORDERING CLINICIAN: JONATAN FRY   FINDINGS: Endotracheal tube terminates about 3 cm above the mohan. Right IJ central line terminates in the lower SVC. No definite focal infiltrate. Cephalization of the pulmonary vessels likely related to supine positioning. Cardiomediastinal silhouette unchanged. Pleural effusions not excluded.       Post endotracheal tube and central line placement. Otherwise no active disease in the chest.   MACRO: None   Signed by: Torres Mcneill 5/17/2024 12:21 PM Dictation workstation:   [id number]    Electrocardiogram, 12-lead    Result Date: 5/17/2024  Atrial fibrillation with rapid ventricular response with premature ventricular or aberrantly conducted complexes Nonspecific intraventricular block Possible Inferior infarct (cited on or before 08-OCT-2023) Abnormal ECG When compared with ECG of 20-APR-2024 13:54, No significant change was found    CT cervical spine wo IV contrast    Result Date: 5/17/2024  Interpreted By:  Florencio Morrell, STUDY: CT CERVICAL SPINE WO IV CONTRAST;  5/17/2024 9:55 am   INDICATION: Signs/Symptoms:fall, on Eliquis.   COMPARISON: Previous exam is from 02/20/2020   ACCESSION NUMBER(S): [id number]   ORDERING CLINICIAN: JONATAN FRY   TECHNIQUE: Thin section axial images were obtained from the skull base down through the thoracic inlet. Sagittal and coronal reconstruction images were generated. Soft tissue, lung,  and bone windows were reviewed.   FINDINGS: VERTEBRAL BODIES AND POSTERIOR ELEMENTS: There is moderate endplate osteophytosis from C5-6 through C7-T1. Mild-to-moderate disc space narrowing at C5-6 and C7-T1 with moderate disc space narrowing at C6-7. Multilevel inter facet hypertrophy with spur formation. Uncovertebral hypertrophy with spur formation on the right at C5-6 and on the left at C6-7. No cervical spine compression fracture. No posterior element fracture. No destructive bone lesion. No listhesis.   SPINAL CANAL: No gross disc herniation.   NECK SOFT TISSUES: Within normal limits.   LUNG APICES: Imaged portion of the lung apices are within normal limits.   SKULL BASE: Within normal limits.       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 No CT evidence of cervical spine fracture in this exam.   MACRO: None   Signed by: Florencio Morrell 5/17/2024 10:15 AM Dictation workstation:   [id number]    CT head wo IV contrast    Result Date: 5/17/2024  Interpreted By:  Florencio Morrell, STUDY: CT HEAD WO IV CONTRAST;  5/17/2024 9:55 am   INDICATION: Signs/Symptoms:fall, on Eliquis.   COMPARISON: Comparison study is from 04/20/2024..   ACCESSION NUMBER(S): [id number]   ORDERING CLINICIAN: JONATAN FRY   TECHNIQUE: Routine axial images were obtained from the skull base through the vertex.  Sagittal and coronal reconstruction images were generated. Brain, subdural, and bone windows were reviewed. N/A Unavailable   FINDINGS: INTRACRANIAL: Mild prominence of ventricles and sulci. There is mild patchy hypodensity throughout the deep periventricular white matter. No acute intracranial bleed, midline shift, or focal mass effect. No destructive bone lesion. No depressed skull fracture. Skullbase arterial calcifications in the carotid siphons and vertebral arteries.   EXTRACRANIAL: Visualized paranasal sinuses were clear. Visualized mastoid air cells were clear.       No depressed skull fracture. No acute intracranial bleed or  focal mass effect.   Mild volume loss.   Mild chronic white matter ischemic disease in the deep periventricular regions.   MACRO: None   Signed by: Florencio Morrell 5/17/2024 10:08 AM Dictation workstation:   [id number]    XR chest 1 view    Result Date: 5/17/2024  Interpreted By:  Sandee Carrero, STUDY: XR CHEST 1 VIEW;  5/17/2024 8:56 am   INDICATION: Signs/Symptoms:Chest Pain.   COMPARISON: 04/20/2024   ACCESSION NUMBER(S): [id number]   ORDERING CLINICIAN: JONATAN FYR   FINDINGS:         CARDIOMEDIASTINAL SILHOUETTE: Cardiomediastinal silhouette is normal in size and configuration.   LUNGS: Lungs are clear.   ABDOMEN: No remarkable upper abdominal findings.   BONES: No acute osseous changes.       1.  No evidence of acute cardiopulmonary process.       MACRO: None   Signed by: Sandee Carrero 5/17/2024 9:01 AM Dictation workstation:   [id number]    Results for orders placed or performed during the hospital encounter of 05/17/24 (from the past 24 hour(s))   POCT GLUCOSE   Result Value Ref Range    POCT Glucose 167 (H) 74 - 99 mg/dL   Blood Gas Arterial Full Panel   Result Value Ref Range    POCT pH, Arterial 7.31 (L) 7.38 - 7.42 pH    POCT pCO2, Arterial 39 38 - 42 mm Hg    POCT pO2, Arterial 137 (H) 85 - 95 mm Hg    POCT SO2, Arterial 99 94 - 100 %    POCT Oxy Hemoglobin, Arterial 96.5 94.0 - 98.0 %    POCT Hematocrit Calculated, Arterial 42.0 41.0 - 52.0 %    POCT Sodium, Arterial 133 (L) 136 - 145 mmol/L    POCT Potassium, Arterial 4.2 3.5 - 5.3 mmol/L    POCT Chloride, Arterial 105 98 - 107 mmol/L    POCT Ionized Calcium, Arterial 1.10 1.10 - 1.33 mmol/L    POCT Glucose, Arterial 184 (H) 74 - 99 mg/dL    POCT Lactate, Arterial 1.4 0.4 - 2.0 mmol/L    POCT Base Excess, Arterial -6.2 (L) -2.0 - 3.0 mmol/L    POCT HCO3 Calculated, Arterial 19.6 (L) 22.0 - 26.0 mmol/L    POCT Hemoglobin, Arterial 14.1 13.5 - 17.5 g/dL    POCT Anion Gap, Arterial 13 10 - 25 mmo/L    Patient Temperature 37.0 degrees Celsius    FiO2  60 %    Apparatus      Ventilator Mode      Ventilator Rate 16 bpm    Tidal Volume 500 mL    Peep CHM2O 8.0 cm H2O    Site of Arterial Puncture Radial Right     Nikunj's Test Positive    POCT GLUCOSE   Result Value Ref Range    POCT Glucose 166 (H) 74 - 99 mg/dL   POCT GLUCOSE   Result Value Ref Range    POCT Glucose 182 (H) 74 - 99 mg/dL   CBC and Auto Differential   Result Value Ref Range    WBC 7.3 4.4 - 11.3 x10*3/uL    nRBC 0.0 0.0 - 0.0 /100 WBCs    RBC 3.99 (L) 4.50 - 5.90 x10*6/uL    Hemoglobin 13.0 (L) 13.5 - 17.5 g/dL    Hematocrit 40.2 (L) 41.0 - 52.0 %     (H) 80 - 100 fL    MCH 32.6 26.0 - 34.0 pg    MCHC 32.3 32.0 - 36.0 g/dL    RDW 16.2 (H) 11.5 - 14.5 %    Platelets 162 150 - 450 x10*3/uL    Neutrophils % 90.5 40.0 - 80.0 %    Immature Granulocytes %, Automated 0.5 0.0 - 0.9 %    Lymphocytes % 7.4 13.0 - 44.0 %    Monocytes % 1.6 2.0 - 10.0 %    Eosinophils % 0.0 0.0 - 6.0 %    Basophils % 0.0 0.0 - 2.0 %    Neutrophils Absolute 6.62 (H) 1.60 - 5.50 x10*3/uL    Immature Granulocytes Absolute, Automated 0.04 0.00 - 0.50 x10*3/uL    Lymphocytes Absolute 0.54 (L) 0.80 - 3.00 x10*3/uL    Monocytes Absolute 0.12 0.05 - 0.80 x10*3/uL    Eosinophils Absolute 0.00 0.00 - 0.40 x10*3/uL    Basophils Absolute 0.00 0.00 - 0.10 x10*3/uL   Comprehensive Metabolic Panel   Result Value Ref Range    Glucose 175 (H) 65 - 99 mg/dL    Sodium 139 133 - 145 mmol/L    Potassium 4.4 3.4 - 5.1 mmol/L    Chloride 102 97 - 107 mmol/L    Bicarbonate 20 (L) 24 - 31 mmol/L    Urea Nitrogen 40 (H) 8 - 25 mg/dL    Creatinine 2.10 (H) 0.40 - 1.60 mg/dL    eGFR 32 (L) >60 mL/min/1.73m*2    Calcium 7.9 (L) 8.5 - 10.4 mg/dL    Albumin 3.0 (L) 3.5 - 5.0 g/dL    Alkaline Phosphatase 93 35 - 125 U/L    Total Protein 5.5 (L) 5.9 - 7.9 g/dL    AST 20 5 - 40 U/L    Bilirubin, Total 1.3 (H) 0.1 - 1.2 mg/dL    ALT 17 5 - 40 U/L    Anion Gap 17 <=19 mmol/L   Magnesium   Result Value Ref Range    Magnesium 1.80 1.60 - 3.10 mg/dL    Phosphorus   Result Value Ref Range    Phosphorus 5.2 (H) 2.5 - 4.5 mg/dL   POCT GLUCOSE   Result Value Ref Range    POCT Glucose 169 (H) 74 - 99 mg/dL   POCT GLUCOSE   Result Value Ref Range    POCT Glucose 147 (H) 74 - 99 mg/dL   POCT GLUCOSE   Result Value Ref Range    POCT Glucose 161 (H) 74 - 99 mg/dL   Assessment/Plan   Principal Problem:    Closed head injury, initial encounter  78 YOM with h/o CHF, HTN, DM, who presented after sustaining a fall vs syncopal episode. Per notes has had a gradual onset, progressive fatigue and weakness for the last few days that lead to him falling. Symptoms also included generalized body aches. In the ED concern for urinary incontinence due to seizures. CT head and spine negative. Further work up revealed LORI with Cr 2.4 (BL 1.4), +ve COVID-19 test, Trop 46, and hypotension. Received multiple boluses of IVF, however remained hypotensive. It was decided to insert a central line and place on pressors. After CVC placement, developed anaphylactic reaction (likely to Lidocaine) and went to [facility]. ROSC achieved after 2 min of BLS and one round of Epinephrine. Post ROSC was intubated and received Benadryl, Solumedrol, started on Levophed and now is being admitted to ICU for further management.      Neuro: post arrest encephalopathy, currently is sedated, but when aroused follows commands      Continue sedation with Propofol, Fentanyl      Continue home Lacosamide      Daily SAT      Supportive measures      Might need repeat head CT in 48 hours after the arrest     CV: H/o CHF (EF 25%), HTN, now s/p arrest due to anaphylactic reaction and shock hemorrhagic vs distributive (sepsis/anaphylaxis). Mildly elevated troponin likely demand ischemia. Shock now resolved. Trop flat at 0.4      Continue amiodarone, Crestor, midodrine and Apixaban      Steroids, Benadryl for anaphylaxis      Continue to monitor        Pulmonary: intubated post arrest. Baseline does not seem to need O2       Continue MV with current settin/18/8/60%, try to wean off as tolerates      Repeat ABG showed PH 7.31 and PCO 39      Daily SBT      No pulmonary involvement from COVID, will hold off dexamethasone     : LORI (Cr 2.4 up from 1.4), Cr now improving, currently 2.1, otherwise no major electrolyte abnormalities.       IVF LR @ 75 with bolus as needed      Is/Os      Daily RFP      Treat electrolyte abnormalities as indicated     GI: no acute issues      NPO      GI prophylaxis with PPI      Will hold home Pepcid     Endo: T2DM on Januvia. BS elevated but acceptable range.        Will hold Januvia       SSI       PCOT every 4 hours       Hypoglycemic protocol     Hem/Onc: no acute issues       Daily CBC     ID: patient with COVID infection. No lung involvement        Will monitor for S&S infection     This was a critical care visit for respiratory failure and shock. Total time 58 min.   Frederic Pina MD

## 2024-05-18 NOTE — CARE PLAN
Chart reviewed    Remains on remdesivir. No abx for now. Renal function and LFTs appropriate    Will follow    Marsha Rasmussen DO  [facility]  #[phone]

## 2024-05-18 NOTE — CARE PLAN
Problem: Pain  Goal: My pain/discomfort is manageable  Outcome: Progressing     Problem: Safety  Goal: Patient will be injury free during hospitalization  Outcome: Progressing  Goal: I will remain free of falls  Outcome: Progressing     Problem: Daily Care  Goal: Daily care needs are met  Outcome: Progressing     Problem: Psychosocial Needs  Goal: Demonstrates ability to cope with hospitalization/illness  Outcome: Progressing  Goal: Collaborate with me, my family, and caregiver to identify my specific goals  Outcome: Progressing     Problem: Discharge Barriers  Goal: My discharge needs are met  Outcome: Progressing     Problem: Skin  Goal: Decreased wound size/increased tissue granulation at next dressing change  Outcome: Progressing  Flowsheets (Taken 5/18/2024 0907)  Decreased wound size/increased tissue granulation at next dressing change:   Promote sleep for wound healing   Utilize specialty bed per algorithm   Protective dressings over bony prominences  Goal: Prevent/manage excess moisture  Outcome: Progressing  Flowsheets (Taken 5/18/2024 0907)  Prevent/manage excess moisture:   Cleanse incontinence/protect with barrier cream   Moisturize dry skin   Follow provider orders for dressing changes   Monitor for/manage infection if present     Problem: Fall/Injury  Goal: Not fall by end of shift  Outcome: Progressing     Problem: Safety - Medical Restraint  Goal: Remains free of injury from restraints (Restraint for Interference with Medical Device)  Outcome: Progressing  Goal: Free from restraint(s) (Restraint for Interference with Medical Device)  Outcome: Progressing     Problem: Respiratory  Goal: Clear secretions with interventions this shift  Outcome: Progressing  Goal: Minimize anxiety/maximize coping throughout shift  Outcome: Progressing  Goal: Minimal/no exertional discomfort or dyspnea this shift  Outcome: Progressing  Goal: No signs of respiratory distress (eg. Use of accessory muscles. Peds  grunting)  Outcome: Progressing  Goal: Patent airway maintained this shift  Outcome: Progressing  Goal: Tolerate mechanical ventilation evidenced by VS/agitation level this shift  Outcome: Progressing  Goal: Tolerate pulmonary toileting this shift  Outcome: Progressing  Goal: Verbalize decreased shortness of breath this shift  Outcome: Progressing  Goal: Wean oxygen to maintain O2 saturation per order/standard this shift  Outcome: Progressing  Goal: Increase self care and/or family involvement in next 24 hours  Outcome: Progressing     Problem: Pain - Adult  Goal: Verbalizes/displays adequate comfort level or baseline comfort level  Outcome: Progressing     Problem: Safety - Adult  Goal: Free from fall injury  Outcome: Progressing     Problem: Discharge Planning  Goal: Discharge to home or other facility with appropriate resources  Outcome: Progressing     Problem: Chronic Conditions and Co-morbidities  Goal: Patient's chronic conditions and co-morbidity symptoms are monitored and maintained or improved  Outcome: Progressing     Problem: Diabetes  Goal: Achieve decreasing blood glucose levels by end of shift  Outcome: Progressing  Goal: Increase stability of blood glucose readings by end of shift  Outcome: Progressing  Goal: Decrease in ketones present in urine by end of shift  Outcome: Progressing  Goal: Maintain glucose levels >70mg/dl to <250mg/dl throughout shift  Outcome: Progressing  Goal: No changes in neurological exam by end of shift  Outcome: Progressing  Goal: Vital signs within normal range for age by end of shift  Outcome: Progressing  Goal: Increase self care and/or family involovement by end of shift  Outcome: Progressing     Problem: Knowledge Deficit  Goal: Patient/family/caregiver demonstrates understanding of disease process, treatment plan, medications, and discharge instructions  Outcome: Progressing     Problem: Nutrition  Goal: Less than 5 days NPO/clear liquids  Outcome: Progressing  Goal:  Oral intake greater than 50%  Outcome: Progressing  Goal: Oral intake greater 75%  Outcome: Progressing  Goal: Consume prescribed supplement  Outcome: Progressing  Goal: Adequate PO fluid intake  Outcome: Progressing  Goal: Nutrition support goals are met within 48 hrs  Outcome: Progressing  Goal: Nutrition support is meeting 75% of nutrient needs  Outcome: Progressing  Goal: Tube feed tolerance  Outcome: Progressing  Goal: BG  mg/dL  Outcome: Progressing  Goal: Lab values WNL  Outcome: Progressing  Goal: Electrolytes WNL  Outcome: Progressing  Goal: Promote healing  Outcome: Progressing  Goal: Maintain stable weight  Outcome: Progressing  Goal: Reduce weight from edema/fluid  Outcome: Progressing  Goal: Gradual weight gain  Outcome: Progressing  Goal: Improve ostomy output  Outcome: Progressing   The patient's goals for the shift include      The clinical goals for the shift include pt. to remain hemodynamically stable throughout shift

## 2024-05-19 LAB
ALBUMIN SERPL-MCNC: 3 G/DL (ref 3.5–5)
ALP BLD-CCNC: 92 U/L (ref 35–125)
ALT SERPL-CCNC: 16 U/L (ref 5–40)
ANION GAP SERPL CALC-SCNC: 13 MMOL/L
AST SERPL-CCNC: 20 U/L (ref 5–40)
BASOPHILS # BLD AUTO: 0.01 X10*3/UL (ref 0–0.1)
BASOPHILS NFR BLD AUTO: 0.1 %
BILIRUB SERPL-MCNC: 1.2 MG/DL (ref 0.1–1.2)
BUN SERPL-MCNC: 42 MG/DL (ref 8–25)
CALCIUM SERPL-MCNC: 8 MG/DL (ref 8.5–10.4)
CHLORIDE SERPL-SCNC: 102 MMOL/L (ref 97–107)
CO2 SERPL-SCNC: 23 MMOL/L (ref 24–31)
CREAT SERPL-MCNC: 1.5 MG/DL (ref 0.4–1.6)
EGFRCR SERPLBLD CKD-EPI 2021: 47 ML/MIN/1.73M*2
EOSINOPHIL # BLD AUTO: 0 X10*3/UL (ref 0–0.4)
EOSINOPHIL NFR BLD AUTO: 0 %
ERYTHROCYTE [DISTWIDTH] IN BLOOD BY AUTOMATED COUNT: 16.7 % (ref 11.5–14.5)
GLUCOSE BLD MANUAL STRIP-MCNC: 111 MG/DL (ref 74–99)
GLUCOSE BLD MANUAL STRIP-MCNC: 134 MG/DL (ref 74–99)
GLUCOSE BLD MANUAL STRIP-MCNC: 135 MG/DL (ref 74–99)
GLUCOSE BLD MANUAL STRIP-MCNC: 144 MG/DL (ref 74–99)
GLUCOSE BLD MANUAL STRIP-MCNC: 149 MG/DL (ref 74–99)
GLUCOSE BLD MANUAL STRIP-MCNC: 175 MG/DL (ref 74–99)
GLUCOSE SERPL-MCNC: 152 MG/DL (ref 65–99)
HCT VFR BLD AUTO: 38.7 % (ref 41–52)
HGB BLD-MCNC: 12.7 G/DL (ref 13.5–17.5)
IMM GRANULOCYTES # BLD AUTO: 0.03 X10*3/UL (ref 0–0.5)
IMM GRANULOCYTES NFR BLD AUTO: 0.3 % (ref 0–0.9)
LYMPHOCYTES # BLD AUTO: 0.43 X10*3/UL (ref 0.8–3)
LYMPHOCYTES NFR BLD AUTO: 4.3 %
MAGNESIUM SERPL-MCNC: 2.4 MG/DL (ref 1.6–3.1)
MCH RBC QN AUTO: 32.9 PG (ref 26–34)
MCHC RBC AUTO-ENTMCNC: 32.8 G/DL (ref 32–36)
MCV RBC AUTO: 100 FL (ref 80–100)
MONOCYTES # BLD AUTO: 0.52 X10*3/UL (ref 0.05–0.8)
MONOCYTES NFR BLD AUTO: 5.2 %
NEUTROPHILS # BLD AUTO: 9.03 X10*3/UL (ref 1.6–5.5)
NEUTROPHILS NFR BLD AUTO: 90.1 %
NRBC BLD-RTO: 0 /100 WBCS (ref 0–0)
PHOSPHATE SERPL-MCNC: 3.4 MG/DL (ref 2.5–4.5)
PLATELET # BLD AUTO: 151 X10*3/UL (ref 150–450)
POTASSIUM SERPL-SCNC: 4 MMOL/L (ref 3.4–5.1)
PROT SERPL-MCNC: 5.5 G/DL (ref 5.9–7.9)
RBC # BLD AUTO: 3.86 X10*6/UL (ref 4.5–5.9)
SODIUM SERPL-SCNC: 138 MMOL/L (ref 133–145)
WBC # BLD AUTO: 10 X10*3/UL (ref 4.4–11.3)

## 2024-05-19 PROCEDURE — 94760 N-INVAS EAR/PLS OXIMETRY 1: CPT

## 2024-05-19 PROCEDURE — 84100 ASSAY OF PHOSPHORUS: CPT

## 2024-05-19 PROCEDURE — 31720 CLEARANCE OF AIRWAYS: CPT

## 2024-05-19 PROCEDURE — 2500000004 HC RX 250 GENERAL PHARMACY W/ HCPCS (ALT 636 FOR OP/ED)

## 2024-05-19 PROCEDURE — 84075 ASSAY ALKALINE PHOSPHATASE: CPT

## 2024-05-19 PROCEDURE — C9113 INJ PANTOPRAZOLE SODIUM, VIA: HCPCS | Performed by: INTERNAL MEDICINE

## 2024-05-19 PROCEDURE — 2020000001 HC ICU ROOM DAILY

## 2024-05-19 PROCEDURE — 82947 ASSAY GLUCOSE BLOOD QUANT: CPT

## 2024-05-19 PROCEDURE — 2500000004 HC RX 250 GENERAL PHARMACY W/ HCPCS (ALT 636 FOR OP/ED): Performed by: STUDENT IN AN ORGANIZED HEALTH CARE EDUCATION/TRAINING PROGRAM

## 2024-05-19 PROCEDURE — 2500000006 HC RX 250 W HCPCS SELF ADMINISTERED DRUGS (ALT 637 FOR ALL PAYERS): Performed by: INTERNAL MEDICINE

## 2024-05-19 PROCEDURE — 83735 ASSAY OF MAGNESIUM: CPT

## 2024-05-19 PROCEDURE — 2500000004 HC RX 250 GENERAL PHARMACY W/ HCPCS (ALT 636 FOR OP/ED): Performed by: INTERNAL MEDICINE

## 2024-05-19 PROCEDURE — 37799 UNLISTED PX VASCULAR SURGERY: CPT

## 2024-05-19 PROCEDURE — 2500000001 HC RX 250 WO HCPCS SELF ADMINISTERED DRUGS (ALT 637 FOR MEDICARE OP): Performed by: INTERNAL MEDICINE

## 2024-05-19 PROCEDURE — 2500000005 HC RX 250 GENERAL PHARMACY W/O HCPCS: Performed by: INTERNAL MEDICINE

## 2024-05-19 PROCEDURE — 2500000005 HC RX 250 GENERAL PHARMACY W/O HCPCS: Mod: JZ | Performed by: STUDENT IN AN ORGANIZED HEALTH CARE EDUCATION/TRAINING PROGRAM

## 2024-05-19 PROCEDURE — 85025 COMPLETE CBC W/AUTO DIFF WBC: CPT

## 2024-05-19 PROCEDURE — 9420000001 HC RT PATIENT EDUCATION 5 MIN

## 2024-05-19 PROCEDURE — 99291 CRITICAL CARE FIRST HOUR: CPT | Performed by: INTERNAL MEDICINE

## 2024-05-19 PROCEDURE — 94003 VENT MGMT INPAT SUBQ DAY: CPT

## 2024-05-19 RX ADMIN — REMDESIVIR 100 MG: 100 INJECTION, POWDER, LYOPHILIZED, FOR SOLUTION INTRAVENOUS at 17:04

## 2024-05-19 RX ADMIN — FOLIC ACID 1 MG: 1 TABLET ORAL at 10:18

## 2024-05-19 RX ADMIN — NYSTATIN 1 APPLICATION: 100000 POWDER TOPICAL at 10:23

## 2024-05-19 RX ADMIN — Medication 6 L/MIN: at 20:00

## 2024-05-19 RX ADMIN — PANTOPRAZOLE SODIUM 40 MG: 40 INJECTION, POWDER, FOR SOLUTION INTRAVENOUS at 05:46

## 2024-05-19 RX ADMIN — MIDODRINE HYDROCHLORIDE 10 MG: 10 TABLET ORAL at 14:35

## 2024-05-19 RX ADMIN — NYSTATIN 1 APPLICATION: 100000 POWDER TOPICAL at 20:58

## 2024-05-19 RX ADMIN — APIXABAN 5 MG: 5 TABLET, FILM COATED ORAL at 20:58

## 2024-05-19 RX ADMIN — METHYLPREDNISOLONE SODIUM SUCCINATE 40 MG: 40 INJECTION, POWDER, FOR SOLUTION INTRAMUSCULAR; INTRAVENOUS at 14:34

## 2024-05-19 RX ADMIN — APIXABAN 5 MG: 5 TABLET, FILM COATED ORAL at 10:19

## 2024-05-19 RX ADMIN — PROPOFOL 15 MCG/KG/MIN: 10 INJECTION, EMULSION INTRAVENOUS at 03:45

## 2024-05-19 RX ADMIN — PROPOFOL 15 MCG/KG/MIN: 10 INJECTION, EMULSION INTRAVENOUS at 13:27

## 2024-05-19 RX ADMIN — AMIODARONE HYDROCHLORIDE 200 MG: 200 TABLET ORAL at 10:18

## 2024-05-19 RX ADMIN — LACOSAMIDE 100 MG: 100 TABLET, FILM COATED ORAL at 10:19

## 2024-05-19 RX ADMIN — LACOSAMIDE 100 MG: 100 TABLET, FILM COATED ORAL at 20:58

## 2024-05-19 RX ADMIN — SODIUM CHLORIDE, SODIUM LACTATE, POTASSIUM CHLORIDE, AND CALCIUM CHLORIDE 75 ML/HR: 600; 310; 30; 20 INJECTION, SOLUTION INTRAVENOUS at 10:25

## 2024-05-19 RX ADMIN — MIDODRINE HYDROCHLORIDE 10 MG: 10 TABLET ORAL at 17:04

## 2024-05-19 RX ADMIN — MIDODRINE HYDROCHLORIDE 10 MG: 10 TABLET ORAL at 10:23

## 2024-05-19 RX ADMIN — Medication 40 PERCENT: at 07:34

## 2024-05-19 RX ADMIN — ROSUVASTATIN CALCIUM 10 MG: 10 TABLET, COATED ORAL at 20:58

## 2024-05-19 RX ADMIN — METHYLPREDNISOLONE SODIUM SUCCINATE 40 MG: 40 INJECTION, POWDER, FOR SOLUTION INTRAMUSCULAR; INTRAVENOUS at 03:45

## 2024-05-19 RX ADMIN — Medication 25 MCG/HR: at 03:44

## 2024-05-19 ASSESSMENT — PAIN SCALES - GENERAL
PAINLEVEL_OUTOF10: 0 - NO PAIN

## 2024-05-19 ASSESSMENT — PAIN - FUNCTIONAL ASSESSMENT
PAIN_FUNCTIONAL_ASSESSMENT: CPOT (CRITICAL CARE PAIN OBSERVATION TOOL)
PAIN_FUNCTIONAL_ASSESSMENT: CPOT (CRITICAL CARE PAIN OBSERVATION TOOL)
PAIN_FUNCTIONAL_ASSESSMENT: 0-10
PAIN_FUNCTIONAL_ASSESSMENT: CPOT (CRITICAL CARE PAIN OBSERVATION TOOL)
PAIN_FUNCTIONAL_ASSESSMENT: CPOT (CRITICAL CARE PAIN OBSERVATION TOOL)

## 2024-05-19 NOTE — SIGNIFICANT EVENT
Patient extubated to 6L at 15:45pm with no complications and a positive leak test, saturating 97% and in no respiratory distress

## 2024-05-19 NOTE — CARE PLAN
Problem: Pain  Goal: My pain/discomfort is manageable  Outcome: Progressing     Problem: Safety  Goal: Patient will be injury free during hospitalization  Outcome: Progressing  Goal: I will remain free of falls  Outcome: Progressing     Problem: Daily Care  Goal: Daily care needs are met  Outcome: Progressing     Problem: Psychosocial Needs  Goal: Demonstrates ability to cope with hospitalization/illness  Outcome: Progressing  Goal: Collaborate with me, my family, and caregiver to identify my specific goals  Outcome: Progressing     Problem: Discharge Barriers  Goal: My discharge needs are met  Outcome: Progressing     Problem: Skin  Goal: Decreased wound size/increased tissue granulation at next dressing change  Outcome: Progressing  Flowsheets (Taken 5/19/2024 1051)  Decreased wound size/increased tissue granulation at next dressing change:   Promote sleep for wound healing   Protective dressings over bony prominences   Utilize specialty bed per algorithm  Goal: Prevent/manage excess moisture  Outcome: Progressing  Flowsheets (Taken 5/19/2024 1052)  Prevent/manage excess moisture:   Cleanse incontinence/protect with barrier cream   Monitor for/manage infection if present   Follow provider orders for dressing changes   Moisturize dry skin     Problem: Fall/Injury  Goal: Not fall by end of shift  Outcome: Progressing     Problem: Safety - Medical Restraint  Goal: Remains free of injury from restraints (Restraint for Interference with Medical Device)  Outcome: Progressing  Goal: Free from restraint(s) (Restraint for Interference with Medical Device)  Outcome: Progressing     Problem: Respiratory  Goal: Clear secretions with interventions this shift  Outcome: Progressing  Goal: Minimize anxiety/maximize coping throughout shift  Outcome: Progressing  Goal: Minimal/no exertional discomfort or dyspnea this shift  Outcome: Progressing  Goal: No signs of respiratory distress (eg. Use of accessory muscles. Peds  grunting)  Outcome: Progressing  Goal: Patent airway maintained this shift  Outcome: Progressing  Goal: Tolerate mechanical ventilation evidenced by VS/agitation level this shift  Outcome: Progressing  Goal: Tolerate pulmonary toileting this shift  Outcome: Progressing  Goal: Verbalize decreased shortness of breath this shift  Outcome: Progressing  Goal: Wean oxygen to maintain O2 saturation per order/standard this shift  Outcome: Progressing  Goal: Increase self care and/or family involvement in next 24 hours  Outcome: Progressing     Problem: Pain - Adult  Goal: Verbalizes/displays adequate comfort level or baseline comfort level  Outcome: Progressing     Problem: Safety - Adult  Goal: Free from fall injury  Outcome: Progressing     Problem: Discharge Planning  Goal: Discharge to home or other facility with appropriate resources  Outcome: Progressing     Problem: Chronic Conditions and Co-morbidities  Goal: Patient's chronic conditions and co-morbidity symptoms are monitored and maintained or improved  Outcome: Progressing     Problem: Diabetes  Goal: Achieve decreasing blood glucose levels by end of shift  Outcome: Progressing  Goal: Increase stability of blood glucose readings by end of shift  Outcome: Progressing  Goal: Decrease in ketones present in urine by end of shift  Outcome: Progressing  Goal: Maintain glucose levels >70mg/dl to <250mg/dl throughout shift  Outcome: Progressing  Goal: No changes in neurological exam by end of shift  Outcome: Progressing  Goal: Vital signs within normal range for age by end of shift  Outcome: Progressing  Goal: Increase self care and/or family involovement by end of shift  Outcome: Progressing     Problem: Knowledge Deficit  Goal: Patient/family/caregiver demonstrates understanding of disease process, treatment plan, medications, and discharge instructions  Outcome: Progressing     Problem: Nutrition  Goal: Less than 5 days NPO/clear liquids  Outcome: Progressing  Goal:  Oral intake greater than 50%  Outcome: Progressing  Goal: Oral intake greater 75%  Outcome: Progressing  Goal: Consume prescribed supplement  Outcome: Progressing  Goal: Adequate PO fluid intake  Outcome: Progressing  Goal: Nutrition support goals are met within 48 hrs  Outcome: Progressing  Goal: Nutrition support is meeting 75% of nutrient needs  Outcome: Progressing  Goal: Tube feed tolerance  Outcome: Progressing  Goal: BG  mg/dL  Outcome: Progressing  Goal: Lab values WNL  Outcome: Progressing  Goal: Electrolytes WNL  Outcome: Progressing  Goal: Promote healing  Outcome: Progressing  Goal: Maintain stable weight  Outcome: Progressing  Goal: Reduce weight from edema/fluid  Outcome: Progressing  Goal: Gradual weight gain  Outcome: Progressing  Goal: Improve ostomy output  Outcome: Progressing   The patient's goals for the shift include      The clinical goals for the shift include heodynamic stability off pressors

## 2024-05-19 NOTE — PROGRESS NOTES
Darrell Arreola is a 78 y.o. male on day 2 of admission presenting with Closed head injury, initial encounter.  78 YOM with h/o CHF, HTN, DM, who presented after sustaining a fall vs syncopal episode. Per notes has had a gradual onset, progressive fatigue and weakness for the last few days that lead to him falling. Symptoms also included generalized body aches. In the ED concern for urinary incontinence due to seizures. CT head and spine negative. Further work up revealed LORI with Cr 2.4 (BL 1.4), +ve COVID-19 test, Trop 46, and hypotension. Received multiple boluses of IVF, however remained hypotensive. It was decided to insert a central line and place on pressors. After CVC placement, developed anaphylactic reaction (likely to Lidocaine) and went to [facility]. ROSC achieved after 2 min of BLS and one round of Epinephrine. Post ROSC was intubated and received Benadryl, Solumedrol, started on Levophed and now is being admitted to ICU for further management.   Subjective   No overnight events. Remains hemodynamically stable, however intermittently is requiring Levophed, likely due to hypotension 2/2 sedation.     Intubated and sedated and cannot provide history.   Objective   Scheduled medications  amiodarone, 200 mg, oral, Daily  apixaban, 5 mg, oral, BID  [Held by provider] famotidine, 10 mg, oral, Daily  folic acid, 1 mg, oral, Daily  insulin lispro, 0-5 Units, subcutaneous, q4h  lacosamide, 100 mg, oral, BID  methylPREDNISolone sodium succinate (PF), 40 mg, intravenous, q8h  midodrine, 10 mg, oral, TID  nystatin, 1 Application, Topical, BID  oxygen, , inhalation, Continuous - Inhalation  pantoprazole, 40 mg, oral, Daily before breakfast   Or  pantoprazole, 40 mg, intravenous, Daily before breakfast  remdesivir, 100 mg, intravenous, q24h  rosuvastatin, 10 mg, oral, Nightly  [Held by provider] SITagliptin phosphate, 50 mg, oral, Daily  [Held by provider] tamsulosin, 0.4 mg, oral, Daily before breakfast    Continuous  medications  fentaNYL, 0-300 mcg/hr, Last Rate: 25 mcg/hr (05/19/24 1255)  lactated Ringer's, 75 mL/hr, Last Rate: 75 mL/hr (05/19/24 1025)  norepinephrine, 0.01-0.5 mcg/kg/min, Last Rate: 0.01 mcg/kg/min (05/19/24 1215)  propofol, 5-50 mcg/kg/min, Last Rate: 15 mcg/kg/min (05/19/24 1327)    PRN medications  PRN medications: dextrose, dextrose, fentaNYL, glucagon, glucagon   Physical Exam  Constitutional:       General: He is not in acute distress.     Appearance: He is normal weight. He is not ill-appearing or toxic-appearing.   HENT:      Mouth/Throat:      Mouth: Mucous membranes are moist.      Comments: ET and G tube in place.   Eyes:      General: No scleral icterus.     Pupils: Pupils are equal, round, and reactive to light.      Comments: S/p R eye cataract surgery.    Cardiovascular:      Rate and Rhythm: Normal rate and regular rhythm.      Heart sounds: No murmur heard.     No friction rub. No gallop.      Comments: Distant heart sounds. Lower extremity pulses could not be palpated, but could be dopplered.   Pulmonary:      Effort: Pulmonary effort is normal. No respiratory distress.      Breath sounds: No wheezing or rales.      Comments: Clear lung fields b/l with fair/poor air entry on MV. No wheezing or crackles.     Abdominal:      General: There is no distension.      Palpations: Abdomen is soft.      Tenderness: There is no abdominal tenderness.   Musculoskeletal:         General: No tenderness.      Cervical back: Neck supple. No rigidity or tenderness.      Right lower leg: No edema.      Left lower leg: No edema.      Comments: B/l cold lower extremities    Lymphadenopathy:      Cervical: No cervical adenopathy.   Skin:     Coloration: Skin is not jaundiced.      Comments: Discoloration b/l LE    Neurological:      Comments: Follows commands, moves all extremities.      Psychiatric:      Comments: Cannot fully assess, currently is intubated and sedated.         Last Recorded Vitals  Blood  "pressure 102/74, pulse 90, temperature 36.7 °C (98.1 °F), temperature source Temporal, resp. rate 15, height 1.854 m (6' 1\"), weight 99.2 kg (218 lb 11.1 oz), SpO2 96%.  Intake/Output last 3 Shifts:  I/O last 3 completed shifts:  In: 3265.8 (32.9 mL/kg) [I.V.:3020.8 (30.5 mL/kg); NG/GT:245]  Out: 1145 (11.5 mL/kg) [Urine:1145 (0.3 mL/kg/hr)]  Weight: 99.2 kg     Relevant Results  XR chest 1 view    Result Date: 5/17/2024  Interpreted By:  Gabriel Dickson, STUDY: Chest dated  5/17/2024.   INDICATION: Signs/Symptoms:OG placement confirmation   COMPARISON: Chest dated 05/17/2024.   ACCESSION NUMBER(S): [id number]   ORDERING CLINICIAN: GERONIMO GELLER   TECHNIQUE: One view of the chest.   FINDINGS: There is an enteric tube with the tip off of the inferior level of the exam. Side port projects over the left upper quadrant. There is likely an endotracheal tube although not well visualized. Its tip as seen on this exam is a proximally 1.7 cm from the mohan. There are bibasilar appearing opacities although this may be projectional. There is a right IJ line with the tip projecting over the lower SVC. Cardiomediastinal silhouette is enlarged but similar to the prior exam. Degenerative changes seen of the spine and shoulders.       1. As seen on this exam the endotracheal tube tip appears to be a proximally 1.7 cm from the mohan. A repeat chest radiograph is recommended to confirm positioning as this may be projectional. 2. Enteric tube with side port over the left hemiabdomen and tip off the inferior level of the exam. 3. Possible bibasilar atelectasis.   MACRO: None   Signed by: Gabriel Dickson 5/17/2024 9:01 PM Dictation workstation:   [id number]    Results for orders placed or performed during the hospital encounter of 05/17/24 (from the past 24 hour(s))   POCT GLUCOSE   Result Value Ref Range    POCT Glucose 154 (H) 74 - 99 mg/dL   POCT GLUCOSE   Result Value Ref Range    POCT Glucose 173 (H) 74 - 99 mg/dL   POCT " GLUCOSE   Result Value Ref Range    POCT Glucose 161 (H) 74 - 99 mg/dL   POCT GLUCOSE   Result Value Ref Range    POCT Glucose 149 (H) 74 - 99 mg/dL   CBC and Auto Differential   Result Value Ref Range    WBC 10.0 4.4 - 11.3 x10*3/uL    nRBC 0.0 0.0 - 0.0 /100 WBCs    RBC 3.86 (L) 4.50 - 5.90 x10*6/uL    Hemoglobin 12.7 (L) 13.5 - 17.5 g/dL    Hematocrit 38.7 (L) 41.0 - 52.0 %     80 - 100 fL    MCH 32.9 26.0 - 34.0 pg    MCHC 32.8 32.0 - 36.0 g/dL    RDW 16.7 (H) 11.5 - 14.5 %    Platelets 151 150 - 450 x10*3/uL    Neutrophils % 90.1 40.0 - 80.0 %    Immature Granulocytes %, Automated 0.3 0.0 - 0.9 %    Lymphocytes % 4.3 13.0 - 44.0 %    Monocytes % 5.2 2.0 - 10.0 %    Eosinophils % 0.0 0.0 - 6.0 %    Basophils % 0.1 0.0 - 2.0 %    Neutrophils Absolute 9.03 (H) 1.60 - 5.50 x10*3/uL    Immature Granulocytes Absolute, Automated 0.03 0.00 - 0.50 x10*3/uL    Lymphocytes Absolute 0.43 (L) 0.80 - 3.00 x10*3/uL    Monocytes Absolute 0.52 0.05 - 0.80 x10*3/uL    Eosinophils Absolute 0.00 0.00 - 0.40 x10*3/uL    Basophils Absolute 0.01 0.00 - 0.10 x10*3/uL   Comprehensive Metabolic Panel   Result Value Ref Range    Glucose 152 (H) 65 - 99 mg/dL    Sodium 138 133 - 145 mmol/L    Potassium 4.0 3.4 - 5.1 mmol/L    Chloride 102 97 - 107 mmol/L    Bicarbonate 23 (L) 24 - 31 mmol/L    Urea Nitrogen 42 (H) 8 - 25 mg/dL    Creatinine 1.50 0.40 - 1.60 mg/dL    eGFR 47 (L) >60 mL/min/1.73m*2    Calcium 8.0 (L) 8.5 - 10.4 mg/dL    Albumin 3.0 (L) 3.5 - 5.0 g/dL    Alkaline Phosphatase 92 35 - 125 U/L    Total Protein 5.5 (L) 5.9 - 7.9 g/dL    AST 20 5 - 40 U/L    Bilirubin, Total 1.2 0.1 - 1.2 mg/dL    ALT 16 5 - 40 U/L    Anion Gap 13 <=19 mmol/L   Magnesium   Result Value Ref Range    Magnesium 2.40 1.60 - 3.10 mg/dL   Phosphorus   Result Value Ref Range    Phosphorus 3.4 2.5 - 4.5 mg/dL   POCT GLUCOSE   Result Value Ref Range    POCT Glucose 111 (H) 74 - 99 mg/dL   POCT GLUCOSE   Result Value Ref Range    POCT Glucose 144  (H) 74 - 99 mg/dL   Assessment/Plan   Principal Problem:    Closed head injury, initial encounter  78 YOM with h/o CHF, HTN, DM, who presented after sustaining a fall vs syncopal episode. Per notes has had a gradual onset, progressive fatigue and weakness for the last few days that lead to him falling. Symptoms also included generalized body aches. In the ED concern for urinary incontinence due to seizures. CT head and spine negative. Further work up revealed LORI with Cr 2.4 (BL 1.4), +ve COVID-19 test, Trop 46, and hypotension. Received multiple boluses of IVF, however remained hypotensive. It was decided to insert a central line and place on pressors. After CVC placement, developed anaphylactic reaction (likely to Lidocaine) and went to [facility]. ROSC achieved after 2 min of BLS and one round of Epinephrine. Post ROSC was intubated and received Benadryl, Solumedrol, started on Levophed and now is being admitted to ICU for further management.      Neuro: post arrest encephalopathy, now is resolved. Currently is sedated, but when aroused follows commands      Continue sedation with Propofol, Fentanyl      Continue home Lacosamide      Daily SAT      Supportive measures      Might need repeat head CT in 48 hours after the arrest     CV: H/o CHF (EF 25%), HTN, now s/p arrest due to anaphylactic reaction and shock hemorrhagic vs distributive (sepsis/anaphylaxis). Mildly elevated troponin likely demand ischemia. Shock now resolved/markedly improved. Trop flat at 0.4      Continue amiodarone, Crestor, midodrine and Apixaban      Steroids, Benadryl for anaphylaxis, can stop today.       Continue to monitor        Pulmonary: intubated post arrest. Baseline does not seem to need O2. Overall minimal vent requirements. Intubated for airway protection      Continue MV with current settin/18/8/60%,       SBT today, if passes, will extubate.      No pulmonary involvement from COVID, will hold off dexamethasone     : LORI (Cr  2.4 up from 1.4), Cr now improving, currently 1.5, otherwise no major electrolyte abnormalities.       IVF LR @ 75 with boluses as needed      Is/Os      Daily RFP      Treat electrolyte abnormalities as indicated     GI: no acute issues      NPO until passes swallow test post extubation      GI prophylaxis with PPI      Will hold home Pepcid     Endo: T2DM on Januvia. BS elevated but acceptable range.        Will hold Januvia       SSI       PCOT every 4 hours       Hypoglycemic protocol     Hem/Onc: mild anemia, Hb overall stable.        Daily CBC     ID: patient with COVID infection. No lung involvement        Will monitor for S&S infection     This was a critical care visit for respiratory failure and shock. Total time 50 min.   Frederic Pina MD

## 2024-05-19 NOTE — SIGNIFICANT EVENT
Pt extubated to 6L NC, toleratd well, able to state name, date, place and president. Call light in reach and pt educated on unit, call light use and bed controls

## 2024-05-20 LAB
ALBUMIN SERPL-MCNC: 2.8 G/DL (ref 3.5–5)
ALP BLD-CCNC: 84 U/L (ref 35–125)
ALT SERPL-CCNC: 14 U/L (ref 5–40)
ANION GAP SERPL CALC-SCNC: 8 MMOL/L
AST SERPL-CCNC: 19 U/L (ref 5–40)
BASOPHILS # BLD MANUAL: 0 X10*3/UL (ref 0–0.1)
BASOPHILS NFR BLD MANUAL: 0 %
BILIRUB SERPL-MCNC: 1.2 MG/DL (ref 0.1–1.2)
BUN SERPL-MCNC: 34 MG/DL (ref 8–25)
BURR CELLS BLD QL SMEAR: ABNORMAL
CALCIUM SERPL-MCNC: 7.8 MG/DL (ref 8.5–10.4)
CHLORIDE SERPL-SCNC: 104 MMOL/L (ref 97–107)
CO2 SERPL-SCNC: 27 MMOL/L (ref 24–31)
CREAT SERPL-MCNC: 1.2 MG/DL (ref 0.4–1.6)
EGFRCR SERPLBLD CKD-EPI 2021: 62 ML/MIN/1.73M*2
EOSINOPHIL # BLD MANUAL: 0 X10*3/UL (ref 0–0.4)
EOSINOPHIL NFR BLD MANUAL: 0 %
ERYTHROCYTE [DISTWIDTH] IN BLOOD BY AUTOMATED COUNT: 17.1 % (ref 11.5–14.5)
GLUCOSE BLD MANUAL STRIP-MCNC: 121 MG/DL (ref 74–99)
GLUCOSE BLD MANUAL STRIP-MCNC: 135 MG/DL (ref 74–99)
GLUCOSE BLD MANUAL STRIP-MCNC: 221 MG/DL (ref 74–99)
GLUCOSE BLD MANUAL STRIP-MCNC: 83 MG/DL (ref 74–99)
GLUCOSE SERPL-MCNC: 226 MG/DL (ref 65–99)
HCT VFR BLD AUTO: 38.5 % (ref 41–52)
HGB BLD-MCNC: 12.5 G/DL (ref 13.5–17.5)
IMM GRANULOCYTES # BLD AUTO: 0.02 X10*3/UL (ref 0–0.5)
IMM GRANULOCYTES NFR BLD AUTO: 0.3 % (ref 0–0.9)
LYMPHOCYTES # BLD MANUAL: 0.32 X10*3/UL (ref 0.8–3)
LYMPHOCYTES NFR BLD MANUAL: 5 %
MAGNESIUM SERPL-MCNC: 2.3 MG/DL (ref 1.6–3.1)
MCH RBC QN AUTO: 33.1 PG (ref 26–34)
MCHC RBC AUTO-ENTMCNC: 32.5 G/DL (ref 32–36)
MCV RBC AUTO: 102 FL (ref 80–100)
MONOCYTES # BLD MANUAL: 0.38 X10*3/UL (ref 0.05–0.8)
MONOCYTES NFR BLD MANUAL: 6 %
NEUTROPHILS # BLD MANUAL: 5.61 X10*3/UL (ref 1.6–5.5)
NEUTS BAND # BLD MANUAL: 0.32 X10*3/UL (ref 0–0.5)
NEUTS BAND NFR BLD MANUAL: 5 %
NEUTS SEG # BLD MANUAL: 5.29 X10*3/UL (ref 1.6–5)
NEUTS SEG NFR BLD MANUAL: 84 %
NRBC BLD-RTO: 0 /100 WBCS (ref 0–0)
OVALOCYTES BLD QL SMEAR: ABNORMAL
PHOSPHATE SERPL-MCNC: 2.4 MG/DL (ref 2.5–4.5)
PLATELET # BLD AUTO: 111 X10*3/UL (ref 150–450)
POTASSIUM SERPL-SCNC: 4.2 MMOL/L (ref 3.4–5.1)
PROT SERPL-MCNC: 5.2 G/DL (ref 5.9–7.9)
Q ONSET: 208 MS
QRS COUNT: 16 BEATS
QRS DURATION: 128 MS
QT INTERVAL: 414 MS
QTC CALCULATION(BAZETT): 536 MS
QTC FREDERICIA: 492 MS
R AXIS: 27 DEGREES
RBC # BLD AUTO: 3.78 X10*6/UL (ref 4.5–5.9)
RBC MORPH BLD: ABNORMAL
SODIUM SERPL-SCNC: 139 MMOL/L (ref 133–145)
T AXIS: 179 DEGREES
T OFFSET: 415 MS
TOTAL CELLS COUNTED BLD: 100
VENTRICULAR RATE: 101 BPM
WBC # BLD AUTO: 6.3 X10*3/UL (ref 4.4–11.3)

## 2024-05-20 PROCEDURE — 2500000001 HC RX 250 WO HCPCS SELF ADMINISTERED DRUGS (ALT 637 FOR MEDICARE OP): Performed by: INTERNAL MEDICINE

## 2024-05-20 PROCEDURE — 80053 COMPREHEN METABOLIC PANEL: CPT

## 2024-05-20 PROCEDURE — 83735 ASSAY OF MAGNESIUM: CPT

## 2024-05-20 PROCEDURE — 2500000006 HC RX 250 W HCPCS SELF ADMINISTERED DRUGS (ALT 637 FOR ALL PAYERS): Performed by: INTERNAL MEDICINE

## 2024-05-20 PROCEDURE — 2060000001 HC INTERMEDIATE ICU ROOM DAILY

## 2024-05-20 PROCEDURE — 37799 UNLISTED PX VASCULAR SURGERY: CPT

## 2024-05-20 PROCEDURE — 82947 ASSAY GLUCOSE BLOOD QUANT: CPT | Mod: 91

## 2024-05-20 PROCEDURE — 2500000004 HC RX 250 GENERAL PHARMACY W/ HCPCS (ALT 636 FOR OP/ED): Performed by: INTERNAL MEDICINE

## 2024-05-20 PROCEDURE — 85027 COMPLETE CBC AUTOMATED: CPT

## 2024-05-20 PROCEDURE — 82947 ASSAY GLUCOSE BLOOD QUANT: CPT

## 2024-05-20 PROCEDURE — 2500000005 HC RX 250 GENERAL PHARMACY W/O HCPCS: Performed by: INTERNAL MEDICINE

## 2024-05-20 PROCEDURE — 2500000002 HC RX 250 W HCPCS SELF ADMINISTERED DRUGS (ALT 637 FOR MEDICARE OP, ALT 636 FOR OP/ED)

## 2024-05-20 PROCEDURE — 99291 CRITICAL CARE FIRST HOUR: CPT | Performed by: INTERNAL MEDICINE

## 2024-05-20 PROCEDURE — 2500000005 HC RX 250 GENERAL PHARMACY W/O HCPCS: Mod: JZ | Performed by: INTERNAL MEDICINE

## 2024-05-20 PROCEDURE — 84100 ASSAY OF PHOSPHORUS: CPT

## 2024-05-20 PROCEDURE — 85007 BL SMEAR W/DIFF WBC COUNT: CPT

## 2024-05-20 PROCEDURE — 2500000001 HC RX 250 WO HCPCS SELF ADMINISTERED DRUGS (ALT 637 FOR MEDICARE OP)

## 2024-05-20 RX ORDER — METOPROLOL TARTRATE 25 MG/1
12.5 TABLET, FILM COATED ORAL 2 TIMES DAILY
Status: DISCONTINUED | OUTPATIENT
Start: 2024-05-20 | End: 2024-05-25

## 2024-05-20 RX ORDER — FAMOTIDINE 20 MG/1
10 TABLET, FILM COATED ORAL DAILY
Status: DISCONTINUED | OUTPATIENT
Start: 2024-05-21 | End: 2024-05-28 | Stop reason: HOSPADM

## 2024-05-20 RX ORDER — INSULIN LISPRO 100 [IU]/ML
0-5 INJECTION, SOLUTION INTRAVENOUS; SUBCUTANEOUS
Status: DISCONTINUED | OUTPATIENT
Start: 2024-05-20 | End: 2024-05-28 | Stop reason: HOSPADM

## 2024-05-20 RX ORDER — NOREPINEPHRINE BITARTRATE/D5W 8 MG/250ML
.01-.5 PLASTIC BAG, INJECTION (ML) INTRAVENOUS CONTINUOUS
Status: DISCONTINUED | OUTPATIENT
Start: 2024-05-20 | End: 2024-05-20

## 2024-05-20 RX ADMIN — METOPROLOL TARTRATE 12.5 MG: 25 TABLET, FILM COATED ORAL at 12:08

## 2024-05-20 RX ADMIN — MIDODRINE HYDROCHLORIDE 10 MG: 10 TABLET ORAL at 12:08

## 2024-05-20 RX ADMIN — APIXABAN 5 MG: 5 TABLET, FILM COATED ORAL at 09:07

## 2024-05-20 RX ADMIN — INSULIN LISPRO 1 UNITS: 100 INJECTION, SOLUTION INTRAVENOUS; SUBCUTANEOUS at 00:54

## 2024-05-20 RX ADMIN — Medication 3 L/MIN: at 07:23

## 2024-05-20 RX ADMIN — INSULIN LISPRO 2 UNITS: 100 INJECTION, SOLUTION INTRAVENOUS; SUBCUTANEOUS at 09:05

## 2024-05-20 RX ADMIN — MIDODRINE HYDROCHLORIDE 10 MG: 10 TABLET ORAL at 16:04

## 2024-05-20 RX ADMIN — METOPROLOL TARTRATE 12.5 MG: 25 TABLET, FILM COATED ORAL at 21:40

## 2024-05-20 RX ADMIN — AMIODARONE HYDROCHLORIDE 200 MG: 200 TABLET ORAL at 09:07

## 2024-05-20 RX ADMIN — NYSTATIN 1 APPLICATION: 100000 POWDER TOPICAL at 09:06

## 2024-05-20 RX ADMIN — ROSUVASTATIN CALCIUM 10 MG: 10 TABLET, COATED ORAL at 21:40

## 2024-05-20 RX ADMIN — MIDODRINE HYDROCHLORIDE 10 MG: 10 TABLET ORAL at 09:06

## 2024-05-20 RX ADMIN — SODIUM CHLORIDE, SODIUM LACTATE, POTASSIUM CHLORIDE, AND CALCIUM CHLORIDE 75 ML/HR: 600; 310; 30; 20 INJECTION, SOLUTION INTRAVENOUS at 01:00

## 2024-05-20 RX ADMIN — LACOSAMIDE 100 MG: 100 TABLET, FILM COATED ORAL at 21:40

## 2024-05-20 RX ADMIN — LACOSAMIDE 100 MG: 100 TABLET, FILM COATED ORAL at 09:06

## 2024-05-20 RX ADMIN — FOLIC ACID 1 MG: 1 TABLET ORAL at 09:06

## 2024-05-20 RX ADMIN — REMDESIVIR 100 MG: 100 INJECTION, POWDER, LYOPHILIZED, FOR SOLUTION INTRAVENOUS at 16:04

## 2024-05-20 RX ADMIN — PANTOPRAZOLE SODIUM 40 MG: 40 TABLET, DELAYED RELEASE ORAL at 05:51

## 2024-05-20 RX ADMIN — NYSTATIN 1 APPLICATION: 100000 POWDER TOPICAL at 21:39

## 2024-05-20 RX ADMIN — Medication 4 L/MIN: at 20:00

## 2024-05-20 RX ADMIN — APIXABAN 5 MG: 5 TABLET, FILM COATED ORAL at 21:40

## 2024-05-20 ASSESSMENT — PAIN - FUNCTIONAL ASSESSMENT
PAIN_FUNCTIONAL_ASSESSMENT: 0-10

## 2024-05-20 ASSESSMENT — PAIN SCALES - GENERAL
PAINLEVEL_OUTOF10: 0 - NO PAIN

## 2024-05-20 NOTE — CARE PLAN
The patient's goals for the shift include      The clinical goals for the shift include Tolerate oxygen wean    Over the shift, the patient did not make progress toward the following goals. Barriers to progression include ***. Recommendations to address these barriers include ***.

## 2024-05-20 NOTE — PROGRESS NOTES
Darrell Arreola is a 78 y.o. male on day 3 of admission presenting with Closed head injury, initial encounter.  78 YOM with h/o CHF, HTN, DM, who presented after sustaining a fall vs syncopal episode. Per notes has had a gradual onset, progressive fatigue and weakness for the last few days that lead to him falling. Symptoms also included generalized body aches. In the ED concern for urinary incontinence due to seizures. CT head and spine negative. Further work up revealed LORI with Cr 2.4 (BL 1.4), +ve COVID-19 test, Trop 46, and hypotension. Received multiple boluses of IVF, however remained hypotensive. It was decided to insert a central line and place on pressors. After CVC placement, developed anaphylactic reaction (likely to Lidocaine) and went to [facility]. ROSC achieved after 2 min of BLS and one round of Epinephrine. Post ROSC was intubated and received Benadryl, Solumedrol, started on Levophed and now is being admitted to ICU for further management.   Subjective   Patient is successfully extubated yesterday 5/19/2024.  Currently not in respiratory distress, maintaining oxygen saturation more than 92% on nasal cannula.  Hemodynamically stable, off vasopressors more than 24 hours back    Objective   Scheduled medications  amiodarone, 200 mg, oral, Daily  apixaban, 5 mg, oral, BID  [Held by provider] famotidine, 10 mg, oral, Daily  folic acid, 1 mg, oral, Daily  insulin lispro, 0-5 Units, subcutaneous, Before meals & nightly  lacosamide, 100 mg, oral, BID  metoprolol tartrate, 12.5 mg, oral, BID  midodrine, 10 mg, oral, TID  nystatin, 1 Application, Topical, BID  oxygen, , inhalation, Continuous - Inhalation  pantoprazole, 40 mg, oral, Daily before breakfast   Or  pantoprazole, 40 mg, intravenous, Daily before breakfast  remdesivir, 100 mg, intravenous, q24h  rosuvastatin, 10 mg, oral, Nightly  [Held by provider] SITagliptin phosphate, 50 mg, oral, Daily  [Held by provider] tamsulosin, 0.4 mg, oral, Daily before  "breakfast    Continuous medications  lactated Ringer's, 75 mL/hr, Last Rate: 75 mL/hr (05/20/24 0100)    PRN medications  PRN medications: dextrose, dextrose, glucagon, glucagon   Physical Exam  Constitutional:       General: He is not in acute distress.     Appearance: He is normal weight. He is not ill-appearing or toxic-appearing.   HENT:      Mouth/Throat:      Mouth: Mucous membranes are moist.   Eyes:      General: No scleral icterus.     Pupils: Pupils are equal, round, and reactive to light.      Comments: S/p R eye cataract surgery.    Cardiovascular:      Rate and Rhythm: Normal rate and regular rhythm.      Heart sounds: No murmur heard.     No friction rub. No gallop.      Comments: Distant heart sounds. Lower extremity pulses could not be palpated, but could be dopplered.   Pulmonary:      Effort: Pulmonary effort is normal. No respiratory distress.      Breath sounds: No wheezing or rales.      Comments: Clear lung fields b/l with fair/poor air entry on MV. No wheezing or crackles.     Abdominal:      General: There is no distension.      Palpations: Abdomen is soft.      Tenderness: There is no abdominal tenderness.   Musculoskeletal:         General: No tenderness.      Cervical back: Neck supple. No rigidity or tenderness.      Right lower leg: No edema.      Left lower leg: No edema.      Comments: B/l cold lower extremities    Lymphadenopathy:      Cervical: No cervical adenopathy.   Skin:     Coloration: Skin is not jaundiced.      Comments: Discoloration b/l LE    Neurological:      Comments: Follows commands, moves all extremities.      Psychiatric:      Comments: Lethargic, following one-step commands       Last Recorded Vitals  Blood pressure 86/67, pulse 93, temperature 36.7 °C (98.1 °F), temperature source Temporal, resp. rate 15, height 1.854 m (6' 1\"), weight 108 kg (237 lb 14 oz), SpO2 93%.  Intake/Output last 3 Shifts:  I/O last 3 completed shifts:  In: 3937.2 (36.5 mL/kg) [I.V.:3372.2 " (31.3 mL/kg); NG/GT:65; IV Piggyback:500]  Out: 1300 (12 mL/kg) [Urine:1300 (0.3 mL/kg/hr)]  Weight: 107.9 kg     Relevant Results  No results found.   Results for orders placed or performed during the hospital encounter of 05/17/24 (from the past 24 hour(s))   POCT GLUCOSE   Result Value Ref Range    POCT Glucose 135 (H) 74 - 99 mg/dL   POCT GLUCOSE   Result Value Ref Range    POCT Glucose 134 (H) 74 - 99 mg/dL   POCT GLUCOSE   Result Value Ref Range    POCT Glucose 175 (H) 74 - 99 mg/dL   CBC and Auto Differential   Result Value Ref Range    WBC 6.3 4.4 - 11.3 x10*3/uL    nRBC 0.0 0.0 - 0.0 /100 WBCs    RBC 3.78 (L) 4.50 - 5.90 x10*6/uL    Hemoglobin 12.5 (L) 13.5 - 17.5 g/dL    Hematocrit 38.5 (L) 41.0 - 52.0 %     (H) 80 - 100 fL    MCH 33.1 26.0 - 34.0 pg    MCHC 32.5 32.0 - 36.0 g/dL    RDW 17.1 (H) 11.5 - 14.5 %    Platelets 111 (L) 150 - 450 x10*3/uL    Immature Granulocytes %, Automated 0.3 0.0 - 0.9 %    Immature Granulocytes Absolute, Automated 0.02 0.00 - 0.50 x10*3/uL   Comprehensive Metabolic Panel   Result Value Ref Range    Glucose 226 (H) 65 - 99 mg/dL    Sodium 139 133 - 145 mmol/L    Potassium 4.2 3.4 - 5.1 mmol/L    Chloride 104 97 - 107 mmol/L    Bicarbonate 27 24 - 31 mmol/L    Urea Nitrogen 34 (H) 8 - 25 mg/dL    Creatinine 1.20 0.40 - 1.60 mg/dL    eGFR 62 >60 mL/min/1.73m*2    Calcium 7.8 (L) 8.5 - 10.4 mg/dL    Albumin 2.8 (L) 3.5 - 5.0 g/dL    Alkaline Phosphatase 84 35 - 125 U/L    Total Protein 5.2 (L) 5.9 - 7.9 g/dL    AST 19 5 - 40 U/L    Bilirubin, Total 1.2 0.1 - 1.2 mg/dL    ALT 14 5 - 40 U/L    Anion Gap 8 <=19 mmol/L   Magnesium   Result Value Ref Range    Magnesium 2.30 1.60 - 3.10 mg/dL   Phosphorus   Result Value Ref Range    Phosphorus 2.4 (L) 2.5 - 4.5 mg/dL   Manual Differential   Result Value Ref Range    Neutrophils %, Manual 84.0 40.0 - 80.0 %    Bands %, Manual 5.0 0.0 - 5.0 %    Lymphocytes %, Manual 5.0 13.0 - 44.0 %    Monocytes %, Manual 6.0 2.0 - 10.0 %     Eosinophils %, Manual 0.0 0.0 - 6.0 %    Basophils %, Manual 0.0 0.0 - 2.0 %    Seg Neutrophils Absolute, Manual 5.29 (H) 1.60 - 5.00 x10*3/uL    Bands Absolute, Manual 0.32 0.00 - 0.50 x10*3/uL    Lymphocytes Absolute, Manual 0.32 (L) 0.80 - 3.00 x10*3/uL    Monocytes Absolute, Manual 0.38 0.05 - 0.80 x10*3/uL    Eosinophils Absolute, Manual 0.00 0.00 - 0.40 x10*3/uL    Basophils Absolute, Manual 0.00 0.00 - 0.10 x10*3/uL    Total Cells Counted 100     Neutrophils Absolute, Manual 5.61 (H) 1.60 - 5.50 x10*3/uL    RBC Morphology See Below     Ovalocytes Many     Coby Cells Many    POCT GLUCOSE   Result Value Ref Range    POCT Glucose 221 (H) 74 - 99 mg/dL   POCT GLUCOSE   Result Value Ref Range    POCT Glucose 135 (H) 74 - 99 mg/dL   Assessment/Plan   Principal Problem:    Closed head injury, initial encounter  78 YOM with h/o CHF, HTN, DM, who presented after sustaining a fall vs syncopal episode. Per notes has had a gradual onset, progressive fatigue and weakness for the last few days that lead to him falling. Symptoms also included generalized body aches. In the ED concern for urinary incontinence due to seizures. CT head and spine negative. Further work up revealed LORI with Cr 2.4 (BL 1.4), +ve COVID-19 test, Trop 46, and hypotension. Received multiple boluses of IVF, however remained hypotensive. It was decided to insert a central line and place on pressors. After CVC placement, developed anaphylactic reaction (likely to Lidocaine) and went to [facility]. ROSC achieved after 2 min of BLS and one round of Epinephrine. Post ROSC was intubated and received Benadryl, Solumedrol, started on Levophed and now is being admitted to ICU for further management.      Neuro: post arrest encephalopathy, now is resolved. Currently is sedated, but when aroused follows commands      Continue sedation with Propofol, Fentanyl      Continue home Lacosamide      Daily SAT      Supportive measures      Might need repeat head CT in 48 hours  after the arrest     CV: H/o CHF (EF 25%), HTN, now s/p arrest due to anaphylactic reaction and shock hemorrhagic vs distributive (sepsis/anaphylaxis). Mildly elevated troponin likely demand ischemia. Shock now resolved/markedly improved. Trop flat at 0.4      Continue amiodarone, Crestor, midodrine and Apixaban      Steroids, Benadryl for anaphylaxis, can stop today.       Continue to monitor  2024: Hemodynamically stable, off vasopressors more than 24 hours back        Pulmonary: intubated post arrest. Baseline does not seem to need O2. Overall minimal vent requirements. Intubated for airway protection      Continue MV with current settin/18/8/60%,       SBT today, if passes, will extubate.      No pulmonary involvement from COVID, will hold off dexamethasone\  2024: Patient is successfully extubated yesterday 2024.    Currently not in respiratory distress, maintaining oxygen saturation more than 92% on nasal cannula.       : LORI (Cr 2.4 up from 1.4), Cr now improving, currently 1.5, otherwise no major electrolyte abnormalities.       IVF LR @ 75 with boluses as needed      Is/Os      Daily RFP      Treat electrolyte abnormalities as indicated     GI: no acute issues      NPO until passes swallow test post extubation      GI prophylaxis with PPI      Will hold home Pepcid     Endo: T2DM on Januvia. BS elevated but acceptable range.        Will hold Januvia       SSI       PCOT every 4 hours       Hypoglycemic protocol     Hem/Onc: mild anemia, Hb overall stable.        Daily CBC     ID: patient with COVID infection. No lung involvement        Will monitor for S&S infection     This was a critical care visit for respiratory failure and shock. Total time 30 min.   We will transfer the patient to stepdown.     Daniele Ramirez MD

## 2024-05-20 NOTE — PROGRESS NOTES
"INFECTIOUS DISEASES PROGRESS NOTE    Consulted / following patient for:  COVID-19    Subjective   Interval History:   Says he is feeling \"better,\" and is just \"weak.\"  He states that he was diagnosed with COVID-19 at the facility where he resides \"about a week ago,\" and cannot give any more precise information.  Endorses diarrhea \"for about a week \"     Objective   PHYSICAL EXAMINATION  Vital signs:  Visit Vitals  BP 85/62   Pulse 102   Temp 36.8 °C (98.2 °F) (Temporal)   Resp 15   Extubated.  On 3 L/min nasal cannula with saturation 92%  General: Weak, not toxic  HEENT:  No scleral icterus or conjunctival suffusion, oral mucosa moist  Nodes:  Negative  Lungs:  Clear to auscultation, shallow  Heart:  S1, S2 normal  Abdomen:  Soft, nontender. No palpable organs or masses.  Nurse describes passage of mucus per rectum, no fecal obvious material    Relevant Results  WBC: 6300     Results from last 72 hours   Lab Units 05/20/24  0608   CREATININE mg/dL 1.20   ANION GAP mmol/L 8   EGFR mL/min/1.73m*2 62     Results from last 72 hours   Lab Units 05/20/24  0608   AST U/L 19   ALT U/L 14   ALK PHOS U/L 84   BILIRUBIN TOTAL mg/dL 1.2     Microbiology:  COVID-19 PCR positive (5/17)    Imaging:  CXR images personally reviewed: No pulmonary infiltrate      ASSESSMENT:  COVID-19  No obvious pneumonitis, extubated, date of onset of COVID-19 remains uncertain, patient received 3 days of remdesivir.  No additional COVID-19 therapeutics indicated    Mucoid discharge per rectum  Rule out C. difficile    PLANS:  -   Wean nasal oxygen as tolerated  -   No COVID-19 therapeutics  -   Await C. difficile assay    Discussed with ICU RN       Earl Justice MD  [facility]  Office:  [phone]  "

## 2024-05-20 NOTE — PROGRESS NOTES
"Nutrition Follow up Note    Nutrition Assessment      Remains in isolation for covid. Extubated on 5/19. Pt with diarrhea - r/o c diff per ID.     Nutrition History:  Food and Nutrient History: RN reports pt seems to have a fair/good appetite and appears to be eating well.  GI Symptoms: Diarrhea    Anthropometrics:  Ht: 185.4 cm (6' 1\"), Wt: 108 kg (237 lb 14 oz), BMI: 31.39  IBW/kg (Dietitian Calculated): 83.64 kg  Percent of IBW: 129 %  Adjusted Body Weight (kg): 89.55 kg    Weight Change:  Daily Weight  05/20/24 : 108 kg (237 lb 14 oz)  04/28/24 : 96.3 kg (212 lb 4.9 oz)  10/20/23 : 99.5 kg (219 lb 5.7 oz)    Nutrition Focused Physical Exam Findings: deferred     Nutrition Significant Labs:  Lab Results   Component Value Date    WBC 6.3 05/20/2024    HGB 12.5 (L) 05/20/2024    HCT 38.5 (L) 05/20/2024     (L) 05/20/2024    CHOL 109 (L) 04/21/2024    TRIG 63 04/21/2024    HDL 46.0 04/21/2024    LDLDIRECT 71 10/10/2023    ALT 14 05/20/2024    AST 19 05/20/2024     05/20/2024    K 4.2 05/20/2024     05/20/2024    CREATININE 1.20 05/20/2024    BUN 34 (H) 05/20/2024    CO2 27 05/20/2024    TSH 4.23 (H) 04/21/2024    INR 1.6 (H) 04/20/2024    HGBA1C 6.7 (H) 04/21/2024     Nutrition Specific Medications:  amiodarone, 200 mg, oral, Daily  apixaban, 5 mg, oral, BID  [Held by provider] famotidine, 10 mg, oral, Daily  folic acid, 1 mg, oral, Daily  insulin lispro, 0-5 Units, subcutaneous, Before meals & nightly  lacosamide, 100 mg, oral, BID  metoprolol tartrate, 12.5 mg, oral, BID  midodrine, 10 mg, oral, TID  nystatin, 1 Application, Topical, BID  oxygen, , inhalation, Continuous - Inhalation  pantoprazole, 40 mg, oral, Daily before breakfast   Or  pantoprazole, 40 mg, intravenous, Daily before breakfast  remdesivir, 100 mg, intravenous, q24h  rosuvastatin, 10 mg, oral, Nightly  [Held by provider] SITagliptin phosphate, 50 mg, oral, Daily  [Held by provider] tamsulosin, 0.4 mg, oral, Daily before " breakfast      lactated Ringer's, 75 mL/hr, Last Rate: 75 mL/hr (05/20/24 0100)      Dietary Orders (From admission, onward)       Start     Ordered    05/19/24 2257  Adult diet Carb Controlled, Cardiac; 75 gram carb/meal, 45 gram Carb evening snack; 70 gm fat; 2 - 3 grams Sodium  Diet effective now        Question Answer Comment   Diet type Carb Controlled    Diet type Cardiac    Carb diet selection: 75 gram carb/meal, 45 gram Carb evening snack    Fat restriction: 70 gm fat    Sodium restriction: 2 - 3 grams Sodium        05/19/24 2256                  Estimated Needs:   Estimated Energy Needs  Total Energy Estimated Needs (kCal): 2241 kCal  Total Estimated Energy Need per Day (kCal/kg): 25 kCal/kg  Method for Estimating Needs: ABW    Estimated Protein Needs  Total Protein Estimated Needs (g): 108 g  Total Protein Estimated Needs (g/kg): 1.2 g/kg  Method for Estimating Needs: ABW    Estimated Fluid Needs  Method for Estimating Needs: <2000 mL/d        Nutrition Diagnosis   Nutrition Diagnosis:  Nutrition Diagnosis  Patient has Nutrition Diagnosis: Yes  Diagnosis Status (1):  (No longer appropriate)  Nutrition Diagnosis 1: Inadequate fat intake  Related to (1): decreased ability to consume sufficient energy  As Evidenced by (1): NPO/Mechanical Ventilation    Additional Nutrition Diagnosis: Diagnosis 2  Diagnosis Status (2): New  Nutrition Diagnosis 2: Increased nutrient needs  Related to (2): increased demand for nutrients  As Evidenced by (2): conditions associated with diagnosis       Nutrition Interventions/Recommendations   Nutrition Interventions and Recommendations:    Nutrition Prescription:  Individualized Nutrition Prescription Provided for : 2241 kcals and 108g protein to be provided via diet    Nutrition Interventions:   Food and/or Nutrient Delivery Interventions  Interventions: Meals and snacks  Meals and Snacks: Carbohydrate-modified diet, Fat-modified diet, Mineral-modified diet  Goal: provide as  ordered    Education Documentation  No documentation found.           Nutrition Monitoring and Evaluation   Monitoring/Evaluation:   Food/Nutrient Related History Monitoring  Monitoring and Evaluation Plan: Energy intake  Energy Intake: Estimated energy intake  Criteria: pt to consume >/= 75% estimated needs       Time Spent/Follow-up:   Follow Up  Time Spent (min): 20 minutes  Last Date of Nutrition Visit: 05/20/24  Nutrition Follow-Up Needed?: 5-7 days  Follow up Comment: 5/24/24

## 2024-05-21 LAB
ALBUMIN SERPL-MCNC: 2.7 G/DL (ref 3.5–5)
ALP BLD-CCNC: 80 U/L (ref 35–125)
ALT SERPL-CCNC: 17 U/L (ref 5–40)
ANION GAP SERPL CALC-SCNC: 7 MMOL/L
AST SERPL-CCNC: 25 U/L (ref 5–40)
BASOPHILS # BLD MANUAL: 0 X10*3/UL (ref 0–0.1)
BASOPHILS NFR BLD MANUAL: 0 %
BILIRUB SERPL-MCNC: 1.8 MG/DL (ref 0.1–1.2)
BUN SERPL-MCNC: 41 MG/DL (ref 8–25)
BURR CELLS BLD QL SMEAR: ABNORMAL
CALCIUM SERPL-MCNC: 7.9 MG/DL (ref 8.5–10.4)
CHLORIDE SERPL-SCNC: 103 MMOL/L (ref 97–107)
CO2 SERPL-SCNC: 27 MMOL/L (ref 24–31)
CREAT SERPL-MCNC: 1.3 MG/DL (ref 0.4–1.6)
EGFRCR SERPLBLD CKD-EPI 2021: 56 ML/MIN/1.73M*2
EOSINOPHIL # BLD MANUAL: 0 X10*3/UL (ref 0–0.4)
EOSINOPHIL NFR BLD MANUAL: 0 %
ERYTHROCYTE [DISTWIDTH] IN BLOOD BY AUTOMATED COUNT: 16.7 % (ref 11.5–14.5)
GLUCOSE BLD MANUAL STRIP-MCNC: 115 MG/DL (ref 74–99)
GLUCOSE BLD MANUAL STRIP-MCNC: 115 MG/DL (ref 74–99)
GLUCOSE BLD MANUAL STRIP-MCNC: 130 MG/DL (ref 74–99)
GLUCOSE BLD MANUAL STRIP-MCNC: 66 MG/DL (ref 74–99)
GLUCOSE BLD MANUAL STRIP-MCNC: 85 MG/DL (ref 74–99)
GLUCOSE BLD MANUAL STRIP-MCNC: 98 MG/DL (ref 74–99)
GLUCOSE SERPL-MCNC: 77 MG/DL (ref 65–99)
HCT VFR BLD AUTO: 36.6 % (ref 41–52)
HGB BLD-MCNC: 11.7 G/DL (ref 13.5–17.5)
IMM GRANULOCYTES # BLD AUTO: 0.02 X10*3/UL (ref 0–0.5)
IMM GRANULOCYTES NFR BLD AUTO: 0.3 % (ref 0–0.9)
LYMPHOCYTES # BLD MANUAL: 0.51 X10*3/UL (ref 0.8–3)
LYMPHOCYTES NFR BLD MANUAL: 7 %
MAGNESIUM SERPL-MCNC: 2.2 MG/DL (ref 1.6–3.1)
MCH RBC QN AUTO: 32.1 PG (ref 26–34)
MCHC RBC AUTO-ENTMCNC: 32 G/DL (ref 32–36)
MCV RBC AUTO: 100 FL (ref 80–100)
MONOCYTES # BLD MANUAL: 0.44 X10*3/UL (ref 0.05–0.8)
MONOCYTES NFR BLD MANUAL: 6 %
NEUTROPHILS # BLD MANUAL: 6.35 X10*3/UL (ref 1.6–5.5)
NEUTS BAND # BLD MANUAL: 0.58 X10*3/UL (ref 0–0.5)
NEUTS BAND NFR BLD MANUAL: 8 %
NEUTS SEG # BLD MANUAL: 5.77 X10*3/UL (ref 1.6–5)
NEUTS SEG NFR BLD MANUAL: 79 %
NRBC BLD-RTO: 0 /100 WBCS (ref 0–0)
OVALOCYTES BLD QL SMEAR: ABNORMAL
PHOSPHATE SERPL-MCNC: 1.3 MG/DL (ref 2.5–4.5)
PLATELET # BLD AUTO: 109 X10*3/UL (ref 150–450)
POTASSIUM SERPL-SCNC: 3.6 MMOL/L (ref 3.4–5.1)
PROT SERPL-MCNC: 5.2 G/DL (ref 5.9–7.9)
RBC # BLD AUTO: 3.65 X10*6/UL (ref 4.5–5.9)
RBC MORPH BLD: ABNORMAL
SODIUM SERPL-SCNC: 137 MMOL/L (ref 133–145)
TOTAL CELLS COUNTED BLD: 100
WBC # BLD AUTO: 7.3 X10*3/UL (ref 4.4–11.3)

## 2024-05-21 PROCEDURE — 2500000001 HC RX 250 WO HCPCS SELF ADMINISTERED DRUGS (ALT 637 FOR MEDICARE OP): Performed by: INTERNAL MEDICINE

## 2024-05-21 PROCEDURE — 2060000001 HC INTERMEDIATE ICU ROOM DAILY

## 2024-05-21 PROCEDURE — 85007 BL SMEAR W/DIFF WBC COUNT: CPT | Performed by: INTERNAL MEDICINE

## 2024-05-21 PROCEDURE — 85027 COMPLETE CBC AUTOMATED: CPT | Performed by: INTERNAL MEDICINE

## 2024-05-21 PROCEDURE — 2500000006 HC RX 250 W HCPCS SELF ADMINISTERED DRUGS (ALT 637 FOR ALL PAYERS): Performed by: INTERNAL MEDICINE

## 2024-05-21 PROCEDURE — 2500000004 HC RX 250 GENERAL PHARMACY W/ HCPCS (ALT 636 FOR OP/ED): Performed by: INTERNAL MEDICINE

## 2024-05-21 PROCEDURE — 2500000004 HC RX 250 GENERAL PHARMACY W/ HCPCS (ALT 636 FOR OP/ED): Performed by: NURSE PRACTITIONER

## 2024-05-21 PROCEDURE — 2500000005 HC RX 250 GENERAL PHARMACY W/O HCPCS: Performed by: NURSE PRACTITIONER

## 2024-05-21 PROCEDURE — 80053 COMPREHEN METABOLIC PANEL: CPT | Performed by: INTERNAL MEDICINE

## 2024-05-21 PROCEDURE — 2500000005 HC RX 250 GENERAL PHARMACY W/O HCPCS: Mod: JZ | Performed by: INTERNAL MEDICINE

## 2024-05-21 PROCEDURE — 83735 ASSAY OF MAGNESIUM: CPT | Performed by: INTERNAL MEDICINE

## 2024-05-21 PROCEDURE — 84100 ASSAY OF PHOSPHORUS: CPT | Performed by: INTERNAL MEDICINE

## 2024-05-21 PROCEDURE — 82947 ASSAY GLUCOSE BLOOD QUANT: CPT

## 2024-05-21 RX ADMIN — LACOSAMIDE 100 MG: 100 TABLET, FILM COATED ORAL at 20:39

## 2024-05-21 RX ADMIN — AMIODARONE HYDROCHLORIDE 200 MG: 200 TABLET ORAL at 08:45

## 2024-05-21 RX ADMIN — ROSUVASTATIN CALCIUM 10 MG: 10 TABLET, COATED ORAL at 20:38

## 2024-05-21 RX ADMIN — POTASSIUM PHOSPHATE, MONOBASIC AND POTASSIUM PHOSPHATE, DIBASIC 21 MMOL: 224; 236 INJECTION, SOLUTION, CONCENTRATE INTRAVENOUS at 23:24

## 2024-05-21 RX ADMIN — NYSTATIN 1 APPLICATION: 100000 POWDER TOPICAL at 20:50

## 2024-05-21 RX ADMIN — METOPROLOL TARTRATE 12.5 MG: 25 TABLET, FILM COATED ORAL at 08:59

## 2024-05-21 RX ADMIN — APIXABAN 5 MG: 5 TABLET, FILM COATED ORAL at 08:48

## 2024-05-21 RX ADMIN — MIDODRINE HYDROCHLORIDE 10 MG: 10 TABLET ORAL at 08:45

## 2024-05-21 RX ADMIN — LACOSAMIDE 100 MG: 100 TABLET, FILM COATED ORAL at 08:51

## 2024-05-21 RX ADMIN — METOPROLOL TARTRATE 12.5 MG: 25 TABLET, FILM COATED ORAL at 20:39

## 2024-05-21 RX ADMIN — APIXABAN 5 MG: 5 TABLET, FILM COATED ORAL at 20:38

## 2024-05-21 RX ADMIN — MIDODRINE HYDROCHLORIDE 10 MG: 10 TABLET ORAL at 17:36

## 2024-05-21 RX ADMIN — MIDODRINE HYDROCHLORIDE 10 MG: 10 TABLET ORAL at 12:38

## 2024-05-21 RX ADMIN — NYSTATIN 1 APPLICATION: 100000 POWDER TOPICAL at 09:00

## 2024-05-21 RX ADMIN — FOLIC ACID 1 MG: 1 TABLET ORAL at 08:51

## 2024-05-21 RX ADMIN — REMDESIVIR 100 MG: 100 INJECTION, POWDER, LYOPHILIZED, FOR SOLUTION INTRAVENOUS at 17:36

## 2024-05-21 RX ADMIN — TAMSULOSIN HYDROCHLORIDE 0.4 MG: 0.4 CAPSULE ORAL at 06:31

## 2024-05-21 RX ADMIN — PANTOPRAZOLE SODIUM 40 MG: 40 TABLET, DELAYED RELEASE ORAL at 06:31

## 2024-05-21 SDOH — SOCIAL STABILITY: SOCIAL INSECURITY: WITHIN THE LAST YEAR, HAVE YOU BEEN AFRAID OF YOUR PARTNER OR EX-PARTNER?: NO

## 2024-05-21 SDOH — ECONOMIC STABILITY: INCOME INSECURITY: IN THE PAST 12 MONTHS, HAS THE ELECTRIC, GAS, OIL, OR WATER COMPANY THREATENED TO SHUT OFF SERVICE IN YOUR HOME?: NO

## 2024-05-21 SDOH — SOCIAL STABILITY: SOCIAL NETWORK
DO YOU BELONG TO ANY CLUBS OR ORGANIZATIONS SUCH AS CHURCH GROUPS UNIONS, FRATERNAL OR ATHLETIC GROUPS, OR SCHOOL GROUPS?: YES

## 2024-05-21 SDOH — HEALTH STABILITY: MENTAL HEALTH
STRESS IS WHEN SOMEONE FEELS TENSE, NERVOUS, ANXIOUS, OR CAN'T SLEEP AT NIGHT BECAUSE THEIR MIND IS TROUBLED. HOW STRESSED ARE YOU?: NOT AT ALL

## 2024-05-21 SDOH — SOCIAL STABILITY: SOCIAL NETWORK: ARE YOU MARRIED, WIDOWED, DIVORCED, SEPARATED, NEVER MARRIED, OR LIVING WITH A PARTNER?: WIDOWED

## 2024-05-21 SDOH — SOCIAL STABILITY: SOCIAL NETWORK: HOW OFTEN DO YOU ATTENT MEETINGS OF THE CLUB OR ORGANIZATION YOU BELONG TO?: 1 TO 4 TIMES PER YEAR

## 2024-05-21 SDOH — SOCIAL STABILITY: SOCIAL INSECURITY: WITHIN THE LAST YEAR, HAVE YOU BEEN HUMILIATED OR EMOTIONALLY ABUSED IN OTHER WAYS BY YOUR PARTNER OR EX-PARTNER?: NO

## 2024-05-21 SDOH — ECONOMIC STABILITY: FOOD INSECURITY: WITHIN THE PAST 12 MONTHS, YOU WORRIED THAT YOUR FOOD WOULD RUN OUT BEFORE YOU GOT MONEY TO BUY MORE.: NEVER TRUE

## 2024-05-21 SDOH — SOCIAL STABILITY: SOCIAL NETWORK: HOW OFTEN DO YOU GET TOGETHER WITH FRIENDS OR RELATIVES?: MORE THAN THREE TIMES A WEEK

## 2024-05-21 SDOH — HEALTH STABILITY: MENTAL HEALTH: HOW OFTEN DO YOU HAVE 6 OR MORE DRINKS ON ONE OCCASION?: NEVER

## 2024-05-21 SDOH — HEALTH STABILITY: MENTAL HEALTH: HOW OFTEN DO YOU HAVE A DRINK CONTAINING ALCOHOL?: NEVER

## 2024-05-21 SDOH — ECONOMIC STABILITY: INCOME INSECURITY: HOW HARD IS IT FOR YOU TO PAY FOR THE VERY BASICS LIKE FOOD, HOUSING, MEDICAL CARE, AND HEATING?: NOT HARD AT ALL

## 2024-05-21 SDOH — ECONOMIC STABILITY: FOOD INSECURITY: WITHIN THE PAST 12 MONTHS, THE FOOD YOU BOUGHT JUST DIDN'T LAST AND YOU DIDN'T HAVE MONEY TO GET MORE.: NEVER TRUE

## 2024-05-21 SDOH — ECONOMIC STABILITY: HOUSING INSECURITY: IN THE LAST 12 MONTHS, HOW MANY PLACES HAVE YOU LIVED?: 1

## 2024-05-21 SDOH — ECONOMIC STABILITY: INCOME INSECURITY
IN THE LAST 12 MONTHS, WAS THERE A TIME WHEN YOU WERE NOT ABLE TO PAY THE MORTGAGE OR RENT ON TIME?: PATIENT UNABLE TO ANSWER

## 2024-05-21 SDOH — HEALTH STABILITY: PHYSICAL HEALTH: ON AVERAGE, HOW MANY DAYS PER WEEK DO YOU ENGAGE IN MODERATE TO STRENUOUS EXERCISE (LIKE A BRISK WALK)?: 0 DAYS

## 2024-05-21 SDOH — SOCIAL STABILITY: SOCIAL NETWORK: HOW OFTEN DO YOU ATTEND CHURCH OR RELIGIOUS SERVICES?: 1 TO 4 TIMES PER YEAR

## 2024-05-21 SDOH — HEALTH STABILITY: PHYSICAL HEALTH: ON AVERAGE, HOW MANY MINUTES DO YOU ENGAGE IN EXERCISE AT THIS LEVEL?: 0 MIN

## 2024-05-21 SDOH — HEALTH STABILITY: MENTAL HEALTH: HOW MANY STANDARD DRINKS CONTAINING ALCOHOL DO YOU HAVE ON A TYPICAL DAY?: PATIENT DOES NOT DRINK

## 2024-05-21 ASSESSMENT — COGNITIVE AND FUNCTIONAL STATUS - GENERAL
MOVING FROM LYING ON BACK TO SITTING ON SIDE OF FLAT BED WITH BEDRAILS: A LOT
EATING MEALS: TOTAL
TOILETING: TOTAL
CLIMB 3 TO 5 STEPS WITH RAILING: TOTAL
PERSONAL GROOMING: TOTAL
WALKING IN HOSPITAL ROOM: TOTAL
MOBILITY SCORE: 12
HELP NEEDED FOR BATHING: TOTAL
DAILY ACTIVITIY SCORE: 6
DRESSING REGULAR UPPER BODY CLOTHING: TOTAL
DRESSING REGULAR LOWER BODY CLOTHING: TOTAL
STANDING UP FROM CHAIR USING ARMS: A LOT
TURNING FROM BACK TO SIDE WHILE IN FLAT BAD: A LITTLE
MOVING TO AND FROM BED TO CHAIR: A LITTLE

## 2024-05-21 ASSESSMENT — PAIN SCALES - GENERAL
PAINLEVEL_OUTOF10: 0 - NO PAIN

## 2024-05-21 ASSESSMENT — ACTIVITIES OF DAILY LIVING (ADL): LACK_OF_TRANSPORTATION: NO

## 2024-05-21 ASSESSMENT — LIFESTYLE VARIABLES
AUDIT-C TOTAL SCORE: 0
SKIP TO QUESTIONS 9-10: 1

## 2024-05-21 ASSESSMENT — PAIN - FUNCTIONAL ASSESSMENT
PAIN_FUNCTIONAL_ASSESSMENT: 0-10

## 2024-05-21 NOTE — PROGRESS NOTES
INFECTIOUS DISEASES PROGRESS NOTE    Consulted / following patient for:  COVID-19    Subjective   Interval History:   No specific complaints  Denies dyspnea or cough  Denies diarrhea    Objective   PHYSICAL EXAMINATION  Vital signs: Afebrile, VSS  General: Weak, not toxic, transferred to MCU  Lungs:  Clear to auscultation, shallow  Heart:  S1, S2 normal  Abdomen:  Soft, nontender. No palpable organs or masses.  No diarrhea  Relevant Results  WBC: 6300     Results from last 72 hours   Lab Units 05/21/24  0514   CREATININE mg/dL 1.30   ANION GAP mmol/L 7   EGFR mL/min/1.73m*2 56*     Results from last 72 hours   Lab Units 05/21/24  0514   AST U/L 25   ALT U/L 17   ALK PHOS U/L 80   BILIRUBIN TOTAL mg/dL 1.8*     Microbiology:  COVID-19 PCR positive (5/17)    Imaging:  CXR images personally reviewed: No pulmonary infiltrate      ASSESSMENT:  COVID-19  No obvious pneumonitis, extubated, date of onset of COVID-19 remains uncertain, patient received 3 days of remdesivir.  No additional COVID-19 therapeutics indicated    PLANS:  -   Wean nasal oxygen as tolerated  -   No COVID-19 therapeutics  -   Patient may come out of contact/droplet isolation at midnight on 5/26, day # 10 since positive COVID-19 test    Discussed with RN     WILL SIGN OFF.  PLEASE RE-CONSULT PRN.  THANK YOU.    Earl Justice MD  [facility]  Office:  [phone]

## 2024-05-21 NOTE — PROGRESS NOTES
[facility] spoke with the pts alexus Stanford over the phone. Pt lives alone in a house, uses a cane vs walker, drives. Pt wears glasses. Pt is a [place]et and Abdoulaye states is covered 100%. Pt does not smoke cigarettes but smokes cigars, does not drink alcohol. Pt does not have MH Hx. Discussion around dc planning, Abdoulaye states pt recently discharged from SNF, uncertain if he will need to go back to one or can return home. Await PT/OT evals. PT/OT attempted to work with the pt today however he was too lethargic to do so.       05/21/24 4398   Discharge Planning   Living Arrangements Other (Comment)   Support Systems Family members   Type of Residence Other (Comment);Private residence   Do you have animals or pets at home? No   Who is requesting discharge planning? Provider   Home or Post Acute Services In home services   Does the patient need discharge transport arranged? Yes   Financial Resource Strain   How hard is it for you to pay for the very basics like food, housing, medical care, and heating? Not hard   Housing Stability   In the last 12 months, was there a time when you were not able to pay the mortgage or rent on time? N   In the last 12 months, how many places have you lived? 1   In the last 12 months, was there a time when you did not have a steady place to sleep or slept in a shelter (including now)? N   Transportation Needs   In the past 12 months, has lack of transportation kept you from medical appointments or from getting medications? no   In the past 12 months, has lack of transportation kept you from meetings, work, or from getting things needed for daily living? No

## 2024-05-21 NOTE — CARE PLAN
The patient's goals for the shift include      The clinical goals for the shift include safety/maintain vital signs WNL    Over the shift, the patient did not make progress toward the following goals. Barriers to progression include forgets limitations/medical hx. Recommendations to address these barriers include administer medications/interventions as ordered.

## 2024-05-21 NOTE — NURSING NOTE
Pt arrived from ICU via bed. Rapid response RN and PCA settled pt, fixed linen, applied heart monitor and obtained vital signs.

## 2024-05-21 NOTE — CARE PLAN
The patient's goals for the shift include      The clinical goals for the shift include maintain vital signs WNL/safety    Over the shift, the patient did not make progress toward the following goals. Barriers to progression include . Recommendations to address these barriers include .

## 2024-05-21 NOTE — PROGRESS NOTES
Darrell Arreola is a 78 y.o. male on day 4 of admission presenting with Closed head injury, initial encounter.    Subjective   Patient seen and examined.  Resting in bed in no acute distress.  Awake alert oriented x 3.  Forgetful.  A bit confused.  No specific complaints.  Review of systems is limited.      Spoke with nursing at bedside, orders for isolation, rule out C. Difficile.  Hypoglycemic this am.  Blood pressure decreased after Metoprolol.    Objective   Contact/droplet isolation precautions in place.    Physical Exam  Vitals and nursing note reviewed.   Constitutional:       General: He is not in acute distress.     Appearance: Normal appearance. He is normal weight. He is ill-appearing. He is not toxic-appearing or diaphoretic.   HENT:      Head: Normocephalic and atraumatic.      Right Ear: Tympanic membrane normal.      Left Ear: Tympanic membrane normal.      Nose: Nose normal.      Mouth/Throat:      Mouth: Mucous membranes are moist.      Pharynx: Oropharynx is clear.   Eyes:      Extraocular Movements: Extraocular movements intact.      Conjunctiva/sclera: Conjunctivae normal.      Pupils: Pupils are equal, round, and reactive to light.   Cardiovascular:      Rate and Rhythm: Normal rate. Rhythm irregular.      Pulses: Normal pulses.      Heart sounds: Normal heart sounds. No murmur heard.  Pulmonary:      Effort: Pulmonary effort is normal. No respiratory distress.      Breath sounds: Rhonchi present. No wheezing or rales.      Comments: 4 liters nasal cannula.   Abdominal:      General: Bowel sounds are normal. There is no distension.      Palpations: Abdomen is soft.      Tenderness: There is no abdominal tenderness.   Genitourinary:     Comments: Deferred.   Musculoskeletal:         General: No swelling or tenderness. Normal range of motion.      Cervical back: Normal range of motion and neck supple.   Skin:     General: Skin is warm and dry.      Capillary Refill: Capillary refill takes less  "than 2 seconds.   Neurological:      General: No focal deficit present.      Mental Status: He is alert and oriented to person, place, and time.      Comments: Forgetful, a bit confused. Follows all commands. Generalized weakness. No focal weakness.    Psychiatric:         Mood and Affect: Mood normal.         Behavior: Behavior normal.       Last Recorded Vitals  Blood pressure 93/62, pulse 107, temperature 36.8 °C (98.2 °F), temperature source Oral, resp. rate 19, height 1.854 m (6' 1\"), weight 105 kg (231 lb 14.8 oz), SpO2 92%.    Intake/Output last 3 Shifts:  I/O last 3 completed shifts:  In: 3334.4 (30.9 mL/kg) [P.O.:940; I.V.:2394.4 (22.2 mL/kg)]  Out: 825 (7.6 mL/kg) [Urine:825 (0.2 mL/kg/hr)]  Weight: 107.9 kg     Telemetry atrial fibrillation rate 100's    Relevant Results  This patient has a central line   Reason for the central line remaining today? Hemodynamic monitoring and Parenteral medication    Results for orders placed or performed during the hospital encounter of 05/17/24 (from the past 24 hour(s))   POCT GLUCOSE   Result Value Ref Range    POCT Glucose 121 (H) 74 - 99 mg/dL   POCT GLUCOSE   Result Value Ref Range    POCT Glucose 83 74 - 99 mg/dL   CBC and Auto Differential   Result Value Ref Range    WBC 7.3 4.4 - 11.3 x10*3/uL    nRBC 0.0 0.0 - 0.0 /100 WBCs    RBC 3.65 (L) 4.50 - 5.90 x10*6/uL    Hemoglobin 11.7 (L) 13.5 - 17.5 g/dL    Hematocrit 36.6 (L) 41.0 - 52.0 %     80 - 100 fL    MCH 32.1 26.0 - 34.0 pg    MCHC 32.0 32.0 - 36.0 g/dL    RDW 16.7 (H) 11.5 - 14.5 %    Platelets 109 (L) 150 - 450 x10*3/uL    Immature Granulocytes %, Automated 0.3 0.0 - 0.9 %    Immature Granulocytes Absolute, Automated 0.02 0.00 - 0.50 x10*3/uL   Comprehensive Metabolic Panel   Result Value Ref Range    Glucose 77 65 - 99 mg/dL    Sodium 137 133 - 145 mmol/L    Potassium 3.6 3.4 - 5.1 mmol/L    Chloride 103 97 - 107 mmol/L    Bicarbonate 27 24 - 31 mmol/L    Urea Nitrogen 41 (H) 8 - 25 mg/dL    " Creatinine 1.30 0.40 - 1.60 mg/dL    eGFR 56 (L) >60 mL/min/1.73m*2    Calcium 7.9 (L) 8.5 - 10.4 mg/dL    Albumin 2.7 (L) 3.5 - 5.0 g/dL    Alkaline Phosphatase 80 35 - 125 U/L    Total Protein 5.2 (L) 5.9 - 7.9 g/dL    AST 25 5 - 40 U/L    Bilirubin, Total 1.8 (H) 0.1 - 1.2 mg/dL    ALT 17 5 - 40 U/L    Anion Gap 7 <=19 mmol/L   Magnesium   Result Value Ref Range    Magnesium 2.20 1.60 - 3.10 mg/dL   Phosphorus   Result Value Ref Range    Phosphorus 1.3 (L) 2.5 - 4.5 mg/dL   Manual Differential   Result Value Ref Range    Neutrophils %, Manual 79.0 40.0 - 80.0 %    Bands %, Manual 8.0 0.0 - 5.0 %    Lymphocytes %, Manual 7.0 13.0 - 44.0 %    Monocytes %, Manual 6.0 2.0 - 10.0 %    Eosinophils %, Manual 0.0 0.0 - 6.0 %    Basophils %, Manual 0.0 0.0 - 2.0 %    Seg Neutrophils Absolute, Manual 5.77 (H) 1.60 - 5.00 x10*3/uL    Bands Absolute, Manual 0.58 (H) 0.00 - 0.50 x10*3/uL    Lymphocytes Absolute, Manual 0.51 (L) 0.80 - 3.00 x10*3/uL    Monocytes Absolute, Manual 0.44 0.05 - 0.80 x10*3/uL    Eosinophils Absolute, Manual 0.00 0.00 - 0.40 x10*3/uL    Basophils Absolute, Manual 0.00 0.00 - 0.10 x10*3/uL    Total Cells Counted 100     Neutrophils Absolute, Manual 6.35 (H) 1.60 - 5.50 x10*3/uL    RBC Morphology See Below     Ovalocytes Many     [place] Cells Many    POCT GLUCOSE   Result Value Ref Range    POCT Glucose 66 (L) 74 - 99 mg/dL   POCT GLUCOSE   Result Value Ref Range    POCT Glucose 115 (H) 74 - 99 mg/dL   POCT GLUCOSE   Result Value Ref Range    POCT Glucose 130 (H) 74 - 99 mg/dL     Susceptibility data from last 90 days.  Collected Specimen Info Organism Ampicillin Cefazolin Cefazolin (uncomplicated UTIs only) Ceftriaxone Ciprofloxacin Gentamicin Nitrofurantoin Piperacillin/Tazobactam Tetracycline Trimethoprim/Sulfamethoxazole   04/26/24 Urine from Clean Catch/Voided Proteus mirabilis S R S S S S R S R S     No results found.    Scheduled medications  amiodarone, 200 mg, oral, Daily  apixaban, 5 mg, oral,  BID  [Held by provider] famotidine, 10 mg, oral, Daily  folic acid, 1 mg, oral, Daily  insulin lispro, 0-5 Units, subcutaneous, Before meals & nightly  lacosamide, 100 mg, oral, BID  metoprolol tartrate, 12.5 mg, oral, BID  midodrine, 10 mg, oral, TID  nystatin, 1 Application, Topical, BID  [Transfer Hold] oxygen, , inhalation, Continuous - Inhalation  pantoprazole, 40 mg, oral, Daily before breakfast   Or  pantoprazole, 40 mg, intravenous, Daily before breakfast  remdesivir, 100 mg, intravenous, q24h  rosuvastatin, 10 mg, oral, Nightly  [Held by provider] SITagliptin phosphate, 50 mg, oral, Daily  tamsulosin, 0.4 mg, oral, Daily before breakfast      Continuous medications     PRN medications  PRN medications: dextrose, dextrose, glucagon, glucagon      ASSESSMENT:  Fall vs syncopal episode  Altered mental status - metabolic encephalopathy  Seizure disorder   History of CVA  Generalized weakness  Acute hypoxic respiratory failure  COVID-19  Status post PEA arrest post anaphylactic reaction - Lidocaine  Atrial fibrillation  Oral anticoagulation  Hypotension  Hyperlipidemia  Systolic congestive heart failure  Acute kidney injury on chronic kidney disease  Hypophosphatemia  Hypocalcemia -- hypoalbuminemia   Protein calorie malnutrition  Type 2 diabetes mellitus with hypoglycemia  Tobacco use    PLAN:  Mentation stable no focal deficits.  Monitor.  Continue current medications.  Avoid sedating medications.  Respiratory status, pulse oximetry stable on supplemental oxygen.  Remdesivir x 3 days.  Contact/droplet isolation through midnight 5/26/2024.  Signed off.  Oxygen.  Pulmonology consultation.  Management per recommendations.  Atrial fibrillation, rate 100's + systolic congestive heart failure with orthostatic hypotension.  Asymptomatic in bed.  Continue current medications. Lopressor 12.5 milligrams p.o bid with parameters for rate control.  Eliquis for stroke prevention from atrial fibrillation.  Midodrine for  hypotension.  Discussed with Cardiology.  Acute kidney injury resolved.  Replace phosphorus.  Increase protein intake.  Monitor electrolytes and renal function.  Point of care glucose reviewed.  Hold oral medications, insulin.  Monitor point of care glucose AC/HS.  Hypoglycemia protocol.  Tobacco cessation education.  DVT prophylaxis.  Eliquis.  GI prophylaxis.  PPI.  PT/OT evaluation.  Fall precautions.  Up with assistance only.  Bed and chair alarm at all times.  Pressure ulcer prevention measures.  Turn and reposition every 2 hours and as needed.  Heels off bed.  Offloading.  Supportive care.  Patient reassured.  Case management following for discharge planning.  Discussed with patient, nursing, case management, Dr. Sims, Cardiology Dr. Lucas.      Rosa Champion, APRN-CNP

## 2024-05-21 NOTE — NURSING NOTE
Pt has a RIJ TLC, drsg dry and intact without any redness, swelling or drainage. All lumens have brisk blood return and flush easily with NS, all lumens clamped and curos caps applied.

## 2024-05-21 NOTE — PROGRESS NOTES
Occupational Therapy                 Therapy Communication Note    Patient Name: Darrell Arreola  MRN: [id number]  Today's Date: 5/21/2024     Discipline: Occupational Therapy    Missed Visit Reason: Missed Visit Reason:  ((RN deferring OT/PT, pt not appropriate at this time d/t low blood pressure (87/59 mmHg, desaturation with minimal movement (85%), and lethargy;)    Missed Time: Attempt    Comment:

## 2024-05-21 NOTE — PROGRESS NOTES
Physical Therapy                 Therapy Communication Note    Patient Name: Darrell Arreola  MRN: [id number]  Today's Date: 5/21/2024     Discipline: Physical Therapy    Missed Visit Reason: Missed Visit Reason: Cancel (Attempted to see pt for PT evaluation, pt not appropriate at this time d/t low blood pressure (87/59 mmHg, desaturation with minimal movement (85%), and lethargy; RN aware.)    Missed Time: Cancel    Comment:

## 2024-05-22 LAB
ALBUMIN SERPL-MCNC: 2.6 G/DL (ref 3.5–5)
ALP BLD-CCNC: 81 U/L (ref 35–125)
ALT SERPL-CCNC: 23 U/L (ref 5–40)
ANION GAP SERPL CALC-SCNC: 6 MMOL/L
AST SERPL-CCNC: 34 U/L (ref 5–40)
BASOPHILS # BLD AUTO: 0 X10*3/UL (ref 0–0.1)
BASOPHILS NFR BLD AUTO: 0 %
BILIRUB SERPL-MCNC: 1.6 MG/DL (ref 0.1–1.2)
BUN SERPL-MCNC: 35 MG/DL (ref 8–25)
BURR CELLS BLD QL SMEAR: NORMAL
CALCIUM SERPL-MCNC: 7.9 MG/DL (ref 8.5–10.4)
CHLORIDE SERPL-SCNC: 107 MMOL/L (ref 97–107)
CO2 SERPL-SCNC: 28 MMOL/L (ref 24–31)
CREAT SERPL-MCNC: 1.1 MG/DL (ref 0.4–1.6)
DOHLE BOD BLD QL SMEAR: PRESENT
EGFRCR SERPLBLD CKD-EPI 2021: 69 ML/MIN/1.73M*2
EOSINOPHIL # BLD AUTO: 0.01 X10*3/UL (ref 0–0.4)
EOSINOPHIL NFR BLD AUTO: 0.2 %
ERYTHROCYTE [DISTWIDTH] IN BLOOD BY AUTOMATED COUNT: 16.7 % (ref 11.5–14.5)
GLUCOSE BLD MANUAL STRIP-MCNC: 123 MG/DL (ref 74–99)
GLUCOSE BLD MANUAL STRIP-MCNC: 128 MG/DL (ref 74–99)
GLUCOSE BLD MANUAL STRIP-MCNC: 131 MG/DL (ref 74–99)
GLUCOSE BLD MANUAL STRIP-MCNC: 71 MG/DL (ref 74–99)
GLUCOSE SERPL-MCNC: 84 MG/DL (ref 65–99)
HCT VFR BLD AUTO: 34.8 % (ref 41–52)
HGB BLD-MCNC: 11 G/DL (ref 13.5–17.5)
IMM GRANULOCYTES # BLD AUTO: 0.06 X10*3/UL (ref 0–0.5)
IMM GRANULOCYTES NFR BLD AUTO: 1.1 % (ref 0–0.9)
LYMPHOCYTES # BLD AUTO: 0.66 X10*3/UL (ref 0.8–3)
LYMPHOCYTES NFR BLD AUTO: 11.7 %
MAGNESIUM SERPL-MCNC: 2.3 MG/DL (ref 1.6–3.1)
MCH RBC QN AUTO: 32.4 PG (ref 26–34)
MCHC RBC AUTO-ENTMCNC: 31.6 G/DL (ref 32–36)
MCV RBC AUTO: 102 FL (ref 80–100)
MONOCYTES # BLD AUTO: 0.45 X10*3/UL (ref 0.05–0.8)
MONOCYTES NFR BLD AUTO: 8 %
NEUTROPHILS # BLD AUTO: 4.45 X10*3/UL (ref 1.6–5.5)
NEUTROPHILS NFR BLD AUTO: 79 %
NRBC BLD-RTO: 0 /100 WBCS (ref 0–0)
OVALOCYTES BLD QL SMEAR: NORMAL
PHOSPHATE SERPL-MCNC: 2.9 MG/DL (ref 2.5–4.5)
PLATELET # BLD AUTO: 85 X10*3/UL (ref 150–450)
POLYCHROMASIA BLD QL SMEAR: NORMAL
POTASSIUM SERPL-SCNC: 4.1 MMOL/L (ref 3.4–5.1)
PROT SERPL-MCNC: 5 G/DL (ref 5.9–7.9)
RBC # BLD AUTO: 3.4 X10*6/UL (ref 4.5–5.9)
RBC MORPH BLD: NORMAL
SODIUM SERPL-SCNC: 141 MMOL/L (ref 133–145)
WBC # BLD AUTO: 5.6 X10*3/UL (ref 4.4–11.3)

## 2024-05-22 PROCEDURE — 2500000002 HC RX 250 W HCPCS SELF ADMINISTERED DRUGS (ALT 637 FOR MEDICARE OP, ALT 636 FOR OP/ED): Mod: MUE | Performed by: INTERNAL MEDICINE

## 2024-05-22 PROCEDURE — 97162 PT EVAL MOD COMPLEX 30 MIN: CPT | Mod: GP

## 2024-05-22 PROCEDURE — 2500000005 HC RX 250 GENERAL PHARMACY W/O HCPCS: Performed by: NURSE PRACTITIONER

## 2024-05-22 PROCEDURE — 84100 ASSAY OF PHOSPHORUS: CPT | Performed by: INTERNAL MEDICINE

## 2024-05-22 PROCEDURE — 94664 DEMO&/EVAL PT USE INHALER: CPT

## 2024-05-22 PROCEDURE — 2500000001 HC RX 250 WO HCPCS SELF ADMINISTERED DRUGS (ALT 637 FOR MEDICARE OP): Performed by: INTERNAL MEDICINE

## 2024-05-22 PROCEDURE — 94760 N-INVAS EAR/PLS OXIMETRY 1: CPT

## 2024-05-22 PROCEDURE — 85025 COMPLETE CBC W/AUTO DIFF WBC: CPT | Performed by: INTERNAL MEDICINE

## 2024-05-22 PROCEDURE — 9420000001 HC RT PATIENT EDUCATION 5 MIN

## 2024-05-22 PROCEDURE — 97166 OT EVAL MOD COMPLEX 45 MIN: CPT | Mod: GO

## 2024-05-22 PROCEDURE — 82947 ASSAY GLUCOSE BLOOD QUANT: CPT

## 2024-05-22 PROCEDURE — 2500000006 HC RX 250 W HCPCS SELF ADMINISTERED DRUGS (ALT 637 FOR ALL PAYERS): Performed by: INTERNAL MEDICINE

## 2024-05-22 PROCEDURE — 83735 ASSAY OF MAGNESIUM: CPT | Performed by: INTERNAL MEDICINE

## 2024-05-22 PROCEDURE — 80053 COMPREHEN METABOLIC PANEL: CPT | Performed by: INTERNAL MEDICINE

## 2024-05-22 PROCEDURE — 2060000001 HC INTERMEDIATE ICU ROOM DAILY

## 2024-05-22 PROCEDURE — 94640 AIRWAY INHALATION TREATMENT: CPT

## 2024-05-22 RX ORDER — IPRATROPIUM BROMIDE AND ALBUTEROL SULFATE 2.5; .5 MG/3ML; MG/3ML
3 SOLUTION RESPIRATORY (INHALATION) EVERY 4 HOURS PRN
Status: DISCONTINUED | OUTPATIENT
Start: 2024-05-22 | End: 2024-05-28 | Stop reason: HOSPADM

## 2024-05-22 RX ADMIN — IPRATROPIUM BROMIDE AND ALBUTEROL SULFATE 3 ML: 2.5; .5 SOLUTION RESPIRATORY (INHALATION) at 23:42

## 2024-05-22 RX ADMIN — PANTOPRAZOLE SODIUM 40 MG: 40 TABLET, DELAYED RELEASE ORAL at 06:06

## 2024-05-22 RX ADMIN — METOPROLOL TARTRATE 12.5 MG: 25 TABLET, FILM COATED ORAL at 10:03

## 2024-05-22 RX ADMIN — NYSTATIN 1 APPLICATION: 100000 POWDER TOPICAL at 21:55

## 2024-05-22 RX ADMIN — TAMSULOSIN HYDROCHLORIDE 0.4 MG: 0.4 CAPSULE ORAL at 06:06

## 2024-05-22 RX ADMIN — LACOSAMIDE 100 MG: 100 TABLET, FILM COATED ORAL at 08:31

## 2024-05-22 RX ADMIN — FOLIC ACID 1 MG: 1 TABLET ORAL at 08:31

## 2024-05-22 RX ADMIN — MIDODRINE HYDROCHLORIDE 10 MG: 10 TABLET ORAL at 12:40

## 2024-05-22 RX ADMIN — ROSUVASTATIN CALCIUM 10 MG: 10 TABLET, COATED ORAL at 21:53

## 2024-05-22 RX ADMIN — METOPROLOL TARTRATE 12.5 MG: 25 TABLET, FILM COATED ORAL at 21:53

## 2024-05-22 RX ADMIN — APIXABAN 5 MG: 5 TABLET, FILM COATED ORAL at 08:31

## 2024-05-22 RX ADMIN — Medication 3 L/MIN: at 19:54

## 2024-05-22 RX ADMIN — Medication 3 L/MIN: at 08:00

## 2024-05-22 RX ADMIN — LACOSAMIDE 100 MG: 100 TABLET, FILM COATED ORAL at 21:53

## 2024-05-22 RX ADMIN — APIXABAN 5 MG: 5 TABLET, FILM COATED ORAL at 21:53

## 2024-05-22 RX ADMIN — MIDODRINE HYDROCHLORIDE 10 MG: 10 TABLET ORAL at 17:54

## 2024-05-22 RX ADMIN — NYSTATIN 1 APPLICATION: 100000 POWDER TOPICAL at 09:00

## 2024-05-22 RX ADMIN — AMIODARONE HYDROCHLORIDE 200 MG: 200 TABLET ORAL at 08:31

## 2024-05-22 RX ADMIN — MIDODRINE HYDROCHLORIDE 10 MG: 10 TABLET ORAL at 08:30

## 2024-05-22 SDOH — HEALTH STABILITY: PHYSICAL HEALTH: ON AVERAGE, HOW MANY DAYS PER WEEK DO YOU ENGAGE IN MODERATE TO STRENUOUS EXERCISE (LIKE A BRISK WALK)?: 0 DAYS

## 2024-05-22 SDOH — HEALTH STABILITY: MENTAL HEALTH: HOW OFTEN DO YOU HAVE A DRINK CONTAINING ALCOHOL?: NEVER

## 2024-05-22 SDOH — ECONOMIC STABILITY: FOOD INSECURITY: WITHIN THE PAST 12 MONTHS, YOU WORRIED THAT YOUR FOOD WOULD RUN OUT BEFORE YOU GOT MONEY TO BUY MORE.: NEVER TRUE

## 2024-05-22 SDOH — ECONOMIC STABILITY: FOOD INSECURITY: WITHIN THE PAST 12 MONTHS, THE FOOD YOU BOUGHT JUST DIDN'T LAST AND YOU DIDN'T HAVE MONEY TO GET MORE.: NEVER TRUE

## 2024-05-22 SDOH — SOCIAL STABILITY: SOCIAL NETWORK: HOW OFTEN DO YOU ATTEND CHURCH OR RELIGIOUS SERVICES?: 1 TO 4 TIMES PER YEAR

## 2024-05-22 SDOH — HEALTH STABILITY: MENTAL HEALTH: HOW MANY STANDARD DRINKS CONTAINING ALCOHOL DO YOU HAVE ON A TYPICAL DAY?: PATIENT DOES NOT DRINK

## 2024-05-22 SDOH — SOCIAL STABILITY: SOCIAL NETWORK: ARE YOU MARRIED, WIDOWED, DIVORCED, SEPARATED, NEVER MARRIED, OR LIVING WITH A PARTNER?: WIDOWED

## 2024-05-22 SDOH — SOCIAL STABILITY: SOCIAL NETWORK: HOW OFTEN DO YOU GET TOGETHER WITH FRIENDS OR RELATIVES?: MORE THAN THREE TIMES A WEEK

## 2024-05-22 SDOH — HEALTH STABILITY: MENTAL HEALTH: HOW OFTEN DO YOU HAVE 6 OR MORE DRINKS ON ONE OCCASION?: NEVER

## 2024-05-22 SDOH — SOCIAL STABILITY: SOCIAL NETWORK: HOW OFTEN DO YOU ATTENT MEETINGS OF THE CLUB OR ORGANIZATION YOU BELONG TO?: 1 TO 4 TIMES PER YEAR

## 2024-05-22 SDOH — SOCIAL STABILITY: SOCIAL INSECURITY: WITHIN THE LAST YEAR, HAVE YOU BEEN HUMILIATED OR EMOTIONALLY ABUSED IN OTHER WAYS BY YOUR PARTNER OR EX-PARTNER?: NO

## 2024-05-22 SDOH — ECONOMIC STABILITY: INCOME INSECURITY: IN THE PAST 12 MONTHS, HAS THE ELECTRIC, GAS, OIL, OR WATER COMPANY THREATENED TO SHUT OFF SERVICE IN YOUR HOME?: NO

## 2024-05-22 SDOH — SOCIAL STABILITY: SOCIAL INSECURITY: WITHIN THE LAST YEAR, HAVE YOU BEEN AFRAID OF YOUR PARTNER OR EX-PARTNER?: NO

## 2024-05-22 SDOH — ECONOMIC STABILITY: HOUSING INSECURITY: IN THE LAST 12 MONTHS, HOW MANY PLACES HAVE YOU LIVED?: 1

## 2024-05-22 SDOH — HEALTH STABILITY: PHYSICAL HEALTH: ON AVERAGE, HOW MANY MINUTES DO YOU ENGAGE IN EXERCISE AT THIS LEVEL?: 0 MIN

## 2024-05-22 ASSESSMENT — COGNITIVE AND FUNCTIONAL STATUS - GENERAL
DRESSING REGULAR UPPER BODY CLOTHING: A LOT
DAILY ACTIVITIY SCORE: 12
WALKING IN HOSPITAL ROOM: TOTAL
STANDING UP FROM CHAIR USING ARMS: A LOT
MOBILITY SCORE: 11
STANDING UP FROM CHAIR USING ARMS: A LOT
TURNING FROM BACK TO SIDE WHILE IN FLAT BAD: A LOT
EATING MEALS: A LITTLE
MOVING TO AND FROM BED TO CHAIR: A LOT
TOILETING: TOTAL
DRESSING REGULAR LOWER BODY CLOTHING: TOTAL
DRESSING REGULAR UPPER BODY CLOTHING: A LOT
HELP NEEDED FOR BATHING: A LOT
WALKING IN HOSPITAL ROOM: TOTAL
CLIMB 3 TO 5 STEPS WITH RAILING: TOTAL
DRESSING REGULAR LOWER BODY CLOTHING: TOTAL
EATING MEALS: A LITTLE
DAILY ACTIVITIY SCORE: 12
TURNING FROM BACK TO SIDE WHILE IN FLAT BAD: A LOT
MOVING FROM LYING ON BACK TO SITTING ON SIDE OF FLAT BED WITH BEDRAILS: A LITTLE
MOVING FROM LYING ON BACK TO SITTING ON SIDE OF FLAT BED WITH BEDRAILS: A LITTLE
HELP NEEDED FOR BATHING: A LOT
PERSONAL GROOMING: A LITTLE
MOBILITY SCORE: 11
PERSONAL GROOMING: A LITTLE
TOILETING: TOTAL
CLIMB 3 TO 5 STEPS WITH RAILING: TOTAL
MOVING TO AND FROM BED TO CHAIR: A LOT

## 2024-05-22 ASSESSMENT — ACTIVITIES OF DAILY LIVING (ADL)
BATHING_ASSISTANCE: MODERATE
ADL_ASSISTANCE: INDEPENDENT
ADL_ASSISTANCE: INDEPENDENT

## 2024-05-22 ASSESSMENT — PAIN - FUNCTIONAL ASSESSMENT
PAIN_FUNCTIONAL_ASSESSMENT: 0-10
PAIN_FUNCTIONAL_ASSESSMENT: 0-10

## 2024-05-22 ASSESSMENT — LIFESTYLE VARIABLES
SKIP TO QUESTIONS 9-10: 1
AUDIT-C TOTAL SCORE: 0

## 2024-05-22 ASSESSMENT — PAIN SCALES - GENERAL
PAINLEVEL_OUTOF10: 0 - NO PAIN

## 2024-05-22 NOTE — NURSING NOTE
Am rounds, assumed care/ therapy previously at bedside, pt now up in chair, with alarm device in place

## 2024-05-22 NOTE — PROGRESS NOTES
Darrell Arreola is a 78 y.o. male on day 5 of admission presenting with Closed head injury, initial encounter.    Spoke with nursing this am, blood pressures and glucose noted.  Up to chair this am, asymptomatic..    Subjective   Patient seen and examined.  Resting in bed in no acute distress.  Lunch bedside.  Awake alert oriented x 3.  Forgetful.  No specific complaints.  Poor historian.  Review of systems is limited.    Objective   Contact/droplet precautions in place.     Physical Exam  Vitals and nursing note reviewed.   Constitutional:       General: He is not in acute distress.     Appearance: Normal appearance. He is normal weight. He is ill-appearing. He is not toxic-appearing or diaphoretic.   HENT:      Head: Normocephalic and atraumatic.      Right Ear: Tympanic membrane normal.      Left Ear: Tympanic membrane normal.      Nose: Nose normal.      Mouth/Throat:      Mouth: Mucous membranes are moist.      Pharynx: Oropharynx is clear.   Eyes:      Extraocular Movements: Extraocular movements intact.      Conjunctiva/sclera: Conjunctivae normal.      Pupils: Pupils are equal, round, and reactive to light.   Cardiovascular:      Rate and Rhythm: Normal rate. Rhythm irregular.      Pulses: Normal pulses.      Heart sounds: Normal heart sounds. No murmur heard.  Pulmonary:      Effort: Pulmonary effort is normal. No respiratory distress.      Breath sounds: Rhonchi present. No wheezing or rales.      Comments: 3 liters nasal cannula. Pulse oximetry 100%.   Abdominal:      General: Bowel sounds are normal. There is no distension.      Palpations: Abdomen is soft.      Tenderness: There is no abdominal tenderness.   Genitourinary:     Comments: Deferred.   Musculoskeletal:         General: No swelling or tenderness. Normal range of motion.      Cervical back: Normal range of motion and neck supple.   Skin:     General: Skin is warm and dry.      Capillary Refill: Capillary refill takes less than 2 seconds.       "Comments: Right IJ line in place dressing clean dry intact.   Neurological:      General: No focal deficit present.      Mental Status: He is alert and oriented to person, place, and time.      Comments: Forgetful, a bit confused. Follows all commands. Generalized weakness. No focal weakness.    Psychiatric:         Mood and Affect: Mood normal.         Behavior: Behavior normal.       Last Recorded Vitals  Blood pressure 96/69, pulse (!) 121, temperature 36.6 °C (97.9 °F), temperature source Temporal, resp. rate 19, height 1.854 m (6' 1\"), weight 101 kg (222 lb 14.2 oz), SpO2 97%.    Intake/Output last 3 Shifts:  I/O last 3 completed shifts:  In: 1290 (12.8 mL/kg) [P.O.:940; IV Piggyback:350]  Out: 550 (5.4 mL/kg) [Urine:550 (0.2 mL/kg/hr)]  Weight: 101.1 kg     Telemetry atrial fibrillation rate 100's    Relevant Results    This patient has a central line   Reason for the central line remaining today? Hemodynamic monitoring and Parenteral medication    Results for orders placed or performed during the hospital encounter of 05/17/24 (from the past 24 hour(s))   POCT GLUCOSE   Result Value Ref Range    POCT Glucose 115 (H) 74 - 99 mg/dL   POCT GLUCOSE   Result Value Ref Range    POCT Glucose 98 74 - 99 mg/dL   POCT GLUCOSE   Result Value Ref Range    POCT Glucose 85 74 - 99 mg/dL   CBC and Auto Differential   Result Value Ref Range    WBC 5.6 4.4 - 11.3 x10*3/uL    nRBC 0.0 0.0 - 0.0 /100 WBCs    RBC 3.40 (L) 4.50 - 5.90 x10*6/uL    Hemoglobin 11.0 (L) 13.5 - 17.5 g/dL    Hematocrit 34.8 (L) 41.0 - 52.0 %     (H) 80 - 100 fL    MCH 32.4 26.0 - 34.0 pg    MCHC 31.6 (L) 32.0 - 36.0 g/dL    RDW 16.7 (H) 11.5 - 14.5 %    Platelets 85 (L) 150 - 450 x10*3/uL    Neutrophils % 79.0 40.0 - 80.0 %    Immature Granulocytes %, Automated 1.1 (H) 0.0 - 0.9 %    Lymphocytes % 11.7 13.0 - 44.0 %    Monocytes % 8.0 2.0 - 10.0 %    Eosinophils % 0.2 0.0 - 6.0 %    Basophils % 0.0 0.0 - 2.0 %    Neutrophils Absolute 4.45 1.60 - " 5.50 x10*3/uL    Immature Granulocytes Absolute, Automated 0.06 0.00 - 0.50 x10*3/uL    Lymphocytes Absolute 0.66 (L) 0.80 - 3.00 x10*3/uL    Monocytes Absolute 0.45 0.05 - 0.80 x10*3/uL    Eosinophils Absolute 0.01 0.00 - 0.40 x10*3/uL    Basophils Absolute 0.00 0.00 - 0.10 x10*3/uL   Comprehensive Metabolic Panel   Result Value Ref Range    Glucose 84 65 - 99 mg/dL    Sodium 141 133 - 145 mmol/L    Potassium 4.1 3.4 - 5.1 mmol/L    Chloride 107 97 - 107 mmol/L    Bicarbonate 28 24 - 31 mmol/L    Urea Nitrogen 35 (H) 8 - 25 mg/dL    Creatinine 1.10 0.40 - 1.60 mg/dL    eGFR 69 >60 mL/min/1.73m*2    Calcium 7.9 (L) 8.5 - 10.4 mg/dL    Albumin 2.6 (L) 3.5 - 5.0 g/dL    Alkaline Phosphatase 81 35 - 125 U/L    Total Protein 5.0 (L) 5.9 - 7.9 g/dL    AST 34 5 - 40 U/L    Bilirubin, Total 1.6 (H) 0.1 - 1.2 mg/dL    ALT 23 5 - 40 U/L    Anion Gap 6 <=19 mmol/L   Magnesium   Result Value Ref Range    Magnesium 2.30 1.60 - 3.10 mg/dL   Phosphorus   Result Value Ref Range    Phosphorus 2.9 2.5 - 4.5 mg/dL   Morphology   Result Value Ref Range    RBC Morphology See Below     Polychromasia Mild     Ovalocytes Few     [place] Cells Many     [place] Bodies Present    POCT GLUCOSE   Result Value Ref Range    POCT Glucose 71 (L) 74 - 99 mg/dL   POCT GLUCOSE   Result Value Ref Range    POCT Glucose 131 (H) 74 - 99 mg/dL     Susceptibility data from last 90 days.  Collected Specimen Info Organism Ampicillin Cefazolin Cefazolin (uncomplicated UTIs only) Ceftriaxone Ciprofloxacin Gentamicin Nitrofurantoin Piperacillin/Tazobactam Tetracycline Trimethoprim/Sulfamethoxazole   04/26/24 Urine from Clean Catch/Voided Proteus mirabilis S R S S S S R S R S     No results found.    Scheduled medications  amiodarone, 200 mg, oral, Daily  apixaban, 5 mg, oral, BID  [Held by provider] famotidine, 10 mg, oral, Daily  folic acid, 1 mg, oral, Daily  insulin lispro, 0-5 Units, subcutaneous, Before meals & nightly  lacosamide, 100 mg, oral, BID  metoprolol  tartrate, 12.5 mg, oral, BID  midodrine, 10 mg, oral, TID  nystatin, 1 Application, Topical, BID  oxygen, , inhalation, Continuous - Inhalation  pantoprazole, 40 mg, oral, Daily before breakfast   Or  pantoprazole, 40 mg, intravenous, Daily before breakfast  rosuvastatin, 10 mg, oral, Nightly  [Held by provider] SITagliptin phosphate, 50 mg, oral, Daily  tamsulosin, 0.4 mg, oral, Daily before breakfast      Continuous medications     PRN medications  PRN medications: dextrose, dextrose, glucagon, glucagon      ASSESSMENT:  Fall vs syncopal episode  Altered mental status - metabolic encephalopathy  Seizure disorder   History of CVA  Generalized weakness  Acute hypoxic respiratory failure  COVID-19  Status post PEA arrest post anaphylactic reaction - Lidocaine  Atrial fibrillation  Oral anticoagulation  Hypotension  Hyperlipidemia  Systolic congestive heart failure  Acute kidney injury on chronic kidney disease  Hypophosphatemia  Hypocalcemia -- hypoalbuminemia   Protein calorie malnutrition  Type 2 diabetes mellitus with hypoglycemia  Tobacco use  Microcytic anemia  Thrombocytopenia worsening    PLAN:  Patient is doing well this afternoon.  No new issues.  Mentation is stable.  No focal deficits.  Continue current medications.  Avoid sedating medications.  Respiratory status, pulse oximetry 100% on 3 liters nasal cannula during encounter.  Remdesivir x 3 days complete.  Contact/droplet isolation through midnight 5/26/2024.  Oxygen.  Pulmonology consultation.  Follow-up - management per recommendations.  Telemetry atrial fibrillation rate 100's - systolic blood pressure 80's-90's systolic congestive heart failure with orthostatic hypotension.  Asymptomatic.  Cardiology consultation.  Input appreciated.  Management per recommendations.  Lopressor 12.5 milligrams p.o bid with parameters for rate control.  Eliquis for stroke prevention from atrial fibrillation.  Midodrine for hypotension.  Apply knee high compression  stockings.  Slow position changes.  CMP reviewed.  Renal function stable.  Phosphorus improved status post replacement.  Monitor electrolytes and renal function.  Monitor.  CBC reviewed.  Microcytic anemia stable.  No evidence of acute blood loss.  Guaiac stools.  Iron studies.  Monitor H&H.  Platelet count trending down.  On Eliquis.  May be secondary to acute viral illness.  Monitor for now.  Point of care glucose reviewed.  Continue to hold oral medications.  No insulin.  Monitor point of care glucose AC/HS.  Hypoglycemia protocol.  Tobacco cessation education.  DVT prophylaxis.  Eliquis.  Monitor platelet count.  GI prophylaxis.  PPI.  PT/OT.  Fall precautions.  Up with assistance only.  Bed and chair alarm at all times.  Pressure ulcer prevention measures.  Turn and reposition every 2 hours and as needed.  Heels off bed.  Offloading.  Supportive care.  Patient reassured.  PT/OT recommend moderate intensity level of continued care.  Patient is a falls risk, is not safe to return home and would benefit from skilled nursing rehabilitation placement on discharge.  Case management following for discharge planning.  Discussed with patient, nursing and Dr. Sims.      ELENITA Vilchis-CNP

## 2024-05-22 NOTE — NURSING NOTE
Rt internal jugular TLC removed intact, pressure held for 5 min, no signs of bleeding or swelling noted. Occlusive dressing of petroleum jelly on 2x2 placed covered with large tegaderm, patient instructed to lay flat for 30min and to call for nurse for any signs of warmth or wetness. Call russ in reach. RN Ashlee aware of removal. USGPIV established in Lt FA #[id number].

## 2024-05-22 NOTE — PROGRESS NOTES
[facility] spoke with pt at bedside, discussed PT eval which reveals pt is needing 2 assist for transfers and would benefit from a rehab stay at SNF. Pt stating he can get therapy at home instead and that is what he wants. [facility] cautioned that due to him living by himself it would not be a safe dc plan, asked him if he could have anyone stay with him, he stated no at first and then when asked if his nephew Abdoulaye could he stated yea. [facility] called harmanrae Watsonrin and left a message with contact info.      05/22/24 [id number]   Discharge Planning   Patient expects to be discharged to: Home with homecare

## 2024-05-22 NOTE — CARE PLAN
The patient's goals for the shift include      The clinical goals for the shift include maintain vital signs WNL/safety    Over the shift, the patient did not make progress toward the following goals. Barriers to progression include weakness. Recommendations to address these barriers include administer medications/interventions as ordered.

## 2024-05-22 NOTE — PROGRESS NOTES
Occupational Therapy    Evaluation    Patient Name: Darrell Arreola  MRN: [id number]  Today's Date: 5/22/2024  Time Calculation  Start Time: 0758  Stop Time: 0817  Time Calculation (min): 19 min    Assessment  IP OT Assessment  OT Assessment: Patient is a 78 year old male admitted with closed head injury. Patient is presenting below baseline level. He requires assist of 2 for safe functional transfers this date. He is requring an increased need for assistance with ADL/IADL tasks. Skilled OT to address the above deficits and increase patient's safety and independence with daily tasks.  Prognosis: Good  Barriers to Discharge: None  Evaluation/Treatment Tolerance: Patient limited by fatigue  End of Session Communication: Bedside nurse  End of Session Patient Position: Up in chair, Alarm on  Plan:  Treatment Interventions: ADL retraining, Functional transfer training, UE strengthening/ROM, Endurance training, Patient/family training, Compensatory technique education  OT Frequency: 4 times per week  OT Discharge Recommendations: Moderate intensity level of continued care  Equipment Recommended upon Discharge: Wheeled walker  OT Recommended Transfer Status: Assist of 2  OT - OK to Discharge: Yes    Subjective   Current Problem:  1. Closed head injury, initial encounter        2. Syncope and collapse        3. Acute kidney injury (CMS-HCC)        4. Failure to thrive in adult        5. Anaphylaxis, initial encounter          General:  General  Reason for Referral: decline in ADLs  Referred By: Rosa Champion  Past Medical History Relevant to Rehab: cellulitis, a-fib, PVD, DM2, depression, stroke like symptoms, CVA, AMS  Co-Treatment: PT  Co-Treatment Reason: safety with functional transfers  Prior to Session Communication: Bedside nurse  Patient Position Received: Bed, 3 rail up, Alarm off, not on at start of session  Preferred Learning Style: verbal, visual  General Comment: Patient is a 78 year old male who presented to  the ED s/p fall vs syncope. Patient was unresponsive and intubated on 5/17/2024. Patient was extubated 5/19/2024. Per MR, patient has had a gradual onset, progressive fatigue and weakness for the last few days that lead to him falling. Symptoms also included generalized body aches. In the ED concern for urinary incontinence due to seizures. CT head and spine negative. Further work up revealed LORI. He is admitted with closed head injury. Patient COVID+. Patient is cleared by nursing for therapy. Patient in bed upon arrival and agreeable to participate  Precautions:  Hearing/Visual Limitations: reading glasses  Medical Precautions: Fall precautions, Oxygen therapy device and L/min (4L O2 via NC)  Precautions Comment: COVID+  Vital Signs:  SpO2: 97 %  BP: 92/63  MAP (mmHg): 70  Pain:  Pain Assessment  Pain Score: 0 - No pain    Objective   Cognition:  Overall Cognitive Status:  (patient is AxOx3. able to follow commands with increased time. flat affect)  Insight: Mild  Impulsive: Mildly     Home Living:  Type of Home: House  Lives With: Alone  Home Adaptive Equipment: Walker rolling or standard (rollator)  Home Layout: One level, Able to live on main level with bedroom/bathroom  Home Access: Ramped entrance  Bathroom Shower/Tub: Walk-in shower  Bathroom Toilet: Standard  Bathroom Equipment: Grab bars in shower, Shower chair with back   Prior Function:  Level of [place]: Independent with ADLs and functional transfers, Needs assistance with homemaking  Receives Help From: Family  ADL Assistance: Independent  Homemaking Assistance: Needs assistance  Driving/Transportation: Family/Friend  Homemaking Assistance Comments: assist from family for cooking/shopping  Ambulatory Assistance: Independent (mod indep with rollator)  Vocational: Retired  IADL History:  Homemaking Responsibilities: Yes  IADL Comments: patient independent with ADLs/IADLs at baseline  ADL:  Eating Assistance: Stand by  Eating Deficit: Setup  Grooming  Assistance: Stand by  Grooming Deficit: Setup (seated, per clinical judgement)  Bathing Assistance: Moderate  Bathing Deficit: Steadying, Supervision/safety, Increased time to complete , Buttocks, Right lower leg including foot, Left lower leg including foot, Use of adaptive equipment (per clinical judgement)  UE Dressing Assistance: Minimal  UE Dressing Deficit: Pull over head, Pull around back, Pull down in back (per clinical judgement)  LE Dressing Assistance: Total  LE Dressing Deficit: Don/doff R sock, Don/doff L sock  Toileting Assistance with Device: Moderate  Toileting Deficit: Steadying, Supervison/safety, Increased time to complete, Perineal hygiene (per clinical judgement)  Activity Tolerance:  Endurance: Decreased tolerance for upright activites  Activity Tolerance Comments: Fair  Bed Mobility/Transfers: Bed Mobility  Bed Mobility: Yes  Bed Mobility 1  Bed Mobility 1: Supine to sitting  Level of Assistance 1: Moderate assistance  Bed Mobility Comments 1: assist for trunk up. head of bed elevated    Transfers  Transfer: Yes  Transfer 1  Transfer From 1: Bed to  Transfer to 1: Stand  Technique 1: Sit to stand  Transfer Level of Assistance 1: Moderate assistance, +2, Arm in arm assistance  Trials/Comments 1: patient then turns to be seated in the chair with arm in arm assist with Mod Ax2    Sitting Balance:  Static Sitting Balance  Static Sitting-Balance Support: Feet supported  Static Sitting-Level of Assistance: Close supervision  Static Sitting-Comment/Number of Minutes: sitting EOB    IADL's:   Homemaking Responsibilities: Yes  IADL Comments: patient independent with ADLs/IADLs at baseline  Vision: Vision - Basic Assessment  Current Vision: Wears glasses only for reading  Sensation:  Sensation Comment: patient denies numbness/tingling  Strength:  Strength Comments: B UE at least >/= 3/5 grossly. observed functionally  Hand Function:  Hand Function  Gross Grasp: Functional  Coordination:  Functional  Extremities: RUE   RUE : Within Functional Limits (observed functionally) and LUE   LUE: Within Functional Limits (observed functionally)    Outcome Measures: [facility] Daily Activity  Putting on and taking off regular lower body clothing: Total  Bathing (including washing, rinsing, drying): A lot  Putting on and taking off regular upper body clothing: A lot  Toileting, which includes using toilet, bedpan or urinal: Total  Taking care of personal grooming such as brushing teeth: A little  Eating Meals: A little  Daily Activity - Total Score: 12    Education Documentation  Body Mechanics, taught by Elvira Judge OT at 5/22/2024  9:05 AM.  Learner: Patient  Readiness: Acceptance  Method: Explanation, Demonstration  Response: Needs Reinforcement    Precautions, taught by Elvira Judge OT at 5/22/2024  9:05 AM.  Learner: Patient  Readiness: Acceptance  Method: Explanation, Demonstration  Response: Needs Reinforcement    Education Comments  No comments found.      Goals:   Encounter Problems       Encounter Problems (Active)       OT Goals       ADLs (Progressing)       Start:  05/22/24    Expected End:  06/12/24       Patient will complete ADL tasks with Mod I, using AE as needed, in order to increase patient's safety and independence with self-care tasks.         Functional Transfers (Progressing)       Start:  05/22/24    Expected End:  06/12/24       Patient will complete functional transfers with Mod I in order to increase safety and independence with daily tasks.         B UE Strengthening (Progressing)       Start:  05/22/24    Expected End:  06/12/24       Patient will increase B UE strength to 4+/5 for functional transfers.         Functional Mobility (Progressing)       Start:  05/22/24    Expected End:  06/12/24       Patient will demonstrate the ability to complete item retrieval and functional mobility with Mod I in order to increase safety and independence with daily tasks.

## 2024-05-22 NOTE — PROGRESS NOTES
Physical Therapy    Physical Therapy Evaluation    Patient Name: Darrell Arreola  MRN: [id number]  Today's Date: 5/22/2024   Time Calculation  Start Time: 0757  Stop Time: 0817  Time Calculation (min): 20 min    Assessment/Plan   PT Assessment  PT Assessment Results: Decreased strength, Decreased range of motion, Decreased endurance, Impaired balance, Decreased mobility, Impaired judgement, Decreased safety awareness  Rehab Prognosis: Good  Evaluation/Treatment Tolerance: Patient tolerated treatment well  Medical Staff Made Aware: Yes  Strengths: Ability to acquire knowledge, Premorbid level of function, Support of extended family/friends, Housing layout  Barriers to Participation: Comorbidities  End of Session Communication: Bedside nurse  Assessment Comment: Pt demonstrates impaired functional mobility from baseline level; pt with BLE weakness, impaired balance, decreased overall activity tolerance, and impaired safety awareness; pt is a falls risk at this time and is not safe to return home alone.  End of Session Patient Position: Up in chair, Alarm on  IP OR SWING BED PT PLAN  Inpatient or Swing Bed: Inpatient  PT Plan  Treatment/Interventions: Bed mobility, Transfer training, Gait training, Balance training, Neuromuscular re-education, Strengthening, Endurance training, Range of motion, Therapeutic exercise, Therapeutic activity  PT Plan: Skilled PT  PT Frequency: 4 times per week  PT Discharge Recommendations: Moderate intensity level of continued care  Equipment Recommended upon Discharge: Wheeled walker  PT Recommended Transfer Status: Assist x2, Assistive device  PT - OK to Discharge: Yes    Subjective   General Visit Information:  General  Reason for Referral: Pt is a 78 year-old M admitted from home with a fall vs syncopal event; intubated on 5/17 and extubated 5/20.  Referred By: Dr. Levi MD  Past Medical History Relevant to Rehab: cellulitis, a-fib, PVD, DM2, depression, stroke like symptoms, CVA,  AMS  Missed Visit: No  Missed Visit Reason: Other (Comment)  Family/Caregiver Present: No  Co-Treatment: OT  Co-Treatment Reason: safety with functional transfers  Prior to Session Communication: Bedside nurse  Patient Position Received: Bed, 3 rail up, Alarm off, not on at start of session  Preferred Learning Style: verbal  General Comment: Pt cleared for therapy via RN, received in supine, NAD, agreeable to participate in therapy.  Home Living:  Home Living  Type of Home: House  Lives With: Alone  Home Adaptive Equipment: Walker rolling or standard (rollator)  Home Layout: One level, Able to live on main level with bedroom/bathroom  Home Access: Ramped entrance  Bathroom Shower/Tub: Walk-in shower  Bathroom Toilet: Standard  Bathroom Equipment: Grab bars in shower, Shower chair with back  Prior Level of Function:  Prior Function Per Pt/Caregiver Report  Level of [place]: Independent with ADLs and functional transfers, Needs assistance with homemaking  Receives Help From: Family  ADL Assistance: Independent  Homemaking Assistance: Needs assistance  Driving/Transportation: Family/Friend  Homemaking Assistance Comments: assist from family for cooking/shopping  Ambulatory Assistance: Independent (mod indep with rollator)  Vocational: Retired  Precautions:  Precautions  Hearing/Visual Limitations: reading glasses  Medical Precautions: Fall precautions, Oxygen therapy device and L/min, Infection precautions (4L O2 via NC; (+) COVID-19)  Vital Signs:  Vital Signs  Heart Rate: (!) 121  BP: 83/59 (RN aware; pt without s/s)    Objective   Pain:  Pain Assessment  Pain Assessment: 0-10  Pain Score: 0 - No pain  Cognition:  Cognition  Overall Cognitive Status: Within Functional Limits  Orientation Level: Oriented X4  Following Commands: Follows one step commands with increased time  Cognition Comments: flat affect  Insight: Mild  Impulsive: Mildly    General Assessments:  General Observation  General Observation: flat  affect, cooperative throughout     Activity Tolerance  Endurance: Decreased tolerance for upright activites  Activity Tolerance Comments: Fair    Sensation  Light Touch: No apparent deficits  Sensation Comment: pt denies paresthesias    Strength  Strength Comments: BLE > 3-/5  Strength  Strength Comments: BLE > 3-/5    Coordination  Movements are Fluid and Coordinated: Yes    Postural Control  Postural Control: Impaired  Posture Comment: forward head, rounded shoulders    Static Sitting Balance  Static Sitting-Balance Support: Feet supported, Bilateral upper extremity supported  Static Sitting-Level of Assistance: Close supervision    Static Standing Balance  Static Standing-Balance Support: Bilateral upper extremity supported  Static Standing-Level of Assistance: Moderate assistance  Functional Assessments:     Bed Mobility  Bed Mobility: Yes  Bed Mobility 1  Bed Mobility 1: Supine to sitting  Level of Assistance 1: Moderate assistance  Bed Mobility Comments 1: assist for trunk elevation; HOB elevated; cueing for use of bedrails    Transfers  Transfer: Yes  Transfer 1  Transfer From 1: Sit to  Transfer to 1: Stand  Technique 1: Sit to stand  Transfer Level of Assistance 1: Moderate assistance, +2  Trials/Comments 1: assist for forward weight shift and balance  Transfers 2  Transfer From 2: Stand to  Transfer to 2: Sit  Technique 2: Stand to sit  Transfer Level of Assistance 2: Moderate assistance, +2  Trials/Comments 2: assist for safe eccentric lowering into chair; cueing for sequencing/safety    Ambulation/Gait Training  Ambulation/Gait Training Performed: Yes  Ambulation/Gait Training 1  Surface 1: Level tile  Device 1: No device  Assistance 1: Moderate assistance (x2)  Quality of Gait 1: Decreased step length, Forward flexed posture, Shuffling gait  Comments/Distance (ft) 1: 3-4 steps to bedside chair with arm-in-arm mod assist x 2 for trunk support and balance    Stairs  Stairs: No    Extremity/Trunk  Assessments:  RLE   RLE :  (AROM limited at end ranges)  LLE   LLE :  (AROM limited at end ranges)  Outcome Measures:  [facility] Basic Mobility  Turning from your back to your side while in a flat bed without using bedrails: A little  Moving from lying on your back to sitting on the side of a flat bed without using bedrails: A lot  Moving to and from bed to chair (including a wheelchair): A lot  Standing up from a chair using your arms (e.g. wheelchair or bedside chair): A lot  To walk in hospital room: Total  Climbing 3-5 steps with railing: Total  Basic Mobility - Total Score: 11    Encounter Problems       Encounter Problems (Active)       Mobility       STG - Patient will ambulate 50' with use of rolling walker and modified independence. (Progressing)       Start:  05/22/24    Expected End:  06/20/24               PT Transfers       STG - Patient to transfer to and from sit to supine with independence. (Progressing)       Start:  05/22/24    Expected End:  06/20/24            STG - Patient will transfer sit to and from stand with use of rolling walker and modified independence. (Progressing)       Start:  05/22/24    Expected End:  06/20/24               Pain - Adult          Safety       LTG - Patient will demonstrate safety requirements appropriate to situation/environment       Start:  05/17/24               Safety       STG - Patient uses call light consistently to request assistance with transfers (Progressing)       Start:  05/22/24    Expected End:  06/20/24                   Education Documentation  Mobility Training, taught by Eva Porter, PT at 5/22/2024  9:12 AM.  Learner: Patient  Readiness: Acceptance  Method: Explanation, Demonstration  Response: Needs Reinforcement    Education Comments  No comments found.

## 2024-05-23 LAB
ALBUMIN SERPL-MCNC: 2.6 G/DL (ref 3.5–5)
ALP BLD-CCNC: 95 U/L (ref 35–125)
ALT SERPL-CCNC: 30 U/L (ref 5–40)
ANION GAP SERPL CALC-SCNC: 8 MMOL/L
AST SERPL-CCNC: 44 U/L (ref 5–40)
BASOPHILS # BLD MANUAL: 0 X10*3/UL (ref 0–0.1)
BASOPHILS NFR BLD MANUAL: 0 %
BILIRUB SERPL-MCNC: 2 MG/DL (ref 0.1–1.2)
BUN SERPL-MCNC: 29 MG/DL (ref 8–25)
BURR CELLS BLD QL SMEAR: ABNORMAL
CALCIUM SERPL-MCNC: 8.2 MG/DL (ref 8.5–10.4)
CHLORIDE SERPL-SCNC: 102 MMOL/L (ref 97–107)
CO2 SERPL-SCNC: 29 MMOL/L (ref 24–31)
CREAT SERPL-MCNC: 1.1 MG/DL (ref 0.4–1.6)
DOHLE BOD BLD QL SMEAR: PRESENT
EGFRCR SERPLBLD CKD-EPI 2021: 69 ML/MIN/1.73M*2
EOSINOPHIL # BLD MANUAL: 0 X10*3/UL (ref 0–0.4)
EOSINOPHIL NFR BLD MANUAL: 0 %
ERYTHROCYTE [DISTWIDTH] IN BLOOD BY AUTOMATED COUNT: 16.5 % (ref 11.5–14.5)
FERRITIN SERPL-MCNC: 862 NG/ML (ref 30–400)
FOLATE SERPL-MCNC: >20 NG/ML (ref 4.2–19.9)
GLUCOSE BLD MANUAL STRIP-MCNC: 108 MG/DL (ref 74–99)
GLUCOSE BLD MANUAL STRIP-MCNC: 124 MG/DL (ref 74–99)
GLUCOSE BLD MANUAL STRIP-MCNC: 72 MG/DL (ref 74–99)
GLUCOSE BLD MANUAL STRIP-MCNC: 96 MG/DL (ref 74–99)
GLUCOSE SERPL-MCNC: 89 MG/DL (ref 65–99)
HCT VFR BLD AUTO: 36.7 % (ref 41–52)
HGB BLD-MCNC: 11.6 G/DL (ref 13.5–17.5)
IMM GRANULOCYTES # BLD AUTO: 0.03 X10*3/UL (ref 0–0.5)
IMM GRANULOCYTES NFR BLD AUTO: 0.4 % (ref 0–0.9)
IRON SATN MFR SERPL: ABNORMAL %
IRON SERPL-MCNC: <20 UG/DL (ref 45–160)
LYMPHOCYTES # BLD MANUAL: 0.4 X10*3/UL (ref 0.8–3)
LYMPHOCYTES NFR BLD MANUAL: 6 %
MAGNESIUM SERPL-MCNC: 2.3 MG/DL (ref 1.6–3.1)
MCH RBC QN AUTO: 32.4 PG (ref 26–34)
MCHC RBC AUTO-ENTMCNC: 31.6 G/DL (ref 32–36)
MCV RBC AUTO: 103 FL (ref 80–100)
MONOCYTES # BLD MANUAL: 0.54 X10*3/UL (ref 0.05–0.8)
MONOCYTES NFR BLD MANUAL: 8 %
NEUTROPHILS # BLD MANUAL: 5.77 X10*3/UL (ref 1.6–5.5)
NEUTS BAND # BLD MANUAL: 0.07 X10*3/UL (ref 0–0.5)
NEUTS BAND NFR BLD MANUAL: 1 %
NEUTS SEG # BLD MANUAL: 5.7 X10*3/UL (ref 1.6–5)
NEUTS SEG NFR BLD MANUAL: 85 %
NRBC BLD-RTO: 0 /100 WBCS (ref 0–0)
OVALOCYTES BLD QL SMEAR: ABNORMAL
PHOSPHATE SERPL-MCNC: 2 MG/DL (ref 2.5–4.5)
PLATELET # BLD AUTO: 94 X10*3/UL (ref 150–450)
POLYCHROMASIA BLD QL SMEAR: ABNORMAL
POTASSIUM SERPL-SCNC: 4 MMOL/L (ref 3.4–5.1)
PROT SERPL-MCNC: 5.4 G/DL (ref 5.9–7.9)
RBC # BLD AUTO: 3.58 X10*6/UL (ref 4.5–5.9)
RBC MORPH BLD: ABNORMAL
SODIUM SERPL-SCNC: 139 MMOL/L (ref 133–145)
TIBC SERPL-MCNC: ABNORMAL UG/DL
TOTAL CELLS COUNTED BLD: 100
UIBC SERPL-MCNC: 112 UG/DL (ref 110–370)
VIT B12 SERPL-MCNC: 763 PG/ML (ref 211–946)
WBC # BLD AUTO: 6.7 X10*3/UL (ref 4.4–11.3)

## 2024-05-23 PROCEDURE — 2500000001 HC RX 250 WO HCPCS SELF ADMINISTERED DRUGS (ALT 637 FOR MEDICARE OP): Performed by: INTERNAL MEDICINE

## 2024-05-23 PROCEDURE — 2060000001 HC INTERMEDIATE ICU ROOM DAILY

## 2024-05-23 PROCEDURE — 2500000005 HC RX 250 GENERAL PHARMACY W/O HCPCS: Performed by: NURSE PRACTITIONER

## 2024-05-23 PROCEDURE — 85027 COMPLETE CBC AUTOMATED: CPT | Performed by: INTERNAL MEDICINE

## 2024-05-23 PROCEDURE — 82728 ASSAY OF FERRITIN: CPT | Performed by: NURSE PRACTITIONER

## 2024-05-23 PROCEDURE — 85007 BL SMEAR W/DIFF WBC COUNT: CPT | Performed by: INTERNAL MEDICINE

## 2024-05-23 PROCEDURE — 2500000006 HC RX 250 W HCPCS SELF ADMINISTERED DRUGS (ALT 637 FOR ALL PAYERS): Performed by: INTERNAL MEDICINE

## 2024-05-23 PROCEDURE — 94760 N-INVAS EAR/PLS OXIMETRY 1: CPT

## 2024-05-23 PROCEDURE — 2500000002 HC RX 250 W HCPCS SELF ADMINISTERED DRUGS (ALT 637 FOR MEDICARE OP, ALT 636 FOR OP/ED): Mod: MUE | Performed by: INTERNAL MEDICINE

## 2024-05-23 PROCEDURE — 2500000002 HC RX 250 W HCPCS SELF ADMINISTERED DRUGS (ALT 637 FOR MEDICARE OP, ALT 636 FOR OP/ED): Performed by: INTERNAL MEDICINE

## 2024-05-23 PROCEDURE — 36415 COLL VENOUS BLD VENIPUNCTURE: CPT | Performed by: INTERNAL MEDICINE

## 2024-05-23 PROCEDURE — 82746 ASSAY OF FOLIC ACID SERUM: CPT | Performed by: NURSE PRACTITIONER

## 2024-05-23 PROCEDURE — 97110 THERAPEUTIC EXERCISES: CPT | Mod: GP,CQ

## 2024-05-23 PROCEDURE — 94640 AIRWAY INHALATION TREATMENT: CPT

## 2024-05-23 PROCEDURE — 97116 GAIT TRAINING THERAPY: CPT | Mod: GP,CQ

## 2024-05-23 PROCEDURE — 9420000001 HC RT PATIENT EDUCATION 5 MIN

## 2024-05-23 PROCEDURE — 84100 ASSAY OF PHOSPHORUS: CPT | Performed by: INTERNAL MEDICINE

## 2024-05-23 PROCEDURE — 83540 ASSAY OF IRON: CPT | Performed by: NURSE PRACTITIONER

## 2024-05-23 PROCEDURE — 82607 VITAMIN B-12: CPT | Performed by: NURSE PRACTITIONER

## 2024-05-23 PROCEDURE — 84075 ASSAY ALKALINE PHOSPHATASE: CPT | Performed by: INTERNAL MEDICINE

## 2024-05-23 PROCEDURE — 82947 ASSAY GLUCOSE BLOOD QUANT: CPT

## 2024-05-23 PROCEDURE — 83735 ASSAY OF MAGNESIUM: CPT | Performed by: INTERNAL MEDICINE

## 2024-05-23 RX ADMIN — APIXABAN 5 MG: 5 TABLET, FILM COATED ORAL at 08:34

## 2024-05-23 RX ADMIN — PANTOPRAZOLE SODIUM 40 MG: 40 TABLET, DELAYED RELEASE ORAL at 06:33

## 2024-05-23 RX ADMIN — Medication 3 L/MIN: at 08:00

## 2024-05-23 RX ADMIN — IPRATROPIUM BROMIDE AND ALBUTEROL SULFATE 3 ML: 2.5; .5 SOLUTION RESPIRATORY (INHALATION) at 04:01

## 2024-05-23 RX ADMIN — LACOSAMIDE 100 MG: 100 TABLET, FILM COATED ORAL at 08:34

## 2024-05-23 RX ADMIN — LACOSAMIDE 100 MG: 100 TABLET, FILM COATED ORAL at 21:28

## 2024-05-23 RX ADMIN — METOPROLOL TARTRATE 12.5 MG: 25 TABLET, FILM COATED ORAL at 21:28

## 2024-05-23 RX ADMIN — METOPROLOL TARTRATE 12.5 MG: 25 TABLET, FILM COATED ORAL at 08:34

## 2024-05-23 RX ADMIN — NYSTATIN 1 APPLICATION: 100000 POWDER TOPICAL at 21:10

## 2024-05-23 RX ADMIN — MIDODRINE HYDROCHLORIDE 10 MG: 10 TABLET ORAL at 08:34

## 2024-05-23 RX ADMIN — MIDODRINE HYDROCHLORIDE 10 MG: 10 TABLET ORAL at 16:59

## 2024-05-23 RX ADMIN — AMIODARONE HYDROCHLORIDE 200 MG: 200 TABLET ORAL at 08:34

## 2024-05-23 RX ADMIN — Medication 3 L/MIN: at 20:00

## 2024-05-23 RX ADMIN — FOLIC ACID 1 MG: 1 TABLET ORAL at 08:34

## 2024-05-23 RX ADMIN — APIXABAN 5 MG: 5 TABLET, FILM COATED ORAL at 21:28

## 2024-05-23 RX ADMIN — IPRATROPIUM BROMIDE AND ALBUTEROL SULFATE 3 ML: 2.5; .5 SOLUTION RESPIRATORY (INHALATION) at 23:10

## 2024-05-23 RX ADMIN — MIDODRINE HYDROCHLORIDE 10 MG: 10 TABLET ORAL at 12:17

## 2024-05-23 RX ADMIN — ROSUVASTATIN CALCIUM 10 MG: 10 TABLET, COATED ORAL at 21:28

## 2024-05-23 RX ADMIN — TAMSULOSIN HYDROCHLORIDE 0.4 MG: 0.4 CAPSULE ORAL at 06:33

## 2024-05-23 ASSESSMENT — PAIN - FUNCTIONAL ASSESSMENT
PAIN_FUNCTIONAL_ASSESSMENT: 0-10

## 2024-05-23 ASSESSMENT — PAIN SCALES - GENERAL
PAINLEVEL_OUTOF10: 0 - NO PAIN

## 2024-05-23 ASSESSMENT — COGNITIVE AND FUNCTIONAL STATUS - GENERAL
MOBILITY SCORE: 12
DRESSING REGULAR UPPER BODY CLOTHING: A LOT
CLIMB 3 TO 5 STEPS WITH RAILING: TOTAL
CLIMB 3 TO 5 STEPS WITH RAILING: A LOT
EATING MEALS: A LITTLE
MOVING FROM LYING ON BACK TO SITTING ON SIDE OF FLAT BED WITH BEDRAILS: A LOT
MOVING TO AND FROM BED TO CHAIR: A LOT
WALKING IN HOSPITAL ROOM: A LOT
STANDING UP FROM CHAIR USING ARMS: A LOT
DAILY ACTIVITIY SCORE: 12
MOVING TO AND FROM BED TO CHAIR: A LOT
STANDING UP FROM CHAIR USING ARMS: A LOT
DRESSING REGULAR LOWER BODY CLOTHING: TOTAL
WALKING IN HOSPITAL ROOM: A LOT
MOVING FROM LYING ON BACK TO SITTING ON SIDE OF FLAT BED WITH BEDRAILS: A LITTLE
HELP NEEDED FOR BATHING: A LOT
MOBILITY SCORE: 12
TURNING FROM BACK TO SIDE WHILE IN FLAT BAD: A LOT
PERSONAL GROOMING: A LITTLE
TOILETING: TOTAL
TURNING FROM BACK TO SIDE WHILE IN FLAT BAD: A LOT

## 2024-05-23 NOTE — PROGRESS NOTES
Physical Therapy    Physical Therapy Treatment    Patient Name: Darrell Arreola  MRN: [id number]  Today's Date: 5/23/2024  Time Calculation  Start Time: 0831  Stop Time: 0854  Time Calculation (min): 23 min    Assessment/Plan   PT Assessment  End of Session Communication: Bedside nurse  End of Session Patient Position: Up in chair, Alarm on  PT Plan  Inpatient/Swing Bed or Outpatient: Inpatient  PT Plan  Treatment/Interventions: Bed mobility, Transfer training, Gait training, Balance training, Neuromuscular re-education, Strengthening, Endurance training, Range of motion, Therapeutic exercise, Therapeutic activity  PT Plan: Skilled PT  PT Frequency: 4 times per week  PT Discharge Recommendations: Moderate intensity level of continued care  Equipment Recommended upon Discharge: Wheeled walker  PT Recommended Transfer Status: Assist x2, Assistive device  PT - OK to Discharge: Yes      General Visit Information:   PT  Visit  PT Received On: 05/23/24  General  Prior to Session Communication: Bedside nurse  Patient Position Received: Bed, 3 rail up, Alarm off, not on at start of session  General Comment: Cleared by nursing to be seen for therapy, pt agreeable with tx, supine in bed upon arrival.    Subjective   Precautions:  Precautions  Precautions Comment: COVID+  Vital Signs:  Vital Signs  Heart Rate: (!) 128 (RN aware)  SpO2: 97 % (2L)  BP: 97/67    Objective   Pain:  Pain Assessment  Pain Assessment: 0-10  Pain Score: 0 - No pain     Postural Control:  Static Sitting Balance  Static Sitting-Balance Support: Feet supported, Bilateral upper extremity supported  Static Sitting-Level of Assistance: Close supervision  Static Standing Balance  Static Standing-Balance Support: Left upper extremity supported  Static Standing-Level of Assistance: Moderate assistance    Treatments:  Therapeutic Exercise  Therapeutic Exercise Performed: Yes  Therapeutic Exercise Activity 1: Bilateral ankle pumps x15  Therapeutic Exercise  Activity 2: Bilateral hip flexion x15  Therapeutic Exercise Activity 3: Bilateral knee extension x15  Therapeutic Exercise Activity 4: Resisted hip abd/add 15    Bed Mobility  Bed Mobility: Yes  Bed Mobility 1  Bed Mobility 1: Supine to sitting  Level of Assistance 1: Moderate assistance  Bed Mobility Comments 1: Mod assist for trunk during supine to sit, pt able to initiate LE's off EOB.    Ambulation/Gait Training  Ambulation/Gait Training Performed: Yes  Ambulation/Gait Training 1  Surface 1: Level tile  Device 1: No device  Assistance 1: Moderate assistance  Quality of Gait 1: Decreased step length, Forward flexed posture, Shuffling gait  Comments/Distance (ft) 1: 5-6 steps (bed to chair) with arm in arm assist, poor ability to achieve an erect posture, bilateral flexed knees, flexed posture.    Transfers  Transfer: Yes  Transfer 1  Transfer From 1: Sit to  Transfer to 1: Stand  Technique 1: Sit to stand, Stand to sit  Transfer Level of Assistance 1: Moderate assistance  Trials/Comments 1: Mod assist for trunk up during sit to stand, decreased eccentric control in sitting.    Outcome Measures:  [facility] Basic Mobility  Turning from your back to your side while in a flat bed without using bedrails: A lot  Moving from lying on your back to sitting on the side of a flat bed without using bedrails: A lot  Moving to and from bed to chair (including a wheelchair): A lot  Standing up from a chair using your arms (e.g. wheelchair or bedside chair): A lot  To walk in hospital room: A lot  Climbing 3-5 steps with railing: A lot  Basic Mobility - Total Score: 12      Encounter Problems       Encounter Problems (Active)       Mobility       STG - Patient will ambulate 50' with use of rolling walker and modified independence. (Progressing)       Start:  05/22/24    Expected End:  06/20/24               PT Transfers       STG - Patient to transfer to and from sit to supine with independence. (Progressing)       Start:  05/22/24     Expected End:  06/20/24            STG - Patient will transfer sit to and from stand with use of rolling walker and modified independence. (Progressing)       Start:  05/22/24    Expected End:  06/20/24

## 2024-05-23 NOTE — PROGRESS NOTES
[facility] he is now agreeable to go to SNF but wants a facility that allows him to smoke, he states he has been to [facility] before and would be ok with this unless there are alternate options. [facility] to [facility] to see whether [place] location or [place] location can accept for pts positive covid status and allow him to smoke, await responses.      05/23/24 1434   Discharge Planning   Patient expects to be discharged to: SNF - precert needed

## 2024-05-23 NOTE — PROGRESS NOTES
Darrell Arreola is a 78 y.o. male on day 6 of admission presenting with Closed head injury, initial encounter.    Subjective   Patient seen and examined.  Resting sitting up in the chair in no acute distress.  Awake alert oriented x 3.  Forgetful.  No specific complaints.  Review of systems is limited.  Discussed discharge plan, skilled nursing rehabilitation, he will consider.    Objective     Physical Exam  Vitals and nursing note reviewed.   Constitutional:       General: He is not in acute distress.     Appearance: Normal appearance. He is normal weight. He is ill-appearing. He is not toxic-appearing or diaphoretic.   HENT:      Head: Normocephalic and atraumatic.      Right Ear: Tympanic membrane normal.      Left Ear: Tympanic membrane normal.      Nose: Nose normal.      Mouth/Throat:      Mouth: Mucous membranes are moist.      Pharynx: Oropharynx is clear.   Eyes:      Extraocular Movements: Extraocular movements intact.      Conjunctiva/sclera: Conjunctivae normal.      Pupils: Pupils are equal, round, and reactive to light.   Cardiovascular:      Rate and Rhythm: Normal rate. Rhythm irregular.      Pulses: Normal pulses.      Heart sounds: Normal heart sounds. No murmur heard.  Pulmonary:      Effort: Pulmonary effort is normal. No respiratory distress.      Breath sounds: Wheezing and rhonchi present. No rales.      Comments: 3 liters nasal cannula. Pulse oximetry 100%.   Abdominal:      General: Bowel sounds are normal. There is no distension.      Palpations: Abdomen is soft.      Tenderness: There is no abdominal tenderness.   Genitourinary:     Comments: Deferred.   Musculoskeletal:         General: No swelling or tenderness. Normal range of motion.      Cervical back: Normal range of motion and neck supple.   Skin:     General: Skin is warm and dry.      Capillary Refill: Capillary refill takes less than 2 seconds.      Comments: Right IJ line has been removed. Dressing clean dry intact.  "  Neurological:      General: No focal deficit present.      Mental Status: He is alert and oriented to person, place, and time.      Comments: Forgetful, a bit confused. Follows all commands. Generalized weakness. No focal weakness.    Psychiatric:         Mood and Affect: Mood normal.         Behavior: Behavior normal.       Last Recorded Vitals  Blood pressure 97/67, pulse (!) 128, temperature 36.4 °C (97.5 °F), temperature source Temporal, resp. rate 20, height 1.854 m (6' 1\"), weight 101 kg (222 lb), SpO2 97%.    Intake/Output last 3 Shifts:  I/O last 3 completed shifts:  In: 734.6 (7.3 mL/kg) [P.O.:480; I.V.:4.6 (0 mL/kg); IV Piggyback:250]  Out: 625 (6.2 mL/kg) [Urine:625 (0.2 mL/kg/hr)]  Weight: 100.7 kg     Telemetry atrial fibrillation rate 100's    Relevant Results  Results for orders placed or performed during the hospital encounter of 05/17/24 (from the past 24 hour(s))   POCT GLUCOSE   Result Value Ref Range    POCT Glucose 123 (H) 74 - 99 mg/dL   POCT GLUCOSE   Result Value Ref Range    POCT Glucose 128 (H) 74 - 99 mg/dL   CBC and Auto Differential   Result Value Ref Range    WBC 6.7 4.4 - 11.3 x10*3/uL    nRBC 0.0 0.0 - 0.0 /100 WBCs    RBC 3.58 (L) 4.50 - 5.90 x10*6/uL    Hemoglobin 11.6 (L) 13.5 - 17.5 g/dL    Hematocrit 36.7 (L) 41.0 - 52.0 %     (H) 80 - 100 fL    MCH 32.4 26.0 - 34.0 pg    MCHC 31.6 (L) 32.0 - 36.0 g/dL    RDW 16.5 (H) 11.5 - 14.5 %    Platelets 94 (L) 150 - 450 x10*3/uL    Immature Granulocytes %, Automated 0.4 0.0 - 0.9 %    Immature Granulocytes Absolute, Automated 0.03 0.00 - 0.50 x10*3/uL   Comprehensive Metabolic Panel   Result Value Ref Range    Glucose 89 65 - 99 mg/dL    Sodium 139 133 - 145 mmol/L    Potassium 4.0 3.4 - 5.1 mmol/L    Chloride 102 97 - 107 mmol/L    Bicarbonate 29 24 - 31 mmol/L    Urea Nitrogen 29 (H) 8 - 25 mg/dL    Creatinine 1.10 0.40 - 1.60 mg/dL    eGFR 69 >60 mL/min/1.73m*2    Calcium 8.2 (L) 8.5 - 10.4 mg/dL    Albumin 2.6 (L) 3.5 - 5.0 " g/dL    Alkaline Phosphatase 95 35 - 125 U/L    Total Protein 5.4 (L) 5.9 - 7.9 g/dL    AST 44 (H) 5 - 40 U/L    Bilirubin, Total 2.0 (H) 0.1 - 1.2 mg/dL    ALT 30 5 - 40 U/L    Anion Gap 8 <=19 mmol/L   Magnesium   Result Value Ref Range    Magnesium 2.30 1.60 - 3.10 mg/dL   Phosphorus   Result Value Ref Range    Phosphorus 2.0 (L) 2.5 - 4.5 mg/dL   Iron and TIBC   Result Value Ref Range    Iron <20 (L) 45 - 160 ug/dL    UIBC 112 110 - 370 ug/dL    TIBC      % Saturation     Ferritin   Result Value Ref Range    Ferritin 862 (H) 30 - 400 ng/mL   Vitamin B12   Result Value Ref Range    Vitamin B12 763 211 - 946 pg/mL   Folate   Result Value Ref Range    Folate, Serum >20.0 (H) 4.2 - 19.9 ng/mL   Manual Differential   Result Value Ref Range    Neutrophils %, Manual 85.0 40.0 - 80.0 %    Bands %, Manual 1.0 0.0 - 5.0 %    Lymphocytes %, Manual 6.0 13.0 - 44.0 %    Monocytes %, Manual 8.0 2.0 - 10.0 %    Eosinophils %, Manual 0.0 0.0 - 6.0 %    Basophils %, Manual 0.0 0.0 - 2.0 %    Seg Neutrophils Absolute, Manual 5.70 (H) 1.60 - 5.00 x10*3/uL    Bands Absolute, Manual 0.07 0.00 - 0.50 x10*3/uL    Lymphocytes Absolute, Manual 0.40 (L) 0.80 - 3.00 x10*3/uL    Monocytes Absolute, Manual 0.54 0.05 - 0.80 x10*3/uL    Eosinophils Absolute, Manual 0.00 0.00 - 0.40 x10*3/uL    Basophils Absolute, Manual 0.00 0.00 - 0.10 x10*3/uL    Total Cells Counted 100     Neutrophils Absolute, Manual 5.77 (H) 1.60 - 5.50 x10*3/uL    RBC Morphology See Below     Polychromasia Mild     Ovalocytes Few     Coby Cells Many     Dohle Bodies Present    POCT GLUCOSE   Result Value Ref Range    POCT Glucose 72 (L) 74 - 99 mg/dL     Susceptibility data from last 90 days.  Collected Specimen Info Organism Ampicillin Cefazolin Cefazolin (uncomplicated UTIs only) Ceftriaxone Ciprofloxacin Gentamicin Nitrofurantoin Piperacillin/Tazobactam Tetracycline Trimethoprim/Sulfamethoxazole   04/26/24 Urine from Clean Catch/Voided Proteus mirabilis S R S S S S R  S R S     No results found.    Scheduled medications  amiodarone, 200 mg, oral, Daily  apixaban, 5 mg, oral, BID  [Held by provider] famotidine, 10 mg, oral, Daily  folic acid, 1 mg, oral, Daily  insulin lispro, 0-5 Units, subcutaneous, Before meals & nightly  lacosamide, 100 mg, oral, BID  metoprolol tartrate, 12.5 mg, oral, BID  midodrine, 10 mg, oral, TID  nystatin, 1 Application, Topical, BID  oxygen, , inhalation, Continuous - Inhalation  pantoprazole, 40 mg, oral, Daily before breakfast   Or  pantoprazole, 40 mg, intravenous, Daily before breakfast  rosuvastatin, 10 mg, oral, Nightly  [Held by provider] SITagliptin phosphate, 50 mg, oral, Daily  tamsulosin, 0.4 mg, oral, Daily before breakfast      Continuous medications     PRN medications  PRN medications: dextrose, dextrose, glucagon, glucagon, ipratropium-albuteroL      ASSESSMENT:  Fall vs syncopal episode  Altered mental status - metabolic encephalopathy  Seizure disorder   History of CVA  Generalized weakness  Acute hypoxic respiratory failure  COVID-19  Status post PEA arrest post anaphylactic reaction - Lidocaine  Atrial fibrillation  Oral anticoagulation  Hypotension asymptomatic   Hyperlipidemia  Systolic congestive heart failure  Acute kidney injury on chronic kidney disease  Hypophosphatemia  Hypocalcemia -- hypoalbuminemia   Protein calorie malnutrition  Type 2 diabetes mellitus with hypoglycemia  Tobacco use  Microcytic anemia  Thrombocytopenia - stable  Elevated AST asymptomatic  Hyperbilirubinemia asymptomatic    PLAN:  Patient is doing well this morning.  No new issues.  Mentation stable.  No focal deficits.  Awake alert noted x 3.  Forgetful.  Suspect toxic, metabolic encephalopathy.  Continue to monitor.  Respiratory status, pulse oximetry stable mid-upper 90's on 3 L nasal cannula.  Lung sounds diminished with wheezing and rhonchi throughout.  Remdesivir x 3 days complete.  Contact and droplet isolation precautions for midnight 5/26/2024.   Continue oxygen.  DuoNeb treatments as needed.  Pulmonology following.  Input appreciated.  Management per recommendations.  Telemetry atrial fibrillation rate 100s.  Systolic blood pressure 90s.  Systolic congestive heart failure plus orthostatic hypotension.  Asymptomatic.  Cardiology following.  But appreciated.  Continue management per recommendations.  Lopressor 12.5 mg p.o. twice daily with parameters for rate control.  Eliquis for stroke prevention from atrial fibrillation.  Continue midodrine for hypotension.  Maintain knee-high compression stockings.  Slow position changes.  Labs reviewed.  Renal function remained stable.  Monitor electrolytes and renal function.  CBC reviewed.  Microcytic anemia remained stable.  No evidence of acute blood loss.  Guaiac stools.  Iron studies reviewed.  Iron < 20.  TIBC and percent saturation not calculated.  Ferritin 862.  Monitor H&H.  Blood count stable.  Continue Eliquis.  Suspect thrombocytopenia secondary to acute viral illness.  Point-of-care glucose reviewed.  Continue to hold oral medications.  No insulin.  Monitor point-of-care glucose AC/HS.  Hypoglycemia protocol.  Tobacco cessation education.  DVT prophylaxis.  Eliquis.  Monitor platelet count.  GI prophylaxis PPI.  Encourage p.o intake.  Protein supplementation.  PT/OT.  Fall precautions.  Up with assistance only.  Bed and chair alarm at all times.  Pressure ulcer prevention measures.  Turn and reposition every 2 hours and as needed.  Heels off bed.  Offloading.  Supportive care.  Patient reassured.  PT/OT recommend moderate intensity level of continued care.  Patient is a falls risk, is not safe to return home and would benefit from skilled nursing rehabilitation placement on discharge.  Discussed with patient in detail.  He will consider.  Case management following for discharge planning.  Input appreciated.  Dissipate discharge to skilled nursing rehabilitation after cleared by Pulmonology and Cardiology.   Discussed with patient, Dr. Sims.      Rosa Champion, ELENITA-CNP

## 2024-05-23 NOTE — CARE PLAN
Problem: Pain  Goal: My pain/discomfort is manageable  Outcome: Progressing     Problem: Safety  Goal: Patient will be injury free during hospitalization  Outcome: Progressing  Goal: I will remain free of falls  Outcome: Progressing     Problem: Daily Care  Goal: Daily care needs are met  Outcome: Progressing     Problem: Psychosocial Needs  Goal: Demonstrates ability to cope with hospitalization/illness  Outcome: Progressing  Goal: Collaborate with me, my family, and caregiver to identify my specific goals  Outcome: Progressing     Problem: Discharge Barriers  Goal: My discharge needs are met  Outcome: Progressing     Problem: Skin  Goal: Decreased wound size/increased tissue granulation at next dressing change  Outcome: Progressing  Goal: Prevent/manage excess moisture  Outcome: Progressing     Problem: Fall/Injury  Goal: Not fall by end of shift  Outcome: Progressing     Problem: Safety - Medical Restraint  Goal: Remains free of injury from restraints (Restraint for Interference with Medical Device)  Outcome: Progressing  Goal: Free from restraint(s) (Restraint for Interference with Medical Device)  Outcome: Progressing     Problem: Respiratory  Goal: Clear secretions with interventions this shift  Outcome: Progressing  Goal: Minimize anxiety/maximize coping throughout shift  Outcome: Progressing  Goal: Minimal/no exertional discomfort or dyspnea this shift  Outcome: Progressing  Goal: No signs of respiratory distress (eg. Use of accessory muscles. Peds grunting)  Outcome: Progressing  Goal: Patent airway maintained this shift  Outcome: Progressing  Goal: Tolerate mechanical ventilation evidenced by VS/agitation level this shift  Outcome: Progressing  Goal: Tolerate pulmonary toileting this shift  Outcome: Progressing  Goal: Verbalize decreased shortness of breath this shift  Outcome: Progressing  Goal: Wean oxygen to maintain O2 saturation per order/standard this shift  Outcome: Progressing  Goal: Increase  self care and/or family involvement in next 24 hours  Outcome: Progressing     Problem: Pain - Adult  Goal: Verbalizes/displays adequate comfort level or baseline comfort level  Outcome: Progressing     Problem: Safety - Adult  Goal: Free from fall injury  Outcome: Progressing     Problem: Discharge Planning  Goal: Discharge to home or other facility with appropriate resources  Outcome: Progressing     Problem: Chronic Conditions and Co-morbidities  Goal: Patient's chronic conditions and co-morbidity symptoms are monitored and maintained or improved  Outcome: Progressing     Problem: Diabetes  Goal: Achieve decreasing blood glucose levels by end of shift  Outcome: Progressing  Goal: Increase stability of blood glucose readings by end of shift  Outcome: Progressing  Goal: Decrease in ketones present in urine by end of shift  Outcome: Progressing  Goal: Maintain glucose levels >70mg/dl to <250mg/dl throughout shift  Outcome: Progressing  Goal: No changes in neurological exam by end of shift  Outcome: Progressing  Goal: Vital signs within normal range for age by end of shift  Outcome: Progressing  Goal: Increase self care and/or family involovement by end of shift  Outcome: Progressing     Problem: Knowledge Deficit  Goal: Patient/family/caregiver demonstrates understanding of disease process, treatment plan, medications, and discharge instructions  Outcome: Progressing     Problem: Nutrition  Goal: Less than 5 days NPO/clear liquids  Outcome: Progressing  Goal: Oral intake greater than 50%  Outcome: Progressing  Goal: Oral intake greater 75%  Outcome: Progressing  Goal: Consume prescribed supplement  Outcome: Progressing  Goal: Adequate PO fluid intake  Outcome: Progressing  Goal: Nutrition support goals are met within 48 hrs  Outcome: Progressing  Goal: Nutrition support is meeting 75% of nutrient needs  Outcome: Progressing  Goal: Tube feed tolerance  Outcome: Progressing  Goal: BG  mg/dL  Outcome:  Progressing  Goal: Lab values WNL  Outcome: Progressing  Goal: Electrolytes WNL  Outcome: Progressing  Goal: Promote healing  Outcome: Progressing  Goal: Maintain stable weight  Outcome: Progressing  Goal: Reduce weight from edema/fluid  Outcome: Progressing  Goal: Gradual weight gain  Outcome: Progressing  Goal: Improve ostomy output  Outcome: Progressing   The patient's goals for the shift include      The clinical goals for the shift include maintain stable vitals signs    Over the shift, the patient did make progress toward the goals.

## 2024-05-23 NOTE — NURSING NOTE
Spoke to Dr. Sims and informed him the pt sounded a little wheezy and was requesting a breathing treatment of which none were ordered. Received orders for duonebs every 4 hours as needed for sob and wheezing. Continuing to monitor.

## 2024-05-23 NOTE — NURSING NOTE
RT at pt's bedside about this time to administer a breathing treatment. Pt is holding his cell phone which is disturbing the continuous pulse ox read. RT provides an ear cuff pulse oximeter if finger meter continues to be unreliable with pt's activities giving inaccurate read outs. Pt recently given a total bed change after having a BM on bedpan but not before some incontinence. Pt denies pain and has no other needs or complaints at this time. Pt oriented to call bell and it and belongings are placed in reach. Telemetry leads connected and reading on monitor. NC O2 in place and connected to flowmeter. Bed alarm set. Continuing to monitor.

## 2024-05-23 NOTE — NURSING NOTE
RT at pt's bedside about this time to administer a breathing treatment. Pt is holding his cell phone again which is disturbing the continuous pulse ox read. Pt recently had another BM part incontinent on pad and part on a bedpan. Pt denies pain and has no other needs or complaints at this time. Uneventful night with no changes since prior assessment. Pt oriented to call bell and it and belongings are placed in reach. Telemetry leads connected and reading on monitor. NC O2 in place and connected to flowmeter. Bed alarm set. Continuing to monitor.

## 2024-05-23 NOTE — NURSING NOTE
Took over care of pt at this time. Pt laying in bed, alert, aox3. Pt denies pain and has no needs or complaints at this time. Dressing over TLC removal c/d/intact. Pt oriented to call bell and it and belongings are placed in reach. Telemetry leads connected and reading on monitor. Bed alarm set. NC O2 in place and connected to flowmeter. Continuing to monitor.

## 2024-05-23 NOTE — CONSULTS
"Inpatient consult to Pulmonology  Consult performed by: Ulises Newell MD  Consult ordered by: ELENITA Vilchis-TRAVIS          Pulmonary Consult    Reason For Consult  Hypoxia  History Of Present Illness  Darrell Arreola is a 78 y.o. male presenting with Closed head injury, initial encounter.     Past Medical History  He has a past medical history of CHF (congestive heart failure) (Multi), Diabetes mellitus (Multi), Heart disease, Hypertension, and Stroke (Multi).    Surgical History  He has a past surgical history that includes CT guided percutaneous biopsy bone deep (02/21/2020); CT guided percutaneous biopsy bone deep (03/24/2020); MR angio head wo IV contrast (05/17/2019); Toe amputation; Tonsillectomy; and MR angio head wo IV contrast (10/9/2023).     Social History  He reports that he has quit smoking. His smoking use included cigars and cigarettes. He has never used smokeless tobacco. He reports that he does not drink alcohol and does not use drugs.      Family History  Family History   Family history unknown: Yes        Allergies  Cefazolin, Latex, Lidocaine, Quinine, and Iodine    Review of Systems   All other systems reviewed and are negative.      Last Recorded Vitals  Blood pressure 95/68, pulse (!) 121, temperature 36.3 °C (97.3 °F), temperature source Temporal, resp. rate 16, height 1.854 m (6' 1\"), weight 101 kg (222 lb 14.2 oz), SpO2 96%.    Intake/Output    Intake/Output Summary (Last 24 hours) at 5/22/2024 2124  Last data filed at 5/22/2024 1800  Gross per 24 hour   Intake 254.58 ml   Output 425 ml   Net -170.42 ml       Physical Exam  Vitals reviewed.   Constitutional:       Appearance: Normal appearance.   HENT:      Head: Normocephalic and atraumatic.      Mouth/Throat:      Mouth: Mucous membranes are moist.   Eyes:      Extraocular Movements: Extraocular movements intact.      Pupils: Pupils are equal, round, and reactive to light.   Cardiovascular:      Rate and Rhythm: Normal rate and " regular rhythm.   Pulmonary:      Effort: Pulmonary effort is normal.      Breath sounds: Normal breath sounds and air entry.   Abdominal:      General: Abdomen is flat. Bowel sounds are normal.      Palpations: Abdomen is soft.   Musculoskeletal:         General: Normal range of motion.      Cervical back: Normal range of motion and neck supple.   Skin:     General: Skin is warm and dry.   Neurological:      General: No focal deficit present.      Mental Status: He is alert and oriented to person, place, and time. Mental status is at baseline.      ...    Oxygen Therapy  SpO2: 96 %  Medical Gas Therapy: Supplemental oxygen  O2 Delivery Method: Nasal cannula       Lines and Tubes:  Peripheral IV 05/22/24 20 G Left;Anterior Forearm (Active)   Placement Date/Time: 05/22/24 1544   Size (Gauge): 20 G  Orientation: Left;Anterior  Location: Forearm  Site Prep: Chlorhexidine   Local Anesthetic: None  Technique: Ultrasound guidance  Placed by: jorge alberto SHARPE  Insertion attempts: 1  P...   Number of days: 0         Scheduled medications  amiodarone, 200 mg, oral, Daily  apixaban, 5 mg, oral, BID  [Held by provider] famotidine, 10 mg, oral, Daily  folic acid, 1 mg, oral, Daily  insulin lispro, 0-5 Units, subcutaneous, Before meals & nightly  lacosamide, 100 mg, oral, BID  metoprolol tartrate, 12.5 mg, oral, BID  midodrine, 10 mg, oral, TID  nystatin, 1 Application, Topical, BID  oxygen, , inhalation, Continuous - Inhalation  pantoprazole, 40 mg, oral, Daily before breakfast   Or  pantoprazole, 40 mg, intravenous, Daily before breakfast  rosuvastatin, 10 mg, oral, Nightly  [Held by provider] SITagliptin phosphate, 50 mg, oral, Daily  tamsulosin, 0.4 mg, oral, Daily before breakfast      Continuous medications     PRN medications  PRN medications: dextrose, dextrose, glucagon, glucagon    Relevant Results  Results from last 7 days   Lab Units 05/22/24  0620 05/21/24  0514 05/20/24  0608   WBC AUTO x10*3/uL 5.6 7.3  6.3   HEMOGLOBIN g/dL 11.0* 11.7* 12.5*   HEMATOCRIT % 34.8* 36.6* 38.5*   PLATELETS AUTO x10*3/uL 85* 109* 111*      Results from last 7 days   Lab Units 05/22/24  0620 05/21/24  0514 05/20/24  0608   SODIUM mmol/L 141 137 139   POTASSIUM mmol/L 4.1 3.6 4.2   CHLORIDE mmol/L 107 103 104   CO2 mmol/L 28 27 27   BUN mg/dL 35* 41* 34*   CREATININE mg/dL 1.10 1.30 1.20   GLUCOSE mg/dL 84 77 226*   CALCIUM mg/dL 7.9* 7.9* 7.8*      Results from last 7 days   Lab Units 05/17/24  1841   POCT PH, ARTERIAL pH 7.31*   POCT PCO2, ARTERIAL mm Hg 39   POCT PO2, ARTERIAL mm Hg 137*   POCT HCO3 CALCULATED, ARTERIAL mmol/L 19.6*   POCT BASE EXCESS, ARTERIAL mmol/L -6.2*     XR chest 1 view 05/17/2024    Narrative  Interpreted By:  Gabriel Dickson,  STUDY:  Chest dated  5/17/2024.    INDICATION:  Signs/Symptoms:OG placement confirmation    COMPARISON:  Chest dated 05/17/2024.    ACCESSION NUMBER(S):  [id number]    ORDERING CLINICIAN:  GERONIMO GELLER    TECHNIQUE:  One view of the chest.    FINDINGS:  There is an enteric tube with the tip off of the inferior level of  the exam. Side port projects over the left upper quadrant. There is  likely an endotracheal tube although not well visualized. Its tip as  seen on this exam is a proximally 1.7 cm from the mohan. There are  bibasilar appearing opacities although this may be projectional.  There is a right IJ line with the tip projecting over the lower SVC.  Cardiomediastinal silhouette is enlarged but similar to the prior  exam. Degenerative changes seen of the spine and shoulders.    Impression  1. As seen on this exam the endotracheal tube tip appears to be a  proximally 1.7 cm from the mohan. A repeat chest radiograph is  recommended to confirm positioning as this may be projectional.  2. Enteric tube with side port over the left hemiabdomen and tip off  the inferior level of the exam.  3. Possible bibasilar atelectasis.    MACRO:  None    Signed by: Gabriel Dickson 5/17/2024 9:01  PM  Dictation workstation:   [id number]       Assessment/Plan   Principal Problem:    Closed head injury, initial encounter  Fall vs syncopal episode  Altered mental status - metabolic encephalopathy  Seizure disorder   History of CVA  Generalized weakness  Acute hypoxic respiratory failure  COVID-19  Status post PEA arrest post anaphylactic reaction - Lidocaine  Atrial fibrillation  Oral anticoagulation  Hypotension  Hyperlipidemia  Systolic congestive heart failure  Acute kidney injury on chronic kidney disease  Hypophosphatemia  Hypocalcemia -- hypoalbuminemia   Protein calorie malnutrition  Type 2 diabetes mellitus with hypoglycemia  Tobacco use  Microcytic anemia  Thrombocytopenia worsening      Stable on 3 L.  COVID-19.  Completed remdesivir  Continue with rate control measures  Wean oxygen as tolerated        Ulises Newell MD  [facility]

## 2024-05-23 NOTE — PROGRESS NOTES
"Nutrition Follow up Note    Nutrition Assessment      Pt is in isolation for COVID. Pt has had fair PO intake. Will provide Mighty shake BID.     Nutrition History:     Energy Intake: Fair 50-75 %  Food Allergies/Intolerances:  None      Anthropometrics:  Ht: 185.4 cm (6' 1\"), Wt: 101 kg (222 lb), BMI: 29.3  IBW/kg (Dietitian Calculated): 83.64 kg  Percent of IBW: 129 %  Adjusted Body Weight (kg): 89.55 kg    Weight Change:  Daily Weight  05/23/24 : 101 kg (222 lb)  04/28/24 : 96.3 kg (212 lb 4.9 oz)  10/20/23 : 99.5 kg (219 lb 5.7 oz)                   Nutrition Focused Physical Exam Findings:                       Nutrition Significant Labs:  Lab Results   Component Value Date    WBC 6.7 05/23/2024    HGB 11.6 (L) 05/23/2024    HCT 36.7 (L) 05/23/2024    PLT 94 (L) 05/23/2024    CHOL 109 (L) 04/21/2024    TRIG 63 04/21/2024    HDL 46.0 04/21/2024    LDLDIRECT 71 10/10/2023    ALT 30 05/23/2024    AST 44 (H) 05/23/2024     05/23/2024    K 4.0 05/23/2024     05/23/2024    CREATININE 1.10 05/23/2024    BUN 29 (H) 05/23/2024    CO2 29 05/23/2024    TSH 4.23 (H) 04/21/2024    INR 1.6 (H) 04/20/2024    HGBA1C 6.7 (H) 04/21/2024     Nutrition Specific Medications:  amiodarone, 200 mg, oral, Daily  apixaban, 5 mg, oral, BID  [Held by provider] famotidine, 10 mg, oral, Daily  folic acid, 1 mg, oral, Daily  insulin lispro, 0-5 Units, subcutaneous, Before meals & nightly  lacosamide, 100 mg, oral, BID  metoprolol tartrate, 12.5 mg, oral, BID  midodrine, 10 mg, oral, TID  nystatin, 1 Application, Topical, BID  oxygen, , inhalation, Continuous - Inhalation  pantoprazole, 40 mg, oral, Daily before breakfast   Or  pantoprazole, 40 mg, intravenous, Daily before breakfast  rosuvastatin, 10 mg, oral, Nightly  [Held by provider] SITagliptin phosphate, 50 mg, oral, Daily  tamsulosin, 0.4 mg, oral, Daily before breakfast        Dietary Orders (From admission, onward)       Start     Ordered    05/19/24 2257  Adult diet " Carb Controlled, Cardiac; 75 gram carb/meal, 45 gram Carb evening snack; 70 gm fat; 2 - 3 grams Sodium  Diet effective now        Question Answer Comment   Diet type Carb Controlled    Diet type Cardiac    Carb diet selection: 75 gram carb/meal, 45 gram Carb evening snack    Fat restriction: 70 gm fat    Sodium restriction: 2 - 3 grams Sodium        05/19/24 2256                  Estimated Needs:   Estimated Energy Needs  Total Energy Estimated Needs (kCal): 2241 kCal  Total Estimated Energy Need per Day (kCal/kg): 25 kCal/kg  Method for Estimating Needs: ABW    Estimated Protein Needs  Total Protein Estimated Needs (g): 108 g  Total Protein Estimated Needs (g/kg): 1.2 g/kg  Method for Estimating Needs: ABW    Estimated Fluid Needs  Method for Estimating Needs: <2000 mL/d        Nutrition Diagnosis   Nutrition Diagnosis:       Nutrition Diagnosis  Patient has Nutrition Diagnosis: Yes  Diagnosis Status (1): Resolved  Nutrition Diagnosis 1: Inadequate fat intake  Related to (1): decreased ability to consume sufficient energy  As Evidenced by (1): NPO/Mechanical Ventilation  Additional Nutrition Diagnosis: Diagnosis 2  Diagnosis Status (2): Ongoing  Nutrition Diagnosis 2: Increased nutrient needs  Related to (2): increased demand for nutrients  As Evidenced by (2): conditions associated with diagnosis       Nutrition Interventions/Recommendations   Nutrition Interventions and Recommendations:    Nutrition Prescription:  Individualized Nutrition Prescription Provided for : 2241 kcals and 108g protein to be provided via diet    Nutrition Interventions:   Food and/or Nutrient Delivery Interventions  Interventions: Meals and snacks, Medical food supplement  Meals and Snacks: Carbohydrate-modified diet  Goal: provie diet as ordered  Medical Food Supplement: Commercial beverage  Goal: mighty shake BID to provide 200 kcals and 7g protein each    Education Documentation  No documentation found.           Nutrition Monitoring  and Evaluation   Monitoring/Evaluation:   Food/Nutrient Related History Monitoring  Monitoring and Evaluation Plan: Energy intake  Energy Intake: Estimated energy intake  Criteria: pt to consume >/= 75% estimated needs         Time Spent/Follow-up:   Follow Up  Time Spent (min): 30 minutes  Last Date of Nutrition Visit: 05/23/24  Nutrition Follow-Up Needed?: 5-7 days  Follow up Comment: 5/29/24

## 2024-05-24 ENCOUNTER — APPOINTMENT (OUTPATIENT)
Dept: RADIOLOGY | Facility: HOSPITAL | Age: 79
DRG: 208 | End: 2024-05-24
Payer: MEDICARE

## 2024-05-24 LAB
ALBUMIN SERPL-MCNC: 2.5 G/DL (ref 3.5–5)
ALP BLD-CCNC: 104 U/L (ref 35–125)
ALT SERPL-CCNC: 35 U/L (ref 5–40)
ANION GAP SERPL CALC-SCNC: 11 MMOL/L
AST SERPL-CCNC: 50 U/L (ref 5–40)
BASOPHILS # BLD MANUAL: 0 X10*3/UL (ref 0–0.1)
BASOPHILS NFR BLD MANUAL: 0 %
BILIRUB DIRECT SERPL-MCNC: 1.7 MG/DL (ref 0–0.2)
BILIRUB SERPL-MCNC: 2.2 MG/DL (ref 0.1–1.2)
BUN SERPL-MCNC: 25 MG/DL (ref 8–25)
BURR CELLS BLD QL SMEAR: ABNORMAL
CALCIUM SERPL-MCNC: 8.2 MG/DL (ref 8.5–10.4)
CHLORIDE SERPL-SCNC: 101 MMOL/L (ref 97–107)
CO2 SERPL-SCNC: 26 MMOL/L (ref 24–31)
CREAT SERPL-MCNC: 1.1 MG/DL (ref 0.4–1.6)
DACRYOCYTES BLD QL SMEAR: ABNORMAL
DOHLE BOD BLD QL SMEAR: PRESENT
EGFRCR SERPLBLD CKD-EPI 2021: 69 ML/MIN/1.73M*2
EOSINOPHIL # BLD MANUAL: 0.07 X10*3/UL (ref 0–0.4)
EOSINOPHIL NFR BLD MANUAL: 1 %
ERYTHROCYTE [DISTWIDTH] IN BLOOD BY AUTOMATED COUNT: 16.3 % (ref 11.5–14.5)
GLUCOSE BLD MANUAL STRIP-MCNC: 165 MG/DL (ref 74–99)
GLUCOSE BLD MANUAL STRIP-MCNC: 197 MG/DL (ref 74–99)
GLUCOSE BLD MANUAL STRIP-MCNC: 83 MG/DL (ref 74–99)
GLUCOSE SERPL-MCNC: 94 MG/DL (ref 65–99)
HCT VFR BLD AUTO: 36.1 % (ref 41–52)
HGB BLD-MCNC: 11.6 G/DL (ref 13.5–17.5)
IMM GRANULOCYTES # BLD AUTO: 0.04 X10*3/UL (ref 0–0.5)
IMM GRANULOCYTES NFR BLD AUTO: 0.6 % (ref 0–0.9)
LYMPHOCYTES # BLD MANUAL: 0.7 X10*3/UL (ref 0.8–3)
LYMPHOCYTES NFR BLD MANUAL: 10 %
MAGNESIUM SERPL-MCNC: 2.2 MG/DL (ref 1.6–3.1)
MCH RBC QN AUTO: 32 PG (ref 26–34)
MCHC RBC AUTO-ENTMCNC: 32.1 G/DL (ref 32–36)
MCV RBC AUTO: 99 FL (ref 80–100)
MONOCYTES # BLD MANUAL: 0.35 X10*3/UL (ref 0.05–0.8)
MONOCYTES NFR BLD MANUAL: 5 %
NEUTROPHILS # BLD MANUAL: 5.88 X10*3/UL (ref 1.6–5.5)
NEUTS BAND # BLD MANUAL: 0.07 X10*3/UL (ref 0–0.5)
NEUTS BAND NFR BLD MANUAL: 1 %
NEUTS SEG # BLD MANUAL: 5.81 X10*3/UL (ref 1.6–5)
NEUTS SEG NFR BLD MANUAL: 83 %
NRBC BLD-RTO: 0 /100 WBCS (ref 0–0)
NT-PROBNP SERPL-MCNC: ABNORMAL PG/ML (ref 0–852)
OVALOCYTES BLD QL SMEAR: ABNORMAL
PHOSPHATE SERPL-MCNC: 1.6 MG/DL (ref 2.5–4.5)
PLATELET # BLD AUTO: 96 X10*3/UL (ref 150–450)
POLYCHROMASIA BLD QL SMEAR: ABNORMAL
POTASSIUM SERPL-SCNC: 3.8 MMOL/L (ref 3.4–5.1)
PROT SERPL-MCNC: 5.4 G/DL (ref 5.9–7.9)
RBC # BLD AUTO: 3.63 X10*6/UL (ref 4.5–5.9)
RBC MORPH BLD: ABNORMAL
SODIUM SERPL-SCNC: 138 MMOL/L (ref 133–145)
TOTAL CELLS COUNTED BLD: 100
WBC # BLD AUTO: 7 X10*3/UL (ref 4.4–11.3)

## 2024-05-24 PROCEDURE — 2500000002 HC RX 250 W HCPCS SELF ADMINISTERED DRUGS (ALT 637 FOR MEDICARE OP, ALT 636 FOR OP/ED): Performed by: INTERNAL MEDICINE

## 2024-05-24 PROCEDURE — 83735 ASSAY OF MAGNESIUM: CPT | Performed by: INTERNAL MEDICINE

## 2024-05-24 PROCEDURE — 85007 BL SMEAR W/DIFF WBC COUNT: CPT | Performed by: INTERNAL MEDICINE

## 2024-05-24 PROCEDURE — 82947 ASSAY GLUCOSE BLOOD QUANT: CPT

## 2024-05-24 PROCEDURE — 2500000001 HC RX 250 WO HCPCS SELF ADMINISTERED DRUGS (ALT 637 FOR MEDICARE OP): Performed by: NURSE PRACTITIONER

## 2024-05-24 PROCEDURE — 2060000001 HC INTERMEDIATE ICU ROOM DAILY

## 2024-05-24 PROCEDURE — 2500000001 HC RX 250 WO HCPCS SELF ADMINISTERED DRUGS (ALT 637 FOR MEDICARE OP): Performed by: INTERNAL MEDICINE

## 2024-05-24 PROCEDURE — 82248 BILIRUBIN DIRECT: CPT | Performed by: NURSE PRACTITIONER

## 2024-05-24 PROCEDURE — 84100 ASSAY OF PHOSPHORUS: CPT | Performed by: INTERNAL MEDICINE

## 2024-05-24 PROCEDURE — 71046 X-RAY EXAM CHEST 2 VIEWS: CPT

## 2024-05-24 PROCEDURE — 84075 ASSAY ALKALINE PHOSPHATASE: CPT | Performed by: INTERNAL MEDICINE

## 2024-05-24 PROCEDURE — 2500000005 HC RX 250 GENERAL PHARMACY W/O HCPCS: Performed by: NURSE PRACTITIONER

## 2024-05-24 PROCEDURE — 71046 X-RAY EXAM CHEST 2 VIEWS: CPT | Performed by: RADIOLOGY

## 2024-05-24 PROCEDURE — 36415 COLL VENOUS BLD VENIPUNCTURE: CPT | Performed by: INTERNAL MEDICINE

## 2024-05-24 PROCEDURE — 83880 ASSAY OF NATRIURETIC PEPTIDE: CPT | Performed by: NURSE PRACTITIONER

## 2024-05-24 PROCEDURE — 9420000001 HC RT PATIENT EDUCATION 5 MIN

## 2024-05-24 PROCEDURE — 94640 AIRWAY INHALATION TREATMENT: CPT

## 2024-05-24 PROCEDURE — 2500000004 HC RX 250 GENERAL PHARMACY W/ HCPCS (ALT 636 FOR OP/ED): Performed by: NURSE PRACTITIONER

## 2024-05-24 PROCEDURE — 85027 COMPLETE CBC AUTOMATED: CPT | Performed by: INTERNAL MEDICINE

## 2024-05-24 RX ORDER — ONDANSETRON HYDROCHLORIDE 2 MG/ML
INJECTION, SOLUTION INTRAVENOUS
Status: DISPENSED
Start: 2024-05-24 | End: 2024-05-24

## 2024-05-24 RX ORDER — FUROSEMIDE 10 MG/ML
40 INJECTION INTRAMUSCULAR; INTRAVENOUS ONCE
Status: COMPLETED | OUTPATIENT
Start: 2024-05-24 | End: 2024-05-24

## 2024-05-24 RX ORDER — SODIUM,POTASSIUM PHOSPHATES 280-250MG
1 POWDER IN PACKET (EA) ORAL ONCE
Status: COMPLETED | OUTPATIENT
Start: 2024-05-24 | End: 2024-05-24

## 2024-05-24 RX ADMIN — APIXABAN 5 MG: 5 TABLET, FILM COATED ORAL at 21:02

## 2024-05-24 RX ADMIN — IPRATROPIUM BROMIDE AND ALBUTEROL SULFATE 3 ML: 2.5; .5 SOLUTION RESPIRATORY (INHALATION) at 11:03

## 2024-05-24 RX ADMIN — IPRATROPIUM BROMIDE AND ALBUTEROL SULFATE 3 ML: 2.5; .5 SOLUTION RESPIRATORY (INHALATION) at 16:51

## 2024-05-24 RX ADMIN — NYSTATIN 1 APPLICATION: 100000 POWDER TOPICAL at 21:04

## 2024-05-24 RX ADMIN — MIDODRINE HYDROCHLORIDE 10 MG: 10 TABLET ORAL at 09:14

## 2024-05-24 RX ADMIN — POTASSIUM & SODIUM PHOSPHATES POWDER PACK 280-160-250 MG 1 PACKET: 280-160-250 PACK at 12:21

## 2024-05-24 RX ADMIN — PANTOPRAZOLE SODIUM 40 MG: 40 TABLET, DELAYED RELEASE ORAL at 05:39

## 2024-05-24 RX ADMIN — METOPROLOL TARTRATE 12.5 MG: 25 TABLET, FILM COATED ORAL at 21:03

## 2024-05-24 RX ADMIN — INSULIN LISPRO 1 UNITS: 100 INJECTION, SOLUTION INTRAVENOUS; SUBCUTANEOUS at 21:01

## 2024-05-24 RX ADMIN — LACOSAMIDE 100 MG: 100 TABLET, FILM COATED ORAL at 09:14

## 2024-05-24 RX ADMIN — MIDODRINE HYDROCHLORIDE 10 MG: 10 TABLET ORAL at 12:21

## 2024-05-24 RX ADMIN — Medication 3 L/MIN: at 20:00

## 2024-05-24 RX ADMIN — FOLIC ACID 1 MG: 1 TABLET ORAL at 09:14

## 2024-05-24 RX ADMIN — TAMSULOSIN HYDROCHLORIDE 0.4 MG: 0.4 CAPSULE ORAL at 06:06

## 2024-05-24 RX ADMIN — MIDODRINE HYDROCHLORIDE 10 MG: 10 TABLET ORAL at 18:21

## 2024-05-24 RX ADMIN — AMIODARONE HYDROCHLORIDE 200 MG: 200 TABLET ORAL at 09:14

## 2024-05-24 RX ADMIN — METOPROLOL TARTRATE 12.5 MG: 25 TABLET, FILM COATED ORAL at 09:14

## 2024-05-24 RX ADMIN — NYSTATIN 1 APPLICATION: 100000 POWDER TOPICAL at 09:14

## 2024-05-24 RX ADMIN — APIXABAN 5 MG: 5 TABLET, FILM COATED ORAL at 09:14

## 2024-05-24 RX ADMIN — LACOSAMIDE 100 MG: 100 TABLET, FILM COATED ORAL at 21:02

## 2024-05-24 RX ADMIN — Medication 3 L/MIN: at 08:00

## 2024-05-24 RX ADMIN — FUROSEMIDE 40 MG: 10 INJECTION, SOLUTION INTRAMUSCULAR; INTRAVENOUS at 12:00

## 2024-05-24 RX ADMIN — ROSUVASTATIN CALCIUM 10 MG: 10 TABLET, COATED ORAL at 21:02

## 2024-05-24 ASSESSMENT — COGNITIVE AND FUNCTIONAL STATUS - GENERAL
MOVING TO AND FROM BED TO CHAIR: A LOT
CLIMB 3 TO 5 STEPS WITH RAILING: TOTAL
DRESSING REGULAR LOWER BODY CLOTHING: TOTAL
DAILY ACTIVITIY SCORE: 12
TOILETING: TOTAL
STANDING UP FROM CHAIR USING ARMS: A LOT
HELP NEEDED FOR BATHING: A LOT
WALKING IN HOSPITAL ROOM: A LOT
DRESSING REGULAR UPPER BODY CLOTHING: A LOT
TURNING FROM BACK TO SIDE WHILE IN FLAT BAD: A LOT
EATING MEALS: A LITTLE
MOVING FROM LYING ON BACK TO SITTING ON SIDE OF FLAT BED WITH BEDRAILS: A LITTLE
PERSONAL GROOMING: A LITTLE
MOBILITY SCORE: 12

## 2024-05-24 ASSESSMENT — PAIN - FUNCTIONAL ASSESSMENT
PAIN_FUNCTIONAL_ASSESSMENT: 0-10
PAIN_FUNCTIONAL_ASSESSMENT: FLACC (FACE, LEGS, ACTIVITY, CRY, CONSOLABILITY)
PAIN_FUNCTIONAL_ASSESSMENT: 0-10

## 2024-05-24 ASSESSMENT — PAIN SCALES - GENERAL
PAINLEVEL_OUTOF10: 0 - NO PAIN

## 2024-05-24 NOTE — PROGRESS NOTES
05/24/24 1508   Discharge Planning   Patient expects to be discharged to: [facility] approved thru 5/28     Patient is not medically ready for discharge today.  [facility] approved and is good through 5/28, provider updated.     Discharge plan NOT secure  DO NOT DC without speaking to care coordination   MD will need to sign/certify the [facility] for skilled rehab prior to discharge

## 2024-05-24 NOTE — CARE PLAN
The patient's goals for the shift include      The clinical goals for the shift include VSS    Over the shift, the patient did not make progress toward the following goals. Barriers to progression include n/a . Recommendations to address these barriers include n/a.

## 2024-05-24 NOTE — CARE PLAN
The patient's goals for the shift include      The clinical goals for the shift include VSS    Over the shift, the patient did not make progress toward the following goals. Barriers to progression include n/a. Recommendations to address these barriers include n/a.

## 2024-05-24 NOTE — PROGRESS NOTES
Darrell Arreola is a 78 y.o. male on day 7 of admission presenting with Closed head injury, initial encounter.    Subjective   Patient seen and examined.  Resting in bed in no acute distress but awake alert and x 3.  Complains of shortness of breath and wheezing.  He just received a breathing treatment reporting improvement in wheezing.  No other complaints.  RT bedside.      Objective     Physical Exam  Vitals and nursing note reviewed.   Constitutional:       General: He is not in acute distress.     Appearance: Normal appearance. He is normal weight. He is ill-appearing. He is not toxic-appearing or diaphoretic.   HENT:      Head: Normocephalic and atraumatic.      Right Ear: Tympanic membrane normal.      Left Ear: Tympanic membrane normal.      Nose: Nose normal.      Mouth/Throat:      Mouth: Mucous membranes are moist.      Pharynx: Oropharynx is clear.   Eyes:      Extraocular Movements: Extraocular movements intact.      Conjunctiva/sclera: Conjunctivae normal.      Pupils: Pupils are equal, round, and reactive to light.   Cardiovascular:      Rate and Rhythm: Normal rate. Rhythm irregular.      Pulses: Normal pulses.      Heart sounds: Normal heart sounds. No murmur heard.  Pulmonary:      Effort: Pulmonary effort is normal. No respiratory distress.      Breath sounds: Wheezing and rhonchi present. No rales.      Comments: Increased diffuse wheezing. 6 liters nasal cannula.   Abdominal:      General: Bowel sounds are normal. There is no distension.      Palpations: Abdomen is soft.      Tenderness: There is no abdominal tenderness.   Genitourinary:     Comments: Deferred.   Musculoskeletal:         General: No swelling or tenderness. Normal range of motion.      Cervical back: Normal range of motion and neck supple.   Skin:     General: Skin is warm and dry.      Capillary Refill: Capillary refill takes less than 2 seconds.      Comments: Right neck dressing clean dry intact.   Neurological:      General:  "No focal deficit present.      Mental Status: He is alert and oriented to person, place, and time.      Comments: Forgetful, a bit confused. Follows all commands. Generalized weakness. No focal weakness.    Psychiatric:         Mood and Affect: Mood normal.         Behavior: Behavior normal.       Last Recorded Vitals  Blood pressure 100/71, pulse (!) 123, temperature 36.8 °C (98.2 °F), temperature source Temporal, resp. rate 20, height 1.854 m (6' 1\"), weight 107 kg (236 lb 1.8 oz), SpO2 97%.    Intake/Output last 3 Shifts:  I/O last 3 completed shifts:  In: - (0 mL/kg)   Out: 555 (5.2 mL/kg) [Urine:555 (0.1 mL/kg/hr)]  Weight: 107.1 kg     Telemetry atrial fibrillation rate 100's    Relevant Results  Results for orders placed or performed during the hospital encounter of 05/17/24 (from the past 24 hour(s))   POCT GLUCOSE   Result Value Ref Range    POCT Glucose 124 (H) 74 - 99 mg/dL   POCT GLUCOSE   Result Value Ref Range    POCT Glucose 96 74 - 99 mg/dL   Comprehensive Metabolic Panel   Result Value Ref Range    Glucose 94 65 - 99 mg/dL    Sodium 138 133 - 145 mmol/L    Potassium 3.8 3.4 - 5.1 mmol/L    Chloride 101 97 - 107 mmol/L    Bicarbonate 26 24 - 31 mmol/L    Urea Nitrogen 25 8 - 25 mg/dL    Creatinine 1.10 0.40 - 1.60 mg/dL    eGFR 69 >60 mL/min/1.73m*2    Calcium 8.2 (L) 8.5 - 10.4 mg/dL    Albumin 2.5 (L) 3.5 - 5.0 g/dL    Alkaline Phosphatase 104 35 - 125 U/L    Total Protein 5.4 (L) 5.9 - 7.9 g/dL    AST 50 (H) 5 - 40 U/L    Bilirubin, Total 2.2 (H) 0.1 - 1.2 mg/dL    ALT 35 5 - 40 U/L    Anion Gap 11 <=19 mmol/L   Magnesium   Result Value Ref Range    Magnesium 2.20 1.60 - 3.10 mg/dL   Phosphorus   Result Value Ref Range    Phosphorus 1.6 (L) 2.5 - 4.5 mg/dL   Bilirubin, Direct   Result Value Ref Range    Bilirubin, Direct 1.7 (H) 0.0 - 0.2 mg/dL   NT Pro-BNP   Result Value Ref Range    PROBNP 18,153 (H) 0 - 852 pg/mL   CBC and Auto Differential   Result Value Ref Range    WBC 7.0 4.4 - 11.3 " x10*3/uL    nRBC 0.0 0.0 - 0.0 /100 WBCs    RBC 3.63 (L) 4.50 - 5.90 x10*6/uL    Hemoglobin 11.6 (L) 13.5 - 17.5 g/dL    Hematocrit 36.1 (L) 41.0 - 52.0 %    MCV 99 80 - 100 fL    MCH 32.0 26.0 - 34.0 pg    MCHC 32.1 32.0 - 36.0 g/dL    RDW 16.3 (H) 11.5 - 14.5 %    Platelets 96 (L) 150 - 450 x10*3/uL    Immature Granulocytes %, Automated 0.6 0.0 - 0.9 %    Immature Granulocytes Absolute, Automated 0.04 0.00 - 0.50 x10*3/uL   Manual Differential   Result Value Ref Range    Neutrophils %, Manual 83.0 40.0 - 80.0 %    Bands %, Manual 1.0 0.0 - 5.0 %    Lymphocytes %, Manual 10.0 13.0 - 44.0 %    Monocytes %, Manual 5.0 2.0 - 10.0 %    Eosinophils %, Manual 1.0 0.0 - 6.0 %    Basophils %, Manual 0.0 0.0 - 2.0 %    Seg Neutrophils Absolute, Manual 5.81 (H) 1.60 - 5.00 x10*3/uL    Bands Absolute, Manual 0.07 0.00 - 0.50 x10*3/uL    Lymphocytes Absolute, Manual 0.70 (L) 0.80 - 3.00 x10*3/uL    Monocytes Absolute, Manual 0.35 0.05 - 0.80 x10*3/uL    Eosinophils Absolute, Manual 0.07 0.00 - 0.40 x10*3/uL    Basophils Absolute, Manual 0.00 0.00 - 0.10 x10*3/uL    Total Cells Counted 100     Neutrophils Absolute, Manual 5.88 (H) 1.60 - 5.50 x10*3/uL    RBC Morphology See Below     Polychromasia Mild     Ovalocytes Few     Teardrop Cells Few     Coby Cells Many     Dohle Bodies Present    POCT GLUCOSE   Result Value Ref Range    POCT Glucose 83 74 - 99 mg/dL     Susceptibility data from last 90 days.  Collected Specimen Info Organism Ampicillin Cefazolin Cefazolin (uncomplicated UTIs only) Ceftriaxone Ciprofloxacin Gentamicin Nitrofurantoin Piperacillin/Tazobactam Tetracycline Trimethoprim/Sulfamethoxazole   04/26/24 Urine from Clean Catch/Voided Proteus mirabilis S R S S S S R S R S     XR chest 2 views    Result Date: 5/24/2024  Interpreted By:  Fabrizio Hines, STUDY: XR CHEST 2 VIEWS; 5/24/2024 10:31 am   INDICATION: Signs/Symptoms:Fever, hypoxia   COMPARISON: 05/17/2024.   ACCESSION NUMBER(S): [id number]   ORDERING  CLINICIAN: MICHAEL YAP   TECHNIQUE: Number of films: Two-view radiographs of the chest were obtained.   FINDINGS: The study is limited due to rotation, respiratory motion and poor inspiratory effort, with resultant crowding of the pulmonary vasculature. Previously seen lines and tubes have been removed. The cardiac silhouette appears prominent, exaggerated by the technique. Bilateral perihilar interstitial and mild alveolar infiltrates and small bilateral effusions are present. No pneumothorax is seen. Degenerative changes involve the spine and shoulders.       Limited study. Bilateral infiltrates and small effusions, most likely due to pulmonary edema/congestive heart failure. Correlate clinically and follow-up as needed.   Signed by: Fabrizio Hines 5/24/2024 10:38 AM Dictation workstation:   [id number]     Scheduled medications  amiodarone, 200 mg, oral, Daily  apixaban, 5 mg, oral, BID  [Held by provider] famotidine, 10 mg, oral, Daily  folic acid, 1 mg, oral, Daily  insulin lispro, 0-5 Units, subcutaneous, Before meals & nightly  lacosamide, 100 mg, oral, BID  metoprolol tartrate, 12.5 mg, oral, BID  midodrine, 10 mg, oral, TID  nystatin, 1 Application, Topical, BID  ondansetron, , ,   oxygen, , inhalation, Continuous - Inhalation  pantoprazole, 40 mg, oral, Daily before breakfast   Or  pantoprazole, 40 mg, intravenous, Daily before breakfast  rosuvastatin, 10 mg, oral, Nightly  [Held by provider] SITagliptin phosphate, 50 mg, oral, Daily  tamsulosin, 0.4 mg, oral, Daily before breakfast      Continuous medications     PRN medications  PRN medications: dextrose, dextrose, glucagon, glucagon, ipratropium-albuteroL, ondansetron      ASSESSMENT:  Fall vs syncopal episode  Altered mental status - metabolic encephalopathy  Seizure disorder   History of CVA  Generalized weakness  Acute hypoxic respiratory failure - worsening - wheezing  COVID-19  Low grade fever  Status post PEA arrest post anaphylactic reaction  - Lidocaine  Atrial fibrillation  Oral anticoagulation  Hypotension asymptomatic   Hyperlipidemia  Acute on chronic systolic congestive heart failure  Acute kidney injury on chronic kidney disease  Hypophosphatemia  Hypocalcemia -- hypoalbuminemia   Protein calorie malnutrition  Type 2 diabetes mellitus with hypoglycemia  Tobacco use  Microcytic anemia  Thrombocytopenia - stable  Elevated AST asymptomatic  Hyperbilirubinemia asymptomatic    PLAN:  Chest x-ray obtained this morning due to low-grade fever.  Radiology report reviewed.  Limited, bilateral infiltrates and small effusions most likely due to pulmonary edema/congestive heart failure - acute on chronic systolic congestive heart failure.  Add proBNP.  Administer Lasix 40 mg IV x 1.  Monitor I's and O's.  Monitor blood pressure closely.  Pulmonology, Cardiology following, input appreciated.  Discussed with Pulmonology, Dr. Newell in cardiology, Dr. Lucas.  Management per recommendations.  Monitor respiratory status and pulse oximetry.  Remdesivir x 3 days course completed.  Defer antibiotics to Pulmonology.  Maintain contact and droplet isolation precautions thru midnight 5/26/2024.  Fluid volume management and rate control per Cardiology.  Monitor heart rate.  Telemetry.  Monitor blood pressure.  Continue Midodrine.  Maintain knee high compression stockings for orthostatic hypotension.  Continue Eliquis for stroke prevention from atrial fibrillation.  Labs reviewed.  + Hypophosphatemia.  Start Neutra-Phos..  Monitor electrolytes, phosphorus, renal function.  + Hyperbilirubinemia.  Completely asymptomatic.  Monitor for now.  Monitor CMP, CBC..  Care glucose reviewed.  Monitor point-of-care glucose ACHS.  Continue to hold oral medications.  Insulin.  Hypoglycemia protocol.  Tobacco cessation education.  DVT prophylaxis.  Eliquis.  Monitor platelet count.  GI prophylaxis PPI.  Encourage p.o. intake.  Protein supplementation.  PT/OT.  Fall precautions.  Up  with assistance only.  Bed and chair alarm at all times.  Pressure ulcer prevention measures.  Turn and reposition every 2 hours and as needed.  Heels off bed.  Offloading.  Supportive care.  Patient reassured.  PT/OT recommend moderate intensity level of continued care.  Patient is a high fall risk.  He is not safe to return home and would benefit from skilled nursing rehabilitation placement on discharge.  Discussed with patient.  He reports discharge plan to skilled nursing rehabilitation.  Case management following for discharge planning.  Input is appreciated.  Discussed with patient, nursing, Pulmonology, Cardiology and Dr. Sims.      Rosa Champion, APRN-CNP

## 2024-05-24 NOTE — NURSING NOTE
Rounded on pt.  Pt laying in bed, alert, having just finished a breathing treatment. Pt denies pain and has no needs or complaints at this time. Pt states he's ready to go to sleep. Dressing over TLC removal c/d/intact. Pt oriented to call bell and it and belongings are placed in reach. Telemetry leads connected and reading on monitor. Bed alarm set. NC O2 in place and connected to flowmeter. Continuing to monitor.

## 2024-05-24 NOTE — PROGRESS NOTES
Occupational Therapy                 Therapy Communication Note    Patient Name: Darrell Arreola  MRN: [id number]  Today's Date: 5/24/2024     Discipline: Occupational Therapy    Missed Visit Reason: Missed Visit Reason:  (OT follow up attempted. Pt with resting HR in the upper 120's, pt also not feeling well this date; will hold therapy at this time; discussed with RN)    Missed Time: Cancel

## 2024-05-24 NOTE — NURSING NOTE
Rounded on pt. Pt laying in bed, eyes closed, respirations even and unlabored. Pt appears to be sleeping and in no apparent pain or distress. Dressing of right neck base clean, dry, intact. Uneventful night with no changes since prior assessment. Call bell and belongings are placed in reach. Telemetry leads connected and reading on monitor. NC O2 in place and connected to flowmeter. Bed alarm set. Continuing to monitor.

## 2024-05-24 NOTE — CARE PLAN
Problem: Respiratory  Goal: Clear secretions with interventions this shift  Outcome: Progressing  Goal: Minimize anxiety/maximize coping throughout shift  Outcome: Progressing  Goal: Minimal/no exertional discomfort or dyspnea this shift  Outcome: Progressing  Goal: No signs of respiratory distress (eg. Use of accessory muscles. Peds grunting)  Outcome: Progressing  Goal: Patent airway maintained this shift  Outcome: Progressing  Goal: Tolerate mechanical ventilation evidenced by VS/agitation level this shift  Outcome: Progressing

## 2024-05-24 NOTE — PROGRESS NOTES
Request for precert to be started with direct submit team this morning for [facility].    Update: Precert approved, 7000 completed. [facility] made facility aware pt is not cleared for dc today but might be ready for dc over the weekend.      05/24/24 0908   Discharge Planning   Patient expects to be discharged to: [facility]

## 2024-05-25 LAB
ALBUMIN SERPL-MCNC: 2.5 G/DL (ref 3.5–5)
ALP BLD-CCNC: 117 U/L (ref 35–125)
ALT SERPL-CCNC: 40 U/L (ref 5–40)
ANION GAP SERPL CALC-SCNC: 9 MMOL/L
AST SERPL-CCNC: 61 U/L (ref 5–40)
BASOPHILS # BLD AUTO: 0.01 X10*3/UL (ref 0–0.1)
BASOPHILS NFR BLD AUTO: 0.1 %
BILIRUB SERPL-MCNC: 3 MG/DL (ref 0.1–1.2)
BUN SERPL-MCNC: 22 MG/DL (ref 8–25)
BURR CELLS BLD QL SMEAR: NORMAL
CALCIUM SERPL-MCNC: 8.3 MG/DL (ref 8.5–10.4)
CHLORIDE SERPL-SCNC: 99 MMOL/L (ref 97–107)
CO2 SERPL-SCNC: 31 MMOL/L (ref 24–31)
CREAT SERPL-MCNC: 1.2 MG/DL (ref 0.4–1.6)
EGFRCR SERPLBLD CKD-EPI 2021: 62 ML/MIN/1.73M*2
EOSINOPHIL # BLD AUTO: 0.05 X10*3/UL (ref 0–0.4)
EOSINOPHIL NFR BLD AUTO: 0.5 %
ERYTHROCYTE [DISTWIDTH] IN BLOOD BY AUTOMATED COUNT: 16.3 % (ref 11.5–14.5)
GLUCOSE BLD MANUAL STRIP-MCNC: 150 MG/DL (ref 74–99)
GLUCOSE BLD MANUAL STRIP-MCNC: 234 MG/DL (ref 74–99)
GLUCOSE BLD MANUAL STRIP-MCNC: 73 MG/DL (ref 74–99)
GLUCOSE BLD MANUAL STRIP-MCNC: 82 MG/DL (ref 74–99)
GLUCOSE SERPL-MCNC: 85 MG/DL (ref 65–99)
HCT VFR BLD AUTO: 35.1 % (ref 41–52)
HGB BLD-MCNC: 11.4 G/DL (ref 13.5–17.5)
IMM GRANULOCYTES # BLD AUTO: 0.08 X10*3/UL (ref 0–0.5)
IMM GRANULOCYTES NFR BLD AUTO: 0.8 % (ref 0–0.9)
LYMPHOCYTES # BLD AUTO: 0.65 X10*3/UL (ref 0.8–3)
LYMPHOCYTES NFR BLD AUTO: 6.6 %
MAGNESIUM SERPL-MCNC: 2 MG/DL (ref 1.6–3.1)
MCH RBC QN AUTO: 32.6 PG (ref 26–34)
MCHC RBC AUTO-ENTMCNC: 32.5 G/DL (ref 32–36)
MCV RBC AUTO: 100 FL (ref 80–100)
MONOCYTES # BLD AUTO: 0.69 X10*3/UL (ref 0.05–0.8)
MONOCYTES NFR BLD AUTO: 7 %
NEUTROPHILS # BLD AUTO: 8.34 X10*3/UL (ref 1.6–5.5)
NEUTROPHILS NFR BLD AUTO: 85 %
NRBC BLD-RTO: 0 /100 WBCS (ref 0–0)
OVALOCYTES BLD QL SMEAR: NORMAL
PHOSPHATE SERPL-MCNC: 2.1 MG/DL (ref 2.5–4.5)
PLATELET # BLD AUTO: 108 X10*3/UL (ref 150–450)
POTASSIUM SERPL-SCNC: 3.9 MMOL/L (ref 3.4–5.1)
PROT SERPL-MCNC: 5.5 G/DL (ref 5.9–7.9)
RBC # BLD AUTO: 3.5 X10*6/UL (ref 4.5–5.9)
RBC MORPH BLD: NORMAL
SODIUM SERPL-SCNC: 139 MMOL/L (ref 133–145)
WBC # BLD AUTO: 9.8 X10*3/UL (ref 4.4–11.3)

## 2024-05-25 PROCEDURE — 2500000001 HC RX 250 WO HCPCS SELF ADMINISTERED DRUGS (ALT 637 FOR MEDICARE OP): Performed by: INTERNAL MEDICINE

## 2024-05-25 PROCEDURE — 97535 SELF CARE MNGMENT TRAINING: CPT | Mod: GO,CO

## 2024-05-25 PROCEDURE — 2500000004 HC RX 250 GENERAL PHARMACY W/ HCPCS (ALT 636 FOR OP/ED): Performed by: HOSPITALIST

## 2024-05-25 PROCEDURE — 2500000002 HC RX 250 W HCPCS SELF ADMINISTERED DRUGS (ALT 637 FOR MEDICARE OP, ALT 636 FOR OP/ED): Performed by: INTERNAL MEDICINE

## 2024-05-25 PROCEDURE — 94640 AIRWAY INHALATION TREATMENT: CPT

## 2024-05-25 PROCEDURE — 84100 ASSAY OF PHOSPHORUS: CPT | Performed by: INTERNAL MEDICINE

## 2024-05-25 PROCEDURE — 2500000005 HC RX 250 GENERAL PHARMACY W/O HCPCS: Performed by: NURSE PRACTITIONER

## 2024-05-25 PROCEDURE — 2500000002 HC RX 250 W HCPCS SELF ADMINISTERED DRUGS (ALT 637 FOR MEDICARE OP, ALT 636 FOR OP/ED): Mod: MUE | Performed by: INTERNAL MEDICINE

## 2024-05-25 PROCEDURE — 2500000004 HC RX 250 GENERAL PHARMACY W/ HCPCS (ALT 636 FOR OP/ED): Performed by: INTERNAL MEDICINE

## 2024-05-25 PROCEDURE — 85025 COMPLETE CBC W/AUTO DIFF WBC: CPT | Performed by: INTERNAL MEDICINE

## 2024-05-25 PROCEDURE — 83735 ASSAY OF MAGNESIUM: CPT | Performed by: INTERNAL MEDICINE

## 2024-05-25 PROCEDURE — 84075 ASSAY ALKALINE PHOSPHATASE: CPT | Performed by: INTERNAL MEDICINE

## 2024-05-25 PROCEDURE — 36415 COLL VENOUS BLD VENIPUNCTURE: CPT | Performed by: INTERNAL MEDICINE

## 2024-05-25 PROCEDURE — 2500000005 HC RX 250 GENERAL PHARMACY W/O HCPCS: Performed by: INTERNAL MEDICINE

## 2024-05-25 PROCEDURE — 82947 ASSAY GLUCOSE BLOOD QUANT: CPT

## 2024-05-25 PROCEDURE — 97530 THERAPEUTIC ACTIVITIES: CPT | Mod: GO,CO

## 2024-05-25 PROCEDURE — 2060000001 HC INTERMEDIATE ICU ROOM DAILY

## 2024-05-25 RX ORDER — POTASSIUM CHLORIDE 20 MEQ/1
20 TABLET, EXTENDED RELEASE ORAL 2 TIMES DAILY
Status: COMPLETED | OUTPATIENT
Start: 2024-05-25 | End: 2024-05-27

## 2024-05-25 RX ORDER — METOPROLOL TARTRATE 1 MG/ML
5 INJECTION, SOLUTION INTRAVENOUS EVERY 4 HOURS PRN
Status: DISCONTINUED | OUTPATIENT
Start: 2024-05-25 | End: 2024-05-28 | Stop reason: HOSPADM

## 2024-05-25 RX ORDER — METOPROLOL SUCCINATE 25 MG/1
25 TABLET, EXTENDED RELEASE ORAL DAILY
Status: DISCONTINUED | OUTPATIENT
Start: 2024-05-25 | End: 2024-05-28 | Stop reason: HOSPADM

## 2024-05-25 RX ORDER — FUROSEMIDE 10 MG/ML
40 INJECTION INTRAMUSCULAR; INTRAVENOUS EVERY 12 HOURS
Qty: 16 ML | Refills: 0 | Status: COMPLETED | OUTPATIENT
Start: 2024-05-25 | End: 2024-05-27

## 2024-05-25 RX ORDER — DEXAMETHASONE 6 MG/1
6 TABLET ORAL DAILY
Status: DISCONTINUED | OUTPATIENT
Start: 2024-05-25 | End: 2024-05-25

## 2024-05-25 RX ORDER — METOPROLOL TARTRATE 1 MG/ML
5 INJECTION, SOLUTION INTRAVENOUS ONCE
Status: COMPLETED | OUTPATIENT
Start: 2024-05-25 | End: 2024-05-25

## 2024-05-25 RX ADMIN — LACOSAMIDE 100 MG: 100 TABLET, FILM COATED ORAL at 08:09

## 2024-05-25 RX ADMIN — ROSUVASTATIN CALCIUM 10 MG: 10 TABLET, COATED ORAL at 21:04

## 2024-05-25 RX ADMIN — AMIODARONE HYDROCHLORIDE 200 MG: 200 TABLET ORAL at 08:09

## 2024-05-25 RX ADMIN — Medication 3 L/MIN: at 20:00

## 2024-05-25 RX ADMIN — PANTOPRAZOLE SODIUM 40 MG: 40 TABLET, DELAYED RELEASE ORAL at 06:36

## 2024-05-25 RX ADMIN — MIDODRINE HYDROCHLORIDE 10 MG: 10 TABLET ORAL at 08:09

## 2024-05-25 RX ADMIN — INSULIN LISPRO 2 UNITS: 100 INJECTION, SOLUTION INTRAVENOUS; SUBCUTANEOUS at 21:48

## 2024-05-25 RX ADMIN — TAMSULOSIN HYDROCHLORIDE 0.4 MG: 0.4 CAPSULE ORAL at 06:36

## 2024-05-25 RX ADMIN — FOLIC ACID 1 MG: 1 TABLET ORAL at 08:09

## 2024-05-25 RX ADMIN — IPRATROPIUM BROMIDE AND ALBUTEROL SULFATE 3 ML: 2.5; .5 SOLUTION RESPIRATORY (INHALATION) at 10:40

## 2024-05-25 RX ADMIN — METOPROLOL TARTRATE 12.5 MG: 25 TABLET, FILM COATED ORAL at 08:09

## 2024-05-25 RX ADMIN — IPRATROPIUM BROMIDE AND ALBUTEROL SULFATE 3 ML: 2.5; .5 SOLUTION RESPIRATORY (INHALATION) at 05:55

## 2024-05-25 RX ADMIN — LACOSAMIDE 100 MG: 100 TABLET, FILM COATED ORAL at 21:04

## 2024-05-25 RX ADMIN — APIXABAN 5 MG: 5 TABLET, FILM COATED ORAL at 08:09

## 2024-05-25 RX ADMIN — NYSTATIN 1 APPLICATION: 100000 POWDER TOPICAL at 10:00

## 2024-05-25 RX ADMIN — DEXAMETHASONE 6 MG: 6 TABLET ORAL at 15:34

## 2024-05-25 RX ADMIN — MIDODRINE HYDROCHLORIDE 10 MG: 10 TABLET ORAL at 12:15

## 2024-05-25 RX ADMIN — Medication 3 L/MIN: at 08:00

## 2024-05-25 RX ADMIN — MIDODRINE HYDROCHLORIDE 10 MG: 10 TABLET ORAL at 16:57

## 2024-05-25 RX ADMIN — POTASSIUM CHLORIDE 20 MEQ: 1500 TABLET, EXTENDED RELEASE ORAL at 21:04

## 2024-05-25 RX ADMIN — FUROSEMIDE 40 MG: 10 INJECTION, SOLUTION INTRAMUSCULAR; INTRAVENOUS at 15:33

## 2024-05-25 RX ADMIN — METOPROLOL TARTRATE 5 MG: 5 INJECTION INTRAVENOUS at 03:55

## 2024-05-25 RX ADMIN — APIXABAN 5 MG: 5 TABLET, FILM COATED ORAL at 21:03

## 2024-05-25 RX ADMIN — NYSTATIN 1 APPLICATION: 100000 POWDER TOPICAL at 21:05

## 2024-05-25 ASSESSMENT — PAIN - FUNCTIONAL ASSESSMENT
PAIN_FUNCTIONAL_ASSESSMENT: 0-10

## 2024-05-25 ASSESSMENT — COGNITIVE AND FUNCTIONAL STATUS - GENERAL
TOILETING: TOTAL
DRESSING REGULAR LOWER BODY CLOTHING: TOTAL
MOBILITY SCORE: 12
CLIMB 3 TO 5 STEPS WITH RAILING: TOTAL
TOILETING: TOTAL
WALKING IN HOSPITAL ROOM: A LOT
MOVING TO AND FROM BED TO CHAIR: A LOT
WALKING IN HOSPITAL ROOM: A LOT
DAILY ACTIVITIY SCORE: 11
DRESSING REGULAR UPPER BODY CLOTHING: A LOT
DRESSING REGULAR UPPER BODY CLOTHING: A LOT
EATING MEALS: A LITTLE
DAILY ACTIVITIY SCORE: 11
PERSONAL GROOMING: A LOT
MOBILITY SCORE: 12
PERSONAL GROOMING: A LOT
TURNING FROM BACK TO SIDE WHILE IN FLAT BAD: A LOT
HELP NEEDED FOR BATHING: A LOT
MOVING FROM LYING ON BACK TO SITTING ON SIDE OF FLAT BED WITH BEDRAILS: A LITTLE
HELP NEEDED FOR BATHING: A LOT
EATING MEALS: A LITTLE
STANDING UP FROM CHAIR USING ARMS: A LOT
DRESSING REGULAR LOWER BODY CLOTHING: TOTAL
MOVING TO AND FROM BED TO CHAIR: A LOT
EATING MEALS: A LITTLE
TURNING FROM BACK TO SIDE WHILE IN FLAT BAD: A LOT
DRESSING REGULAR LOWER BODY CLOTHING: TOTAL
TOILETING: TOTAL
DAILY ACTIVITIY SCORE: 12
PERSONAL GROOMING: A LITTLE
STANDING UP FROM CHAIR USING ARMS: A LOT
DRESSING REGULAR UPPER BODY CLOTHING: A LOT
CLIMB 3 TO 5 STEPS WITH RAILING: TOTAL
HELP NEEDED FOR BATHING: A LOT
MOVING FROM LYING ON BACK TO SITTING ON SIDE OF FLAT BED WITH BEDRAILS: A LITTLE

## 2024-05-25 ASSESSMENT — PAIN DESCRIPTION - DESCRIPTORS: DESCRIPTORS: SORE

## 2024-05-25 ASSESSMENT — PAIN SCALES - GENERAL
PAINLEVEL_OUTOF10: 0 - NO PAIN
PAINLEVEL_OUTOF10: 8

## 2024-05-25 ASSESSMENT — ACTIVITIES OF DAILY LIVING (ADL): HOME_MANAGEMENT_TIME_ENTRY: 25

## 2024-05-25 NOTE — NURSING NOTE
Pt previously seen by therapy at bedside, pt was sitting at side of bed, taking deep breaths, attempting to get more oxygen.  Of note, one of the nasal canula  was out of pt nose

## 2024-05-25 NOTE — PROGRESS NOTES
Darrell Arreola is a 78 y.o. male on day 8 of admission presenting with Closed head injury, initial encounter.    Subjective   The patient was seen and examined for lying in the bed.  Patient is very lethargic.  Patient medically open with RVR.  Patient was given IV metoprolol.  Heart rate is still running high.       Objective     Physical Exam  HEENT:  Head externally atraumatic,  extraocular movements intact, oral mucosa moist  Neck:  Supple, no JVP, no palpable adenopathy or thyromegaly.  No carotid bruit.  Chest:  Clear to auscultation and resonant.  Heart: Irregularly irregular rate and rhythm, no murmur or gallop could be appreciated.  Abdomen:  Soft, nontender, bowel sounds present, normoactive, no palpable hepatosplenomegaly.  Extremities:  No edema, pulses present, no cyanosis or clubbing.  CNS:  Patient alert, oriented to time, place and person.    No new deficit.  Cranial nerves 2-12 grossly intact  Skin:  No active rash.  Musculoskeletal:  No  apparent joint swelling or erythema, range of movement normal.  Last Recorded Vitals  Heart Rate:  [108-117]   Temp:  [36.3 °C (97.3 °F)-36.9 °C (98.4 °F)]   Resp:  [12-22]   BP: ()/(61-68)   SpO2:  [93 %-98 %]     Intake/Output last 3 Shifts:  I/O last 3 completed shifts:  In: 720 (6.7 mL/kg) [P.O.:720]  Out: 1440 (13.4 mL/kg) [Urine:1440 (0.4 mL/kg/hr)]  Weight: 107.1 kg     Relevant Results  Susceptibility data from last 90 days.  Collected Specimen Info Organism Ampicillin Cefazolin Cefazolin (uncomplicated UTIs only) Ceftriaxone Ciprofloxacin Gentamicin Nitrofurantoin Piperacillin/Tazobactam Tetracycline Trimethoprim/Sulfamethoxazole   04/26/24 Urine from Clean Catch/Voided Proteus mirabilis S R S S S S R S R S     Results for orders placed or performed during the hospital encounter of 05/17/24 (from the past 24 hour(s))   POCT GLUCOSE   Result Value Ref Range    POCT Glucose 197 (H) 74 - 99 mg/dL   POCT GLUCOSE   Result Value Ref Range    POCT Glucose  165 (H) 74 - 99 mg/dL   CBC and Auto Differential   Result Value Ref Range    WBC 9.8 4.4 - 11.3 x10*3/uL    nRBC 0.0 0.0 - 0.0 /100 WBCs    RBC 3.50 (L) 4.50 - 5.90 x10*6/uL    Hemoglobin 11.4 (L) 13.5 - 17.5 g/dL    Hematocrit 35.1 (L) 41.0 - 52.0 %     80 - 100 fL    MCH 32.6 26.0 - 34.0 pg    MCHC 32.5 32.0 - 36.0 g/dL    RDW 16.3 (H) 11.5 - 14.5 %    Platelets 108 (L) 150 - 450 x10*3/uL    Neutrophils % 85.0 40.0 - 80.0 %    Immature Granulocytes %, Automated 0.8 0.0 - 0.9 %    Lymphocytes % 6.6 13.0 - 44.0 %    Monocytes % 7.0 2.0 - 10.0 %    Eosinophils % 0.5 0.0 - 6.0 %    Basophils % 0.1 0.0 - 2.0 %    Neutrophils Absolute 8.34 (H) 1.60 - 5.50 x10*3/uL    Immature Granulocytes Absolute, Automated 0.08 0.00 - 0.50 x10*3/uL    Lymphocytes Absolute 0.65 (L) 0.80 - 3.00 x10*3/uL    Monocytes Absolute 0.69 0.05 - 0.80 x10*3/uL    Eosinophils Absolute 0.05 0.00 - 0.40 x10*3/uL    Basophils Absolute 0.01 0.00 - 0.10 x10*3/uL   Comprehensive Metabolic Panel   Result Value Ref Range    Glucose 85 65 - 99 mg/dL    Sodium 139 133 - 145 mmol/L    Potassium 3.9 3.4 - 5.1 mmol/L    Chloride 99 97 - 107 mmol/L    Bicarbonate 31 24 - 31 mmol/L    Urea Nitrogen 22 8 - 25 mg/dL    Creatinine 1.20 0.40 - 1.60 mg/dL    eGFR 62 >60 mL/min/1.73m*2    Calcium 8.3 (L) 8.5 - 10.4 mg/dL    Albumin 2.5 (L) 3.5 - 5.0 g/dL    Alkaline Phosphatase 117 35 - 125 U/L    Total Protein 5.5 (L) 5.9 - 7.9 g/dL    AST 61 (H) 5 - 40 U/L    Bilirubin, Total 3.0 (H) 0.1 - 1.2 mg/dL    ALT 40 5 - 40 U/L    Anion Gap 9 <=19 mmol/L   Magnesium   Result Value Ref Range    Magnesium 2.00 1.60 - 3.10 mg/dL   Phosphorus   Result Value Ref Range    Phosphorus 2.1 (L) 2.5 - 4.5 mg/dL   Morphology   Result Value Ref Range    RBC Morphology See Below     Ovalocytes Few     Coby Cells Many    POCT GLUCOSE   Result Value Ref Range    POCT Glucose 73 (L) 74 - 99 mg/dL   POCT GLUCOSE   Result Value Ref Range    POCT Glucose 82 74 - 99 mg/dL        Current  Facility-Administered Medications:     amiodarone (Pacerone) tablet 200 mg, 200 mg, oral, Daily, Daniele Ramirez MD, 200 mg at 05/25/24 0809    apixaban (Eliquis) tablet 5 mg, 5 mg, oral, BID, Daniele Ramirez MD, 5 mg at 05/25/24 0809    dextrose 50 % injection 12.5 g, 12.5 g, intravenous, q15 min PRN, Daniele Ramirez MD    dextrose 50 % injection 25 g, 25 g, intravenous, q15 min PRN, Daniele Ramirez MD    [Held by provider] famotidine (Pepcid) tablet 10 mg, 10 mg, oral, Daily, Daniele Ramirez MD    folic acid (Folvite) tablet 1 mg, 1 mg, oral, Daily, Daniele Ramirez MD, 1 mg at 05/25/24 0809    glucagon (Glucagen) injection 1 mg, 1 mg, intramuscular, q15 min PRN, Daniele Ramirez MD    glucagon (Glucagen) injection 1 mg, 1 mg, intramuscular, q15 min PRN, Daniele Ramirez MD    insulin lispro (HumaLOG) injection 0-5 Units, 0-5 Units, subcutaneous, Before meals & nightly, Daniele Ramirez MD, 1 Units at 05/24/24 2101    ipratropium-albuteroL (Duo-Neb) 0.5-2.5 mg/3 mL nebulizer solution 3 mL, 3 mL, nebulization, q4h PRN, Milan Sims MD, 3 mL at 05/25/24 1040    lacosamide (Vimpat) tablet 100 mg, 100 mg, oral, BID, Daniele Ramirez MD, 100 mg at 05/25/24 0809    metoprolol tartrate (Lopressor) tablet 12.5 mg, 12.5 mg, oral, BID, Daniele Ramirez MD, 12.5 mg at 05/25/24 0809    midodrine (Proamatine) tablet 10 mg, 10 mg, oral, TID, Daniele Ramirez MD, 10 mg at 05/25/24 1215    nystatin (Mycostatin) 100,000 unit/gram powder 1 Application, 1 Application, Topical, BID, Daniele Ramirez MD, 1 Application at 05/24/24 2104    oxygen (O2) therapy, , inhalation, Continuous - Inhalation, ELENITA Vilchis-CNP, 3 L/min at 05/25/24 0800    pantoprazole (ProtoNix) EC tablet 40 mg, 40 mg, oral, Daily before breakfast, 40 mg at 05/25/24 0636 **OR** pantoprazole (ProtoNix) injection 40 mg, 40 mg, intravenous, Daily before breakfast, Daniele Ramirez MD, 40 mg at 05/19/24 0512     rosuvastatin (Crestor) tablet 10 mg, 10 mg, oral, Nightly, Daniele Ramirez MD, 10 mg at 05/24/24 2102    [Held by provider] SITagliptin phosphate (Januvia) tablet 50 mg, 50 mg, oral, Daily, Daniele Ramirez MD    tamsulosin (Flomax) 24 hr capsule 0.4 mg, 0.4 mg, oral, Daily before breakfast, Daniele Ramirez MD, 0.4 mg at 05/25/24 0636   Assessment/Plan   Principal Problem:    Closed head injury, initial encounter  Altered mental status  Seizure disorder  History of CVA  Generalized weakness  Acute hypoxic respiratory failure  COVID-19  History of PEA arrest  Atrial fibrillation  Hypertension  Hyperlipidemia  Systolic congestive heart failure  Protein calorie malnutrition  Diabetes mellitus type 2 with hyperglycemia    Plan: Continue current medications supportive care.  Patient's heart rate is running high.  With the patient IV Lopressor boluses as needed.  Patient is lethargic.  Patient said that he was not able to sleep last night.  Only home metoprolol.  Patient continued to be lethargic due to ABG to check CO2 level and also check ammonia level.           Milan Sims MD

## 2024-05-25 NOTE — NURSING NOTE
Dr good previously at bedside, pt c/o of chest discomfort from coughing and requesting stool softener, dr de la rosa called

## 2024-05-25 NOTE — CARE PLAN
The patient's goals for the shift include      The clinical goals for the shift include stablize heart rate/maintain SpO2 greater than 92%    Over the shift, the patient did not make progress toward the following goals. Barriers to progression include Covid+. Recommendations to address these barriers include administer medications/interventions as ordered.

## 2024-05-25 NOTE — PROGRESS NOTES
Pulmonary Progress Note 05/25/24     Patient seen in follow-up for acute respiratory failure with hypoxia    Subjective   Interval History:   Case signed out to me by Dr. Newell.  First time evaluating.  Notes productive cough of yellow phlegm especially after breathing treatments.  Feels about the same    Objective   Vital signs in last 24 hours:  Temp:  [36.3 °C (97.3 °F)-36.9 °C (98.4 °F)] 36.7 °C (98.1 °F)  Heart Rate:  [108-117] 117  Resp:  [12-22] 20  BP: ()/(61-68) 101/64  FiO2 (%):  [36 %] 36 %    Intake/Output last 3 shifts:  I/O last 3 completed shifts:  In: 720 (6.7 mL/kg) [P.O.:720]  Out: 1440 (13.4 mL/kg) [Urine:1440 (0.4 mL/kg/hr)]  Weight: 107.1 kg   Intake/Output this shift:  I/O this shift:  In: -   Out: 650 [Urine:650]    Physical Exam  Vitals reviewed.   Constitutional:       Appearance: He is ill-appearing.      Interventions: Nasal cannula in place.   HENT:      Head: Normocephalic and atraumatic.      Mouth/Throat:      Mouth: Mucous membranes are moist.      Pharynx: Oropharynx is clear.   Eyes:      General: No scleral icterus.     Conjunctiva/sclera: Conjunctivae normal.   Cardiovascular:      Rate and Rhythm: Tachycardia present. Rhythm irregular.   Pulmonary:      Effort: Pulmonary effort is normal.      Breath sounds: Wheezing, rhonchi and rales present.   Musculoskeletal:      Right lower leg: No edema.      Left lower leg: No edema.   Skin:     General: Skin is warm and dry.   Neurological:      General: No focal deficit present.      Mental Status: He is alert.   Psychiatric:         Mood and Affect: Mood normal.         Behavior: Behavior normal.         Scheduled medications  amiodarone, 200 mg, oral, Daily  apixaban, 5 mg, oral, BID  [Held by provider] famotidine, 10 mg, oral, Daily  folic acid, 1 mg, oral, Daily  insulin lispro, 0-5 Units, subcutaneous, Before meals & nightly  lacosamide, 100 mg, oral, BID  metoprolol succinate XL, 25 mg, oral, Daily  midodrine,  10 mg, oral, TID  nystatin, 1 Application, Topical, BID  oxygen, , inhalation, Continuous - Inhalation  pantoprazole, 40 mg, oral, Daily before breakfast   Or  pantoprazole, 40 mg, intravenous, Daily before breakfast  rosuvastatin, 10 mg, oral, Nightly  [Held by provider] SITagliptin phosphate, 50 mg, oral, Daily  tamsulosin, 0.4 mg, oral, Daily before breakfast      Continuous medications     PRN medications  PRN medications: dextrose, dextrose, glucagon, glucagon, ipratropium-albuteroL, metoprolol     Labs:  Lab Results   Component Value Date     05/25/2024    K 3.9 05/25/2024    CL 99 05/25/2024    CO2 31 05/25/2024    BUN 22 05/25/2024    CREATININE 1.20 05/25/2024    GLUCOSE 85 05/25/2024    CALCIUM 8.3 (L) 05/25/2024     Lab Results   Component Value Date    WBC 9.8 05/25/2024    HGB 11.4 (L) 05/25/2024    HCT 35.1 (L) 05/25/2024     05/25/2024     (L) 05/25/2024       Imaging:  XR chest 2 views    Result Date: 5/24/2024  Interpreted By:  Fabrizio Hines, STUDY: XR CHEST 2 VIEWS; 5/24/2024 10:31 am   INDICATION: Signs/Symptoms:Fever, hypoxia   COMPARISON: 05/17/2024.   ACCESSION NUMBER(S): [id number]   ORDERING CLINICIAN: MICHAEL YAP   TECHNIQUE: Number of films: Two-view radiographs of the chest were obtained.   FINDINGS: The study is limited due to rotation, respiratory motion and poor inspiratory effort, with resultant crowding of the pulmonary vasculature. Previously seen lines and tubes have been removed. The cardiac silhouette appears prominent, exaggerated by the technique. Bilateral perihilar interstitial and mild alveolar infiltrates and small bilateral effusions are present. No pneumothorax is seen. Degenerative changes involve the spine and shoulders.       Limited study. Bilateral infiltrates and small effusions, most likely due to pulmonary edema/congestive heart failure. Correlate clinically and follow-up as needed.   Signed by: Fabrizio Hines 5/24/2024 10:38 AM Dictation  workstation:   [id number]    Electrocardiogram, 12-lead    Result Date: 5/20/2024  Atrial fibrillation with rapid ventricular response with premature ventricular or aberrantly conducted complexes Nonspecific intraventricular block Possible Inferior infarct (cited on or before 08-OCT-2023) Abnormal ECG When compared with ECG of 20-APR-2024 13:54, No significant change was found Confirmed by Simeon Lucas ([id number]) on 5/20/2024 7:35:04 AM    XR chest 1 view    Result Date: 5/17/2024  Interpreted By:  Gabriel Dickson, STUDY: Chest dated  5/17/2024.   INDICATION: Signs/Symptoms:OG placement confirmation   COMPARISON: Chest dated 05/17/2024.   ACCESSION NUMBER(S): [id number]   ORDERING CLINICIAN: GERONIMO GELLER   TECHNIQUE: One view of the chest.   FINDINGS: There is an enteric tube with the tip off of the inferior level of the exam. Side port projects over the left upper quadrant. There is likely an endotracheal tube although not well visualized. Its tip as seen on this exam is a proximally 1.7 cm from the mohan. There are bibasilar appearing opacities although this may be projectional. There is a right IJ line with the tip projecting over the lower SVC. Cardiomediastinal silhouette is enlarged but similar to the prior exam. Degenerative changes seen of the spine and shoulders.       1. As seen on this exam the endotracheal tube tip appears to be a proximally 1.7 cm from the mohan. A repeat chest radiograph is recommended to confirm positioning as this may be projectional. 2. Enteric tube with side port over the left hemiabdomen and tip off the inferior level of the exam. 3. Possible bibasilar atelectasis.   MACRO: None   Signed by: Gabriel Dickson 5/17/2024 9:01 PM Dictation workstation:   [id number]    ECG 12 lead    Result Date: 5/17/2024  Atrial fibrillation with rapid ventricular response Nonspecific intraventricular block T wave abnormality, consider lateral ischemia Abnormal ECG No previous ECGs available  Confirmed by Edward Dang ([id number]) on 5/17/2024 3:10:56 PM    ECG 12 lead    Result Date: 5/17/2024  Atrial fibrillation Nonspecific intraventricular block Inferior infarct (cited on or before 08-OCT-2023) Abnormal ECG When compared with ECG of 17-MAY-2024 08:19, (unconfirmed) No significant change was found Confirmed by Edward Dang ([id number]) on 5/17/2024 3:09:55 PM    XR chest 1 view    Result Date: 5/17/2024  Interpreted By:  Torres Mcneill, STUDY: XR CHEST 1 VIEW;  5/17/2024 11:58 am   INDICATION: Signs/Symptoms:central line placement.   COMPARISON: 05/17/2024 at 8:53 a.m.   ACCESSION NUMBER(S): [id number]   ORDERING CLINICIAN: JONATAN FRY   FINDINGS: Endotracheal tube terminates about 3 cm above the mohan. Right IJ central line terminates in the lower SVC. No definite focal infiltrate. Cephalization of the pulmonary vessels likely related to supine positioning. Cardiomediastinal silhouette unchanged. Pleural effusions not excluded.       Post endotracheal tube and central line placement. Otherwise no active disease in the chest.   MACRO: None   Signed by: Torres Mcneill 5/17/2024 12:21 PM Dictation workstation:   [id number]    CT cervical spine wo IV contrast    Result Date: 5/17/2024  Interpreted By:  Florencio Morrell, STUDY: CT CERVICAL SPINE WO IV CONTRAST;  5/17/2024 9:55 am   INDICATION: Signs/Symptoms:fall, on Eliquis.   COMPARISON: Previous exam is from 02/20/2020   ACCESSION NUMBER(S): [id number]   ORDERING CLINICIAN: JONATAN FRY   TECHNIQUE: Thin section axial images were obtained from the skull base down through the thoracic inlet. Sagittal and coronal reconstruction images were generated. Soft tissue, lung, and bone windows were reviewed.   FINDINGS: VERTEBRAL BODIES AND POSTERIOR ELEMENTS: There is moderate endplate osteophytosis from C5-6 through C7-T1. Mild-to-moderate disc space narrowing at C5-6 and C7-T1 with moderate disc space narrowing at C6-7. Multilevel inter facet hypertrophy  with spur formation. Uncovertebral hypertrophy with spur formation on the right at C5-6 and on the left at C6-7. No cervical spine compression fracture. No posterior element fracture. No destructive bone lesion. No listhesis.   SPINAL CANAL: No gross disc herniation.   NECK SOFT TISSUES: Within normal limits.   LUNG APICES: Imaged portion of the lung apices are within normal limits.   SKULL BASE: Within normal limits.       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 No CT evidence of cervical spine fracture in this exam.   MACRO: None   Signed by: Florencio Morrell 5/17/2024 10:15 AM Dictation workstation:   [id number]    CT head wo IV contrast    Result Date: 5/17/2024  Interpreted By:  Florencio Morrell, STUDY: CT HEAD WO IV CONTRAST;  5/17/2024 9:55 am   INDICATION: Signs/Symptoms:fall, on Eliquis.   COMPARISON: Comparison study is from 04/20/2024..   ACCESSION NUMBER(S): [id number]   ORDERING CLINICIAN: JONATAN FRY   TECHNIQUE: Routine axial images were obtained from the skull base through the vertex.  Sagittal and coronal reconstruction images were generated. Brain, subdural, and bone windows were reviewed. N/A Unavailable   FINDINGS: INTRACRANIAL: Mild prominence of ventricles and sulci. There is mild patchy hypodensity throughout the deep periventricular white matter. No acute intracranial bleed, midline shift, or focal mass effect. No destructive bone lesion. No depressed skull fracture. Skullbase arterial calcifications in the carotid siphons and vertebral arteries.   EXTRACRANIAL: Visualized paranasal sinuses were clear. Visualized mastoid air cells were clear.       No depressed skull fracture. No acute intracranial bleed or focal mass effect.   Mild volume loss.   Mild chronic white matter ischemic disease in the deep periventricular regions.   MACRO: None   Signed by: Florencio Morrell 5/17/2024 10:08 AM Dictation workstation:   [id number]    XR chest 1 view    Result Date: 5/17/2024  Interpreted By:  Alise  Sandee, STUDY: XR CHEST 1 VIEW;  5/17/2024 8:56 am   INDICATION: Signs/Symptoms:Chest Pain.   COMPARISON: 04/20/2024   ACCESSION NUMBER(S): [id number]   ORDERING CLINICIAN: JONATAN FRY   FINDINGS:         CARDIOMEDIASTINAL SILHOUETTE: Cardiomediastinal silhouette is normal in size and configuration.   LUNGS: Lungs are clear.   ABDOMEN: No remarkable upper abdominal findings.   BONES: No acute osseous changes.       1.  No evidence of acute cardiopulmonary process.       MACRO: None   Signed by: Sandee Carrero 5/17/2024 9:01 AM Dictation workstation:   [id number]              Assessment/Plan   Principal Problem:    Closed head injury, initial encounter    Acute respiratory failure with hypoxia: Combination of airway disease and pulmonary edema.  Yesterday's chest x-ray limited secondary to portable technique and low lung volumes creating some vascular crowding.  Continue with supplemental oxygen  Probable acute on chronic systolic heart failure: As reason for increased oxygen requirements per my review of the chart yesterday  Diuresis  Contribution of his tachycardia and atrial fibrillation.  Rate control per primary.  Avoid significant volume expansion.  COVID-19:  Completed remdesivir from 517 through 521  First time evaluating and does sound rhonchorous and slight wheezing on exam.  Although do not believe COVID-19 pneumonitis, will add Decadron to hopefully cover for all of this  Continue with bronchodilators as needed.  Will consider mucolytic's    Will continue to follow with you       LOS: 8 days       Tamiko Law MD  Pulmonary/Critical Care medicine

## 2024-05-25 NOTE — NURSING NOTE
Assume care of patient. Pt lying quietly in bed watching tv. Pt requesting fresh water, will provide. Denies any further needs at this time. Call light within reach. Continue with plan of care.

## 2024-05-25 NOTE — NURSING NOTE
HR sustaining 120s-130s called Dr Sims and was ordered to give 5 mg IV Lopressor.   Hr now running 110s-120s

## 2024-05-25 NOTE — PROGRESS NOTES
Pulmonary Progress Note    Darrell Arreola is a 78 y.o. male on day 7 of admission presenting with Closed head injury, initial encounter.    Subjective   Objective   Vital Signs      5/24/2024     4:00 AM 5/24/2024     5:45 AM 5/24/2024     6:00 AM 5/24/2024     7:22 AM 5/24/2024    11:03 AM 5/24/2024     3:35 PM 5/24/2024     7:34 PM   Vitals   Systolic    100 100 95 90   Diastolic    60 71 62 61   Heart Rate  123    114    Temp 37.8 °C (100 °F)   36.8 °C (98.2 °F) 36.8 °C (98.2 °F) 36.7 °C (98.1 °F) 36.5 °C (97.7 °F)   Resp 16   20 20 21    Weight (lb) 236.11  236.11       BMI 31.15 kg/m2  31.15 kg/m2       BSA (m2) 2.35 m2  2.35 m2           Oxygen Therapy  SpO2: 98 %  Medical Gas Therapy: Supplemental oxygen  O2 Delivery Method: Nasal cannula         Intake/Output previous 24 hours:    Intake/Output Summary (Last 24 hours) at 5/24/2024 2232  Last data filed at 5/24/2024 1700  Gross per 24 hour   Intake 720 ml   Output 1175 ml   Net -455 ml       Physical Exam  Vitals reviewed.   Constitutional:       Appearance: Normal appearance.   HENT:      Head: Normocephalic and atraumatic.      Mouth/Throat:      Mouth: Mucous membranes are moist.   Eyes:      Extraocular Movements: Extraocular movements intact.      Pupils: Pupils are equal, round, and reactive to light.   Cardiovascular:      Rate and Rhythm: Normal rate and regular rhythm.   Pulmonary:      Effort: Pulmonary effort is normal.      Breath sounds: Normal breath sounds and air entry.   Abdominal:      General: Abdomen is flat. Bowel sounds are normal.      Palpations: Abdomen is soft.   Musculoskeletal:         General: Normal range of motion.      Cervical back: Normal range of motion and neck supple.   Skin:     General: Skin is warm and dry.   Neurological:      General: No focal deficit present.      Mental Status: He is alert and oriented to person, place, and time. Mental status is at baseline.       ...  Lines and Tubes:  Peripheral IV 05/22/24  20 G Left;Anterior Forearm (Active)   Placement Date/Time: 05/22/24 1544   Size (Gauge): 20 G  Orientation: Left;Anterior  Location: Forearm  Site Prep: Chlorhexidine   Local Anesthetic: None  Technique: Ultrasound guidance  Placed by: jorge alberto SHARPE  Insertion attempts: 1  P...   Number of days: 2         Scheduled medications  amiodarone, 200 mg, oral, Daily  apixaban, 5 mg, oral, BID  [Held by provider] famotidine, 10 mg, oral, Daily  folic acid, 1 mg, oral, Daily  insulin lispro, 0-5 Units, subcutaneous, Before meals & nightly  lacosamide, 100 mg, oral, BID  metoprolol tartrate, 12.5 mg, oral, BID  midodrine, 10 mg, oral, TID  nystatin, 1 Application, Topical, BID  ondansetron, , ,   oxygen, , inhalation, Continuous - Inhalation  pantoprazole, 40 mg, oral, Daily before breakfast   Or  pantoprazole, 40 mg, intravenous, Daily before breakfast  rosuvastatin, 10 mg, oral, Nightly  [Held by provider] SITagliptin phosphate, 50 mg, oral, Daily  tamsulosin, 0.4 mg, oral, Daily before breakfast      Continuous medications     PRN medications  PRN medications: dextrose, dextrose, glucagon, glucagon, ipratropium-albuteroL, ondansetron    Relevant Results  Results from last 7 days   Lab Units 05/24/24  0511 05/23/24  0555 05/22/24  0620   WBC AUTO x10*3/uL 7.0 6.7 5.6   HEMOGLOBIN g/dL 11.6* 11.6* 11.0*   HEMATOCRIT % 36.1* 36.7* 34.8*   PLATELETS AUTO x10*3/uL 96* 94* 85*      Results from last 7 days   Lab Units 05/24/24  0510 05/23/24  0555 05/22/24  0620   SODIUM mmol/L 138 139 141   POTASSIUM mmol/L 3.8 4.0 4.1   CHLORIDE mmol/L 101 102 107   CO2 mmol/L 26 29 28   BUN mg/dL 25 29* 35*   CREATININE mg/dL 1.10 1.10 1.10   GLUCOSE mg/dL 94 89 84   CALCIUM mg/dL 8.2* 8.2* 7.9*          XR chest 2 views 05/24/2024    Narrative  Interpreted By:  Fabrizio Hines,  STUDY:  XR CHEST 2 VIEWS; 5/24/2024 10:31 am    INDICATION:  Signs/Symptoms:Fever, hypoxia    COMPARISON:  05/17/2024.    ACCESSION  NUMBER(S):  [id number]    ORDERING CLINICIAN:  MICHAEL YAP    TECHNIQUE:  Number of films: Two-view radiographs of the chest were obtained.    FINDINGS:  The study is limited due to rotation, respiratory motion and poor  inspiratory effort, with resultant crowding of the pulmonary  vasculature. Previously seen lines and tubes have been removed.  The cardiac silhouette appears prominent, exaggerated by the  technique. Bilateral perihilar interstitial and mild alveolar  infiltrates and small bilateral effusions are present. No  pneumothorax is seen. Degenerative changes involve the spine and  shoulders.    Impression  Limited study. Bilateral infiltrates and small effusions, most likely  due to pulmonary edema/congestive heart failure. Correlate clinically  and follow-up as needed.    Signed by: Fabrizio Hines 5/24/2024 10:38 AM  Dictation workstation:   [id number]      Patient Active Problem List   Diagnosis    Cellulitis    Contusion of thigh    Non-traumatic rhabdomyolysis    Atrial fibrillation (Multi)    Diabetes mellitus (Multi)    PVD (peripheral vascular disease) (CMS-[facility])    Benign essential hypertension    Depression    Asthenia    At risk for falls    Coronary artery disease    Obesity    Stroke-like symptoms    Diabetes mellitus type 2 in obese    History of cerebrovascular accident    Altered mental status, unspecified altered mental status type    Acute ischemic left ARIEL stroke (Multi)    Closed head injury, initial encounter     Assessment/Plan   Principal Problem:    Closed head injury, initial encounter  Fall vs syncopal episode  Altered mental status - metabolic encephalopathy  Seizure disorder   History of CVA  Generalized weakness  Acute hypoxic respiratory failure  COVID-19  Status post PEA arrest post anaphylactic reaction - Lidocaine  Atrial fibrillation  Oral anticoagulation  Hypotension  Hyperlipidemia  Systolic congestive heart failure  Acute kidney injury on chronic kidney  disease  Hypophosphatemia  Hypocalcemia -- hypoalbuminemia   Protein calorie malnutrition  Type 2 diabetes mellitus with hypoglycemia  Tobacco use  Microcytic anemia  Thrombocytopenia worsening        Worsening resp status  COVID-19.  Completed remdesivir  Continue with rate control measures  Wean oxygen as tolerated     Diuresis with lasix  Signs of pulmonary edema  Oxygen 6lpm - increase from previouis    Ulises Newell MD  [facility]

## 2024-05-25 NOTE — CONSULTS
Consults  History Of Present Illness:    Darrell Arreola is a 78 y.o. male presenting with medical history significant for having history of diabetes mellitus type 2, hypertension, history of CVA and congestive heart failure was admitted with symptoms of shortness of breath.  Subsequently patient went into cardiac arrest requiring intubation and admission to intensive care unit.  Subsequently was extubated and patient was transferred to the floor.  Cardio consulted for evaluation of low blood pressure and atrial fibrillation.  Patient denies any symptoms of chest discomfort.  Denies symptoms of shortness of breath.  And symptoms of palpitations.  He is feeling better..     Last Recorded Vitals:  Vitals:    05/25/24 0000 05/25/24 0300 05/25/24 0800 05/25/24 0829   BP:  113/68  101/64   BP Location:  Left arm  Left arm   Patient Position:  Lying  Lying   Pulse: (!) 117      Resp:  20  20   Temp:  36.9 °C (98.4 °F)  36.7 °C (98.1 °F)   TempSrc:  Temporal  Temporal   SpO2:  95% 98%    Weight:       Height:           Last Labs:  CBC - 5/25/2024:  5:55 AM  9.8 11.4 108    35.1      CMP - 5/25/2024:  5:55 AM  8.3 5.5 61 --- 3.0   2.1 2.5 40 117      PTT - 4/20/2024:  3:41 PM  1.6   16.0 38.4     Hemoglobin A1C   Date/Time Value Ref Range Status   04/21/2024 07:32 AM 6.7 (H) See below % Final   04/04/2024 11:13 AM 6.5 (H) 4.3 - 5.6 % Final     Comment:     American Diabetes Association guidelines indicate that patients with HgbA1c in the range 5.7-6.4% are at increased risk for development of diabetes, and intervention by lifestyle modification may be beneficial. HgbA1c greater or equal to 6.5% is considered diagnostic of diabetes.   10/10/2023 11:42 AM 5.3 See below % Final     LDL Calculated   Date/Time Value Ref Range Status   04/21/2024 07:30 AM 50 (L) 65 - 130 mg/dL Final   07/02/2023 04:38 AM 23 (L) 65 - 130 MG/DL Final   05/17/2019 06:01 AM 42 (L) 65 - 130 MG/DL Final      Last I/O:  I/O last 3 completed shifts:  In:  "720 (6.7 mL/kg) [P.O.:720]  Out: 1440 (13.4 mL/kg) [Urine:1440 (0.4 mL/kg/hr)]  Weight: 107.1 kg     Past Cardiology Tests (Last 3 Years):  EKG:  ECG 12 lead 05/17/2024      ECG 12 lead 05/17/2024      Electrocardiogram, 12-lead 05/17/2024      ECG 12 lead 04/20/2024      Electrocardiogram, 12-lead PRN ACS symptoms 10/10/2023    Echo:  Transthoracic Echo (TTE) Complete 10/10/2023    Ejection Fractions:  No results found for: \"EF\"  Cath:  No results found for this or any previous visit from the past 1095 days.    Stress Test:  No results found for this or any previous visit from the past 1095 days.    Cardiac Imaging:  No results found for this or any previous visit from the past 1095 days.      Past Medical History:  He has a past medical history of CHF (congestive heart failure) (Multi), Diabetes mellitus (Multi), Heart disease, Hypertension, and Stroke (Multi).    Past Surgical History:  He has a past surgical history that includes CT guided percutaneous biopsy bone deep (02/21/2020); CT guided percutaneous biopsy bone deep (03/24/2020); MR angio head wo IV contrast (05/17/2019); Toe amputation; Tonsillectomy; and MR angio head wo IV contrast (10/9/2023).      Social History:  He reports that he has quit smoking. His smoking use included cigars and cigarettes. He has never used smokeless tobacco. He reports that he does not drink alcohol and does not use drugs.    Family History:  Family History   Family history unknown: Yes        Allergies:  Cefazolin, Latex, Lidocaine, Quinine, and Iodine    Inpatient Medications:  Scheduled medications   Medication Dose Route Frequency    amiodarone  200 mg oral Daily    apixaban  5 mg oral BID    [Held by provider] famotidine  10 mg oral Daily    folic acid  1 mg oral Daily    insulin lispro  0-5 Units subcutaneous Before meals & nightly    lacosamide  100 mg oral BID    metoprolol succinate XL  25 mg oral Daily    midodrine  10 mg oral TID    nystatin  1 Application Topical " BID    oxygen   inhalation Continuous - Inhalation    pantoprazole  40 mg oral Daily before breakfast    Or    pantoprazole  40 mg intravenous Daily before breakfast    rosuvastatin  10 mg oral Nightly    [Held by provider] SITagliptin phosphate  50 mg oral Daily    tamsulosin  0.4 mg oral Daily before breakfast     PRN medications   Medication    dextrose    dextrose    glucagon    glucagon    ipratropium-albuteroL    metoprolol     Continuous Medications   Medication Dose Last Rate     Outpatient Medications:  Current Outpatient Medications   Medication Instructions    amiodarone (PACERONE) 200 mg, oral, Daily    apixaban (ELIQUIS) 5 mg, oral, 2 times daily    famotidine (PEPCID) 10 mg, oral, Daily    folic acid (FOLVITE) 1 mg, oral, Daily    lacosamide (VIMPAT) 100 mg, oral, 2 times daily    midodrine (PROAMATINE) 10 mg, oral, 3 times daily (morning, midday, late afternoon)    nystatin (Mycostatin) 100,000 unit/gram powder 1 Application, Topical, 2 times daily    rosuvastatin (CRESTOR) 10 mg, oral, Daily    SITagliptin phosphate (JANUVIA) 50 mg, oral, Daily    tamsulosin (FLOMAX) 0.4 mg, oral, Daily before breakfast   Review of systems contusional, cardiopulmonary, GI,  musculoskeletal neuroendocrine and psych were reviewed and all pertinent findings were noted other than those facts mentioned HPI    Physical Exam:  HEENT PERRLA neck is supple lungs clear to auscultation bilaterally with good air entry.  S1-S2 present with no significant murmurs abdomen is soft and nontender extremity shows no evidence of pedal edema neuro is grossly nonfocal     Assessment/Plan   1 paroxysmal atrial fibrillation patient remains on Eliquis.  His ventricular rate response is elevated with a heart rate 1 and 10 bpm.  However his blood pressure is quite soft hence we will be cautious in initiating AV zeynep blocking agents.  2.  Severe cardiomyopathy-low blood pressure will be cautious and patient patient back on diuretics which  should be given as needed basis for now  3.  Hypotension for which he remains on midodrine.  Discussed with Dr. Levi mclaughlin.  Note was initiated on 22 May but completed at a later date  Peripheral IV 05/22/24 20 G Left;Anterior Forearm (Active)   Site Assessment Clean;Intact;Dry 05/25/24 0900   Dressing Status Clean;Dry 05/25/24 0900   Number of days: 3       Code Status:  Full Code    I spent 30 minutes in the professional and overall care of this patient.        Simeon Lucas MD

## 2024-05-25 NOTE — PROGRESS NOTES
Occupational Therapy    Occupational Therapy Treatment    Name: Darrell Arreola  MRN: [id number]  : 1945  Date: 24  Time Calculation  Start Time: 159  Stop Time: 259  Time Calculation (min): 60 min    Assessment:agreeable to therapy  Evaluation/Treatment Tolerance: Patient limited by fatigue  Medical Staff Made Aware: Yes  End of Session Communication: Bedside nurse  End of Session Patient Position: Bed, 2 rail up, Alarm off, not on at start of session  Plan:  Treatment Interventions: ADL retraining, Functional transfer training, UE strengthening/ROM, Endurance training, Patient/family training, Compensatory technique education  OT Frequency: 4 times per week  OT Discharge Recommendations: Moderate intensity level of continued care  Equipment Recommended upon Discharge: Wheeled walker  OT Recommended Transfer Status: Assist of 2  OT - OK to Discharge: Yes    Subjective   Previous Visit Info:  OT Last Visit  OT Received On: 24  General:  General  Prior to Session Communication: Bedside nurse  Patient Position Received: Bed, 2 rail up, Alarm off, not on at start of session  Preferred Learning Style: verbal, visual  General Comment: COVID+  Precautions:COVID+      Vitals:  Vital Signs  Heart Rate Source: Monitor  SpO2: (!) 88 % (88 with sitting on EOB, returned to 93 once supine. 3 liters oxygen, continuously monitored during sessoin)  BP: 97/64  BP Location: Left arm  BP Method: Automatic  Patient Position: Lying  Pain Assessment:  Pain Assessment  Pain Assessment: 0-10  Pain Score: 8  Pain Type: Acute pain  Pain Location: Chest  Pain Descriptors: Sore  Pain Frequency: Intermittent  Pain Onset: Other (Comment) (with coughing, notified nursing)     Objective   Cognition:WFL     Activities of Daily Living: Feeding  Feeding Level of Assistance: Setup  Feeding Where Assessed: Bed level  Feeding Comments: to open containers         Bed Mobility/Transfers: Bed Mobility  Bed Mobility: Yes  Bed Mobility  1  Bed Mobility 1: Supine to sitting  Level of Assistance 1: Maximum assistance, Minimal verbal cues  Bed Mobility Comments 1: to EOB, sat for 2 min in forward flexed, R lateral lean position  Bed Mobility 2  Bed Mobility  2: Rolling right, Rolling left  Level of Assistance 2: +2, Moderate assistance  Bed Mobility Comments 2: unable to perform polly care, dependent  Nursing recommended pt lay down due to desaturation to 87%. Returned to 93% within 1 min of returning to  supine.      Sitting Balance:  Static Sitting Balance  Static Sitting-Balance Support: Right upper extremity supported, Left upper extremity supported, Feet supported  Static Sitting-Level of Assistance: Moderate assistance  Static Sitting-Comment/Number of Minutes: 2 min. , R lateral lean, forward flexed position  Outcome Measures:  [facility] Daily Activity  Putting on and taking off regular lower body clothing: Total  Bathing (including washing, rinsing, drying): A lot  Putting on and taking off regular upper body clothing: A lot  Toileting, which includes using toilet, bedpan or urinal: Total  Taking care of personal grooming such as brushing teeth: A lot  Eating Meals: A little  Daily Activity - Total Score: 11        Education Documentation  Handouts, taught by JAYME Cuba at 5/25/2024  2:55 PM.  Learner: Patient  Readiness: Acceptance  Method: Explanation, Demonstration  Response: Verbalizes Understanding, Needs Reinforcement    Body Mechanics, taught by JAYME Cuba at 5/25/2024  2:55 PM.  Learner: Patient  Readiness: Acceptance  Method: Explanation, Demonstration  Response: Verbalizes Understanding, Needs Reinforcement    Precautions, taught by JAYME Cuba at 5/25/2024  2:55 PM.  Learner: Patient  Readiness: Acceptance  Method: Explanation, Demonstration  Response: Verbalizes Understanding, Needs Reinforcement    Home Exercise Program, taught by JAYME Cuba at 5/25/2024  2:55 PM.  Learner: Patient  Readiness:  Acceptance  Method: Explanation, Demonstration  Response: Verbalizes Understanding, Needs Reinforcement    ADL Training, taught by JAYME Cuba at 5/25/2024  2:55 PM.  Learner: Patient  Readiness: Acceptance  Method: Explanation, Demonstration  Response: Verbalizes Understanding, Needs Reinforcement    Education Comments  No comments found.      Goals:  Encounter Problems       Encounter Problems (Active)       OT Goals       ADLs (Progressing)       Start:  05/22/24    Expected End:  06/12/24       Patient will complete ADL tasks with Mod I, using AE as needed, in order to increase patient's safety and independence with self-care tasks.         Functional Transfers (Progressing)       Start:  05/22/24    Expected End:  06/12/24       Patient will complete functional transfers with Mod I in order to increase safety and independence with daily tasks.         B UE Strengthening (Progressing)       Start:  05/22/24    Expected End:  06/12/24       Patient will increase B UE strength to 4+/5 for functional transfers.         Functional Mobility (Progressing)       Start:  05/22/24    Expected End:  06/12/24       Patient will demonstrate the ability to complete item retrieval and functional mobility with Mod I in order to increase safety and independence with daily tasks.

## 2024-05-25 NOTE — CARE PLAN
The patient's goals for the shift include  no symptoms of resp. distress    The clinical goals for the shift include monitor BP and HR    Over the shift, the patient did not make progress toward the following goals. Barriers to progression include -120s and BP 90s Systolic. Recommendations to address these barriers include scheduled medications and follow parameters.

## 2024-05-25 NOTE — CARE PLAN
The patient's goals for the shift include      The clinical goals for the shift include monitor BP and HR    Over the shift, the patient did not make progress toward the following goals. Barriers to progression include out of bed more frequently . Recommendations to address these barriers include education and support.

## 2024-05-26 LAB
ALBUMIN SERPL-MCNC: 2.5 G/DL (ref 3.5–5)
ALP BLD-CCNC: 130 U/L (ref 35–125)
ALT SERPL-CCNC: 41 U/L (ref 5–40)
ANION GAP SERPL CALC-SCNC: 8 MMOL/L
AST SERPL-CCNC: 52 U/L (ref 5–40)
BASOPHILS # BLD AUTO: 0.02 X10*3/UL (ref 0–0.1)
BASOPHILS NFR BLD AUTO: 0.3 %
BILIRUB SERPL-MCNC: 2.7 MG/DL (ref 0.1–1.2)
BUN SERPL-MCNC: 28 MG/DL (ref 8–25)
CALCIUM SERPL-MCNC: 8.4 MG/DL (ref 8.5–10.4)
CHLORIDE SERPL-SCNC: 95 MMOL/L (ref 97–107)
CO2 SERPL-SCNC: 33 MMOL/L (ref 24–31)
CREAT SERPL-MCNC: 1.4 MG/DL (ref 0.4–1.6)
EGFRCR SERPLBLD CKD-EPI 2021: 51 ML/MIN/1.73M*2
EOSINOPHIL # BLD AUTO: 0 X10*3/UL (ref 0–0.4)
EOSINOPHIL NFR BLD AUTO: 0 %
ERYTHROCYTE [DISTWIDTH] IN BLOOD BY AUTOMATED COUNT: 15.7 % (ref 11.5–14.5)
GLUCOSE BLD MANUAL STRIP-MCNC: 193 MG/DL (ref 74–99)
GLUCOSE BLD MANUAL STRIP-MCNC: 265 MG/DL (ref 74–99)
GLUCOSE BLD MANUAL STRIP-MCNC: 276 MG/DL (ref 74–99)
GLUCOSE BLD MANUAL STRIP-MCNC: 295 MG/DL (ref 74–99)
GLUCOSE SERPL-MCNC: 193 MG/DL (ref 65–99)
HCT VFR BLD AUTO: 37.1 % (ref 41–52)
HGB BLD-MCNC: 11.9 G/DL (ref 13.5–17.5)
IMM GRANULOCYTES # BLD AUTO: 0.05 X10*3/UL (ref 0–0.5)
IMM GRANULOCYTES NFR BLD AUTO: 0.6 % (ref 0–0.9)
LYMPHOCYTES # BLD AUTO: 0.42 X10*3/UL (ref 0.8–3)
LYMPHOCYTES NFR BLD AUTO: 5.4 %
MAGNESIUM SERPL-MCNC: 2.2 MG/DL (ref 1.6–3.1)
MCH RBC QN AUTO: 32 PG (ref 26–34)
MCHC RBC AUTO-ENTMCNC: 32.1 G/DL (ref 32–36)
MCV RBC AUTO: 100 FL (ref 80–100)
MONOCYTES # BLD AUTO: 0.25 X10*3/UL (ref 0.05–0.8)
MONOCYTES NFR BLD AUTO: 3.2 %
NEUTROPHILS # BLD AUTO: 7.11 X10*3/UL (ref 1.6–5.5)
NEUTROPHILS NFR BLD AUTO: 90.5 %
NRBC BLD-RTO: 0 /100 WBCS (ref 0–0)
PHOSPHATE SERPL-MCNC: 3.5 MG/DL (ref 2.5–4.5)
PLATELET # BLD AUTO: 105 X10*3/UL (ref 150–450)
POTASSIUM SERPL-SCNC: 4 MMOL/L (ref 3.4–5.1)
PROT SERPL-MCNC: 5.9 G/DL (ref 5.9–7.9)
RBC # BLD AUTO: 3.72 X10*6/UL (ref 4.5–5.9)
SODIUM SERPL-SCNC: 136 MMOL/L (ref 133–145)
WBC # BLD AUTO: 7.9 X10*3/UL (ref 4.4–11.3)

## 2024-05-26 PROCEDURE — 2500000002 HC RX 250 W HCPCS SELF ADMINISTERED DRUGS (ALT 637 FOR MEDICARE OP, ALT 636 FOR OP/ED): Performed by: INTERNAL MEDICINE

## 2024-05-26 PROCEDURE — 82947 ASSAY GLUCOSE BLOOD QUANT: CPT

## 2024-05-26 PROCEDURE — 2500000005 HC RX 250 GENERAL PHARMACY W/O HCPCS: Performed by: NURSE PRACTITIONER

## 2024-05-26 PROCEDURE — 84075 ASSAY ALKALINE PHOSPHATASE: CPT | Performed by: INTERNAL MEDICINE

## 2024-05-26 PROCEDURE — 36415 COLL VENOUS BLD VENIPUNCTURE: CPT | Performed by: INTERNAL MEDICINE

## 2024-05-26 PROCEDURE — 2060000001 HC INTERMEDIATE ICU ROOM DAILY

## 2024-05-26 PROCEDURE — 85025 COMPLETE CBC W/AUTO DIFF WBC: CPT | Performed by: INTERNAL MEDICINE

## 2024-05-26 PROCEDURE — 94760 N-INVAS EAR/PLS OXIMETRY 1: CPT

## 2024-05-26 PROCEDURE — 2500000001 HC RX 250 WO HCPCS SELF ADMINISTERED DRUGS (ALT 637 FOR MEDICARE OP): Performed by: INTERNAL MEDICINE

## 2024-05-26 PROCEDURE — 94640 AIRWAY INHALATION TREATMENT: CPT

## 2024-05-26 PROCEDURE — 84100 ASSAY OF PHOSPHORUS: CPT | Performed by: INTERNAL MEDICINE

## 2024-05-26 PROCEDURE — 83735 ASSAY OF MAGNESIUM: CPT | Performed by: INTERNAL MEDICINE

## 2024-05-26 PROCEDURE — 2500000004 HC RX 250 GENERAL PHARMACY W/ HCPCS (ALT 636 FOR OP/ED): Performed by: INTERNAL MEDICINE

## 2024-05-26 PROCEDURE — 9420000001 HC RT PATIENT EDUCATION 5 MIN

## 2024-05-26 RX ADMIN — AMIODARONE HYDROCHLORIDE 200 MG: 200 TABLET ORAL at 08:33

## 2024-05-26 RX ADMIN — APIXABAN 5 MG: 5 TABLET, FILM COATED ORAL at 08:33

## 2024-05-26 RX ADMIN — ROSUVASTATIN CALCIUM 10 MG: 10 TABLET, COATED ORAL at 20:15

## 2024-05-26 RX ADMIN — INSULIN LISPRO 3 UNITS: 100 INJECTION, SOLUTION INTRAVENOUS; SUBCUTANEOUS at 13:38

## 2024-05-26 RX ADMIN — FUROSEMIDE 40 MG: 10 INJECTION, SOLUTION INTRAMUSCULAR; INTRAVENOUS at 17:51

## 2024-05-26 RX ADMIN — FUROSEMIDE 40 MG: 10 INJECTION, SOLUTION INTRAMUSCULAR; INTRAVENOUS at 03:54

## 2024-05-26 RX ADMIN — INSULIN LISPRO 1 UNITS: 100 INJECTION, SOLUTION INTRAVENOUS; SUBCUTANEOUS at 10:02

## 2024-05-26 RX ADMIN — INSULIN LISPRO 3 UNITS: 100 INJECTION, SOLUTION INTRAVENOUS; SUBCUTANEOUS at 17:47

## 2024-05-26 RX ADMIN — LACOSAMIDE 100 MG: 100 TABLET, FILM COATED ORAL at 20:15

## 2024-05-26 RX ADMIN — MIDODRINE HYDROCHLORIDE 10 MG: 10 TABLET ORAL at 17:45

## 2024-05-26 RX ADMIN — Medication 3 L/MIN: at 20:00

## 2024-05-26 RX ADMIN — LACOSAMIDE 100 MG: 100 TABLET, FILM COATED ORAL at 08:33

## 2024-05-26 RX ADMIN — APIXABAN 5 MG: 5 TABLET, FILM COATED ORAL at 20:15

## 2024-05-26 RX ADMIN — TAMSULOSIN HYDROCHLORIDE 0.4 MG: 0.4 CAPSULE ORAL at 06:23

## 2024-05-26 RX ADMIN — POTASSIUM CHLORIDE 20 MEQ: 1500 TABLET, EXTENDED RELEASE ORAL at 08:33

## 2024-05-26 RX ADMIN — FOLIC ACID 1 MG: 1 TABLET ORAL at 08:33

## 2024-05-26 RX ADMIN — METOPROLOL SUCCINATE 25 MG: 25 TABLET, FILM COATED, EXTENDED RELEASE ORAL at 08:33

## 2024-05-26 RX ADMIN — MIDODRINE HYDROCHLORIDE 10 MG: 10 TABLET ORAL at 12:56

## 2024-05-26 RX ADMIN — Medication 3 L/MIN: at 08:00

## 2024-05-26 RX ADMIN — MIDODRINE HYDROCHLORIDE 10 MG: 10 TABLET ORAL at 08:33

## 2024-05-26 RX ADMIN — NYSTATIN 1 APPLICATION: 100000 POWDER TOPICAL at 20:15

## 2024-05-26 RX ADMIN — PANTOPRAZOLE SODIUM 40 MG: 40 TABLET, DELAYED RELEASE ORAL at 06:23

## 2024-05-26 RX ADMIN — INSULIN LISPRO 3 UNITS: 100 INJECTION, SOLUTION INTRAVENOUS; SUBCUTANEOUS at 21:22

## 2024-05-26 RX ADMIN — POTASSIUM CHLORIDE 20 MEQ: 1500 TABLET, EXTENDED RELEASE ORAL at 20:15

## 2024-05-26 RX ADMIN — IPRATROPIUM BROMIDE AND ALBUTEROL SULFATE 3 ML: 2.5; .5 SOLUTION RESPIRATORY (INHALATION) at 12:08

## 2024-05-26 ASSESSMENT — COGNITIVE AND FUNCTIONAL STATUS - GENERAL
HELP NEEDED FOR BATHING: A LOT
TURNING FROM BACK TO SIDE WHILE IN FLAT BAD: A LOT
MOVING FROM LYING ON BACK TO SITTING ON SIDE OF FLAT BED WITH BEDRAILS: A LITTLE
DAILY ACTIVITIY SCORE: 11
DRESSING REGULAR UPPER BODY CLOTHING: A LOT
TOILETING: TOTAL
DRESSING REGULAR LOWER BODY CLOTHING: TOTAL
EATING MEALS: A LITTLE
MOVING TO AND FROM BED TO CHAIR: A LOT
PERSONAL GROOMING: A LOT
CLIMB 3 TO 5 STEPS WITH RAILING: TOTAL
STANDING UP FROM CHAIR USING ARMS: A LOT
MOBILITY SCORE: 12
WALKING IN HOSPITAL ROOM: A LOT

## 2024-05-26 ASSESSMENT — PAIN - FUNCTIONAL ASSESSMENT: PAIN_FUNCTIONAL_ASSESSMENT: 0-10

## 2024-05-26 ASSESSMENT — PAIN SCALES - GENERAL
PAINLEVEL_OUTOF10: 0 - NO PAIN
PAINLEVEL_OUTOF10: 0 - NO PAIN

## 2024-05-26 NOTE — PROGRESS NOTES
Pulmonary Progress Note 05/26/24     Patient seen in follow-up for acute respiratory failure with hypoxia    Subjective   Interval History:   Received 1 dose of Decadron yesterday.  Discontinued after discussion with cardiology.  Being treated for heart failure.  Overall he feels better today.  Still with rapid heart rate    Objective   Vital signs in last 24 hours:  Temp:  [35.7 °C (96.3 °F)-36.8 °C (98.2 °F)] 35.7 °C (96.3 °F)  Heart Rate:  [107-109] 109  Resp:  [11-18] 16  BP: ()/(66-81) 96/74    Intake/Output last 3 shifts:  I/O last 3 completed shifts:  In: - (0 mL/kg)   Out: 2265 (22.7 mL/kg) [Urine:2265 (0.6 mL/kg/hr)]  Weight: 99.8 kg   Intake/Output this shift:  I/O this shift:  In: 320 [P.O.:320]  Out: 1200 [Urine:1200]    Physical Exam  Vitals reviewed.   Constitutional:       Appearance: He is ill-appearing.      Interventions: Nasal cannula in place.   HENT:      Head: Normocephalic and atraumatic.      Mouth/Throat:      Mouth: Mucous membranes are moist.      Pharynx: Oropharynx is clear.   Eyes:      General: No scleral icterus.     Conjunctiva/sclera: Conjunctivae normal.   Cardiovascular:      Rate and Rhythm: Tachycardia present. Rhythm irregular.   Pulmonary:      Effort: Pulmonary effort is normal.      Breath sounds: Rales present. No wheezing or rhonchi.   Musculoskeletal:      Right lower leg: No edema.      Left lower leg: No edema.   Skin:     General: Skin is warm and dry.   Neurological:      General: No focal deficit present.      Mental Status: He is alert.   Psychiatric:         Mood and Affect: Mood normal.         Behavior: Behavior normal.         Scheduled medications  amiodarone, 200 mg, oral, Daily  apixaban, 5 mg, oral, BID  [Held by provider] famotidine, 10 mg, oral, Daily  folic acid, 1 mg, oral, Daily  furosemide, 40 mg, intravenous, q12h  insulin lispro, 0-5 Units, subcutaneous, Before meals & nightly  lacosamide, 100 mg, oral, BID  metoprolol succinate  XL, 25 mg, oral, Daily  midodrine, 10 mg, oral, TID  nystatin, 1 Application, Topical, BID  oxygen, , inhalation, Continuous - Inhalation  pantoprazole, 40 mg, oral, Daily before breakfast   Or  pantoprazole, 40 mg, intravenous, Daily before breakfast  potassium chloride CR, 20 mEq, oral, BID  rosuvastatin, 10 mg, oral, Nightly  [Held by provider] SITagliptin phosphate, 50 mg, oral, Daily  tamsulosin, 0.4 mg, oral, Daily before breakfast      Continuous medications     PRN medications  PRN medications: dextrose, dextrose, glucagon, glucagon, ipratropium-albuteroL, metoprolol     Labs:  Lab Results   Component Value Date     05/26/2024    K 4.0 05/26/2024    CL 95 (L) 05/26/2024    CO2 33 (H) 05/26/2024    BUN 28 (H) 05/26/2024    CREATININE 1.40 05/26/2024    GLUCOSE 193 (H) 05/26/2024    CALCIUM 8.4 (L) 05/26/2024     Lab Results   Component Value Date    WBC 7.9 05/26/2024    HGB 11.9 (L) 05/26/2024    HCT 37.1 (L) 05/26/2024     05/26/2024     (L) 05/26/2024       Imaging:  XR chest 2 views    Result Date: 5/24/2024  Interpreted By:  Fabrizio Hines, STUDY: XR CHEST 2 VIEWS; 5/24/2024 10:31 am   INDICATION: Signs/Symptoms:Fever, hypoxia   COMPARISON: 05/17/2024.   ACCESSION NUMBER(S): [id number]   ORDERING CLINICIAN: MICHAEL YAP   TECHNIQUE: Number of films: Two-view radiographs of the chest were obtained.   FINDINGS: The study is limited due to rotation, respiratory motion and poor inspiratory effort, with resultant crowding of the pulmonary vasculature. Previously seen lines and tubes have been removed. The cardiac silhouette appears prominent, exaggerated by the technique. Bilateral perihilar interstitial and mild alveolar infiltrates and small bilateral effusions are present. No pneumothorax is seen. Degenerative changes involve the spine and shoulders.       Limited study. Bilateral infiltrates and small effusions, most likely due to pulmonary edema/congestive heart failure.  Correlate clinically and follow-up as needed.   Signed by: Fabrizio Hines 5/24/2024 10:38 AM Dictation workstation:   [id number]    Electrocardiogram, 12-lead    Result Date: 5/20/2024  Atrial fibrillation with rapid ventricular response with premature ventricular or aberrantly conducted complexes Nonspecific intraventricular block Possible Inferior infarct (cited on or before 08-OCT-2023) Abnormal ECG When compared with ECG of 20-APR-2024 13:54, No significant change was found Confirmed by Simeon Lucas ([id number]) on 5/20/2024 7:35:04 AM    XR chest 1 view    Result Date: 5/17/2024  Interpreted By:  Gabriel Dickson, STUDY: Chest dated  5/17/2024.   INDICATION: Signs/Symptoms:OG placement confirmation   COMPARISON: Chest dated 05/17/2024.   ACCESSION NUMBER(S): [id number]   ORDERING CLINICIAN: GERONIMO GELLER   TECHNIQUE: One view of the chest.   FINDINGS: There is an enteric tube with the tip off of the inferior level of the exam. Side port projects over the left upper quadrant. There is likely an endotracheal tube although not well visualized. Its tip as seen on this exam is a proximally 1.7 cm from the mohan. There are bibasilar appearing opacities although this may be projectional. There is a right IJ line with the tip projecting over the lower SVC. Cardiomediastinal silhouette is enlarged but similar to the prior exam. Degenerative changes seen of the spine and shoulders.       1. As seen on this exam the endotracheal tube tip appears to be a proximally 1.7 cm from the mohan. A repeat chest radiograph is recommended to confirm positioning as this may be projectional. 2. Enteric tube with side port over the left hemiabdomen and tip off the inferior level of the exam. 3. Possible bibasilar atelectasis.   MACRO: None   Signed by: Gabriel Dickson 5/17/2024 9:01 PM Dictation workstation:   [id number]    ECG 12 lead    Result Date: 5/17/2024  Atrial fibrillation with rapid ventricular response Nonspecific  intraventricular block T wave abnormality, consider lateral ischemia Abnormal ECG No previous ECGs available Confirmed by Edward Dang ([id number]) on 5/17/2024 3:10:56 PM    ECG 12 lead    Result Date: 5/17/2024  Atrial fibrillation Nonspecific intraventricular block Inferior infarct (cited on or before 08-OCT-2023) Abnormal ECG When compared with ECG of 17-MAY-2024 08:19, (unconfirmed) No significant change was found Confirmed by Edward Dang ([id number]) on 5/17/2024 3:09:55 PM    XR chest 1 view    Result Date: 5/17/2024  Interpreted By:  Torres Mcneill, STUDY: XR CHEST 1 VIEW;  5/17/2024 11:58 am   INDICATION: Signs/Symptoms:central line placement.   COMPARISON: 05/17/2024 at 8:53 a.m.   ACCESSION NUMBER(S): [id number]   ORDERING CLINICIAN: JONATAN FRY   FINDINGS: Endotracheal tube terminates about 3 cm above the mohan. Right IJ central line terminates in the lower SVC. No definite focal infiltrate. Cephalization of the pulmonary vessels likely related to supine positioning. Cardiomediastinal silhouette unchanged. Pleural effusions not excluded.       Post endotracheal tube and central line placement. Otherwise no active disease in the chest.   MACRO: None   Signed by: Torres Mcneill 5/17/2024 12:21 PM Dictation workstation:   [id number]    CT cervical spine wo IV contrast    Result Date: 5/17/2024  Interpreted By:  Florencio Morrell, STUDY: CT CERVICAL SPINE WO IV CONTRAST;  5/17/2024 9:55 am   INDICATION: Signs/Symptoms:fall, on Eliquis.   COMPARISON: Previous exam is from 02/20/2020   ACCESSION NUMBER(S): [id number]   ORDERING CLINICIAN: JONATAN FRY   TECHNIQUE: Thin section axial images were obtained from the skull base down through the thoracic inlet. Sagittal and coronal reconstruction images were generated. Soft tissue, lung, and bone windows were reviewed.   FINDINGS: VERTEBRAL BODIES AND POSTERIOR ELEMENTS: There is moderate endplate osteophytosis from C5-6 through C7-T1. Mild-to-moderate disc space  narrowing at C5-6 and C7-T1 with moderate disc space narrowing at C6-7. Multilevel inter facet hypertrophy with spur formation. Uncovertebral hypertrophy with spur formation on the right at C5-6 and on the left at C6-7. No cervical spine compression fracture. No posterior element fracture. No destructive bone lesion. No listhesis.   SPINAL CANAL: No gross disc herniation.   NECK SOFT TISSUES: Within normal limits.   LUNG APICES: Imaged portion of the lung apices are within normal limits.   SKULL BASE: Within normal limits.       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 No CT evidence of cervical spine fracture in this exam.   MACRO: None   Signed by: Florencio Morrell 5/17/2024 10:15 AM Dictation workstation:   [id number]    CT head wo IV contrast    Result Date: 5/17/2024  Interpreted By:  Florencio Morrell, STUDY: CT HEAD WO IV CONTRAST;  5/17/2024 9:55 am   INDICATION: Signs/Symptoms:fall, on Eliquis.   COMPARISON: Comparison study is from 04/20/2024..   ACCESSION NUMBER(S): [id number]   ORDERING CLINICIAN: JONATAN FRY   TECHNIQUE: Routine axial images were obtained from the skull base through the vertex.  Sagittal and coronal reconstruction images were generated. Brain, subdural, and bone windows were reviewed. N/A Unavailable   FINDINGS: INTRACRANIAL: Mild prominence of ventricles and sulci. There is mild patchy hypodensity throughout the deep periventricular white matter. No acute intracranial bleed, midline shift, or focal mass effect. No destructive bone lesion. No depressed skull fracture. Skullbase arterial calcifications in the carotid siphons and vertebral arteries.   EXTRACRANIAL: Visualized paranasal sinuses were clear. Visualized mastoid air cells were clear.       No depressed skull fracture. No acute intracranial bleed or focal mass effect.   Mild volume loss.   Mild chronic white matter ischemic disease in the deep periventricular regions.   MACRO: None   Signed by: Florencio Morrell 5/17/2024 10:08 AM  Dictation workstation:   [id number]    XR chest 1 view    Result Date: 5/17/2024  Interpreted By:  Sandee Carreor, STUDY: XR CHEST 1 VIEW;  5/17/2024 8:56 am   INDICATION: Signs/Symptoms:Chest Pain.   COMPARISON: 04/20/2024   ACCESSION NUMBER(S): [id number]   ORDERING CLINICIAN: JONATAN FRY   FINDINGS:         CARDIOMEDIASTINAL SILHOUETTE: Cardiomediastinal silhouette is normal in size and configuration.   LUNGS: Lungs are clear.   ABDOMEN: No remarkable upper abdominal findings.   BONES: No acute osseous changes.       1.  No evidence of acute cardiopulmonary process.       MACRO: None   Signed by: Sandee Carrero 5/17/2024 9:01 AM Dictation workstation:   [id number]              Assessment/Plan   Principal Problem:    Closed head injury, initial encounter    Acute respiratory failure with hypoxia: Combination of airway disease and pulmonary edema.  Predominantly pulmonary edema.  Doing much better with diuresis  Continue with supplemental oxygen  Probable acute on chronic systolic heart failure: As reason for increased oxygen requirements per my review of the chart yesterday  Diuresis  Cardiology asked to his atrial fibrillation  COVID-19:  Completed remdesivir from 517 through 521  Doing much better with just bronchodilators.  Additionally with diuresis.  Probably more all cardiac asthma as a reason for yesterday's exam.  Hold on any further Decadron with steroids.  Continue with bronchodilators as needed.     Will continue to follow with you       LOS: 9 days       Tamiko Law MD  Pulmonary/Critical Care medicine

## 2024-05-26 NOTE — PROGRESS NOTES
Darrell Arreola is a 78 y.o. male on day 9 of admission presenting with Closed head injury, initial encounter.    Subjective   Patient was seen and examined.  More alert as compared to before.  Patient given diuretics yesterday.  Give supportive care.  Give physical therapy and Occupational Therapy.       Objective     Physical Exam  HEENT:  Head externally atraumatic,  extraocular movements intact, oral mucosa moist  Neck:  Supple, no JVP, no palpable adenopathy or thyromegaly.  No carotid bruit.  Chest:  Clear to auscultation and resonant.  Heart:  Regular rate and rhythm, no murmur or gallop could be appreciated.  Abdomen:  Soft, nontender, bowel sounds present, normoactive, no palpable hepatosplenomegaly.  Extremities:  No edema, pulses present, no cyanosis or clubbing.  CNS:  Patient alert, oriented to time, place and person.    No new deficit.  Cranial nerves 2-12 grossly intact  Skin:  No active rash.  Musculoskeletal:  No  apparent joint swelling or erythema, range of movement normal.  Last Recorded Vitals  Heart Rate:  [107-109]   Temp:  [35.7 °C (96.3 °F)-36.8 °C (98.2 °F)]   Resp:  [11-18]   BP: ()/(57-81)   Weight:  [99.8 kg (220 lb 0.3 oz)]   SpO2:  [95 %-100 %]     Intake/Output last 3 Shifts:  I/O last 3 completed shifts:  In: - (0 mL/kg)   Out: 2265 (22.7 mL/kg) [Urine:2265 (0.6 mL/kg/hr)]  Weight: 99.8 kg     Relevant Results  Susceptibility data from last 90 days.  Collected Specimen Info Organism Ampicillin Cefazolin Cefazolin (uncomplicated UTIs only) Ceftriaxone Ciprofloxacin Gentamicin Nitrofurantoin Piperacillin/Tazobactam Tetracycline Trimethoprim/Sulfamethoxazole   04/26/24 Urine from Clean Catch/Voided Proteus mirabilis S R S S S S R S R S     Results for orders placed or performed during the hospital encounter of 05/17/24 (from the past 24 hour(s))   POCT GLUCOSE   Result Value Ref Range    POCT Glucose 234 (H) 74 - 99 mg/dL   CBC and Auto Differential   Result Value Ref Range    WBC  7.9 4.4 - 11.3 x10*3/uL    nRBC 0.0 0.0 - 0.0 /100 WBCs    RBC 3.72 (L) 4.50 - 5.90 x10*6/uL    Hemoglobin 11.9 (L) 13.5 - 17.5 g/dL    Hematocrit 37.1 (L) 41.0 - 52.0 %     80 - 100 fL    MCH 32.0 26.0 - 34.0 pg    MCHC 32.1 32.0 - 36.0 g/dL    RDW 15.7 (H) 11.5 - 14.5 %    Platelets 105 (L) 150 - 450 x10*3/uL    Neutrophils % 90.5 40.0 - 80.0 %    Immature Granulocytes %, Automated 0.6 0.0 - 0.9 %    Lymphocytes % 5.4 13.0 - 44.0 %    Monocytes % 3.2 2.0 - 10.0 %    Eosinophils % 0.0 0.0 - 6.0 %    Basophils % 0.3 0.0 - 2.0 %    Neutrophils Absolute 7.11 (H) 1.60 - 5.50 x10*3/uL    Immature Granulocytes Absolute, Automated 0.05 0.00 - 0.50 x10*3/uL    Lymphocytes Absolute 0.42 (L) 0.80 - 3.00 x10*3/uL    Monocytes Absolute 0.25 0.05 - 0.80 x10*3/uL    Eosinophils Absolute 0.00 0.00 - 0.40 x10*3/uL    Basophils Absolute 0.02 0.00 - 0.10 x10*3/uL   Comprehensive Metabolic Panel   Result Value Ref Range    Glucose 193 (H) 65 - 99 mg/dL    Sodium 136 133 - 145 mmol/L    Potassium 4.0 3.4 - 5.1 mmol/L    Chloride 95 (L) 97 - 107 mmol/L    Bicarbonate 33 (H) 24 - 31 mmol/L    Urea Nitrogen 28 (H) 8 - 25 mg/dL    Creatinine 1.40 0.40 - 1.60 mg/dL    eGFR 51 (L) >60 mL/min/1.73m*2    Calcium 8.4 (L) 8.5 - 10.4 mg/dL    Albumin 2.5 (L) 3.5 - 5.0 g/dL    Alkaline Phosphatase 130 (H) 35 - 125 U/L    Total Protein 5.9 5.9 - 7.9 g/dL    AST 52 (H) 5 - 40 U/L    Bilirubin, Total 2.7 (H) 0.1 - 1.2 mg/dL    ALT 41 (H) 5 - 40 U/L    Anion Gap 8 <=19 mmol/L   Magnesium   Result Value Ref Range    Magnesium 2.20 1.60 - 3.10 mg/dL   Phosphorus   Result Value Ref Range    Phosphorus 3.5 2.5 - 4.5 mg/dL   POCT GLUCOSE   Result Value Ref Range    POCT Glucose 193 (H) 74 - 99 mg/dL   POCT GLUCOSE   Result Value Ref Range    POCT Glucose 276 (H) 74 - 99 mg/dL   POCT GLUCOSE   Result Value Ref Range    POCT Glucose 265 (H) 74 - 99 mg/dL        Current Facility-Administered Medications:     amiodarone (Pacerone) tablet 200 mg, 200  mg, oral, Daily, Daniele Ramirez MD, 200 mg at 05/26/24 0833    apixaban (Eliquis) tablet 5 mg, 5 mg, oral, BID, Daniele Ramirez MD, 5 mg at 05/26/24 0833    dextrose 50 % injection 12.5 g, 12.5 g, intravenous, q15 min PRN, Daniele Ramirez MD    dextrose 50 % injection 25 g, 25 g, intravenous, q15 min PRN, Daniele Ramirez MD    [Held by provider] famotidine (Pepcid) tablet 10 mg, 10 mg, oral, Daily, Daniele Ramirez MD    folic acid (Folvite) tablet 1 mg, 1 mg, oral, Daily, Daniele Ramirez MD, 1 mg at 05/26/24 0833    furosemide (Lasix) injection 40 mg, 40 mg, intravenous, q12h, Simeon Lucas MD, 40 mg at 05/26/24 0354    glucagon (Glucagen) injection 1 mg, 1 mg, intramuscular, q15 min PRN, Daniele Ramirez MD    glucagon (Glucagen) injection 1 mg, 1 mg, intramuscular, q15 min PRN, Daniele Ramirez MD    insulin lispro (HumaLOG) injection 0-5 Units, 0-5 Units, subcutaneous, Before meals & nightly, Daniele Ramirez MD, 3 Units at 05/26/24 1338    ipratropium-albuteroL (Duo-Neb) 0.5-2.5 mg/3 mL nebulizer solution 3 mL, 3 mL, nebulization, q4h PRN, Milan Sims MD, 3 mL at 05/26/24 1208    lacosamide (Vimpat) tablet 100 mg, 100 mg, oral, BID, Daniele Ramirez MD, 100 mg at 05/26/24 0833    metoprolol succinate XL (Toprol-XL) 24 hr tablet 25 mg, 25 mg, oral, Daily, Milan Sims MD, 25 mg at 05/26/24 0833    metoprolol tartrate (Lopressor) injection 5 mg, 5 mg, intravenous, q4h PRN, Milan Sims MD    midodrine (Proamatine) tablet 10 mg, 10 mg, oral, TID, Daniele Ramirez MD, 10 mg at 05/26/24 1256    nystatin (Mycostatin) 100,000 unit/gram powder 1 Application, 1 Application, Topical, BID, Daniele Ramirez MD, 1 Application at 05/25/24 2105    oxygen (O2) therapy, , inhalation, Continuous - Inhalation, ELENITA Vilchis-CNP, 3 L/min at 05/26/24 0800    pantoprazole (ProtoNix) EC tablet 40 mg, 40 mg, oral, Daily before breakfast, 40 mg at 05/26/24 0623 **OR**  pantoprazole (ProtoNix) injection 40 mg, 40 mg, intravenous, Daily before breakfast, Daniele Ramirez MD, 40 mg at 05/19/24 0546    potassium chloride CR (Klor-Con M20) ER tablet 20 mEq, 20 mEq, oral, BID, Simeon Lucas MD, 20 mEq at 05/26/24 0833    rosuvastatin (Crestor) tablet 10 mg, 10 mg, oral, Nightly, Daniele Ramirez MD, 10 mg at 05/25/24 2104    [Held by provider] SITagliptin phosphate (Januvia) tablet 50 mg, 50 mg, oral, Daily, Daniele Ramirez MD    tamsulosin (Flomax) 24 hr capsule 0.4 mg, 0.4 mg, oral, Daily before breakfast, Daniele Ramirez MD, 0.4 mg at 05/26/24 0623   Assessment/Plan   Principal Problem:    Closed head injury, initial encounter  COVID-19  Altered mental status  Congestive heart failure  Acute hypoxic respiratory failure  History of.  Atrial fibrillation  Hyperlipidemia  Acute on chronic systolic congestive heart failure  Hypophosphatemia  Hypocalcemia  Diabetes mellitus type 2    Plan: Continue current medication.  Supportive care begin physical therapy and Occupational Therapy.  Continue monitor electrolytes.  Monitor CBC and BMP.  Patient blood pressure running low.  Give Lasix.  Give midodrine as needed.  Pulse ox.  Patient has improved significantly as compared to yesterday.  Patient appeared to be in less distress.  Blood sugars running slightly high.  Monitor for now.  Continue sliding scale insulin.  We will take DVT, fall, aspiration, decubitus and DVT precaution.  This has been discussed with the patient and is agreeable to it.      Milan Sims MD

## 2024-05-26 NOTE — CARE PLAN
The patient's goals for the shift include increase mobility    The clinical goals for the shift include decrease heart rate    Over the shift, the patient did not make progress toward the following goals. Barriers to progression include weakness, shortness of breath with exertion. Recommendations to address these barriers include taking breaks when needed.

## 2024-05-27 LAB
ALBUMIN SERPL-MCNC: 2.5 G/DL (ref 3.5–5)
ALP BLD-CCNC: 133 U/L (ref 35–125)
ALT SERPL-CCNC: 39 U/L (ref 5–40)
ANION GAP SERPL CALC-SCNC: 10 MMOL/L
AST SERPL-CCNC: 41 U/L (ref 5–40)
BASOPHILS # BLD AUTO: 0.01 X10*3/UL (ref 0–0.1)
BASOPHILS NFR BLD AUTO: 0.1 %
BILIRUB SERPL-MCNC: 2 MG/DL (ref 0.1–1.2)
BUN SERPL-MCNC: 37 MG/DL (ref 8–25)
CALCIUM SERPL-MCNC: 8.3 MG/DL (ref 8.5–10.4)
CHLORIDE SERPL-SCNC: 94 MMOL/L (ref 97–107)
CO2 SERPL-SCNC: 35 MMOL/L (ref 24–31)
CREAT SERPL-MCNC: 1.5 MG/DL (ref 0.4–1.6)
EGFRCR SERPLBLD CKD-EPI 2021: 47 ML/MIN/1.73M*2
EOSINOPHIL # BLD AUTO: 0 X10*3/UL (ref 0–0.4)
EOSINOPHIL NFR BLD AUTO: 0 %
ERYTHROCYTE [DISTWIDTH] IN BLOOD BY AUTOMATED COUNT: 16 % (ref 11.5–14.5)
GLUCOSE BLD MANUAL STRIP-MCNC: 146 MG/DL (ref 74–99)
GLUCOSE BLD MANUAL STRIP-MCNC: 189 MG/DL (ref 74–99)
GLUCOSE BLD MANUAL STRIP-MCNC: 189 MG/DL (ref 74–99)
GLUCOSE BLD MANUAL STRIP-MCNC: 196 MG/DL (ref 74–99)
GLUCOSE SERPL-MCNC: 228 MG/DL (ref 65–99)
HCT VFR BLD AUTO: 37.8 % (ref 41–52)
HGB BLD-MCNC: 12.2 G/DL (ref 13.5–17.5)
IMM GRANULOCYTES # BLD AUTO: 0.14 X10*3/UL (ref 0–0.5)
IMM GRANULOCYTES NFR BLD AUTO: 1.3 % (ref 0–0.9)
LYMPHOCYTES # BLD AUTO: 0.7 X10*3/UL (ref 0.8–3)
LYMPHOCYTES NFR BLD AUTO: 6.3 %
MAGNESIUM SERPL-MCNC: 1.9 MG/DL (ref 1.6–3.1)
MCH RBC QN AUTO: 32.4 PG (ref 26–34)
MCHC RBC AUTO-ENTMCNC: 32.3 G/DL (ref 32–36)
MCV RBC AUTO: 100 FL (ref 80–100)
MONOCYTES # BLD AUTO: 0.39 X10*3/UL (ref 0.05–0.8)
MONOCYTES NFR BLD AUTO: 3.5 %
NEUTROPHILS # BLD AUTO: 9.82 X10*3/UL (ref 1.6–5.5)
NEUTROPHILS NFR BLD AUTO: 88.8 %
NRBC BLD-RTO: 0 /100 WBCS (ref 0–0)
PHOSPHATE SERPL-MCNC: 2.6 MG/DL (ref 2.5–4.5)
PLATELET # BLD AUTO: 125 X10*3/UL (ref 150–450)
POTASSIUM SERPL-SCNC: 3.3 MMOL/L (ref 3.4–5.1)
PROT SERPL-MCNC: 5.8 G/DL (ref 5.9–7.9)
RBC # BLD AUTO: 3.77 X10*6/UL (ref 4.5–5.9)
SODIUM SERPL-SCNC: 139 MMOL/L (ref 133–145)
WBC # BLD AUTO: 11.1 X10*3/UL (ref 4.4–11.3)

## 2024-05-27 PROCEDURE — 94760 N-INVAS EAR/PLS OXIMETRY 1: CPT

## 2024-05-27 PROCEDURE — 2500000002 HC RX 250 W HCPCS SELF ADMINISTERED DRUGS (ALT 637 FOR MEDICARE OP, ALT 636 FOR OP/ED): Mod: MUE | Performed by: NURSE PRACTITIONER

## 2024-05-27 PROCEDURE — 2500000002 HC RX 250 W HCPCS SELF ADMINISTERED DRUGS (ALT 637 FOR MEDICARE OP, ALT 636 FOR OP/ED): Performed by: INTERNAL MEDICINE

## 2024-05-27 PROCEDURE — 83735 ASSAY OF MAGNESIUM: CPT | Performed by: INTERNAL MEDICINE

## 2024-05-27 PROCEDURE — 2060000001 HC INTERMEDIATE ICU ROOM DAILY

## 2024-05-27 PROCEDURE — 97530 THERAPEUTIC ACTIVITIES: CPT | Mod: GO,CO

## 2024-05-27 PROCEDURE — 97535 SELF CARE MNGMENT TRAINING: CPT | Mod: GO,CO

## 2024-05-27 PROCEDURE — 84100 ASSAY OF PHOSPHORUS: CPT | Performed by: INTERNAL MEDICINE

## 2024-05-27 PROCEDURE — 2500000005 HC RX 250 GENERAL PHARMACY W/O HCPCS: Performed by: NURSE PRACTITIONER

## 2024-05-27 PROCEDURE — 2500000005 HC RX 250 GENERAL PHARMACY W/O HCPCS: Performed by: INTERNAL MEDICINE

## 2024-05-27 PROCEDURE — 92610 EVALUATE SWALLOWING FUNCTION: CPT | Mod: GN

## 2024-05-27 PROCEDURE — 2500000004 HC RX 250 GENERAL PHARMACY W/ HCPCS (ALT 636 FOR OP/ED): Performed by: NURSE PRACTITIONER

## 2024-05-27 PROCEDURE — 2500000001 HC RX 250 WO HCPCS SELF ADMINISTERED DRUGS (ALT 637 FOR MEDICARE OP): Performed by: INTERNAL MEDICINE

## 2024-05-27 PROCEDURE — 85025 COMPLETE CBC W/AUTO DIFF WBC: CPT | Performed by: INTERNAL MEDICINE

## 2024-05-27 PROCEDURE — 9420000001 HC RT PATIENT EDUCATION 5 MIN

## 2024-05-27 PROCEDURE — 82947 ASSAY GLUCOSE BLOOD QUANT: CPT

## 2024-05-27 PROCEDURE — 84075 ASSAY ALKALINE PHOSPHATASE: CPT | Performed by: INTERNAL MEDICINE

## 2024-05-27 PROCEDURE — 36415 COLL VENOUS BLD VENIPUNCTURE: CPT | Performed by: INTERNAL MEDICINE

## 2024-05-27 PROCEDURE — 2500000004 HC RX 250 GENERAL PHARMACY W/ HCPCS (ALT 636 FOR OP/ED): Performed by: INTERNAL MEDICINE

## 2024-05-27 PROCEDURE — 2500000001 HC RX 250 WO HCPCS SELF ADMINISTERED DRUGS (ALT 637 FOR MEDICARE OP): Performed by: NURSE PRACTITIONER

## 2024-05-27 PROCEDURE — 94640 AIRWAY INHALATION TREATMENT: CPT

## 2024-05-27 RX ORDER — DOCUSATE SODIUM 100 MG/1
100 CAPSULE, LIQUID FILLED ORAL 2 TIMES DAILY
Status: DISCONTINUED | OUTPATIENT
Start: 2024-05-27 | End: 2024-05-28 | Stop reason: HOSPADM

## 2024-05-27 RX ORDER — ACETAMINOPHEN 325 MG/1
650 TABLET ORAL EVERY 6 HOURS PRN
Status: DISCONTINUED | OUTPATIENT
Start: 2024-05-27 | End: 2024-05-28 | Stop reason: HOSPADM

## 2024-05-27 RX ORDER — BISACODYL 10 MG/1
10 SUPPOSITORY RECTAL DAILY
Status: DISCONTINUED | OUTPATIENT
Start: 2024-05-27 | End: 2024-05-28 | Stop reason: HOSPADM

## 2024-05-27 RX ORDER — BUDESONIDE 0.5 MG/2ML
0.5 INHALANT ORAL
Status: DISCONTINUED | OUTPATIENT
Start: 2024-05-27 | End: 2024-05-28 | Stop reason: HOSPADM

## 2024-05-27 RX ORDER — POLYETHYLENE GLYCOL 3350 17 G/17G
17 POWDER, FOR SOLUTION ORAL DAILY
Status: DISCONTINUED | OUTPATIENT
Start: 2024-05-27 | End: 2024-05-28 | Stop reason: HOSPADM

## 2024-05-27 RX ADMIN — DOCUSATE SODIUM 100 MG: 100 CAPSULE, LIQUID FILLED ORAL at 20:36

## 2024-05-27 RX ADMIN — IPRATROPIUM BROMIDE AND ALBUTEROL SULFATE 3 ML: 2.5; .5 SOLUTION RESPIRATORY (INHALATION) at 02:27

## 2024-05-27 RX ADMIN — DOCUSATE SODIUM 100 MG: 100 CAPSULE, LIQUID FILLED ORAL at 09:53

## 2024-05-27 RX ADMIN — MIDODRINE HYDROCHLORIDE 10 MG: 10 TABLET ORAL at 12:16

## 2024-05-27 RX ADMIN — LACOSAMIDE 100 MG: 100 TABLET, FILM COATED ORAL at 09:53

## 2024-05-27 RX ADMIN — Medication 2 L/MIN: at 08:00

## 2024-05-27 RX ADMIN — ROSUVASTATIN CALCIUM 10 MG: 10 TABLET, COATED ORAL at 20:36

## 2024-05-27 RX ADMIN — FOLIC ACID 1 MG: 1 TABLET ORAL at 09:53

## 2024-05-27 RX ADMIN — BUDESONIDE INHALATION 0.5 MG: 0.5 SUSPENSION RESPIRATORY (INHALATION) at 10:30

## 2024-05-27 RX ADMIN — POTASSIUM CHLORIDE 20 MEQ: 1500 TABLET, EXTENDED RELEASE ORAL at 09:53

## 2024-05-27 RX ADMIN — LACOSAMIDE 100 MG: 100 TABLET, FILM COATED ORAL at 20:36

## 2024-05-27 RX ADMIN — AMIODARONE HYDROCHLORIDE 200 MG: 200 TABLET ORAL at 09:53

## 2024-05-27 RX ADMIN — INSULIN LISPRO 1 UNITS: 100 INJECTION, SOLUTION INTRAVENOUS; SUBCUTANEOUS at 13:18

## 2024-05-27 RX ADMIN — ACETAMINOPHEN 650 MG: 325 TABLET ORAL at 12:15

## 2024-05-27 RX ADMIN — ACETAMINOPHEN 650 MG: 325 TABLET ORAL at 21:29

## 2024-05-27 RX ADMIN — NYSTATIN 1 APPLICATION: 100000 POWDER TOPICAL at 20:36

## 2024-05-27 RX ADMIN — IPRATROPIUM BROMIDE AND ALBUTEROL SULFATE 3 ML: 2.5; .5 SOLUTION RESPIRATORY (INHALATION) at 19:54

## 2024-05-27 RX ADMIN — METOPROLOL SUCCINATE 25 MG: 25 TABLET, FILM COATED, EXTENDED RELEASE ORAL at 09:56

## 2024-05-27 RX ADMIN — INSULIN LISPRO 1 UNITS: 100 INJECTION, SOLUTION INTRAVENOUS; SUBCUTANEOUS at 21:51

## 2024-05-27 RX ADMIN — POLYETHYLENE GLYCOL 3350 17 G: 17 POWDER, FOR SOLUTION ORAL at 09:56

## 2024-05-27 RX ADMIN — APIXABAN 5 MG: 5 TABLET, FILM COATED ORAL at 09:53

## 2024-05-27 RX ADMIN — INSULIN LISPRO 1 UNITS: 100 INJECTION, SOLUTION INTRAVENOUS; SUBCUTANEOUS at 09:59

## 2024-05-27 RX ADMIN — MIDODRINE HYDROCHLORIDE 10 MG: 10 TABLET ORAL at 09:53

## 2024-05-27 RX ADMIN — TAMSULOSIN HYDROCHLORIDE 0.4 MG: 0.4 CAPSULE ORAL at 06:20

## 2024-05-27 RX ADMIN — FUROSEMIDE 40 MG: 10 INJECTION, SOLUTION INTRAMUSCULAR; INTRAVENOUS at 04:11

## 2024-05-27 RX ADMIN — MIDODRINE HYDROCHLORIDE 10 MG: 10 TABLET ORAL at 17:12

## 2024-05-27 RX ADMIN — BUDESONIDE INHALATION 0.5 MG: 0.5 SUSPENSION RESPIRATORY (INHALATION) at 19:54

## 2024-05-27 RX ADMIN — PANTOPRAZOLE SODIUM 40 MG: 40 TABLET, DELAYED RELEASE ORAL at 06:20

## 2024-05-27 RX ADMIN — METOPROLOL TARTRATE 5 MG: 5 INJECTION INTRAVENOUS at 22:59

## 2024-05-27 RX ADMIN — Medication 3 L/MIN: at 19:54

## 2024-05-27 RX ADMIN — APIXABAN 5 MG: 5 TABLET, FILM COATED ORAL at 20:36

## 2024-05-27 ASSESSMENT — PAIN SCALES - GENERAL
PAINLEVEL_OUTOF10: 7
PAINLEVEL_OUTOF10: 6
PAINLEVEL_OUTOF10: 0 - NO PAIN
PAINLEVEL_OUTOF10: 5 - MODERATE PAIN
PAINLEVEL_OUTOF10: 0 - NO PAIN
PAINLEVEL_OUTOF10: 5 - MODERATE PAIN
PAINLEVEL_OUTOF10: 8
PAINLEVEL_OUTOF10: 6

## 2024-05-27 ASSESSMENT — COGNITIVE AND FUNCTIONAL STATUS - GENERAL
MOVING FROM LYING ON BACK TO SITTING ON SIDE OF FLAT BED WITH BEDRAILS: A LOT
WALKING IN HOSPITAL ROOM: A LOT
DRESSING REGULAR LOWER BODY CLOTHING: TOTAL
EATING MEALS: A LITTLE
PERSONAL GROOMING: A LITTLE
PERSONAL GROOMING: A LITTLE
CLIMB 3 TO 5 STEPS WITH RAILING: A LOT
HELP NEEDED FOR BATHING: A LOT
DAILY ACTIVITIY SCORE: 13
HELP NEEDED FOR BATHING: A LOT
DAILY ACTIVITIY SCORE: 12
DRESSING REGULAR LOWER BODY CLOTHING: TOTAL
TOILETING: TOTAL
TURNING FROM BACK TO SIDE WHILE IN FLAT BAD: A LOT
MOBILITY SCORE: 12
DRESSING REGULAR UPPER BODY CLOTHING: A LOT
STANDING UP FROM CHAIR USING ARMS: A LOT
MOVING TO AND FROM BED TO CHAIR: A LOT
TOILETING: TOTAL
DRESSING REGULAR UPPER BODY CLOTHING: A LOT

## 2024-05-27 ASSESSMENT — ACTIVITIES OF DAILY LIVING (ADL): HOME_MANAGEMENT_TIME_ENTRY: 23

## 2024-05-27 ASSESSMENT — PAIN DESCRIPTION - DESCRIPTORS: DESCRIPTORS: SORE

## 2024-05-27 ASSESSMENT — PAIN - FUNCTIONAL ASSESSMENT
PAIN_FUNCTIONAL_ASSESSMENT: 0-10

## 2024-05-27 NOTE — PROGRESS NOTES
Speech-Language Pathology    Speech-Language Pathology Clinical Swallow Evaluation    Patient Name: Darrell Arreola  MRN: [id number]  : 1945  Today's Date: 24  Start time: Start Time: 1136  Stop time: Stop Time: 1156  Time calculation (min) : Time Calculation (min): 20 min      ASSESSMENT  Impressions:  Mild oral dysphagia. Do not suspect pharyngeal involvement based on CSE.   No overt s/s penetration/aspiration observed across all tested consistencies.   Prognosis: Good    PLAN  Recommendations:  MBSS recommended: No; no pharyngeal dysphagia suspected.  Solid consistency: Soft/bite-sized (IDDSI level 6)  Liquid consistency: Thin (IDDSI 0)  Medication administration: Whole  Compensatory swallow strategies: Upright positioning for all PO intake, Remain upright for >30 min after meals, Slow rate of intake, Alternate bites and sips, and Chew thoroughly    Recommended frequency/duration:  Skilled SLP services recommended: Yes  Frequency: 2x/week  Duration: Current admission  Discharge recommendation: Unable to determine at this time; please see follow-up notes for DC recommendation.  Strengths: Motivation  Barriers to participation in tx: Comorbidities    Goals (start date 24):  Pt will consume recommended diet (soft/bite sized/thin) without overt s/sx aspiration/penetration in 95% of observed trials.  Pt will consume trials of regular solids (as dentures/adhesive available) without overt s/s penetration/aspiration for consideration of diet upgrade.    Status: Goal initiated this date   Progress this date: Na       SUBJECTIVE    PMHx relevant to rehab: CHF, diabetes, HTN, A-fib on Eliquis     Chief complaint: Pt was admitted on 24 due to closed head injury d/t fall. Went into cardiac arrest in the ED, was intubated    Relevant imaging results:   Cxr 24  IMPRESSION:  Limited study. Bilateral infiltrates and small effusions, most likely  due to pulmonary edema/congestive heart failure. Correlate  clinically  and follow-up as needed.    SLP Received On: 05/27/24  Patient Class: Inpatient     Ordering Physician: Milly Champion  Reason for Referral: concern for aspiration  Prior to Session Communication: Bedside nurse  RN cleared pt to participate in session.   Pt reported no baseline dysphagia.        Pain Assessment  Pain Assessment: 0-10  Pain Score: 8  Pain Type: Acute pain  Pain Location: Chest  Pain Descriptors: Sore  Pain Frequency: Intermittent  Pain Onset: Other (Comment) (with coughing, notified nursing)  Response to Interventions: notified nursing  Orientation: Oriented to self and Oriented to location  Ability to follow functional commands: WFL  Nutritional status: Appears well-nourished/no concerns   Patient positioning: Upright in bed      OBJECTIVE  Clinical swallow evaluation completed and consisted of interview, oral motor assessment, and PO trials (6oz thin water via straw, 3oz applesauce, 2 sehlia crackers).  ORAL PHASE: Edentulous for testing. Dentures present but unable to wear d/t no denture adhesive. Oral mucosa were pink, moist, and free of obvious lesions. Lingual strength and ROM were intact. Labial strength/ROM were intact. Labial seal was adequate. Mastication of solids was prolonged but adequate. A/P transit was intact. Min oral residuals were seen.  PHARYNGEAL PHASE: Laryngeal elevation was visualized or palpated with all trials, however adequacy of hyolaryngeal elevation/excursion cannot be determined at bedside. No immediate or delayed s/sx aspiration/penetration were observed with any consistencies.      Treatment/Education:  Results and recommendations were relayed to: Patient and Bedside nurse  Education provided: Yes   Learner: Patient   Barriers to learning: None   Method of teaching: Verbal   Topic: Role of ST, results of assessment, risk for aspiration, recommended diet modifications, recommendation for MBSS, recommended safe swallow strategies, and recommendation for dysphagia  follow-up   Outcome of teaching: Pt/family demonstrated good understanding and Education will be reinforced during follow-up visits, as appropriate  Treatment provided: No  Next Treatment Priority: rafa/strat; try reg if denture ahesive available

## 2024-05-27 NOTE — CARE PLAN
The patient's goals for the shift include  sleep    The clinical goals for the shift include control heart rate    Over the shift, the patient did not make progress toward the following goals. Barriers to progression include afib. Recommendations to address these barriers include administer medications/interventions as ordered.

## 2024-05-27 NOTE — PROGRESS NOTES
Darrell Arreola is a 78 y.o. male on day 10 of admission presenting with Closed head injury, initial encounter.    Subjective   Patient seen and examined.  Resting in bed in no acute distress.  Awake alert oriented x 3.  Wheezing + cough.  Chest pain with cough.  Short of breath after up to chair yesterday.  Constipated.  No abdominal pain nausea, vomiting.  Tolerating diet.    Objective     Physical Exam  Vitals and nursing note reviewed.   Constitutional:       General: He is not in acute distress.     Appearance: Normal appearance. He is normal weight. He is ill-appearing. He is not toxic-appearing or diaphoretic.   HENT:      Head: Normocephalic and atraumatic.      Right Ear: Tympanic membrane normal.      Left Ear: Tympanic membrane normal.      Nose: Nose normal.      Mouth/Throat:      Mouth: Mucous membranes are moist.      Pharynx: Oropharynx is clear.   Eyes:      Extraocular Movements: Extraocular movements intact.      Conjunctiva/sclera: Conjunctivae normal.      Pupils: Pupils are equal, round, and reactive to light.   Cardiovascular:      Rate and Rhythm: Normal rate. Rhythm irregular.      Pulses: Normal pulses.      Heart sounds: Normal heart sounds. No murmur heard.  Pulmonary:      Effort: Pulmonary effort is normal. No respiratory distress.      Breath sounds: Wheezing and rhonchi present. No rales.      Comments: Diffuse wheezing improved. Non-productive cough. 3 liters nasal cannula.   Abdominal:      General: Bowel sounds are normal. There is no distension.      Palpations: Abdomen is soft.      Tenderness: There is no abdominal tenderness.   Genitourinary:     Comments: Deferred.   Musculoskeletal:         General: No swelling or tenderness. Normal range of motion.      Cervical back: Normal range of motion and neck supple.   Skin:     General: Skin is warm and dry.      Capillary Refill: Capillary refill takes less than 2 seconds.      Comments: Right neck dressing clean dry intact.  "  Neurological:      General: No focal deficit present.      Mental Status: He is alert and oriented to person, place, and time.      Comments: Awake alert oriented x 3. Forgetful. Follows all commands. Generalized weakness. No focal weakness.    Psychiatric:         Mood and Affect: Mood normal.         Behavior: Behavior normal.       Last Recorded Vitals  Blood pressure 97/68, pulse (!) 112, temperature 36 °C (96.8 °F), temperature source Temporal, resp. rate 16, height 1.854 m (6' 1\"), weight 103 kg (227 lb 4.7 oz), SpO2 98%.    Intake/Output last 3 Shifts:  I/O last 3 completed shifts:  In: 1040 (10.1 mL/kg) [P.O.:1040]  Out: 5650 (54.8 mL/kg) [Urine:5650 (1.5 mL/kg/hr)]  Weight: 103.1 kg     Telemetry atrial fibrillation rate 100's    Relevant Results  Results for orders placed or performed during the hospital encounter of 05/17/24 (from the past 24 hour(s))   POCT GLUCOSE   Result Value Ref Range    POCT Glucose 276 (H) 74 - 99 mg/dL   POCT GLUCOSE   Result Value Ref Range    POCT Glucose 265 (H) 74 - 99 mg/dL   POCT GLUCOSE   Result Value Ref Range    POCT Glucose 295 (H) 74 - 99 mg/dL   CBC and Auto Differential   Result Value Ref Range    WBC 11.1 4.4 - 11.3 x10*3/uL    nRBC 0.0 0.0 - 0.0 /100 WBCs    RBC 3.77 (L) 4.50 - 5.90 x10*6/uL    Hemoglobin 12.2 (L) 13.5 - 17.5 g/dL    Hematocrit 37.8 (L) 41.0 - 52.0 %     80 - 100 fL    MCH 32.4 26.0 - 34.0 pg    MCHC 32.3 32.0 - 36.0 g/dL    RDW 16.0 (H) 11.5 - 14.5 %    Platelets 125 (L) 150 - 450 x10*3/uL    Neutrophils % 88.8 40.0 - 80.0 %    Immature Granulocytes %, Automated 1.3 (H) 0.0 - 0.9 %    Lymphocytes % 6.3 13.0 - 44.0 %    Monocytes % 3.5 2.0 - 10.0 %    Eosinophils % 0.0 0.0 - 6.0 %    Basophils % 0.1 0.0 - 2.0 %    Neutrophils Absolute 9.82 (H) 1.60 - 5.50 x10*3/uL    Immature Granulocytes Absolute, Automated 0.14 0.00 - 0.50 x10*3/uL    Lymphocytes Absolute 0.70 (L) 0.80 - 3.00 x10*3/uL    Monocytes Absolute 0.39 0.05 - 0.80 x10*3/uL    " Eosinophils Absolute 0.00 0.00 - 0.40 x10*3/uL    Basophils Absolute 0.01 0.00 - 0.10 x10*3/uL   Comprehensive Metabolic Panel   Result Value Ref Range    Glucose 228 (H) 65 - 99 mg/dL    Sodium 139 133 - 145 mmol/L    Potassium 3.3 (L) 3.4 - 5.1 mmol/L    Chloride 94 (L) 97 - 107 mmol/L    Bicarbonate 35 (H) 24 - 31 mmol/L    Urea Nitrogen 37 (H) 8 - 25 mg/dL    Creatinine 1.50 0.40 - 1.60 mg/dL    eGFR 47 (L) >60 mL/min/1.73m*2    Calcium 8.3 (L) 8.5 - 10.4 mg/dL    Albumin 2.5 (L) 3.5 - 5.0 g/dL    Alkaline Phosphatase 133 (H) 35 - 125 U/L    Total Protein 5.8 (L) 5.9 - 7.9 g/dL    AST 41 (H) 5 - 40 U/L    Bilirubin, Total 2.0 (H) 0.1 - 1.2 mg/dL    ALT 39 5 - 40 U/L    Anion Gap 10 <=19 mmol/L   Magnesium   Result Value Ref Range    Magnesium 1.90 1.60 - 3.10 mg/dL   Phosphorus   Result Value Ref Range    Phosphorus 2.6 2.5 - 4.5 mg/dL   POCT GLUCOSE   Result Value Ref Range    POCT Glucose 189 (H) 74 - 99 mg/dL     Susceptibility data from last 90 days.  Collected Specimen Info Organism Ampicillin Cefazolin Cefazolin (uncomplicated UTIs only) Ceftriaxone Ciprofloxacin Gentamicin Nitrofurantoin Piperacillin/Tazobactam Tetracycline Trimethoprim/Sulfamethoxazole   04/26/24 Urine from Clean Catch/Voided Proteus mirabilis S R S S S S R S R S     No results found.    Scheduled medications  amiodarone, 200 mg, oral, Daily  apixaban, 5 mg, oral, BID  bisacodyl, 10 mg, rectal, Daily  docusate sodium, 100 mg, oral, BID  [Held by provider] famotidine, 10 mg, oral, Daily  folic acid, 1 mg, oral, Daily  insulin lispro, 0-5 Units, subcutaneous, Before meals & nightly  lacosamide, 100 mg, oral, BID  metoprolol succinate XL, 25 mg, oral, Daily  midodrine, 10 mg, oral, TID  nystatin, 1 Application, Topical, BID  oxygen, , inhalation, Continuous - Inhalation  pantoprazole, 40 mg, oral, Daily before breakfast   Or  pantoprazole, 40 mg, intravenous, Daily before breakfast  polyethylene glycol, 17 g, oral, Daily  potassium chloride  CR, 20 mEq, oral, BID  rosuvastatin, 10 mg, oral, Nightly  [Held by provider] SITagliptin phosphate, 50 mg, oral, Daily  tamsulosin, 0.4 mg, oral, Daily before breakfast      Continuous medications     PRN medications  PRN medications: dextrose, dextrose, glucagon, glucagon, ipratropium-albuteroL, metoprolol      ASSESSMENT:  Fall vs syncopal episode  Altered mental status - metabolic encephalopathy  Seizure disorder   History of CVA  Generalized weakness  Acute hypoxic respiratory failure - improved  COVID-19  Low grade fever resolved  Status post PEA arrest post anaphylactic reaction - Lidocaine  Atrial fibrillation - rapid ventricular rate intermittent  Oral anticoagulation  Hypotension asymptomatic   Hyperlipidemia  Acute on chronic systolic congestive heart failure  Hypokalemia  Acute kidney injury on chronic kidney disease stable  Hypophosphatemia resolved  Hypocalcemia -- hypoalbuminemia   Protein calorie malnutrition  Type 2 diabetes mellitus with hypoglycemia  Tobacco use  Microcytic anemia  Thrombocytopenia - improved  Elevated AST asymptomatic  Hyperbilirubinemia asymptomatic - improved  Constipation    PLAN:  Overall, condition improved.  Respiratory status improved with diuretics.  Continue to monitor fluid volume status.  + Wheezing.  Add Pulmicort twice daily.  Continue Duo Neb treatments as needed.  Pulmonology following, input appreciated.  Remdesivir course complete.  Contact and droplet isolation precautions discontinued.  Monitor heart rate and blood pressure.  Continue Toprol-XL.  Continue Midodrine.  Knee-high compression stockings.  Continue Eliquis for stroke prevention from atrial fibrillation.  Labs reviewed.  Replace potassium 20 meq x 1.  Monitor electrolytes and renal function.  Add Colace 100 mg p.o. twice daily, MiraLAX daily.  Dulcolax milligram suppository as needed.  Monitor for bowel results.  Avoid constipation.  Point-of-care glucose reviewed.  May add sliding scale insulin for  glycemic control.  Hypoglycemia protocol.  Tobacco cessation education pertinent.  DVT prophylaxis.  Eliquis.  Monitor H&H and platelet count.  GI prophylaxis PPI.  Encourage p.o. intake.  Protein supplementation.  PT OT.  Fall precautions.  Up with assistance only.  Bed and chair alarm at all times.  Pressure ulcer prevention measures.  Turn reposition every 2 hours and as needed.  Heels off bed.  Offloading.  Supportive care.  Patient reassured.  PT/OT recommend moderate intensity level of continued care.  Patient is in agreement to skilled nursing rehabilitation.  [facility]  Anticipate discharge tomorrow if stable.  Discussed with patient and Dr. Sims.       Rosa Champion, APRN-CNP

## 2024-05-27 NOTE — PROGRESS NOTES
Occupational Therapy    Occupational Therapy Treatment    Name: Darrell Arreola  MRN: [id number]  : 1945  Date: 24  Time Calculation  Start Time: 959  Stop Time:   Time Calculation (min): 43 min    Assessment:  Evaluation/Treatment Tolerance: Patient limited by fatigue  Medical Staff Made Aware: Yes  End of Session Communication: Bedside nurse  End of Session Patient Position: Bed, 2 rail up, Alarm on  Plan:  Treatment Interventions: ADL retraining, Functional transfer training, UE strengthening/ROM, Endurance training, Patient/family training, Compensatory technique education  OT Frequency: 4 times per week  OT Discharge Recommendations: Moderate intensity level of continued care  Equipment Recommended upon Discharge: Wheeled walker  OT Recommended Transfer Status: Assist of 2  OT - OK to Discharge: Yes    Subjective   Previous Visit Info:  OT Last Visit  OT Received On: 24  General:  General  Prior to Session Communication: Bedside nurse  Patient Position Received: Bed, 2 rail up, Alarm on  Preferred Learning Style: verbal, visual  General Comment: reluctant but agreeable to therapy  Precautions:  Medical Precautions: Other (comment) (no longer on COVID precautions)  Vitals:  Vital Signs  SpO2: 97 % (on 2.5 L oxygen , monitored throughout session. dropped to lowest at 95% when seated on EOB, 99% when resting supine.)  BP: 85/55  Pain Assessment:  Pain Assessment  Pain Assessment: 0-10  Pain Score: 6  Pain Type: Acute pain  Pain Location: Abdomen  Response to Interventions: notified nursing.     Objective   Cognition:WFL     Activities of Daily Living: Grooming  Grooming Level of Assistance: Setup  Grooming Where Assessed: Edge of bed  Grooming Comments: to wash face and hands, brush teeth and comb hair. Extended time to complete due to fatigue.       Bed Mobility/Transfers: Bed Mobility 1  Bed Mobility 1: Supine to sitting  Level of Assistance 1: Minimum assistance  Bed Mobility Comments 1:  use of bed rails, extended time to complete  Bed Mobility 2  Bed Mobility  2: Sitting to supine  Level of Assistance 2: Maximum assistance  Bed Mobility Comments 2: , assist of 2 persons to HOB         Sitting Balance:  Dynamic Sitting Balance  Dynamic Sitting-Balance Support: Bilateral upper extremity supported  Dynamic Sitting-Balance: Lateral lean (left)  Dynamic Sitting-Comments: close sup for EOB sitting x 13 min during grooming    Outcome Measures:  [facility] Daily Activity  Putting on and taking off regular lower body clothing: Total  Bathing (including washing, rinsing, drying): A lot  Putting on and taking off regular upper body clothing: A lot  Toileting, which includes using toilet, bedpan or urinal: Total  Taking care of personal grooming such as brushing teeth: A little  Eating Meals: A little  Daily Activity - Total Score: 12        Education Documentation  Handouts, taught by JAYME Cuba at 5/27/2024 10:40 AM.  Learner: Patient  Readiness: Acceptance  Method: Explanation, Demonstration  Response: Verbalizes Understanding, Needs Reinforcement    Body Mechanics, taught by JAYME Cuba at 5/27/2024 10:40 AM.  Learner: Patient  Readiness: Acceptance  Method: Explanation, Demonstration  Response: Verbalizes Understanding, Needs Reinforcement    Precautions, taught by JAYME Cuba at 5/27/2024 10:40 AM.  Learner: Patient  Readiness: Acceptance  Method: Explanation, Demonstration  Response: Verbalizes Understanding, Needs Reinforcement    Home Exercise Program, taught by JAYME Cuba at 5/27/2024 10:40 AM.  Learner: Patient  Readiness: Acceptance  Method: Explanation, Demonstration  Response: Verbalizes Understanding, Needs Reinforcement    ADL Training, taught by JAYME Cuba at 5/27/2024 10:40 AM.  Learner: Patient  Readiness: Acceptance  Method: Explanation, Demonstration  Response: Verbalizes Understanding, Needs Reinforcement    Education Comments  No comments  found.      Goals:  Encounter Problems       Encounter Problems (Active)       OT Goals       ADLs (Progressing)       Start:  05/22/24    Expected End:  06/12/24       Patient will complete ADL tasks with Mod I, using AE as needed, in order to increase patient's safety and independence with self-care tasks.         Functional Transfers (Progressing)       Start:  05/22/24    Expected End:  06/12/24       Patient will complete functional transfers with Mod I in order to increase safety and independence with daily tasks.         B UE Strengthening (Progressing)       Start:  05/22/24    Expected End:  06/12/24       Patient will increase B UE strength to 4+/5 for functional transfers.         Functional Mobility (Progressing)       Start:  05/22/24    Expected End:  06/12/24       Patient will demonstrate the ability to complete item retrieval and functional mobility with Mod I in order to increase safety and independence with daily tasks.

## 2024-05-28 ENCOUNTER — APPOINTMENT (OUTPATIENT)
Dept: RADIOLOGY | Facility: HOSPITAL | Age: 79
DRG: 208 | End: 2024-05-28
Payer: MEDICARE

## 2024-05-28 VITALS
SYSTOLIC BLOOD PRESSURE: 116 MMHG | OXYGEN SATURATION: 97 % | WEIGHT: 212.52 LBS | TEMPERATURE: 97.7 F | BODY MASS INDEX: 28.17 KG/M2 | HEIGHT: 73 IN | DIASTOLIC BLOOD PRESSURE: 76 MMHG | RESPIRATION RATE: 20 BRPM | HEART RATE: 115 BPM

## 2024-05-28 LAB
ALBUMIN SERPL-MCNC: 2.3 G/DL (ref 3.5–5)
ALP BLD-CCNC: 118 U/L (ref 35–125)
ALT SERPL-CCNC: 32 U/L (ref 5–40)
ANION GAP SERPL CALC-SCNC: 7 MMOL/L
AST SERPL-CCNC: 33 U/L (ref 5–40)
BASOPHILS # BLD AUTO: 0.01 X10*3/UL (ref 0–0.1)
BASOPHILS NFR BLD AUTO: 0.1 %
BILIRUB SERPL-MCNC: 2 MG/DL (ref 0.1–1.2)
BUN SERPL-MCNC: 34 MG/DL (ref 8–25)
CALCIUM SERPL-MCNC: 8.1 MG/DL (ref 8.5–10.4)
CHLORIDE SERPL-SCNC: 93 MMOL/L (ref 97–107)
CO2 SERPL-SCNC: 36 MMOL/L (ref 24–31)
CREAT SERPL-MCNC: 1.3 MG/DL (ref 0.4–1.6)
EGFRCR SERPLBLD CKD-EPI 2021: 56 ML/MIN/1.73M*2
EOSINOPHIL # BLD AUTO: 0.02 X10*3/UL (ref 0–0.4)
EOSINOPHIL NFR BLD AUTO: 0.2 %
ERYTHROCYTE [DISTWIDTH] IN BLOOD BY AUTOMATED COUNT: 16.2 % (ref 11.5–14.5)
GLUCOSE BLD MANUAL STRIP-MCNC: 116 MG/DL (ref 74–99)
GLUCOSE BLD MANUAL STRIP-MCNC: 170 MG/DL (ref 74–99)
GLUCOSE BLD MANUAL STRIP-MCNC: 170 MG/DL (ref 74–99)
GLUCOSE SERPL-MCNC: 188 MG/DL (ref 65–99)
HCT VFR BLD AUTO: 35.5 % (ref 41–52)
HGB BLD-MCNC: 11.2 G/DL (ref 13.5–17.5)
IMM GRANULOCYTES # BLD AUTO: 0.06 X10*3/UL (ref 0–0.5)
IMM GRANULOCYTES NFR BLD AUTO: 0.7 % (ref 0–0.9)
LYMPHOCYTES # BLD AUTO: 0.61 X10*3/UL (ref 0.8–3)
LYMPHOCYTES NFR BLD AUTO: 7 %
MAGNESIUM SERPL-MCNC: 1.9 MG/DL (ref 1.6–3.1)
MCH RBC QN AUTO: 31.7 PG (ref 26–34)
MCHC RBC AUTO-ENTMCNC: 31.5 G/DL (ref 32–36)
MCV RBC AUTO: 101 FL (ref 80–100)
MONOCYTES # BLD AUTO: 0.38 X10*3/UL (ref 0.05–0.8)
MONOCYTES NFR BLD AUTO: 4.4 %
NEUTROPHILS # BLD AUTO: 7.65 X10*3/UL (ref 1.6–5.5)
NEUTROPHILS NFR BLD AUTO: 87.6 %
NRBC BLD-RTO: 0 /100 WBCS (ref 0–0)
PHOSPHATE SERPL-MCNC: 2.7 MG/DL (ref 2.5–4.5)
PLATELET # BLD AUTO: 124 X10*3/UL (ref 150–450)
POTASSIUM SERPL-SCNC: 3.6 MMOL/L (ref 3.4–5.1)
PROT SERPL-MCNC: 5.1 G/DL (ref 5.9–7.9)
RBC # BLD AUTO: 3.53 X10*6/UL (ref 4.5–5.9)
SODIUM SERPL-SCNC: 136 MMOL/L (ref 133–145)
WBC # BLD AUTO: 8.7 X10*3/UL (ref 4.4–11.3)

## 2024-05-28 PROCEDURE — 2500000002 HC RX 250 W HCPCS SELF ADMINISTERED DRUGS (ALT 637 FOR MEDICARE OP, ALT 636 FOR OP/ED): Performed by: INTERNAL MEDICINE

## 2024-05-28 PROCEDURE — 2500000004 HC RX 250 GENERAL PHARMACY W/ HCPCS (ALT 636 FOR OP/ED): Performed by: NURSE PRACTITIONER

## 2024-05-28 PROCEDURE — 9420000001 HC RT PATIENT EDUCATION 5 MIN

## 2024-05-28 PROCEDURE — 97530 THERAPEUTIC ACTIVITIES: CPT | Mod: GO,CO

## 2024-05-28 PROCEDURE — 71046 X-RAY EXAM CHEST 2 VIEWS: CPT

## 2024-05-28 PROCEDURE — 2500000001 HC RX 250 WO HCPCS SELF ADMINISTERED DRUGS (ALT 637 FOR MEDICARE OP): Performed by: INTERNAL MEDICINE

## 2024-05-28 PROCEDURE — 2500000005 HC RX 250 GENERAL PHARMACY W/O HCPCS: Performed by: NURSE PRACTITIONER

## 2024-05-28 PROCEDURE — 97110 THERAPEUTIC EXERCISES: CPT | Mod: GO,CO

## 2024-05-28 PROCEDURE — 36415 COLL VENOUS BLD VENIPUNCTURE: CPT | Performed by: INTERNAL MEDICINE

## 2024-05-28 PROCEDURE — 84100 ASSAY OF PHOSPHORUS: CPT | Performed by: INTERNAL MEDICINE

## 2024-05-28 PROCEDURE — 92526 ORAL FUNCTION THERAPY: CPT | Mod: GN | Performed by: SPEECH-LANGUAGE PATHOLOGIST

## 2024-05-28 PROCEDURE — 2500000002 HC RX 250 W HCPCS SELF ADMINISTERED DRUGS (ALT 637 FOR MEDICARE OP, ALT 636 FOR OP/ED): Performed by: NURSE PRACTITIONER

## 2024-05-28 PROCEDURE — 2500000001 HC RX 250 WO HCPCS SELF ADMINISTERED DRUGS (ALT 637 FOR MEDICARE OP): Performed by: NURSE PRACTITIONER

## 2024-05-28 PROCEDURE — 80053 COMPREHEN METABOLIC PANEL: CPT | Performed by: INTERNAL MEDICINE

## 2024-05-28 PROCEDURE — 94640 AIRWAY INHALATION TREATMENT: CPT

## 2024-05-28 PROCEDURE — 85025 COMPLETE CBC W/AUTO DIFF WBC: CPT | Performed by: INTERNAL MEDICINE

## 2024-05-28 PROCEDURE — 83735 ASSAY OF MAGNESIUM: CPT | Performed by: INTERNAL MEDICINE

## 2024-05-28 PROCEDURE — 82947 ASSAY GLUCOSE BLOOD QUANT: CPT

## 2024-05-28 PROCEDURE — 71046 X-RAY EXAM CHEST 2 VIEWS: CPT | Performed by: RADIOLOGY

## 2024-05-28 RX ORDER — BISACODYL 10 MG/1
10 SUPPOSITORY RECTAL DAILY PRN
Start: 2024-05-28

## 2024-05-28 RX ORDER — METOPROLOL SUCCINATE 25 MG/1
25 TABLET, EXTENDED RELEASE ORAL DAILY
Start: 2024-05-29

## 2024-05-28 RX ORDER — POLYETHYLENE GLYCOL 3350 17 G/17G
17 POWDER, FOR SOLUTION ORAL DAILY
Start: 2024-05-29

## 2024-05-28 RX ORDER — GUAIFENESIN 100 MG/5ML
100 SOLUTION ORAL EVERY 4 HOURS PRN
Status: DISCONTINUED | OUTPATIENT
Start: 2024-05-28 | End: 2024-05-28 | Stop reason: HOSPADM

## 2024-05-28 RX ORDER — POTASSIUM CHLORIDE 20 MEQ/1
20 TABLET, EXTENDED RELEASE ORAL ONCE
Status: COMPLETED | OUTPATIENT
Start: 2024-05-28 | End: 2024-05-28

## 2024-05-28 RX ORDER — INSULIN LISPRO 100 [IU]/ML
0-5 INJECTION, SOLUTION INTRAVENOUS; SUBCUTANEOUS
Start: 2024-05-28

## 2024-05-28 RX ORDER — DOCUSATE SODIUM 100 MG/1
100 CAPSULE, LIQUID FILLED ORAL 2 TIMES DAILY
Start: 2024-05-28

## 2024-05-28 RX ORDER — SPIRONOLACTONE 25 MG/1
12.5 TABLET ORAL DAILY
Status: DISCONTINUED | OUTPATIENT
Start: 2024-05-29 | End: 2024-05-28 | Stop reason: HOSPADM

## 2024-05-28 RX ORDER — IPRATROPIUM BROMIDE AND ALBUTEROL SULFATE 2.5; .5 MG/3ML; MG/3ML
3 SOLUTION RESPIRATORY (INHALATION) EVERY 4 HOURS PRN
Start: 2024-05-28

## 2024-05-28 RX ORDER — BUDESONIDE 0.5 MG/2ML
0.5 INHALANT ORAL
Start: 2024-05-28

## 2024-05-28 RX ORDER — GUAIFENESIN 100 MG/5ML
100 SOLUTION ORAL EVERY 4 HOURS PRN
Start: 2024-05-28

## 2024-05-28 RX ORDER — POTASSIUM CHLORIDE 20 MEQ/1
20 TABLET, EXTENDED RELEASE ORAL ONCE
Start: 2024-05-28 | End: 2024-05-28

## 2024-05-28 RX ORDER — FUROSEMIDE 40 MG/1
40 TABLET ORAL DAILY
Start: 2024-05-28

## 2024-05-28 RX ORDER — ACETAMINOPHEN 325 MG/1
650 TABLET ORAL EVERY 6 HOURS PRN
Start: 2024-05-28

## 2024-05-28 RX ORDER — SPIRONOLACTONE 25 MG/1
12.5 TABLET ORAL DAILY
Start: 2024-05-29

## 2024-05-28 RX ORDER — FUROSEMIDE 40 MG/1
40 TABLET ORAL DAILY
Status: DISCONTINUED | OUTPATIENT
Start: 2024-05-28 | End: 2024-05-28 | Stop reason: HOSPADM

## 2024-05-28 RX ADMIN — FOLIC ACID 1 MG: 1 TABLET ORAL at 08:58

## 2024-05-28 RX ADMIN — AMIODARONE HYDROCHLORIDE 200 MG: 200 TABLET ORAL at 08:44

## 2024-05-28 RX ADMIN — INSULIN LISPRO 1 UNITS: 100 INJECTION, SOLUTION INTRAVENOUS; SUBCUTANEOUS at 08:46

## 2024-05-28 RX ADMIN — BUDESONIDE INHALATION 0.5 MG: 0.5 SUSPENSION RESPIRATORY (INHALATION) at 06:56

## 2024-05-28 RX ADMIN — TAMSULOSIN HYDROCHLORIDE 0.4 MG: 0.4 CAPSULE ORAL at 06:05

## 2024-05-28 RX ADMIN — Medication 2 L/MIN: at 08:00

## 2024-05-28 RX ADMIN — MIDODRINE HYDROCHLORIDE 10 MG: 10 TABLET ORAL at 08:44

## 2024-05-28 RX ADMIN — FUROSEMIDE 40 MG: 40 TABLET ORAL at 16:11

## 2024-05-28 RX ADMIN — NYSTATIN 1 APPLICATION: 100000 POWDER TOPICAL at 08:57

## 2024-05-28 RX ADMIN — POTASSIUM CHLORIDE 20 MEQ: 1500 TABLET, EXTENDED RELEASE ORAL at 16:11

## 2024-05-28 RX ADMIN — MIDODRINE HYDROCHLORIDE 10 MG: 10 TABLET ORAL at 16:11

## 2024-05-28 RX ADMIN — LACOSAMIDE 100 MG: 100 TABLET, FILM COATED ORAL at 08:44

## 2024-05-28 RX ADMIN — INSULIN LISPRO 1 UNITS: 100 INJECTION, SOLUTION INTRAVENOUS; SUBCUTANEOUS at 12:46

## 2024-05-28 RX ADMIN — MIDODRINE HYDROCHLORIDE 10 MG: 10 TABLET ORAL at 12:46

## 2024-05-28 RX ADMIN — APIXABAN 5 MG: 5 TABLET, FILM COATED ORAL at 08:44

## 2024-05-28 RX ADMIN — POLYETHYLENE GLYCOL 3350 17 G: 17 POWDER, FOR SOLUTION ORAL at 08:43

## 2024-05-28 RX ADMIN — METOPROLOL SUCCINATE 25 MG: 25 TABLET, FILM COATED, EXTENDED RELEASE ORAL at 08:43

## 2024-05-28 RX ADMIN — PANTOPRAZOLE SODIUM 40 MG: 40 TABLET, DELAYED RELEASE ORAL at 06:05

## 2024-05-28 RX ADMIN — GUAIFENESIN 100 MG: 200 SOLUTION ORAL at 17:39

## 2024-05-28 RX ADMIN — DOCUSATE SODIUM 100 MG: 100 CAPSULE, LIQUID FILLED ORAL at 08:44

## 2024-05-28 RX ADMIN — ACETAMINOPHEN 650 MG: 325 TABLET ORAL at 09:03

## 2024-05-28 ASSESSMENT — PAIN SCALES - GENERAL
PAINLEVEL_OUTOF10: 0 - NO PAIN
PAINLEVEL_OUTOF10: 4

## 2024-05-28 ASSESSMENT — COGNITIVE AND FUNCTIONAL STATUS - GENERAL
EATING MEALS: A LITTLE
TOILETING: A LOT
PERSONAL GROOMING: A LITTLE
HELP NEEDED FOR BATHING: A LOT
DAILY ACTIVITIY SCORE: 14
DRESSING REGULAR UPPER BODY CLOTHING: A LITTLE
DRESSING REGULAR LOWER BODY CLOTHING: TOTAL

## 2024-05-28 ASSESSMENT — PAIN - FUNCTIONAL ASSESSMENT
PAIN_FUNCTIONAL_ASSESSMENT: FLACC (FACE, LEGS, ACTIVITY, CRY, CONSOLABILITY)
PAIN_FUNCTIONAL_ASSESSMENT: 0-10

## 2024-05-28 NOTE — PROGRESS NOTES
Physical Therapy                 Therapy Communication Note    Patient Name: Darrell Arreola  MRN: [id number]  Today's Date: 5/28/2024     Discipline: Physical Therapy    Missed Visit Reason: Missed Visit Reason: Patient refused (Attempted to see pt for follow-up however pt declined at this time stating he has done a lot today and is going to [facility] later.)    Missed Time: Attempt

## 2024-05-28 NOTE — NURSING NOTE
Assume care of patient. Pt lying quietly in bed with eyes closed. Even unlabored breaths noted. No signs of distress noted. No needs identified at this time. Call light within reach. Bed alarm engaged. Continue with plan of care.

## 2024-05-28 NOTE — PROGRESS NOTES
Pt anticipated to dc today, [facility] made aware however as of time of this note no dc order in yet. 7000 is completed and goldenrod sent to facility. [facility] made facility aware pt may still dc to them today likely somewhere on or after 5-6, transportation has not yet been set up and will need to be done through roundtrip. Precert expires at end of day today, attending CNP is aware.      05/28/24 4504   Discharge Planning   Patient expects to be discharged to: [facility] - precert good through 5/28   Does the patient need discharge transport arranged? Yes   RoundTrip coordination needed? Yes   Has discharge transport been arranged? No

## 2024-05-28 NOTE — CARE PLAN
The patient's goals for the shift include      The clinical goals for the shift include maintain heart rate/safety    Over the shift, the patient did not make progress toward the following goals. Barriers to progression include afib. Recommendations to address these barriers include administer medications/interventions as ordered.

## 2024-05-28 NOTE — PROGRESS NOTES
Occupational Therapy    OT Treatment    Patient Name: Darrell Arreola  MRN: [id number]  Today's Date: 5/28/2024  Time Calculation  Start Time: 1117  Stop Time: 1141  Time Calculation (min): 24 min         Assessment:  OT Assessment: Tolerated session fairly, demonstrating fair functional tolerance, declining OOB activity despite encouragement. Pt continuing to progress towards POC and would benefit from continued skilled OT services to improve strength, balance, and functional tolerance to increase independence with ADL tasks  End of Session Communication: Bedside nurse  End of Session Patient Position: Bed, 3 rail up, Alarm on  OT Assessment Results: Decreased ADL status, Decreased upper extremity strength, Decreased safe judgment during ADL, Decreased endurance, Decreased functional mobility, Decreased gross motor control, Decreased IADLs  Plan:  Treatment Interventions: ADL retraining, Functional transfer training, UE strengthening/ROM, Endurance training, Patient/family training, Compensatory technique education  OT Frequency: 4 times per week  OT Discharge Recommendations: Moderate intensity level of continued care  Equipment Recommended upon Discharge: Wheeled walker  OT Recommended Transfer Status: Assist of 2  OT - OK to Discharge: Yes  Treatment Interventions: ADL retraining, Functional transfer training, UE strengthening/ROM, Endurance training, Patient/family training, Compensatory technique education    Subjective   Previous Visit Info:  OT Last Visit  OT Received On: 05/28/24  General:  General  Prior to Session Communication: Bedside nurse  Patient Position Received: Bed, 3 rail up, Alarm off, not on at start of session  General Comment: Cleared for therapy per RN. Pt supine in bed upon arrival and agreeable to tx  Precautions:  Hearing/Visual Limitations: reading glasses  Medical Precautions: Fall precautions, Oxygen therapy device and L/min (3L O2 nc)  Vital Signs:  Vital Signs  Heart Rate: (!)  115  BP: 88/53  MAP (mmHg): 61  Patient Position: Sitting  Pain:  Pain Assessment  Pain Assessment: FLACC (Face, Legs, Activity, Cry, Consolability)  Pain Score: 4  Pain Type: Acute pain  Pain Location: Chest (d/t coughing)    Objective    Cognition:  Cognition  Overall Cognitive Status: Within Functional Limits  Following Commands: Follows one step commands with increased time  Insight: Mild  Impulsive: Mildly    Bed Mobility/Transfers: Bed Mobility  Bed Mobility: Yes  Bed Mobility 1  Bed Mobility 1: Supine to sitting  Level of Assistance 1: Moderate assistance, Minimal verbal cues  Bed Mobility Comments 1: Assist with BLE advancement and trunk elevation with cues for use of bed rail for UE support. Pt requiring increased assist to scoot hips to EOB with use of drawsheet  Bed Mobility 2  Bed Mobility  2: Sitting to supine  Level of Assistance 2: Maximum assistance  Bed Mobility Comments 2: assist to lift BLE on bed with cues for trunk lowering. Max x2 to boost to HOB with use of drawsheet    Transfers  Transfer: No (declining OOB activity despite max encouragement)    Sitting Balance:  Static Sitting Balance  Static Sitting-Level of Assistance: Close supervision  Static Sitting-Comment/Number of Minutes: strong forward flexed posture with intermittent cues for postural alignment    Therapy/Activity: Therapeutic Exercise  Therapeutic Exercise Activity 1: participated in AROM BUE exercises 4x10 in all planes to improve strength and functional tolerance to increase independence with transfer tasks with cues provided for proper muscle recruitement    Therapeutic Activity  Therapeutic Activity Performed: Yes  Therapeutic Activity 1: tolerated sitting EOB ~15 min with fair balance during BUE exercises with increased cues required for postural alignment d/t forward flexed posture    Outcome Measures:[facility] Daily Activity  Putting on and taking off regular lower body clothing: Total  Bathing (including washing, rinsing,  drying): A lot  Putting on and taking off regular upper body clothing: A little  Toileting, which includes using toilet, bedpan or urinal: A lot  Taking care of personal grooming such as brushing teeth: A little  Eating Meals: A little  Daily Activity - Total Score: 14    Education Documentation  Handouts, taught by JAYME Montiel at 5/28/2024 12:38 PM.  Learner: Patient  Readiness: Acceptance  Method: Explanation  Response: Verbalizes Understanding, Needs Reinforcement    Body Mechanics, taught by JAYME Montiel at 5/28/2024 12:38 PM.  Learner: Patient  Readiness: Acceptance  Method: Explanation  Response: Verbalizes Understanding, Needs Reinforcement    Precautions, taught by JAYME Montiel at 5/28/2024 12:38 PM.  Learner: Patient  Readiness: Acceptance  Method: Explanation  Response: Verbalizes Understanding, Needs Reinforcement    Home Exercise Program, taught by JAYME Montiel at 5/28/2024 12:38 PM.  Learner: Patient  Readiness: Acceptance  Method: Explanation  Response: Verbalizes Understanding, Needs Reinforcement    ADL Training, taught by JAYME Montiel at 5/28/2024 12:38 PM.  Learner: Patient  Readiness: Acceptance  Method: Explanation  Response: Verbalizes Understanding, Needs Reinforcement    Education Comments  No comments found.        Problem: OT Goals  Goal: ADLs  Description: Patient will complete ADL tasks with Mod I, using AE as needed, in order to increase patient's safety and independence with self-care tasks.  Outcome: Progressing  Goal: Functional Transfers  Description: Patient will complete functional transfers with Mod I in order to increase safety and independence with daily tasks.  Outcome: Progressing  Goal: B UE Strengthening  Description: Patient will increase B UE strength to 4+/5 for functional transfers.  Outcome: Progressing     Problem: OT Goals  Goal: Functional Mobility  Description: Patient will demonstrate the  ability to complete item retrieval and functional mobility with Mod I in order to increase safety and independence with daily tasks.    Outcome: Not Progressing

## 2024-05-28 NOTE — CARE PLAN
The patient's goals for the shift include      The clinical goals for the shift include control heart rate/safety    Over the shift, the patient did not make progress toward the following goals. Barriers to progression include . Recommendations to address these barriers include .

## 2024-05-28 NOTE — PROGRESS NOTES
Darrell Arreola is a 78 y.o. male on day 11 of admission presenting with Closed head injury, initial encounter.    Subjective   Patient seen and examined.  Awake alert oriented x 3.  No new complaints.  Wheezing improved.  Intermittent non-productive cough, shortness of breath improved.  Chest pain with cough only.  + Bowel movements.  Constipation resolved.    Spoke with nursing, speech therapy in, diet modified.  On 2 liters nasal cannula.    Objective     Physical Exam  Vitals and nursing note reviewed.   Constitutional:       General: He is not in acute distress.     Appearance: Normal appearance. He is normal weight. He is ill-appearing. He is not toxic-appearing or diaphoretic.   HENT:      Head: Normocephalic and atraumatic.      Right Ear: Tympanic membrane normal.      Left Ear: Tympanic membrane normal.      Nose: Nose normal.      Mouth/Throat:      Mouth: Mucous membranes are moist.      Pharynx: Oropharynx is clear.   Eyes:      Extraocular Movements: Extraocular movements intact.      Conjunctiva/sclera: Conjunctivae normal.      Pupils: Pupils are equal, round, and reactive to light.   Cardiovascular:      Rate and Rhythm: Normal rate. Rhythm irregular.      Pulses: Normal pulses.      Heart sounds: Normal heart sounds. No murmur heard.  Pulmonary:      Effort: Pulmonary effort is normal. No respiratory distress.      Breath sounds: Rhonchi present. No wheezing or rales.      Comments: Intermittent non-productive cough. 2 liters nasal cannula.   Abdominal:      General: Bowel sounds are normal. There is no distension.      Palpations: Abdomen is soft.      Tenderness: There is no abdominal tenderness.   Genitourinary:     Comments: Deferred.   Musculoskeletal:         General: No swelling or tenderness. Normal range of motion.      Cervical back: Normal range of motion and neck supple.      Comments: Knee high compression stockings in place.   Skin:     General: Skin is warm and dry.      Capillary  "Refill: Capillary refill takes less than 2 seconds.      Comments: Left ear skin erythema and tenderness at oxygen tubing. No open wounds. Right neck dressing clean dry intact.   Neurological:      General: No focal deficit present.      Mental Status: He is alert and oriented to person, place, and time.      Comments: Awake alert oriented x 3. Forgetful. Follows all commands. Generalized weakness. No focal weakness.    Psychiatric:         Mood and Affect: Mood normal.         Behavior: Behavior normal.       Last Recorded Vitals  Blood pressure 91/65, pulse 107, temperature 36.4 °C (97.5 °F), temperature source Temporal, resp. rate 14, height 1.854 m (6' 1\"), weight 96.4 kg (212 lb 8.4 oz), SpO2 97%.    Intake/Output last 3 Shifts:  I/O last 3 completed shifts:  In: 840 (8.7 mL/kg) [P.O.:840]  Out: 4150 (43.1 mL/kg) [Urine:4150 (1.2 mL/kg/hr)]  Weight: 96.4 kg     Telemetry atrial fibrillation rate 100's    Relevant Results  Results for orders placed or performed during the hospital encounter of 05/17/24 (from the past 24 hour(s))   POCT GLUCOSE   Result Value Ref Range    POCT Glucose 189 (H) 74 - 99 mg/dL   POCT GLUCOSE   Result Value Ref Range    POCT Glucose 146 (H) 74 - 99 mg/dL   POCT GLUCOSE   Result Value Ref Range    POCT Glucose 196 (H) 74 - 99 mg/dL   CBC and Auto Differential   Result Value Ref Range    WBC 8.7 4.4 - 11.3 x10*3/uL    nRBC 0.0 0.0 - 0.0 /100 WBCs    RBC 3.53 (L) 4.50 - 5.90 x10*6/uL    Hemoglobin 11.2 (L) 13.5 - 17.5 g/dL    Hematocrit 35.5 (L) 41.0 - 52.0 %     (H) 80 - 100 fL    MCH 31.7 26.0 - 34.0 pg    MCHC 31.5 (L) 32.0 - 36.0 g/dL    RDW 16.2 (H) 11.5 - 14.5 %    Platelets 124 (L) 150 - 450 x10*3/uL    Neutrophils % 87.6 40.0 - 80.0 %    Immature Granulocytes %, Automated 0.7 0.0 - 0.9 %    Lymphocytes % 7.0 13.0 - 44.0 %    Monocytes % 4.4 2.0 - 10.0 %    Eosinophils % 0.2 0.0 - 6.0 %    Basophils % 0.1 0.0 - 2.0 %    Neutrophils Absolute 7.65 (H) 1.60 - 5.50 x10*3/uL    " Immature Granulocytes Absolute, Automated 0.06 0.00 - 0.50 x10*3/uL    Lymphocytes Absolute 0.61 (L) 0.80 - 3.00 x10*3/uL    Monocytes Absolute 0.38 0.05 - 0.80 x10*3/uL    Eosinophils Absolute 0.02 0.00 - 0.40 x10*3/uL    Basophils Absolute 0.01 0.00 - 0.10 x10*3/uL   Comprehensive Metabolic Panel   Result Value Ref Range    Glucose 188 (H) 65 - 99 mg/dL    Sodium 136 133 - 145 mmol/L    Potassium 3.6 3.4 - 5.1 mmol/L    Chloride 93 (L) 97 - 107 mmol/L    Bicarbonate 36 (H) 24 - 31 mmol/L    Urea Nitrogen 34 (H) 8 - 25 mg/dL    Creatinine 1.30 0.40 - 1.60 mg/dL    eGFR 56 (L) >60 mL/min/1.73m*2    Calcium 8.1 (L) 8.5 - 10.4 mg/dL    Albumin 2.3 (L) 3.5 - 5.0 g/dL    Alkaline Phosphatase 118 35 - 125 U/L    Total Protein 5.1 (L) 5.9 - 7.9 g/dL    AST 33 5 - 40 U/L    Bilirubin, Total 2.0 (H) 0.1 - 1.2 mg/dL    ALT 32 5 - 40 U/L    Anion Gap 7 <=19 mmol/L   Magnesium   Result Value Ref Range    Magnesium 1.90 1.60 - 3.10 mg/dL   Phosphorus   Result Value Ref Range    Phosphorus 2.7 2.5 - 4.5 mg/dL   POCT GLUCOSE   Result Value Ref Range    POCT Glucose 170 (H) 74 - 99 mg/dL     Susceptibility data from last 90 days.  Collected Specimen Info Organism Ampicillin Cefazolin Cefazolin (uncomplicated UTIs only) Ceftriaxone Ciprofloxacin Gentamicin Nitrofurantoin Piperacillin/Tazobactam Tetracycline Trimethoprim/Sulfamethoxazole   04/26/24 Urine from Clean Catch/Voided Proteus mirabilis S R S S S S R S R S     No results found.    Scheduled medications  amiodarone, 200 mg, oral, Daily  apixaban, 5 mg, oral, BID  bisacodyl, 10 mg, rectal, Daily  budesonide, 0.5 mg, nebulization, BID  docusate sodium, 100 mg, oral, BID  [Held by provider] famotidine, 10 mg, oral, Daily  folic acid, 1 mg, oral, Daily  insulin lispro, 0-5 Units, subcutaneous, Before meals & nightly  lacosamide, 100 mg, oral, BID  metoprolol succinate XL, 25 mg, oral, Daily  midodrine, 10 mg, oral, TID  nystatin, 1 Application, Topical, BID  oxygen, ,  inhalation, Continuous - Inhalation  pantoprazole, 40 mg, oral, Daily before breakfast   Or  pantoprazole, 40 mg, intravenous, Daily before breakfast  polyethylene glycol, 17 g, oral, Daily  rosuvastatin, 10 mg, oral, Nightly  [Held by provider] SITagliptin phosphate, 50 mg, oral, Daily  tamsulosin, 0.4 mg, oral, Daily before breakfast      Continuous medications     PRN medications  PRN medications: acetaminophen, dextrose, dextrose, glucagon, glucagon, ipratropium-albuteroL, metoprolol      ASSESSMENT:  Fall vs syncopal episode  Altered mental status - metabolic encephalopathy  Seizure disorder   History of CVA  Generalized weakness  Acute hypoxic respiratory failure - improved  COVID-19  Low grade fever resolved  Status post PEA arrest post anaphylactic reaction - Lidocaine  Atrial fibrillation - rapid ventricular rate intermittent  Oral anticoagulation  Hypotension asymptomatic   Hyperlipidemia  Acute on chronic systolic congestive heart failure - improved  Hypokalemia resolved  Acute kidney injury on chronic kidney disease stable  Hypophosphatemia - resolved  Hypocalcemia -- hypoalbuminemia   Protein calorie malnutrition  Type 2 diabetes mellitus with hypoglycemia -- resolved  Tobacco use  Microcytic anemia  Thrombocytopenia - improved  Elevated AST asymptomatic  Hyperbilirubinemia asymptomatic - improved  Constipation resolved  Dysphagia  Musculoskeletal pain    PLAN:  Patient is doing well this morning.  No new issues.  Overall, condition is improving.  Respiratory status, pulse oximetry stable, on 2 L nasal cannula.  Continue current treatment.  Pulmicort twice daily.  DuoNeb treatments.  Oxygen.  Incentive spirometer 10 x / hour while awake.  Pulmonology following, input appreciated.  Remdesivir x 3 days course, complete.  Isolation precautions discontinued.  Maintenance diuretics per Cardiology.  Telemetry atrial fibrillation rate low 100s.  Heart rate fairly controlled.  Blood pressure fairly controlled.   Continue Toprol XL for heart rate control.  Continue Midodrine.  Knee-high compression stockings during day, remove at night.  Continue Eliquis for prevention from atrial fibrillation.  Labs reviewed.  Electrolytes and renal function stable.  Continue to monitor.  Constipation resolved.  Continue bowel regimen Colace 100 mg p.o. twice daily, MiraLAX daily.  Dulcolax suppository as needed for constipation.  Point-of-care glucose reviewed.  Continue sliding scale insulin for glycemic control.  Hypoglycemia protocol.  Tobacco cessation education pertinent.  DVT prophylaxis.  Eliquis.  Monitor H&H and platelet count.  GI prophylaxis PPI.  Encourage p.o. intake.  Protein supplementation.  Pressure ulcer skin care.  P.r.n Tylenol.  PT/OT.  Fall precautions.  Up with assistance only.  Bed and chair alarm at all times.  Pressure ulcer prevention measures.  Turn and reposition every 2 hours and as needed.  Heels off bed.  Offloading.  Supportive care.  Patient reassured.  PT/OT recommend moderate intensity level of continued care.  Patient is in agreement to skilled nursing rehabilitation.  Discharge plan to [facility] nursing rehabilitation facility.  Anticipate discharge today if cleared by consultations Pulmonology and Cardiology.  Discussed with patient, nursing, case management and Dr. Sims.        Rosa Champion, APRN-CNP

## 2024-05-28 NOTE — PROGRESS NOTES
Speech-Language Pathology    Speech-Language Pathology Clinical Swallow Treatment    Patient Name: Darrell Arreola  MRN: [id number]  : 1945  Today's Date: 24  Start time: Start Time:   Stop time: Stop Time: 1037  Time calculation (min) : Time Calculation (min): 22 min    ASSESSMENT  SLP TX Intervention Outcome: Making Progress Towards Goals  Treatment Tolerance: Patient tolerated treatment well    Impressions: Pt safely tolerating regular solids, thin liquids and puree safely with adequate mastication, dentures appear to fit well today, no overt s/s aspiration    PLAN  MBSS recommended: No; no pharyngeal dysphagia suspected.  Recommended solid consistency: Regular (IDDSI level 7)  Recommended liquid consistency: Thin (IDDSI 0)  Recommended medication administration: Whole, in thin liquid  Safe swallow strategies: Upright positioning for all PO intake, Small bites, and Alternate bites and sips  Discharge recommendation: Recommend no further ST upon DC                                                                                          Inpatient/Swing Bed or Outpatient: Inpatient  SLP TX Plan: Continue Plan of Care  SLP Plan: Skilled SLP  SLP Frequency: 2x per week  Duration: 1 week  Next Treatment Priority: diet rafa, strat    SUBJECTIVE  Prior to Session Communication: Bedside nurse  RN cleared pt to participate in session and reported pt upset with diet change to soft and bite sized  Respiratory status: Supplemental oxygen via NC  Positioning: Upright in bed  Pt was alert, pleasant, and cooperative for session. Dislikes soft bite sized consistency, want a regular diet consistency    Pain Assessment  Pain Assessment: 0-10  Pain Score: 0 - No pain    Orientation: Ox4  Ability to follow functional commands: WFL    OBJECTIVE  Therapeutic Swallow  Therapeutic Swallow Intervention : PO Trials, Compensatory Strategies    Goals (start date 24):  Pt will consume recommended diet (soft/bite sized/thin)  without overt s/sx aspiration/penetration in 95% of observed trials.   Status: Progressing   Progress this date: pt tolerated pudding and thin liquids safeley without s/s apiration    Pt will consume trials of regular solids (as dentures/adhesive available) without overt s/s penetration/aspiration for consideration of diet upgrade.    Status: Progressing   Progress this date: Pt placed upper dentures without adhesive, adequate fit, adequate mastication with 1 shelia cracker and 1 scott doon cookie, complete oral clearance, no s/s aspiration, Discussed upgrade to regular solids    Treatment/Education:  Results and recommendations were relayed to: Patient and Bedside nurse  Education provided: Yes   Learner: Patient   Barriers to learning: None   Method of teaching: Verbal   Topic: Role of ST, results of assessment, recommended safe swallow strategies,                                                         Outcome of teaching: Pt verbalized understanding

## 2024-05-28 NOTE — PROGRESS NOTES
"Subjective Data:  Patient denies any symptoms of chest pain or shortness of breath is feeling much better.  He just came back after having a chest x-ray done    Overnight Events:    none     Objective Data:  Last Recorded Vitals:  Vitals:    05/28/24 0546 05/28/24 0700 05/28/24 1117 05/28/24 1139   BP:  91/65 88/53 88/53   BP Location:  Right arm  Right arm   Patient Position:  Lying Sitting Lying   Pulse:   (!) 115    Resp:  14  16   Temp:  36.4 °C (97.5 °F)  36.5 °C (97.7 °F)   TempSrc:  Temporal  Temporal   SpO2:       Weight: 96.4 kg (212 lb 8.4 oz)      Height:           Last Labs:  CBC - 5/28/2024:  5:52 AM  8.7 11.2 124    35.5      CMP - 5/28/2024:  5:52 AM  8.1 5.1 33 --- 2.0   2.7 2.3 32 118      PTT - 4/20/2024:  3:41 PM  1.6   16.0 38.4     HGBA1C   Date/Time Value Ref Range Status   04/21/2024 07:32 AM 6.7 See below % Final   04/04/2024 11:13 AM 6.5 4.3 - 5.6 % Final     Comment:     American Diabetes Association guidelines indicate that patients with HgbA1c in the range 5.7-6.4% are at increased risk for development of diabetes, and intervention by lifestyle modification may be beneficial. HgbA1c greater or equal to 6.5% is considered diagnostic of diabetes.   10/10/2023 11:42 AM 5.3 See below % Final     LDLCALC   Date/Time Value Ref Range Status   04/21/2024 07:30 AM 50 65 - 130 mg/dL Final   07/02/2023 04:38 AM 23 65 - 130 MG/DL Final   05/17/2019 06:01 AM 42 65 - 130 MG/DL Final      Last I/O:  I/O last 3 completed shifts:  In: 840 (8.7 mL/kg) [P.O.:840]  Out: 4150 (43.1 mL/kg) [Urine:4150 (1.2 mL/kg/hr)]  Weight: 96.4 kg     Past Cardiology Tests (Last 3 Years):  EKG:  ECG 12 lead 05/17/2024      ECG 12 lead 05/17/2024      Electrocardiogram, 12-lead 05/17/2024      ECG 12 lead 04/20/2024      Electrocardiogram, 12-lead PRN ACS symptoms 10/10/2023    Echo:  Transthoracic Echo (TTE) Complete 10/10/2023    Ejection Fractions:  No results found for: \"EF\"  Cath:  No results found for this or any " previous visit from the past 1095 days.    Stress Test:  No results found for this or any previous visit from the past 1095 days.    Cardiac Imaging:  No results found for this or any previous visit from the past 1095 days.      Inpatient Medications:  Scheduled medications   Medication Dose Route Frequency    amiodarone  200 mg oral Daily    apixaban  5 mg oral BID    bisacodyl  10 mg rectal Daily    budesonide  0.5 mg nebulization BID    docusate sodium  100 mg oral BID    [Held by provider] famotidine  10 mg oral Daily    folic acid  1 mg oral Daily    furosemide  40 mg oral Daily    insulin lispro  0-5 Units subcutaneous Before meals & nightly    lacosamide  100 mg oral BID    metoprolol succinate XL  25 mg oral Daily    midodrine  10 mg oral TID    nystatin  1 Application Topical BID    oxygen   inhalation Continuous - Inhalation    pantoprazole  40 mg oral Daily before breakfast    Or    pantoprazole  40 mg intravenous Daily before breakfast    polyethylene glycol  17 g oral Daily    potassium chloride CR  20 mEq oral Once    rosuvastatin  10 mg oral Nightly    sacubitriL-valsartan  0.5 tablet oral BID    [Held by provider] SITagliptin phosphate  50 mg oral Daily    [START ON 5/29/2024] spironolactone  12.5 mg oral Daily    tamsulosin  0.4 mg oral Daily before breakfast     PRN medications   Medication    acetaminophen    dextrose    dextrose    glucagon    glucagon    ipratropium-albuteroL    metoprolol     Continuous Medications   Medication Dose Last Rate       Physical Exam:  HEENT PERRLA neck is supple lungs still has few rales at the bases posteriorly heart S1-S2 present with no murmurs abdomen soft and nontender EXTR shows no evidence of preliminaries grossly nonfocal     Assessment/Plan   1 cardiomyopathy ejection fraction around 20% stage IV [place] heart association classification.  Patient will be initiated on a small dose of Entresto 24/20 6/2 tablet twice daily and will require maintenance dose  of Lasix 40 mg daily along with potassium chloride 20 mg daily and will add spironolactone 12.5 mg daily.  Will keep parameters to hold Entresto for systolic blood pressure less than 90 mmHg.  Patient can be [facility] this afternoon  Peripheral IV 05/22/24 20 G Left;Anterior Forearm (Active)   Site Assessment Clean;Dry;Intact 05/27/24 2038   Dressing Type Transparent 05/27/24 2038   Line Status Saline locked;Flushed;No blood return 05/27/24 2038   Dressing Status Clean;Dry;Occlusive 05/27/24 2038   Number of days: 6       Code Status:  Full Code    I spent 20 minutes in the professional and overall care of this patient.        Simeon Lucas MD

## 2024-05-28 NOTE — PROGRESS NOTES
"Subjective Data:  Patient was having symptoms of shortness of breath and requiring increased amount of oxygen supplementation.    Overnight Events:    none     Objective Data:  Last Recorded Vitals:  Vitals:    05/28/24 0546 05/28/24 0700 05/28/24 1117 05/28/24 1139   BP:  91/65 88/53 88/53   BP Location:  Right arm  Right arm   Patient Position:  Lying Sitting Lying   Pulse:   (!) 115    Resp:  14  16   Temp:  36.4 °C (97.5 °F)  36.5 °C (97.7 °F)   TempSrc:  Temporal  Temporal   SpO2:       Weight: 96.4 kg (212 lb 8.4 oz)      Height:           Last Labs:  CBC - 5/28/2024:  5:52 AM  8.7 11.2 124    35.5      CMP - 5/28/2024:  5:52 AM  8.1 5.1 33 --- 2.0   2.7 2.3 32 118      PTT - 4/20/2024:  3:41 PM  1.6   16.0 38.4     HGBA1C   Date/Time Value Ref Range Status   04/21/2024 07:32 AM 6.7 See below % Final   04/04/2024 11:13 AM 6.5 4.3 - 5.6 % Final     Comment:     American Diabetes Association guidelines indicate that patients with HgbA1c in the range 5.7-6.4% are at increased risk for development of diabetes, and intervention by lifestyle modification may be beneficial. HgbA1c greater or equal to 6.5% is considered diagnostic of diabetes.   10/10/2023 11:42 AM 5.3 See below % Final     LDLCALC   Date/Time Value Ref Range Status   04/21/2024 07:30 AM 50 65 - 130 mg/dL Final   07/02/2023 04:38 AM 23 65 - 130 MG/DL Final   05/17/2019 06:01 AM 42 65 - 130 MG/DL Final      Last I/O:  I/O last 3 completed shifts:  In: 840 (8.7 mL/kg) [P.O.:840]  Out: 4150 (43.1 mL/kg) [Urine:4150 (1.2 mL/kg/hr)]  Weight: 96.4 kg     Past Cardiology Tests (Last 3 Years):  EKG:  ECG 12 lead 05/17/2024      ECG 12 lead 05/17/2024      Electrocardiogram, 12-lead 05/17/2024      ECG 12 lead 04/20/2024      Electrocardiogram, 12-lead PRN ACS symptoms 10/10/2023    Echo:  Transthoracic Echo (TTE) Complete 10/10/2023    Ejection Fractions:  No results found for: \"EF\"  Cath:  No results found for this or any previous visit from the past 1095 " days.    Stress Test:  No results found for this or any previous visit from the past 1095 days.    Cardiac Imaging:  No results found for this or any previous visit from the past 1095 days.      Inpatient Medications:  Scheduled medications   Medication Dose Route Frequency    amiodarone  200 mg oral Daily    apixaban  5 mg oral BID    bisacodyl  10 mg rectal Daily    budesonide  0.5 mg nebulization BID    docusate sodium  100 mg oral BID    [Held by provider] famotidine  10 mg oral Daily    folic acid  1 mg oral Daily    furosemide  40 mg oral Daily    insulin lispro  0-5 Units subcutaneous Before meals & nightly    lacosamide  100 mg oral BID    metoprolol succinate XL  25 mg oral Daily    midodrine  10 mg oral TID    nystatin  1 Application Topical BID    oxygen   inhalation Continuous - Inhalation    pantoprazole  40 mg oral Daily before breakfast    Or    pantoprazole  40 mg intravenous Daily before breakfast    polyethylene glycol  17 g oral Daily    potassium chloride CR  20 mEq oral Once    rosuvastatin  10 mg oral Nightly    sacubitriL-valsartan  0.5 tablet oral BID    [Held by provider] SITagliptin phosphate  50 mg oral Daily    [START ON 5/29/2024] spironolactone  12.5 mg oral Daily    tamsulosin  0.4 mg oral Daily before breakfast     PRN medications   Medication    acetaminophen    dextrose    dextrose    glucagon    glucagon    ipratropium-albuteroL    metoprolol     Continuous Medications   Medication Dose Last Rate       Physical Exam:  HEENT PERRLA neck supple lungs bilateral rales appreciated posteriorly heart S1-S2 present no murmurs abdomen soft and nontender EXTR shows no pedal edema     Assessment/Plan   #1 shortness of breath secondary to fluid overload.  Today I will start the patient on Lasix 40 mg IV every 12 hours for 4 doses with prior supplementation of 20 mg 12 hours apart for 4 doses.  Will reevaluate as I send dose and then start patient on guideline directed medical therapy for stage  IV cardiomyopathy  Dressing Type Transparent 05/27/24 2038   Line Status Saline locked;Flushed;No blood return 05/27/24 2038   Dressing Status Clean;Dry;Occlusive 05/27/24 2038   Number of days: 6       Code Status:  Full Code    I spent 20 minutes in the professional and overall care of this patient.        Simeon Lucas MD

## 2024-05-28 NOTE — DISCHARGE INSTRUCTIONS
If you have any questions, please contact Dr. Sims's office at [phone].    Oxygen 2 liters nasal cannula.  Maintain SpO2 > 92%.    Use incentive spirometer 10 x / hour while awake.    Knee high compression stockings during day, remove at night.    2 view chest x-ray in 2 weeks Dx: Pleural effusion. Results to Dr. Huston.

## 2024-05-28 NOTE — CARE PLAN
Problem: Respiratory  Goal: Minimal/no exertional discomfort or dyspnea this shift  Outcome: Progressing  Goal: Verbalize decreased shortness of breath this shift  Outcome: Progressing  Goal: Wean oxygen to maintain O2 saturation per order/standard this shift  Outcome: Progressing

## 2024-05-28 NOTE — DISCHARGE SUMMARY
Discharge Diagnosis  Fall vs syncopal episode  Altered mental status - metabolic encephalopathy  Seizure disorder   History of CVA  Generalized weakness  Acute hypoxic respiratory failure - improved  COVID-19  Low grade fever resolved  Status post PEA arrest post anaphylactic reaction - Lidocaine  Atrial fibrillation - rapid ventricular rate intermittent  Oral anticoagulation  Hypotension asymptomatic   Hyperlipidemia  Acute on chronic systolic congestive heart failure - improved  Hypokalemia resolved  Acute kidney injury on chronic kidney disease stable  Hypophosphatemia - resolved  Hypocalcemia -- hypoalbuminemia   Protein calorie malnutrition  Type 2 diabetes mellitus with hypoglycemia -- resolved  Tobacco use  Microcytic anemia  Thrombocytopenia - improved  Elevated AST asymptomatic  Hyperbilirubinemia asymptomatic - improved  Constipation resolved  Dysphagia  Musculoskeletal pain    Issues Requiring Follow-Up  Above    Discharge Meds     Your medication list        START taking these medications        Instructions Last Dose Given Next Dose Due   acetaminophen 325 mg tablet  Commonly known as: Tylenol      Take 2 tablets (650 mg) by mouth every 6 hours if needed for mild pain (1 - 3).       bisacodyl 10 mg suppository  Commonly known as: Dulcolax      Insert 1 suppository (10 mg) into the rectum once daily as needed for constipation.       budesonide 0.5 mg/2 mL nebulizer solution  Commonly known as: Pulmicort      Take 2 mL (0.5 mg) by nebulization 2 times a day. Rinse mouth with water after use to reduce aftertaste and incidence of candidiasis. Do not swallow.       docusate sodium 100 mg capsule  Commonly known as: Colace      Take 1 capsule (100 mg) by mouth 2 times a day.       furosemide 40 mg tablet  Commonly known as: Lasix      Take 1 tablet (40 mg) by mouth once daily.       glucagon 1 mg injection  Commonly known as: Glucagen      Inject 1 mg into the muscle every 15 minutes if needed for low blood  sugar - see comments (For blood glucose less than or equal to 40 mg/dL and no IV access).       insulin lispro 100 unit/mL injection  Commonly known as: HumaLOG      Inject 0-0.05 mL (0-5 Units) under the skin 4 times a day before meals. Take as directed per insulin instructions. Per sliding scale. Provide sliding scale on discharge.       ipratropium-albuteroL 0.5-2.5 mg/3 mL nebulizer solution  Commonly known as: Duo-Neb      Take 3 mL by nebulization every 4 hours if needed for wheezing or shortness of breath.       metoprolol succinate XL 25 mg 24 hr tablet  Commonly known as: Toprol-XL  Start taking on: May 29, 2024      Take 1 tablet (25 mg) by mouth once daily. Do not crush or chew.       oxygen gas therapy  Commonly known as: O2      Inhale 1 each every 12 hours.       polyethylene glycol 17 gram packet  Commonly known as: Glycolax, Miralax  Start taking on: May 29, 2024      Take 17 g by mouth once daily.       potassium chloride CR 20 mEq ER tablet  Commonly known as: Klor-Con M20      Take 1 tablet (20 mEq) by mouth 1 time for 1 dose. Do not crush or chew.       sacubitriL-valsartan 24-26 mg tablet  Commonly known as: Entresto      Take 0.5 tablets by mouth 2 times a day. Hold for systolic bp < 90 mm hg.       spironolactone 25 mg tablet  Commonly known as: Aldactone  Start taking on: May 29, 2024      Take 0.5 tablets (12.5 mg) by mouth once daily. Do not fill before May 29, 2024.              CONTINUE taking these medications        Instructions Last Dose Given Next Dose Due   amiodarone 200 mg tablet  Commonly known as: Pacerone      Take 1 tablet (200 mg) by mouth once daily. Do not start before November 2, 2023.       Eliquis 5 mg tablet  Generic drug: apixaban           famotidine 10 mg tablet  Commonly known as: Pepcid      Take 1 tablet (10 mg) by mouth once daily.       folic acid 1 mg tablet  Commonly known as: Folvite      Take 1 tablet (1 mg) by mouth once daily.       lacosamide 100 mg  tablet  Commonly known as: Vimpat      Take 1 tablet (100 mg) by mouth 2 times a day.       midodrine 10 mg tablet  Commonly known as: Proamatine      Take 1 tablet (10 mg) by mouth 3 times a day with meals.       nystatin 100,000 unit/gram powder  Commonly known as: Mycostatin      Apply 1 Application topically 2 times a day.       rosuvastatin 10 mg tablet  Commonly known as: Crestor           tamsulosin 0.4 mg 24 hr capsule  Commonly known as: Flomax      Take 1 capsule (0.4 mg) by mouth once daily in the morning. Take before meals.              STOP taking these medications      SITagliptin phosphate 50 mg tablet  Commonly known as: Januvia                  Where to Get Your Medications        Information about where to get these medications is not yet available    Ask your nurse or doctor about these medications  acetaminophen 325 mg tablet  bisacodyl 10 mg suppository  budesonide 0.5 mg/2 mL nebulizer solution  docusate sodium 100 mg capsule  furosemide 40 mg tablet  glucagon 1 mg injection  insulin lispro 100 unit/mL injection  ipratropium-albuteroL 0.5-2.5 mg/3 mL nebulizer solution  metoprolol succinate XL 25 mg 24 hr tablet  oxygen gas therapy  polyethylene glycol 17 gram packet  potassium chloride CR 20 mEq ER tablet  sacubitriL-valsartan 24-26 mg tablet  spironolactone 25 mg tablet         Test Results Pending At Discharge  Pending Labs       No current pending labs.            Hospital Course  This is a very pleasant 78-year-old  male presenting to the emergency department with fall versus syncopal episode.  Per the record, he had a gradual onset, progressive fatigue and weakness and generalized body aches.  In the emergency department, initial workup was done.  There was concern for urinary incontinence due to seizures.  In the emergency department, initial workup was done.  CT head and spine were unremarkable.  He was diagnosed with acute kidney injury and COVID-19.  He was hypotensive.  He  received IV fluid boluses though remained hypotensive.  It was decided to insert a central line and start vasopressors.  After placement, he developed anaphylactic reaction - likely to lidocaine - and went into PEA arrest.  ROSC was achieved after BLS and epinephrine.  He was intubated treated with Benadryl and Solu-Medrol and started on Levophed.  He was admitted to the intensive care unit.  He was treated with COVID-19 with IV Remdesivir.  He was evaluated and treated by Infectious disease.  He was extubated.  He was transferred to the stepdown unit.  He was evaluated and treated by Pulmonology for acute hypoxic respiratory failure.  He was evaluated and treated by Cardiology for atrial fibrillation, hypertension and systolic congestive heart failure.  He was treated with DuoNeb treatments, Pulmicort and oxygen.  He was treated with diuretics for acute on chronic systolic congestive heart failure, pulmonary edema.  His respiratory status improved.  His overall condition improved.  He was treated for constipation.  His overall condition improved.  He was evaluated physical, occupational therapy - recommend moderate intensity level of continued care.  He is stable for discharge to [facility] skilled nursing rehabilitation.    Pertinent Physical Exam At Time of Discharge  See physical examination    Outpatient Follow-Up  No future appointments.      Rosa Champion, ELENITA-CNP

## 2024-05-28 NOTE — CARE PLAN
Problem: Respiratory  Goal: Clear secretions with interventions this shift  Outcome: Progressing  Goal: Minimize anxiety/maximize coping throughout shift  Outcome: Progressing  Goal: Minimal/no exertional discomfort or dyspnea this shift  Outcome: Progressing  Goal: Patent airway maintained this shift  Outcome: Progressing  Goal: Tolerate mechanical ventilation evidenced by VS/agitation level this shift  Outcome: Met  Goal: Tolerate pulmonary toileting this shift  Outcome: Progressing  Goal: Verbalize decreased shortness of breath this shift  Outcome: Progressing  Goal: Wean oxygen to maintain O2 saturation per order/standard this shift  Outcome: Progressing  Goal: Increase self care and/or family involvement in next 24 hours  Outcome: Progressing

## 2024-05-28 NOTE — PROGRESS NOTES
FOLLOWUP FOR: Respiratory failure and hypoxia    SUBJECTIVE  Nonproductive cough.  Still tachycardic intermittently.  On 3 L nasal cannula    PHYSICAL EXAM        5/27/2024    11:00 AM 5/27/2024     3:00 PM 5/27/2024     7:11 PM 5/28/2024    12:15 AM 5/28/2024     3:15 AM 5/28/2024     5:46 AM 5/28/2024     7:00 AM   Vitals   Systolic 98 84 91 75 87  91   Diastolic 50 63 70 56 63  65   Heart Rate 121 107        Temp 36.3 °C (97.3 °F) 36.1 °C (97 °F) 36.8 °C (98.2 °F) 36.8 °C (98.2 °F) 37.1 °C (98.8 °F)  36.4 °C (97.5 °F)   Resp 16 16 18 16 16  14   Weight (lb)     212.52 212.52    BMI     28.04 kg/m2 28.04 kg/m2    BSA (m2)     2.23 m2 2.23 m2        Vital signs reviewed   NAD, awake and alert, chronically ill, coughing, O2   Lungs Symmetric excursions.  Auscultation - diminished but clear, no wheezing/rales/rhonchi.     Cor RSR No murmur, rub, gallop   Abd soft and nontender, no rebound or distention, no HS megaly   Ext no CCE   Neuro no focal deficits.  moves all extremities symmetrically.  speech normal.  affect normal    LABS    Serum chemistries were reviewed.  Creatinine improved to 1.3.  Mild elevation of the bicarbonate consistent with a metabolic alkalosis    XRAYS    No x-rays today    ASSESSMENT:    Acute respiratory failure with hypoxia  Pulmonary edema  Acute on chronic congestive heart failure/systolic  Recent COVID-19 infection    PLAN    Continue supplemental oxygen  Amiodarone, Eliquis  Prophylaxis  OT PT  Follow-up two-view chest x-ray  Anticipate discharge to skilled nursing later today      Travis Huston MD, [facility]

## 2024-08-16 NOTE — NURSING NOTE
Assume care of patient. Pt lying quietly in bed watching tv. Pt denies any pain or needs at this time. Call light within reach. Bed alarm engaged. Continue with plan of care.   MOLECULAR PCR